# Patient Record
Sex: FEMALE | Race: WHITE | Employment: OTHER | URBAN - METROPOLITAN AREA
[De-identification: names, ages, dates, MRNs, and addresses within clinical notes are randomized per-mention and may not be internally consistent; named-entity substitution may affect disease eponyms.]

---

## 2018-07-19 ENCOUNTER — HOSPITAL ENCOUNTER (INPATIENT)
Age: 83
LOS: 1 days | Discharge: HOME | DRG: 149 | End: 2018-07-22
Attending: EMERGENCY MEDICINE | Admitting: INTERNAL MEDICINE
Payer: MEDICARE

## 2018-07-19 ENCOUNTER — APPOINTMENT (OUTPATIENT)
Dept: CT IMAGING | Age: 83
DRG: 149 | End: 2018-07-19
Attending: EMERGENCY MEDICINE
Payer: MEDICARE

## 2018-07-19 ENCOUNTER — APPOINTMENT (OUTPATIENT)
Dept: MRI IMAGING | Age: 83
DRG: 149 | End: 2018-07-19
Attending: INTERNAL MEDICINE
Payer: MEDICARE

## 2018-07-19 DIAGNOSIS — R42 DIZZINESS: ICD-10-CM

## 2018-07-19 DIAGNOSIS — R27.0 ATAXIA: ICD-10-CM

## 2018-07-19 DIAGNOSIS — R42 LIGHTHEADEDNESS: Primary | ICD-10-CM

## 2018-07-19 PROBLEM — R53.1 WEAKNESS: Status: ACTIVE | Noted: 2018-07-19

## 2018-07-19 LAB
ALBUMIN SERPL-MCNC: 3.7 G/DL (ref 3.5–5)
ALBUMIN/GLOB SERPL: 1.1 {RATIO} (ref 1.1–2.2)
ALP SERPL-CCNC: 68 U/L (ref 45–117)
ALT SERPL-CCNC: 23 U/L (ref 12–78)
ANION GAP SERPL CALC-SCNC: 7 MMOL/L (ref 5–15)
APPEARANCE UR: CLEAR
AST SERPL-CCNC: 21 U/L (ref 15–37)
ATRIAL RATE: 61 BPM
BACTERIA URNS QL MICRO: NEGATIVE /HPF
BASOPHILS # BLD: 0 K/UL (ref 0–0.1)
BASOPHILS NFR BLD: 0 % (ref 0–1)
BILIRUB SERPL-MCNC: 0.3 MG/DL (ref 0.2–1)
BILIRUB UR QL: NEGATIVE
BUN SERPL-MCNC: 12 MG/DL (ref 6–20)
BUN/CREAT SERPL: 13 (ref 12–20)
CALCIUM SERPL-MCNC: 8.6 MG/DL (ref 8.5–10.1)
CALCULATED P AXIS, ECG09: 60 DEGREES
CALCULATED R AXIS, ECG10: -39 DEGREES
CALCULATED T AXIS, ECG11: 24 DEGREES
CHLORIDE SERPL-SCNC: 96 MMOL/L (ref 97–108)
CO2 SERPL-SCNC: 27 MMOL/L (ref 21–32)
COLOR UR: NORMAL
CREAT SERPL-MCNC: 0.96 MG/DL (ref 0.55–1.02)
DIAGNOSIS, 93000: NORMAL
DIFFERENTIAL METHOD BLD: NORMAL
EOSINOPHIL # BLD: 0.1 K/UL (ref 0–0.4)
EOSINOPHIL NFR BLD: 1 % (ref 0–7)
EPITH CASTS URNS QL MICRO: NORMAL /LPF
ERYTHROCYTE [DISTWIDTH] IN BLOOD BY AUTOMATED COUNT: 12.4 % (ref 11.5–14.5)
GLOBULIN SER CALC-MCNC: 3.5 G/DL (ref 2–4)
GLUCOSE BLD STRIP.AUTO-MCNC: 91 MG/DL (ref 65–100)
GLUCOSE SERPL-MCNC: 154 MG/DL (ref 65–100)
GLUCOSE UR STRIP.AUTO-MCNC: NEGATIVE MG/DL
HCT VFR BLD AUTO: 38.6 % (ref 35–47)
HGB BLD-MCNC: 12.8 G/DL (ref 11.5–16)
HGB UR QL STRIP: NEGATIVE
HYALINE CASTS URNS QL MICRO: NORMAL /LPF (ref 0–5)
IMM GRANULOCYTES # BLD: 0 K/UL (ref 0–0.04)
IMM GRANULOCYTES NFR BLD AUTO: 0 % (ref 0–0.5)
KETONES UR QL STRIP.AUTO: NEGATIVE MG/DL
LEUKOCYTE ESTERASE UR QL STRIP.AUTO: NEGATIVE
LYMPHOCYTES # BLD: 2.2 K/UL (ref 0.8–3.5)
LYMPHOCYTES NFR BLD: 37 % (ref 12–49)
MAGNESIUM SERPL-MCNC: 1.9 MG/DL (ref 1.6–2.4)
MCH RBC QN AUTO: 29.6 PG (ref 26–34)
MCHC RBC AUTO-ENTMCNC: 33.2 G/DL (ref 30–36.5)
MCV RBC AUTO: 89.1 FL (ref 80–99)
MONOCYTES # BLD: 0.5 K/UL (ref 0–1)
MONOCYTES NFR BLD: 8 % (ref 5–13)
NEUTS SEG # BLD: 3.1 K/UL (ref 1.8–8)
NEUTS SEG NFR BLD: 53 % (ref 32–75)
NITRITE UR QL STRIP.AUTO: NEGATIVE
NRBC # BLD: 0 K/UL (ref 0–0.01)
NRBC BLD-RTO: 0 PER 100 WBC
P-R INTERVAL, ECG05: 208 MS
PH UR STRIP: 6.5 [PH] (ref 5–8)
PLATELET # BLD AUTO: 304 K/UL (ref 150–400)
PMV BLD AUTO: 9.6 FL (ref 8.9–12.9)
POTASSIUM SERPL-SCNC: 3.9 MMOL/L (ref 3.5–5.1)
PROT SERPL-MCNC: 7.2 G/DL (ref 6.4–8.2)
PROT UR STRIP-MCNC: NEGATIVE MG/DL
Q-T INTERVAL, ECG07: 422 MS
QRS DURATION, ECG06: 84 MS
QTC CALCULATION (BEZET), ECG08: 424 MS
RBC # BLD AUTO: 4.33 M/UL (ref 3.8–5.2)
RBC #/AREA URNS HPF: NORMAL /HPF (ref 0–5)
SERVICE CMNT-IMP: NORMAL
SODIUM SERPL-SCNC: 130 MMOL/L (ref 136–145)
SP GR UR REFRACTOMETRY: 1.01 (ref 1–1.03)
UR CULT HOLD, URHOLD: NORMAL
UROBILINOGEN UR QL STRIP.AUTO: 0.2 EU/DL (ref 0.2–1)
VENTRICULAR RATE, ECG03: 61 BPM
WBC # BLD AUTO: 5.9 K/UL (ref 3.6–11)
WBC URNS QL MICRO: NORMAL /HPF (ref 0–4)

## 2018-07-19 PROCEDURE — 74011250636 HC RX REV CODE- 250/636: Performed by: INTERNAL MEDICINE

## 2018-07-19 PROCEDURE — 93005 ELECTROCARDIOGRAM TRACING: CPT

## 2018-07-19 PROCEDURE — 74011250637 HC RX REV CODE- 250/637: Performed by: INTERNAL MEDICINE

## 2018-07-19 PROCEDURE — 82962 GLUCOSE BLOOD TEST: CPT

## 2018-07-19 PROCEDURE — 70450 CT HEAD/BRAIN W/O DYE: CPT

## 2018-07-19 PROCEDURE — 93880 EXTRACRANIAL BILAT STUDY: CPT

## 2018-07-19 PROCEDURE — 70551 MRI BRAIN STEM W/O DYE: CPT

## 2018-07-19 PROCEDURE — 83735 ASSAY OF MAGNESIUM: CPT | Performed by: EMERGENCY MEDICINE

## 2018-07-19 PROCEDURE — 70544 MR ANGIOGRAPHY HEAD W/O DYE: CPT

## 2018-07-19 PROCEDURE — 99285 EMERGENCY DEPT VISIT HI MDM: CPT

## 2018-07-19 PROCEDURE — 36415 COLL VENOUS BLD VENIPUNCTURE: CPT | Performed by: EMERGENCY MEDICINE

## 2018-07-19 PROCEDURE — 80053 COMPREHEN METABOLIC PANEL: CPT | Performed by: EMERGENCY MEDICINE

## 2018-07-19 PROCEDURE — 81001 URINALYSIS AUTO W/SCOPE: CPT | Performed by: EMERGENCY MEDICINE

## 2018-07-19 PROCEDURE — 85025 COMPLETE CBC W/AUTO DIFF WBC: CPT | Performed by: EMERGENCY MEDICINE

## 2018-07-19 PROCEDURE — 99218 HC RM OBSERVATION: CPT

## 2018-07-19 PROCEDURE — 74011250637 HC RX REV CODE- 250/637: Performed by: EMERGENCY MEDICINE

## 2018-07-19 RX ORDER — BUTALBITAL, ACETAMINOPHEN AND CAFFEINE 300; 40; 50 MG/1; MG/1; MG/1
1 CAPSULE ORAL
COMMUNITY
End: 2019-10-31 | Stop reason: CLARIF

## 2018-07-19 RX ORDER — SODIUM CHLORIDE 0.9 % (FLUSH) 0.9 %
5-10 SYRINGE (ML) INJECTION AS NEEDED
Status: DISCONTINUED | OUTPATIENT
Start: 2018-07-19 | End: 2018-07-22 | Stop reason: HOSPADM

## 2018-07-19 RX ORDER — ATORVASTATIN CALCIUM 20 MG/1
40 TABLET, FILM COATED ORAL
Status: DISCONTINUED | OUTPATIENT
Start: 2018-07-19 | End: 2018-07-20

## 2018-07-19 RX ORDER — SODIUM CHLORIDE 0.9 % (FLUSH) 0.9 %
5-10 SYRINGE (ML) INJECTION EVERY 8 HOURS
Status: DISCONTINUED | OUTPATIENT
Start: 2018-07-19 | End: 2018-07-22 | Stop reason: HOSPADM

## 2018-07-19 RX ORDER — CALCIUM CARBONATE 200(500)MG
1 TABLET,CHEWABLE ORAL
COMMUNITY
End: 2020-11-03

## 2018-07-19 RX ORDER — LANOLIN ALCOHOL/MO/W.PET/CERES
3 CREAM (GRAM) TOPICAL
Status: DISCONTINUED | OUTPATIENT
Start: 2018-07-19 | End: 2018-07-22 | Stop reason: HOSPADM

## 2018-07-19 RX ORDER — DIAZEPAM 5 MG/1
5 TABLET ORAL
Status: DISCONTINUED | OUTPATIENT
Start: 2018-07-19 | End: 2018-07-22 | Stop reason: HOSPADM

## 2018-07-19 RX ORDER — DICYCLOMINE HYDROCHLORIDE 10 MG/1
10 CAPSULE ORAL
Status: DISCONTINUED | OUTPATIENT
Start: 2018-07-19 | End: 2018-07-22 | Stop reason: HOSPADM

## 2018-07-19 RX ORDER — CLOPIDOGREL BISULFATE 75 MG/1
75 TABLET ORAL
Status: DISCONTINUED | OUTPATIENT
Start: 2018-07-19 | End: 2018-07-22 | Stop reason: HOSPADM

## 2018-07-19 RX ORDER — ENOXAPARIN SODIUM 100 MG/ML
40 INJECTION SUBCUTANEOUS
Status: DISCONTINUED | OUTPATIENT
Start: 2018-07-19 | End: 2018-07-22 | Stop reason: HOSPADM

## 2018-07-19 RX ORDER — FLUTICASONE PROPIONATE 50 MCG
1 SPRAY, SUSPENSION (ML) NASAL DAILY
COMMUNITY
End: 2021-04-06

## 2018-07-19 RX ORDER — FLUTICASONE PROPIONATE 50 MCG
2 SPRAY, SUSPENSION (ML) NASAL DAILY
Status: DISCONTINUED | OUTPATIENT
Start: 2018-07-20 | End: 2018-07-22 | Stop reason: HOSPADM

## 2018-07-19 RX ORDER — DICYCLOMINE HYDROCHLORIDE 10 MG/1
10 CAPSULE ORAL
COMMUNITY
End: 2020-11-03 | Stop reason: SDUPTHER

## 2018-07-19 RX ORDER — ONDANSETRON 4 MG/1
4 TABLET, ORALLY DISINTEGRATING ORAL
Status: DISCONTINUED | OUTPATIENT
Start: 2018-07-19 | End: 2018-07-22 | Stop reason: HOSPADM

## 2018-07-19 RX ORDER — ACETAMINOPHEN 325 MG/1
650 TABLET ORAL
Status: DISCONTINUED | OUTPATIENT
Start: 2018-07-19 | End: 2018-07-22 | Stop reason: HOSPADM

## 2018-07-19 RX ORDER — ASPIRIN 325 MG
325 TABLET ORAL ONCE
Status: COMPLETED | OUTPATIENT
Start: 2018-07-19 | End: 2018-07-19

## 2018-07-19 RX ORDER — NALOXONE HYDROCHLORIDE 0.4 MG/ML
0.4 INJECTION, SOLUTION INTRAMUSCULAR; INTRAVENOUS; SUBCUTANEOUS AS NEEDED
Status: DISCONTINUED | OUTPATIENT
Start: 2018-07-19 | End: 2018-07-22 | Stop reason: HOSPADM

## 2018-07-19 RX ORDER — ACETAMINOPHEN 650 MG/1
650 SUPPOSITORY RECTAL
Status: DISCONTINUED | OUTPATIENT
Start: 2018-07-19 | End: 2018-07-22 | Stop reason: HOSPADM

## 2018-07-19 RX ORDER — BUTALBITAL, ACETAMINOPHEN AND CAFFEINE 50; 325; 40 MG/1; MG/1; MG/1
1 TABLET ORAL
Status: DISCONTINUED | OUTPATIENT
Start: 2018-07-19 | End: 2018-07-22 | Stop reason: HOSPADM

## 2018-07-19 RX ORDER — ATENOLOL 25 MG/1
25 TABLET ORAL
Status: DISCONTINUED | OUTPATIENT
Start: 2018-07-19 | End: 2018-07-22 | Stop reason: HOSPADM

## 2018-07-19 RX ADMIN — CLOPIDOGREL BISULFATE 75 MG: 75 TABLET ORAL at 20:31

## 2018-07-19 RX ADMIN — ATENOLOL 25 MG: 25 TABLET ORAL at 20:30

## 2018-07-19 RX ADMIN — ENOXAPARIN SODIUM 40 MG: 40 INJECTION SUBCUTANEOUS at 20:31

## 2018-07-19 RX ADMIN — ASPIRIN 325 MG: 325 TABLET ORAL at 16:33

## 2018-07-19 RX ADMIN — DIAZEPAM 5 MG: 5 TABLET ORAL at 20:31

## 2018-07-19 RX ADMIN — ATORVASTATIN CALCIUM 40 MG: 20 TABLET, FILM COATED ORAL at 22:43

## 2018-07-19 RX ADMIN — ACETAMINOPHEN 650 MG: 325 TABLET ORAL at 22:43

## 2018-07-19 RX ADMIN — ONDANSETRON 4 MG: 4 TABLET, ORALLY DISINTEGRATING ORAL at 22:44

## 2018-07-19 RX ADMIN — Medication 10 ML: at 22:00

## 2018-07-19 RX ADMIN — MELATONIN TAB 3 MG 3 MG: 3 TAB at 22:43

## 2018-07-19 NOTE — ACP (ADVANCE CARE PLANNING)
Advance Care Planning (ACP) Provider Note - Comprehensive      Date of ACP Conversation: 07/19/18    Persons included in Conversation:  patient and family  Length of ACP Conversation in minutes:  15 minutes     Authorized Decision Maker (if patient is incapable of making informed decisions): This person is: patient's son, who is not available. Also present during this discussion was pt's cousin, who is an oncology nurse at [address] [place] [address] for ALL Patients with Decision Making Capacity:   Importance of advance care planning, including choosing a healthcare agent to communicate patient's healthcare decisions if patient lost the ability to make decisions, such as after a sudden illness  Exploration of values, goals, and preferences if recovery is not expected, even with continued medical treatment in the event of: cardiopulmonary arrest, imminent death  \"In these circumstances, what matters most to you? \"  \"What, if any, treatments would you want to avoid? \" Patient is not 979% certain that she would want heroic measures for resuscitation in the event of cardiopulmonary arrest, including chest compressions, defibrillation, intubation/mechanical ventilation. She will discuss it further with her cousin    Review of Existing Advance Directive:  N/A. Pt does have one but not available for review     For Serious or Chronic Illness:  Understanding of medical condition    Understanding of CPR, goals and expected outcomes, benefits and burdens discussed. Information on CPR success rates provided (e.g. for hospitalized adults, the average survival rate to discharge after cardiac arrest is about 17%. Many factors lower a patients chance of survival, including advanced age, performance status, malignancy, and presence of multiple comorbidities); Individual asked to communicate understanding of information in his/her own words.   Explored fears and concerns regarding CPR or possible outcomes     Interventions Provided:  Patient is not 023% certain that she would want heroic measures for resuscitation in the event of cardiopulmonary arrest, including chest compressions, defibrillation, intubation/mechanical ventilation. She will discuss it further with her cousin. As such, code status is FULL CODE for now. She declines palliative care consult.

## 2018-07-19 NOTE — IP AVS SNAPSHOT
[address] 
[phone] Patient: Amanda Mejia MRN: [id number] ECV:[id number]/96/[id number] A check arabella indicates which time of day the medication should be taken. My Medications CONTINUE taking these medications Instructions Each Dose to Equal  
 Morning Noon Evening Bedtime  
 atenolol 25 mg tablet Commonly known as:  TENORMIN Your last dose was:  7/21/18 at 5:42 PM  
   
 Take 25 mg by mouth daily (after dinner). 25 mg  
    
   
   
   
  
 calcium carbonate 200 mg calcium (500 mg) Chew Commonly known as:  TUMS Take 1 Tab by mouth four (4) times daily as needed. 1 Tab  
    
   
   
   
  
 diazePAM 5 mg tablet Commonly known as:  VALIUM Your last dose was:  7/21/18 at 8:50 PM  
   
 Take 5 mg by mouth nightly as needed. 5 mg  
    
   
   
   
  
 dicyclomine 10 mg capsule Commonly known as:  BENTYL Your last dose was:  7/22/18 at 8:50 PM  
   
 Take 10 mg by mouth three (3) times daily as needed (abdominal cramping). 10 mg  
    
   
   
   
  
 FIORICET -40 mg per capsule Generic drug:  butalbital-acetaminophen-caff Take 1 Cap by mouth every six (6) hours as needed for Pain. 1 Cap FLONASE ALLERGY RELIEF 50 mcg/actuation nasal spray Generic drug:  fluticasone Your last dose was:  7/22/18at 9:00 AM  
   
 2 Sprays by Both Nostrils route daily. 2 Spray  
    
   
   
   
  
 ondansetron hcl 4 mg tablet Commonly known as:  Melvi Huston Take 1 Tab by mouth every eight (8) hours as needed for Nausea. 4 mg PLAVIX 75 mg Tab Generic drug:  clopidogrel Your last dose was:  7/21/18 at 5:42 PM  
   
 Take 75 mg by mouth daily (after dinner).   
 75 mg

## 2018-07-19 NOTE — ED NOTES
Patient up to bedside commode with assist, no difficulty noted. Hospitalist at bedside evaluating patient at this time.

## 2018-07-19 NOTE — ROUTINE PROCESS
TRANSFER - OUT REPORT:    Verbal report given to Anders Gray RN (name) on Nithin Cruz  being transferred to 5th floor(unit) for routine progression of care       Report consisted of patients Situation, Background, Assessment and   Recommendations(SBAR). Information from the following report(s) ED Summary and MAR was reviewed with the receiving nurse. Lines:   Peripheral IV 07/19/18 Left Forearm (Active)   Site Assessment Clean, dry, & intact 7/19/2018  1:28 PM   Phlebitis Assessment 0 7/19/2018  1:28 PM   Infiltration Assessment 0 7/19/2018  1:28 PM   Dressing Status Clean, dry, & intact 7/19/2018  1:28 PM   Dressing Type Transparent 7/19/2018  1:28 PM   Hub Color/Line Status Pink 7/19/2018  1:28 PM        Opportunity for questions and clarification was provided.       Patient transported with:   [employer]

## 2018-07-19 NOTE — H&P
[facility] [address] [facility], [facility] [address] 
[phone] Hospitalist Admission Note NAME:  Roselia Campbell :   1935 MRN:  [id number] PCP:  Jim Resendez MD  
 
Date/Time:  2018 6:01 PM 
 
  
  
Subjective: CHIEF COMPLAINT: dizziness HISTORY OF PRESENT ILLNESS:    
Ms. Brenda Luciano is a 81 y.o. female w/ hx of TIA, pre-diabetes, breast CA, HTN who presents with dizziness. Ongoing x 1 week, intermittent, moderate, worsening. Last night her 13year-old granddaughter (whom she serves as legal guardian and primary care provider) noticed slurring of speech. Pt did not think she had slurred speech. Denied facial droop, numbness, visual impairment, weakness. Does report chronic headaches. ED workup showed hyponatremia. Head CT negative. Past Medical History:  
Diagnosis Date  Anxiety  Breast cancer ([facility] [address]   
 s/p lumpectomy  Diabetes mellitus ([facility] [address] diet-controlled  Hx-TIA (transient ischemic attack)  Hypertension  Hyponatremia  Melanoma ([facility] [address]   
 s/p resection, right cheek Past Surgical History:  
Procedure Laterality Date  HX BACK SURGERY    
 HX BREAST LUMPECTOMY    
 right  HX CHOLECYSTECTOMY  HX HYSTERECTOMY  HX MALIGNANT SKIN LESION EXCISION    HX MENISCECTOMY  HX OTHER SURGICAL    
 pineda's neuroma bilaterally Social History Substance Use Topics  Smoking status: Never Smoker  Smokeless tobacco: Not on file  Alcohol use No  
  
 
Family History Problem Relation Age of Onset  Heart Failure Mother  Other Father   
  aortic aneurysm  Diabetes Son  Immunodeficiency Son   
  post kidney and pancreas transplant Allergies Allergen Reactions  Sulfa (Sulfonamide Antibiotics) Nausea and Vomiting  Codeine Other (comments) Stomach cramping  Levaquin [Levofloxacin] Nausea and Vomiting   Pt can not take PO due to vomiting. Prior to Admission medications Medication Sig Start Date End Date Taking? Authorizing Provider  
dicyclomine (BENTYL) 10 mg capsule Take 10 mg by mouth three (3) times daily as needed (abdominal cramping). Yes Historical Provider  
butalbital-acetaminophen-caff (FIORICET) -40 mg per capsule Take 1 Cap by mouth every six (6) hours as needed for Pain. Yes Historical Provider  
fluticasone (FLONASE ALLERGY RELIEF) 50 mcg/actuation nasal spray 2 Sprays by Both Nostrils route daily. Yes Historical Provider  
calcium carbonate (TUMS) 200 mg calcium (500 mg) chew Take 1 Tab by mouth four (4) times daily as needed. Yes Historical Provider  
ondansetron hcl (ZOFRAN) 4 mg tablet Take 1 Tab by mouth every eight (8) hours as needed for Nausea. 3/25/13  Yes Shahriar Medina PA-C  
atenolol (TENORMIN) 25 mg tablet Take 25 mg by mouth daily (after dinner). Yes Historical Provider  
clopidogrel (PLAVIX) 75 mg tablet Take 75 mg by mouth daily (after dinner). Yes Historical Provider  
diazepam (VALIUM) 5 mg tablet Take 5 mg by mouth nightly as needed. Yes Historical Provider Review of Systems: 
(bold if positive, if negative) Gen:  Eyes:  ENT:  CVS:  dizzinessPulm:  GI:   
:   
MS:  Skin:  Psych:  Endo:   
Hem:  Renal:   
Neuro:  headache Objective: VITALS:   
Vital signs reviewed; most recent are: 
 
Visit Vitals  /57  Pulse 70  Temp 98.2 °F (36.8 °C)  Resp 20  
 Ht 5' 1\" (1.549 m)  Wt 52.2 kg (115 lb)  SpO2 97%  BMI 21.73 kg/m2 SpO2 Readings from Last 6 Encounters:  
07/19/18 97% 03/25/13 93% 03/12/13 95% 03/08/13 97% No intake or output data in the 24 hours ending 07/19/18 1801 Exam:  
 
Physical Exam: 
 
Gen:  Well-developed, well-nourished, in no acute distress HEENT:  No scleral icterus, PERRL, hearing intact to voice, moist mucous membranes Neck:  Supple, without masses. Thyroid non-tender Resp:  No accessory muscle use. CTAB without wheezing, rales, rhonchi 
Card: RRR. Normal S1 and S2 without murmurs, rubs, or gallops. No peripheral lower extremity edema. No JVD. Peripheral pulses in tact. Abd:  Normoactive bowel sounds. Soft, non-tender, non-distended. No rebound, no guarding. No appreciable hepatosplenomegaly Lymph:  No cervical adenopathy Musc:  No cyanosis or clubbing Skin:  No rashes or ulcers; turgor intact. Cap refill ~2 secs Neuro:  Cranial nerves 3-12 in tact. No focal motor weakness or numbness, follows commands appropriately Psych:  Good insight, normal affect. Alert, oriented x 3. Answers questions appropriately Labs: 
 
Recent Labs  
   07/19/18 
 1329 WBC  5.9 HGB  12.8 HCT  38.6 PLT  304 Recent Labs  
   07/19/18 
 1329 NA  130*  
K  3.9 CL  96* CO2  27 GLU  154* BUN  12  
CREA  0.96  
CA  8.6 MG  1.9 ALB  3.7 SGOT  21 ALT  23 No components found for: [place] No results for input(s): PH, PCO2, PO2, HCO3, FIO2 in the last 72 hours. No results for input(s): INR in the last 72 hours. No lab exists for component: INREXT Lab Results Component Value Date/Time Specimen Description: NARES 03/07/2013 04:00 PM  
 Specimen Description: SPUTUM 03/06/2013 04:15 PM  
 Specimen Description: BLOOD 03/05/2013 02:45 PM  
 
Lab Results Component Value Date/Time Culture result:  03/07/2013 04:00 PM  
  MRSA NOT PRESENT Screening of patient nares for MRSA is for surveillance purposes and, if positive, to facilitate isolation considerations in high risk settings. It is not intended for automatic decolonization interventions per se as regimens are not sufficiently effective to warrant routine use. Culture result: LIGHT NORMAL RESPIRATORY GABE 03/06/2013 04:15 PM  
 Culture result: NO GROWTH 5 DAYS 03/05/2013 02:45 PM  
 
All other current labs reviewed in the computer. Imaging/Studies: 
 
Head CT IMPRESSION: No evidence of acute process. EKG: NSR, NSST changes Assessment / Plan:   
  
  Dizziness: does have history of TIAs, but no focal findings on exam. Questionable slurring of speech overnight noted by her 13year old granddaughter at home. Patient herself does endorse lots of stressors occurring at home as her granddaughter (for whom she serves as primary guardian) needs a lot of medical/psychiatric attention. Will admit under observation and start TIA/CVA workup, including MRI/MRA brain, carotid dopplers, TTE. Check orthostatics, noting one BP reading in the 28L systolic. PT/OT evaluation. Continue Plavix. Start statin, check FLP. Self-reports \"pre-diabetes\". Random BG elevated. Check FBG and A1c. Neurology consult Hyponatremia: chronic. Stable. May need to fluid restrict. Follow Na Essential hypertension, benign: checking orthostatics as above. TTE. Can continue atenolol keeping in mind permissive HTN. Watch HR on BB. Anxiety / insomnia: continue diazepam. Add melatonin Code Status: FULL. See separate ACP note Surrogate decision maker: son ED notes and lab results reviewed. Total time spent with patient: 55 Minutes Time spent in the care of this patient included reviewing records, discussing with nursing, obtaining history and examining the patient, and discussing treatment plans, with >50% time spent counseling/coordinating care Risk of deterioration: High Care Plan discussed with: ED provider, Patient, Family, Nursing Staff and >50% of time spent in counseling and coordination of care Discussed:  Code Status, Care Plan and D/C Planning Prophylaxis:  Lovenox Disposition:  Home w/Family      
___________________________________________________ Attending Physician: Janith Klinefelter, MD

## 2018-07-19 NOTE — PROGRESS NOTES
[facility]: MED RECONCILIATION    Medications added:     · Dicyclomine PRN for abdominal cramping - patient reports that she has been using this much more often recently  · Fioricet PRN  · Flonase daily   · Tums PRN for stomach acid - reports using a lot lately    Medications removed:    · Mucinex  · Lansoprazole - reports that she thought it was taken off the market, but says she would like to have a prescription for a maintenance acid reducer  · Azithromycin - old Rx    Medications adjusted:    · none    Information obtained from: patient, Erasmo Cifuentes (cousin) present with permission, medication bottles provided for review, Rx query    Significant PMH/Disease States:   Past Medical History:   Diagnosis Date    Anxiety     Breast cancer ([facility] [address] 1993    s/p lumpectomy    Diabetes mellitus ([facility] [address]     diet-controlled    Hx-TIA (transient ischemic attack)     Hypertension     Hyponatremia     Melanoma ([facility] [address] 2008    s/p resection, right cheek     Chief Complaint for this Admission:   Chief Complaint   Patient presents with    Dizziness     Allergies: Sulfa (sulfonamide antibiotics); Codeine; and Levaquin [levofloxacin]    Prior to Admission Medications:     Medication Documentation Review Audit       Reviewed by NATA TaylorD (Pharmacist) on 07/19/18 at 1730         Medication Sig Documenting Provider Last Dose Status Taking?      atenolol (TENORMIN) 25 mg tablet Take 25 mg by mouth daily (after dinner). Historical Provider 7/18/2018 dinner Active Yes    butalbital-acetaminophen-caff (FIORICET) -40 mg per capsule Take 1 Cap by mouth every six (6) hours as needed for Pain. Historical Provider 7/19/2018 Unknown time Active Yes    calcium carbonate (TUMS) 200 mg calcium (500 mg) chew Take 1 Tab by mouth four (4) times daily as needed. Historical Provider 7/19/2018 Unknown time Active Yes    clopidogrel (PLAVIX) 75 mg tablet Take 75 mg by mouth daily (after dinner).  Historical Provider 7/18/2018 dinner Active Yes diazepam (VALIUM) 5 mg tablet Take 5 mg by mouth nightly as needed. Historical Provider 7/19/2018 0200 Active Yes    dicyclomine (BENTYL) 10 mg capsule Take 10 mg by mouth three (3) times daily as needed (abdominal cramping). Historical Provider 7/19/2018 Unknown time Active Yes    fluticasone (FLONASE ALLERGY RELIEF) 50 mcg/actuation nasal spray 2 Sprays by Both Nostrils route daily. Historical Provider 7/19/2018 am Active Yes    ondansetron hcl (ZOFRAN) 4 mg tablet Take 1 Tab by mouth every eight (8) hours as needed for Nausea. Jr Dallas PA-C 7/19/2018 Unknown time Active Yes                  Thank you for the consult,  Daniel Peña

## 2018-07-19 NOTE — PROGRESS NOTES
[address] call from report from DALE Davis. Awaiting patient arrival.    65  TRANSFER - IN REPORT:    Verbal report received from Susan RN(name) on Tracy Marshall  being received from ED(unit) for routine progression of care      Report consisted of patients Situation, Background, Assessment and   Recommendations(SBAR). Information from the following report(s) SBAR, Kardex, MAR, Recent Results and Cardiac Rhythm NSR was reviewed with the receiving nurse. Opportunity for questions and clarification was provided. Assessment completed upon patients arrival to unit and care assumed. Patient arrived in room. Patient requesting food. Cousin at bedside. 1835  MRI checklist completed. MRI tech Weston Diaz notified. Patient is requesting valium prior to test. Oncoming nurse and MRI notified. \      1900  Bedside and Verbal shift change report given to Sravanthi Cowan (oncoming nurse) by Leann Triana RN (offgoing nurse). Report included the following information SBAR, Kardex, MAR, Recent Results and Cardiac Rhythm NSR.

## 2018-07-19 NOTE — PROGRESS NOTES
7/19/2018  5:26 PM  Case Management Note    Met with patient and family member to discuss discharge planning. Confirmed demographics. Patients 13year old granddaughter lives with here. They live in a 2 story home with 5 steps to inter and 12 to upstairs. Patient does not have any DME, she used HH some years back and is unable to recall the name. Patient follows [facility] for medical management. No NN to notify. She gets her RX filled @ [facility] [address] through [facility]. Patient states she is being admitted for further testing, not confirmed at time of this note. Patient does have consults for PT/OT/ speech so may need something on discharge. Will continue to follow    Reason for Admission:  dizziness                   RRAT Score:          5           Plan for utilizing home health:      Unable to determine at this time                    Likelihood of Readmission:  Low/green                         Transition of Care Plan:     Unable to determine at this time    Care Management Interventions  PCP Verified by CM: Yes  Mode of Transport at Discharge: Self  Transition of Care Consult (CM Consult): Discharge Planning  Physical Therapy Consult: Yes  Occupational Therapy Consult: Yes  Speech Therapy Consult: Yes  Current Support Network:  Other (17 year old sohail lives with her)  Confirm Follow Up Transport: Family  Plan discussed with Pt/Family/Caregiver: Yes  Discharge Location  Discharge Placement: Unable to determine at this time  Usman Deluca, Josie N Nirav Gonzalez

## 2018-07-19 NOTE — ED TRIAGE NOTES
Patient sent by NP from PCP's office, has had progressive issues with weakness, dizziness, and lightheadedness. Has been going on for a couple of weeks, getting worse. Seems as though she is fine during the day but by the evening she is worn out. Adds that she is raising her granddaughter for the past 12 years. She is  and is doing this alone. Went to cardiology and had a EKG, which was unchanged since 2015. Cards wants a outpatient stress test.  States NP wanted CT scan of head. Brings lab results with as well.

## 2018-07-19 NOTE — ED PROVIDER NOTES
HPI Comments: 81 y.o. female with past medical history significant for DM, HTN, and TIA who presents from home with chief complaint of dizziness. Pt states she suddenly became lightheaded, generally weak, and nauseous after awakening from sleep at 11:30 last night. Pt's granddaughter observed slurred speech at that time. She states she recently has been becoming lightheaded after standing and walking. Pt states she visited her cardiologist yesterday and had lab work drawn this past Monday. Pt is currently taking Diazepam PRN and Zofran for nausea. Pt notes she was on Plavix in the past for a prior TIA. She also c/o SOB w/ exertion \"for a while\". In addition, she reports loss of appetite and weight loss which she attributes to stress after the passing of her  1 year ago. Pt notes she has hx of hyponatremia; she took 1 salt tablet prior to seeing cardiology yesterday. Pt's cousin reports Pt has occasional confusion and she fell ~1 month ago d/t \"losing her balance\". Pt is scheduled for a stress test on 8/15/18. Pt denies new medications. She denies chest pain, room-spinning dizziness, cough, fever, pain/swelling in legs, and black/bloody stools. There are no other acute medical concerns at this time. PCP: Beti Lopez MD    Note written by Cayla Prajapati, as dictated by Mendez Kirby MD 2:00 PM    The history is provided by the patient and a relative.         Past Medical History:   Diagnosis Date    Anxiety     Breast cancer ([facility] [address] 1993    s/p lumpectomy    Diabetes mellitus ([address]     diet-controlled    Hx-TIA (transient ischemic attack)     Hypertension     Hyponatremia     Melanoma ([facility] [address] 2008    s/p resection, right cheek       Past Surgical History:   Procedure Laterality Date    HX BACK SURGERY      HX BREAST LUMPECTOMY  1993    right    HX CHOLECYSTECTOMY      HX HYSTERECTOMY      HX MALIGNANT SKIN LESION EXCISION  2008    HX MENISCECTOMY      HX OTHER SURGICAL edwin's neuroma bilaterally         Family History:   Problem Relation Age of Onset    Heart Failure Mother     Other Father      aortic aneurysm    Diabetes Son     Immunodeficiency Son      post kidney and pancreas transplant       Social History     Social History    Marital status:      Spouse name: N/A    Number of children: N/A    Years of education: N/A     Occupational History    retired from retail      Social History Main Topics    Smoking status: Never Smoker    Smokeless tobacco: Not on file    Alcohol use No    Drug use: Not on file    Sexual activity: Not on file     Other Topics Concern    Not on file     Social History Narrative    Caretaker for granddaughter         ALLERGIES: Sulfa (sulfonamide antibiotics); Codeine; and Levaquin [levofloxacin]    Review of Systems   Constitutional: Positive for appetite change and unexpected weight change. Negative for fever. Respiratory: Positive for shortness of breath. Negative for cough. Cardiovascular: Negative for chest pain and leg swelling. Gastrointestinal: Positive for nausea. Negative for blood in stool. Neurological: Positive for weakness and light-headedness. Negative for dizziness. Psychiatric/Behavioral: Positive for confusion. All other systems reviewed and are negative.       Vitals:    07/19/18 1256 07/19/18 1319 07/19/18 1320   BP: 173/78 164/79    Pulse: 73 69 72   Resp: 16  15   Temp: 98.2 °F (36.8 °C)     SpO2: 98%  99%   Weight: 52.2 kg (115 lb)     Height: 5' 1\" (1.549 m)              Physical Exam   Physical Examination: General appearance - alert, well appearing, and in no distress, oriented to person, place, and time and normal appearing weight  Eyes - pupils equal and reactive, extraocular eye movements intact  Neck - supple, no significant adenopathy  Chest - clear to auscultation, no wheezes, rales or rhonchi, symmetric air entry  Heart - normal rate, regular rhythm, normal S1, S2, no murmurs, rubs, clicks or gallops  Abdomen - soft, nontender, nondistended, no masses or organomegaly  Back exam - full range of motion, no tenderness, palpable spasm or pain on motion  Neurological - alert, oriented, normal speech, no focal findings or movement disorder noted, normal f-n-f, no nystagmus, no pronator drift  Musculoskeletal - no joint tenderness, deformity or swelling  Extremities - peripheral pulses normal, no pedal edema, no clubbing or cyanosis  Skin - normal coloration and turgor, no rashes, no suspicious skin lesions noted  MDM  Number of Diagnoses or Management Options     Amount and/or Complexity of Data Reviewed  Clinical lab tests: ordered and reviewed  Tests in the radiology section of CPT®: ordered and reviewed  Decide to obtain previous medical records or to obtain history from someone other than the patient: yes  Obtain history from someone other than the patient: yes (family)  Review and summarize past medical records: yes  Independent visualization of images, tracings, or specimens: yes    Patient Progress  Patient progress: improved        ED Course       Procedures  ED EKG interpretation:  Rhythm: normal sinus rhythm; and regular . Rate (approx.): 61; Axis: left axis deviation; P wave: prolonged; QRS interval: normal ; ST/T wave: non-specific changes;   EKG documented by Gustavo Toledo MD      PROGRESS NOTE:  4:14 PM  Updated Pt on test results and plan for admission to r/o CVA/TIA. CONSULT NOTE:  4:16 PM Gustavo Toledo MD spoke with Dr. Sha Rubin, Consult for Hospitalist.  Discussed available diagnostic tests and clinical findings. Dr. Sha Rubin will see and admit Pt.

## 2018-07-19 NOTE — ED NOTES
Bedside shift change report given to Lara RN (oncoming nurse) by Monica Christianson RN (offgoing nurse). Report included the following information SBAR, ED Summary, MAR and Recent Results.

## 2018-07-19 NOTE — IP AVS SNAPSHOT
[address] [facility] 
[phone] Patient: Kandice Yo MRN: [id number] PEU:9/[id number]/[id number] About your hospitalization You were admitted on:  July 19, 2018 You last received care in the:  [facility] 5M1 MED SURG 1 You were discharged on:  July 22, 2018 Why you were hospitalized Your primary diagnosis was:  Not on File Your diagnoses also included:  Anxiety, History Of Tias, Essential Hypertension, Benign, Hyponatremia, Dizziness, Weakness Follow-up Information Follow up With Details Comments Contact Info MD Pj Walker [address] [address] [phone] Discharge Orders None A check arabella indicates which time of day the medication should be taken. My Medications CONTINUE taking these medications Instructions Each Dose to Equal  
 Morning Noon Evening Bedtime  
 atenolol 25 mg tablet Commonly known as:  TENORMIN Your last dose was:  7/21/18 at 5:42 PM  
   
 Take 25 mg by mouth daily (after dinner). 25 mg  
    
   
   
   
  
 calcium carbonate 200 mg calcium (500 mg) Chew Commonly known as:  TUMS Take 1 Tab by mouth four (4) times daily as needed. 1 Tab  
    
   
   
   
  
 diazePAM 5 mg tablet Commonly known as:  VALIUM Your last dose was:  7/21/18 at 8:50 PM  
   
 Take 5 mg by mouth nightly as needed. 5 mg  
    
   
   
   
  
 dicyclomine 10 mg capsule Commonly known as:  BENTYL Your last dose was:  7/22/18 at 8:50 PM  
   
 Take 10 mg by mouth three (3) times daily as needed (abdominal cramping). 10 mg  
    
   
   
   
  
 FIORICET -40 mg per capsule Generic drug:  butalbital-acetaminophen-caff Take 1 Cap by mouth every six (6) hours as needed for Pain. 1 Cap FLONASE ALLERGY RELIEF 50 mcg/actuation nasal spray Generic drug:  fluticasone Your last dose was:  7/22/18at 9:00 AM  
   
 2 Sprays by Both Nostrils route daily. 2 Spray  
    
   
   
   
  
 ondansetron hcl 4 mg tablet Commonly known as:  Alex Even Take 1 Tab by mouth every eight (8) hours as needed for Nausea. 4 mg PLAVIX 75 mg Tab Generic drug:  clopidogrel Your last dose was:  7/21/18 at 5:42 PM  
   
 Take 75 mg by mouth daily (after dinner). 75 mg Discharge Instructions ACUTE DIAGNOSES: 
Dizziness Dizziness CHRONIC MEDICAL DIAGNOSES: 
Problem List as of 7/22/2018  Date Reviewed: 7/19/2018 Codes Class Noted - Resolved Dizziness ICD-10-CM: B61 ICD-9-CM: 780.4  7/19/2018 - Present Weakness ICD-10-CM: R53.1 ICD-9-CM: 780.79  7/19/2018 - Present Pneumonia, organism unspecified(486) ICD-10-CM: J18.9 ICD-9-CM: 019  3/5/2013 - Present Hyponatremia ICD-10-CM: E87.1 ICD-9-CM: 276.1  3/5/2013 - Present Essential hypertension, benign (Chronic) ICD-10-CM: I10 
ICD-9-CM: 401.1  3/5/2013 - Present Anxiety (Chronic) ICD-10-CM: F41.9 ICD-9-CM: 300.00  3/5/2013 - Present History of TIAs (Chronic) ICD-10-CM: C34.20 
ICD-9-CM: V12.54  3/5/2013 - Present RESOLVED: Sinus tachycardia ICD-10-CM: R00.0 ICD-9-CM: 427.89  3/5/2013 - 3/8/2013 RESOLVED: Cough ICD-10-CM: R05 ICD-9-CM: 786.2  3/5/2013 - 3/8/2013 RESOLVED: Nausea & vomiting ICD-10-CM: R11.2 ICD-9-CM: 787.01  3/5/2013 - 3/8/2013 DISCHARGE MEDICATIONS:  
  
 
 
· It is important that you take the medication exactly as they are prescribed. · Keep your medication in the bottles provided by the pharmacist and keep a list of the medication names, dosages, and times to be taken in your wallet. · Do not take other medications without consulting your doctor. DIET:  Regular Diet ACTIVITY: Activity as tolerated ADDITIONAL INFORMATION: If you experience any of the following symptoms then please call your primary care physician or return to the emergency room if you cannot get hold of your doctor: Fever, chills, nausea, vomiting, diarrhea, change in mentation, falling, bleeding, shortness of breath. FOLLOW UP CARE: 
Dr. Jim Resenedz MD  you are to call and set up an appointment to see them in 2 weeks. Follow-up with nephrology, Dr Finn Araiza. Check labs in 3 days. Information obtained by : 
I understand that if any problems occur once I am at home I am to contact my physician. I understand and acknowledge receipt of the instructions indicated above. Physician's or R.N.'s Signature                                                                  Date/Time Patient or Representative Signature                                                          Date/Time LÃ[employer]nzanos Announcement We are excited to announce that we are making your provider's discharge notes available to you in LÃ¡nzanos. You will see these notes when they are completed and signed by the physician that discharged you from your recent hospital stay. If you have any questions or concerns about any information you see in LÃ[employer]nzanos, please call the Health Information Department where you were seen or reach out to your Primary Care Provider for more information about your plan of care. Introducing [facility]! Dear Anaya Fatima: Thank you for requesting a LÃ[employer]nz[employer] account. Our records indicate that you already have an active LÃ[employer]nzanos account. You can access your account anytime at [email or web address] Encentuate/Royal Yatri Holidayst Did you know that you can access your hospital and ER discharge instructions at any time in Savaree? You can also review all of your test results from your hospital stay or ER visit. Additional Information If you have questions, please visit the Frequently Asked Questions section of the [employer] website at [email or web address] Have [email or web address] [email or web address] [email or web address]/. Remember, Sav[place]e is NOT to be used for urgent needs. For medical emergencies, dial 911. Now available from your iPhone and Android! Introducing Pasquale Kaiser As a [employer] patient, I wanted to make you aware of our electronic visit tool called Pasquale Kaiser. MagTag/PubNub allows you to connect within minutes with a medical provider 24 hours a day, seven days a week via a mobile device or tablet or logging into a secure website from your computer. You can access Pasquale Kaiser from anywhere in the [place]. A virtual visit might be right for you when you have a simple condition and feel like you just dont want to get out of bed, or cant get away from work for an appointment, when your regular [facility] provider is not available (evenings, weekends or holidays), or when youre out of town and need minor care. Electronic visits cost only $49 and if the MagTag/PubNub provider determines a prescription is needed to treat your condition, one can be electronically transmitted to a nearby pharmacy*. Please take a moment to enroll today if you have not already done so. The enrollment process is free and takes just a few minutes. To enroll, please download the MagTag/PubNub ben to your tablet or phone, or visit [email or web address] to enroll on your computer. And, as an [address] [place] [address] patient with a [employer] account, the results of your visits will be scanned into your electronic medical record and your primary care provider will be able to view the scanned results. We urge you to continue to see your regular [employer] provider for your ongoing medical care. And while your primary care provider may not be the one available when you seek a [employer] virtual visit, the peace of mind you get from getting a real diagnosis real time can be priceless. For more information on [employer], view our Frequently Asked Questions (FAQs) at [email or web address]. Sincerely, 
 
Carmela Barlow MD 
Chief Medical Officer Yvonne Vo *:  certain medications cannot be prescribed via [employer] Providers Seen During Your Hospitalization Provider Specialty Primary office phone Neeta Silva MD Emergency Medicine [phone] Julita Shelton MD Internal Medicine [phone] Ileana Smyth MD Hospitalist [phone] Your Primary Care Physician (PCP) Primary Care Physician Office Phone Office Fax [address] [facility] [phone] You are allergic to the following Allergen Reactions Sulfa (Sulfonamide Antibiotics) Nausea and Vomiting Codeine Other (comments) Stomach cramping Levaquin (Levofloxacin) Nausea and Vomiting Pt can not take PO due to vomiting. Recent Documentation Height Weight Breastfeeding? BMI OB Status Smoking Status 1.549 m 52.2 kg No 21.73 kg/m2 Hysterectomy Never Smoker Emergency Contacts Name Discharge Info Relation Home Work Mobile Josefina Lamas DISCHARGE CAREGIVER [3] Daughter [21] [phone] Patient Belongings The following personal items are in your possession at time of discharge: 
     Visual Aid: Glasses, With patient          Jewelry: None       Other Valuables: Eyeglasses, [address] [facility], [address], Cell Phone Please provide this summary of care documentation to your next provider.  
  
  
 
  
Signatures-by signing, you are acknowledging that this After Visit Summary has been reviewed with you and you have received a copy. Patient Signature:  ____________________________________________________________ Date:  ____________________________________________________________  
  
Steve Mais Provider Signature:  ____________________________________________________________ Date:  ____________________________________________________________

## 2018-07-20 ENCOUNTER — APPOINTMENT (OUTPATIENT)
Dept: GENERAL RADIOLOGY | Age: 83
DRG: 149 | End: 2018-07-20
Attending: INTERNAL MEDICINE
Payer: MEDICARE

## 2018-07-20 LAB
ALBUMIN SERPL-MCNC: 3.2 G/DL (ref 3.5–5)
ALBUMIN/GLOB SERPL: 1 {RATIO} (ref 1.1–2.2)
ALP SERPL-CCNC: 60 U/L (ref 45–117)
ALT SERPL-CCNC: 21 U/L (ref 12–78)
ANION GAP SERPL CALC-SCNC: 6 MMOL/L (ref 5–15)
AST SERPL-CCNC: 20 U/L (ref 15–37)
BASOPHILS # BLD: 0 K/UL (ref 0–0.1)
BASOPHILS NFR BLD: 1 % (ref 0–1)
BILIRUB SERPL-MCNC: 0.3 MG/DL (ref 0.2–1)
BUN SERPL-MCNC: 13 MG/DL (ref 6–20)
BUN/CREAT SERPL: 14 (ref 12–20)
CALCIUM SERPL-MCNC: 8.6 MG/DL (ref 8.5–10.1)
CHLORIDE SERPL-SCNC: 97 MMOL/L (ref 97–108)
CHOLEST SERPL-MCNC: 169 MG/DL
CO2 SERPL-SCNC: 27 MMOL/L (ref 21–32)
CREAT SERPL-MCNC: 0.9 MG/DL (ref 0.55–1.02)
DIFFERENTIAL METHOD BLD: ABNORMAL
EOSINOPHIL # BLD: 0.1 K/UL (ref 0–0.4)
EOSINOPHIL NFR BLD: 2 % (ref 0–7)
ERYTHROCYTE [DISTWIDTH] IN BLOOD BY AUTOMATED COUNT: 12.2 % (ref 11.5–14.5)
EST. AVERAGE GLUCOSE BLD GHB EST-MCNC: 117 MG/DL
GLOBULIN SER CALC-MCNC: 3.1 G/DL (ref 2–4)
GLUCOSE SERPL-MCNC: 96 MG/DL (ref 65–100)
HBA1C MFR BLD: 5.7 % (ref 4.2–6.3)
HCT VFR BLD AUTO: 35.9 % (ref 35–47)
HDLC SERPL-MCNC: 65 MG/DL
HDLC SERPL: 2.6 {RATIO} (ref 0–5)
HGB BLD-MCNC: 12.2 G/DL (ref 11.5–16)
IMM GRANULOCYTES # BLD: 0 K/UL (ref 0–0.04)
IMM GRANULOCYTES NFR BLD AUTO: 0 % (ref 0–0.5)
LDLC SERPL CALC-MCNC: 84.2 MG/DL (ref 0–100)
LIPID PROFILE,FLP: NORMAL
LYMPHOCYTES # BLD: 3.6 K/UL (ref 0.8–3.5)
LYMPHOCYTES NFR BLD: 52 % (ref 12–49)
MAGNESIUM SERPL-MCNC: 2 MG/DL (ref 1.6–2.4)
MCH RBC QN AUTO: 29.8 PG (ref 26–34)
MCHC RBC AUTO-ENTMCNC: 34 G/DL (ref 30–36.5)
MCV RBC AUTO: 87.6 FL (ref 80–99)
MONOCYTES # BLD: 0.6 K/UL (ref 0–1)
MONOCYTES NFR BLD: 9 % (ref 5–13)
NEUTS SEG # BLD: 2.6 K/UL (ref 1.8–8)
NEUTS SEG NFR BLD: 37 % (ref 32–75)
NRBC # BLD: 0 K/UL (ref 0–0.01)
NRBC BLD-RTO: 0 PER 100 WBC
PHOSPHATE SERPL-MCNC: 4.1 MG/DL (ref 2.6–4.7)
PLATELET # BLD AUTO: 253 K/UL (ref 150–400)
PMV BLD AUTO: 9 FL (ref 8.9–12.9)
POTASSIUM SERPL-SCNC: 4.1 MMOL/L (ref 3.5–5.1)
PROT SERPL-MCNC: 6.3 G/DL (ref 6.4–8.2)
RBC # BLD AUTO: 4.1 M/UL (ref 3.8–5.2)
SODIUM SERPL-SCNC: 130 MMOL/L (ref 136–145)
TRIGL SERPL-MCNC: 99 MG/DL (ref ?–150)
VLDLC SERPL CALC-MCNC: 19.8 MG/DL
WBC # BLD AUTO: 7.1 K/UL (ref 3.6–11)

## 2018-07-20 PROCEDURE — 74011250636 HC RX REV CODE- 250/636: Performed by: INTERNAL MEDICINE

## 2018-07-20 PROCEDURE — 83036 HEMOGLOBIN GLYCOSYLATED A1C: CPT | Performed by: INTERNAL MEDICINE

## 2018-07-20 PROCEDURE — 80053 COMPREHEN METABOLIC PANEL: CPT | Performed by: INTERNAL MEDICINE

## 2018-07-20 PROCEDURE — 74011250637 HC RX REV CODE- 250/637: Performed by: PHYSICIAN ASSISTANT

## 2018-07-20 PROCEDURE — 85025 COMPLETE CBC W/AUTO DIFF WBC: CPT | Performed by: INTERNAL MEDICINE

## 2018-07-20 PROCEDURE — 74011250637 HC RX REV CODE- 250/637: Performed by: INTERNAL MEDICINE

## 2018-07-20 PROCEDURE — 84100 ASSAY OF PHOSPHORUS: CPT | Performed by: INTERNAL MEDICINE

## 2018-07-20 PROCEDURE — 97165 OT EVAL LOW COMPLEX 30 MIN: CPT

## 2018-07-20 PROCEDURE — 97535 SELF CARE MNGMENT TRAINING: CPT

## 2018-07-20 PROCEDURE — G8987 SELF CARE CURRENT STATUS: HCPCS

## 2018-07-20 PROCEDURE — 36415 COLL VENOUS BLD VENIPUNCTURE: CPT | Performed by: INTERNAL MEDICINE

## 2018-07-20 PROCEDURE — 99218 HC RM OBSERVATION: CPT

## 2018-07-20 PROCEDURE — 83735 ASSAY OF MAGNESIUM: CPT | Performed by: INTERNAL MEDICINE

## 2018-07-20 PROCEDURE — G8988 SELF CARE GOAL STATUS: HCPCS

## 2018-07-20 PROCEDURE — 93306 TTE W/DOPPLER COMPLETE: CPT

## 2018-07-20 PROCEDURE — 71046 X-RAY EXAM CHEST 2 VIEWS: CPT

## 2018-07-20 PROCEDURE — 80061 LIPID PANEL: CPT | Performed by: INTERNAL MEDICINE

## 2018-07-20 PROCEDURE — 97116 GAIT TRAINING THERAPY: CPT

## 2018-07-20 PROCEDURE — 97161 PT EVAL LOW COMPLEX 20 MIN: CPT

## 2018-07-20 RX ORDER — POLYETHYLENE GLYCOL 3350 17 G/17G
17 POWDER, FOR SOLUTION ORAL DAILY
Status: DISCONTINUED | OUTPATIENT
Start: 2018-07-20 | End: 2018-07-22 | Stop reason: HOSPADM

## 2018-07-20 RX ORDER — TRAMADOL HYDROCHLORIDE 50 MG/1
50 TABLET ORAL
Status: DISCONTINUED | OUTPATIENT
Start: 2018-07-20 | End: 2018-07-22 | Stop reason: HOSPADM

## 2018-07-20 RX ADMIN — POLYETHYLENE GLYCOL 3350 17 G: 17 POWDER, FOR SOLUTION ORAL at 12:50

## 2018-07-20 RX ADMIN — ATENOLOL 25 MG: 25 TABLET ORAL at 17:21

## 2018-07-20 RX ADMIN — Medication 10 ML: at 13:13

## 2018-07-20 RX ADMIN — MELATONIN TAB 3 MG 3 MG: 3 TAB at 21:02

## 2018-07-20 RX ADMIN — ONDANSETRON 4 MG: 4 TABLET, ORALLY DISINTEGRATING ORAL at 06:54

## 2018-07-20 RX ADMIN — CLOPIDOGREL BISULFATE 75 MG: 75 TABLET ORAL at 17:21

## 2018-07-20 RX ADMIN — TRAMADOL HYDROCHLORIDE 50 MG: 50 TABLET, FILM COATED ORAL at 09:54

## 2018-07-20 RX ADMIN — Medication 10 ML: at 21:41

## 2018-07-20 RX ADMIN — ENOXAPARIN SODIUM 40 MG: 40 INJECTION SUBCUTANEOUS at 21:03

## 2018-07-20 RX ADMIN — Medication 10 ML: at 06:00

## 2018-07-20 RX ADMIN — FLUTICASONE PROPIONATE 2 SPRAY: 50 SPRAY, METERED NASAL at 09:54

## 2018-07-20 RX ADMIN — ACETAMINOPHEN 650 MG: 325 TABLET ORAL at 20:59

## 2018-07-20 RX ADMIN — ACETAMINOPHEN 650 MG: 325 TABLET ORAL at 04:25

## 2018-07-20 RX ADMIN — DICYCLOMINE HYDROCHLORIDE 10 MG: 10 CAPSULE ORAL at 04:35

## 2018-07-20 NOTE — PROGRESS NOTES
Cody Osorio [address] [place] [address]  [address] [facility], [address] [place] [address]  [phone]      Medical Progress Note      NAME: Marlin Hoff   :  1935  MRM:  [id number]    Date/Time: 2018  8:06 AM       Assessment and Plan:   1. 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 MRI/MRA brain, carotid dopplers are unremarkable. Check TTE. Check orthostatics. PT/OT evaluation. Continue Plavix. Neurology consult     2. Hyponatremia: chronic. Stable. May need to fluid restrict. Check urin and serum osmolality, urin sodium and TSH. Consult nephrology      3.  Essential hypertension, benign: on atenolol.      4. Anxiety / insomnia: a lot of stress at home. continue diazepam.     5.  Generalized abdominal pain/ Hx IBS. Has severe abdominal pain. Follows with Dr Joel Cota. Symptomatic treatment and consult GI           Subjective:     Chief Complaint:  Follow up of pt who was admitted with dizziness. C/o severe abdominal pain     ROS:  (bold if positive, if negative)    Abd Pain  Tolerating PT  Tolerating Diet        Objective:     Last 24hrs VS reviewed since prior progress note.  Most recent are:    Visit Vitals    /67 (BP 1 Location: Left arm, BP Patient Position: At rest)    Pulse 66    Temp 97.8 °F (36.6 °C)    Resp 17    Ht 5' 1\" (1.549 m)    Wt 52.2 kg (115 lb)    SpO2 95%    BMI 21.73 kg/m2     SpO2 Readings from Last 6 Encounters:   18 95%   13 93%   13 95%   13 97%        No intake or output data in the 24 hours ending 18 0806     Physical Exam:    Gen:  Well-developed, well-nourished, in no acute distress  HEENT:  Pink conjunctivae, PERRL, hearing intact to voice, moist mucous membranes  Neck:  Supple, without masses, thyroid non-tender  Resp:  No accessory muscle use, clear breath sounds without wheezes rales or rhonchi  Card:  No murmurs, normal S1, S2 without thrills, bruits or peripheral edema  Abd:  Soft, non-tender, non-distended, normoactive bowel sounds are present, no palpable organomegaly and no detectable hernias  Lymph:  No cervical or inguinal adenopathy  Musc:  No cyanosis or clubbing  Skin:  No rashes or ulcers, skin turgor is good  Neuro:  Cranial nerves are grossly intact, no focal motor weakness, follows commands appropriately  Psych:  Good insight, oriented to person, place and time, alert  __________________________________________________________________  Medications Reviewed: (see below)  Medications:     Current Facility-Administered Medications   Medication Dose Route Frequency    sodium chloride (NS) flush 5-10 mL  5-10 mL IntraVENous Q8H    sodium chloride (NS) flush 5-10 mL  5-10 mL IntraVENous PRN    naloxone (NARCAN) injection 0.4 mg  0.4 mg IntraVENous PRN    sodium chloride (NS) flush 5-10 mL  5-10 mL IntraVENous Q8H    sodium chloride (NS) flush 5-10 mL  5-10 mL IntraVENous PRN    acetaminophen (TYLENOL) tablet 650 mg  650 mg Oral Q4H PRN    Or    acetaminophen (TYLENOL) solution 650 mg  650 mg Per NG tube Q4H PRN    Or    acetaminophen (TYLENOL) suppository 650 mg  650 mg Rectal Q4H PRN    enoxaparin (LOVENOX) injection 40 mg  40 mg SubCUTAneous QHS    atenolol (TENORMIN) tablet 25 mg  25 mg Oral PCD    butalbital-acetaminophen-caffeine (FIORICET, ESGIC) -40 mg per tablet 1 Tab  1 Tab Oral Q6H PRN    clopidogrel (PLAVIX) tablet 75 mg  75 mg Oral PCD    diazePAM (VALIUM) tablet 5 mg  5 mg Oral QHS PRN    dicyclomine (BENTYL) capsule 10 mg  10 mg Oral TID PRN    fluticasone (FLONASE) 50 mcg/actuation nasal spray 2 Spray  2 Spray Both Nostrils DAILY    ondansetron (ZOFRAN ODT) tablet 4 mg  4 mg Oral Q8H PRN    melatonin tablet 3 mg  3 mg Oral QHS    atorvastatin (LIPITOR) tablet 40 mg  40 mg Oral QHS        Lab Data Reviewed: (see below)  Lab Review:     Recent Labs      07/20/18   0202  07/19/18   1329   WBC  7.1  5.9   HGB  12.2  12.8   HCT  35.9  38.6   PLT  253  304     Recent Labs      07/20/18 0202 07/19/18   1329   NA  130*  130*   K  4.1  3.9   CL  97  96*   CO2  27  27   GLU  96  154*   BUN  13  12   CREA  0.90  0.96   CA  8.6  8.6   MG  2.0  1.9   PHOS  4.1   --    ALB  3.2*  3.7   TBILI  0.3  0.3   SGOT  20  21   ALT  21  23     Lab Results   Component Value Date/Time    Glucose (POC) 91 07/19/2018 03:22 PM    Glucose (POC) 104 03/12/2013 12:38 PM    Glucose (POC) 106 (H) 03/08/2013 07:37 AM    Glucose (POC) 123 (H) 03/07/2013 10:40 PM    Glucose (POC) 113 (H) 03/07/2013 05:00 PM    Glucose (POC) 97 03/07/2013 11:20 AM     No results for input(s): PH, PCO2, PO2, HCO3, FIO2 in the last 72 hours. No results for input(s): INR in the last 72 hours. No lab exists for component: INREXT  All Micro Results     Procedure Component Value Units Date/Time    URINE CULTURE HOLD SAMPLE [[id number]] Collected:  07/19/18 1330    Order Status:  Completed Specimen:  Urine from Serum Updated:  07/19/18 1334     Urine culture hold         URINE ON HOLD IN MICROBIOLOGY DEPT FOR 3 DAYS. IF UNPRESERVED URINE IS SUBMITTED, IT CANNOT BE USED FOR ADDITIONAL TESTING AFTER 24 HRS, RECOLLECTION WILL BE REQUIRED. I have reviewed notes of prior 24hr. Other pertinent lab:       Total time spent with patient: [place] [address] discussed with: Patient, Nursing Staff and >50% of time spent in counseling and coordination of care    Discussed:  Care Plan    Prophylaxis:  Lovenox    Disposition:  Home w/Family           ___________________________________________________    Attending Physician: Traci Pennington MD

## 2018-07-20 NOTE — ROUTINE PROCESS
Bedside shift change report given to Aleida Tolbert (oncoming nurse) by Annie Fabian (offgoing nurse). Report included the following information SBAR, Kardex, Intake/Output, MAR, Accordion, Recent Results and Med Rec Status.

## 2018-07-20 NOTE — CONSULTS
Gastroenterology Consultation Note      Admit Date: 7/19/2018  Consult Date: 7/20/2018   I greatly appreciate your asking me to see Columbus Favia, thank you very much for the opportunity to participate in her care. Narrative Assessment and Plan   · Lower abdominal cramps  · Chronic constipation  · Hx of pancreatic cyst 2008 - stable on CT scan abdomen/pelvis 2017    I believe her constipation is contributing to her intermittent lower abdominal cramping as symptoms improves with having a bowel movement. She's uses a lot of medication as needed but not on a daily regimen. Recommend trial of daily Miralax to see if this controls her symptoms  Okay to continue Bentyl as needed    Attending evaluation (Dr. Doretha Vizcaino) to follow    Subjective:     Chief Complaint: abdominal cramps    History of Present Illness: Rito Hussein is a 81 y.o. female who presented with complaints of dizziness and concern for stroke. Workup was negative. We have been asked to evaluate patient for abdominal pain. Upon my discussion with patient she states \"this is not anything unusual\". She has been dealing with GI symptoms for years. This morning she had severe lower abdominal cramps and a burning sensation. Improved with having a bowel movement. She tries to have a bowel movement daily but sometimes requires Miralax, enema or suppository. Sometimes the stool is hard and difficult to pass. No visible blood in stool. Last evaluation in office in 2017 by Dr. Dannie Perry - she was scheduled to have EGD/Colon but constipation was so severe that it can canceled until it resolved so that prep would be adequate. She never has procedures done. No additional complaints.     PCP:  Mae Bills MD    Past Medical History:   Diagnosis Date    Anxiety     [facility] cancer [facility]) 1993    s/p lumpectomy    Diabetes mellitus ([facility] [address]     diet-controlled    Hx-TIA (transient ischemic attack)     Hypertension     Hyponatremia     Melanoma ([facility] [address] 2008    s/p resection, right cheek        Past Surgical History:   Procedure Laterality Date    HX BACK SURGERY      HX BREAST LUMPECTOMY  1993    right    HX CHOLECYSTECTOMY      HX HYSTERECTOMY      HX MALIGNANT SKIN LESION EXCISION      HX MENISCECTOMY      HX OTHER SURGICAL      pineda's neuroma bilaterally       Social History   Substance Use Topics    Smoking status: Never Smoker    Smokeless tobacco: Not on file    Alcohol use No        Family History   Problem Relation Age of Onset    Heart Failure Mother     Other Father      aortic aneurysm    Diabetes Son     Immunodeficiency Son      post kidney and pancreas transplant        Allergies   Allergen Reactions    Sulfa (Sulfonamide Antibiotics) Nausea and Vomiting    Codeine Other (comments)     Stomach cramping    Levaquin [Levofloxacin] Nausea and Vomiting     Pt can not take PO due to vomiting. Home Medications:  Prior to Admission Medications   Prescriptions Last Dose Informant Patient Reported? Taking?   atenolol (TENORMIN) 25 mg tablet 2018 at dinner Self Yes Yes   Sig: Take 25 mg by mouth daily (after dinner). butalbital-acetaminophen-caff (FIORICET) -40 mg per capsule 2018 at Unknown time Self Yes Yes   Sig: Take 1 Cap by mouth every six (6) hours as needed for Pain. calcium carbonate (TUMS) 200 mg calcium (500 mg) chew 2018 at Unknown time Self Yes Yes   Sig: Take 1 Tab by mouth four (4) times daily as needed. clopidogrel (PLAVIX) 75 mg tablet 2018 at dinner Self Yes Yes   Sig: Take 75 mg by mouth daily (after dinner). diazepam (VALIUM) 5 mg tablet 2018 at 0200 Self Yes Yes   Sig: Take 5 mg by mouth nightly as needed. dicyclomine (BENTYL) 10 mg capsule 2018 at Unknown time Self Yes Yes   Sig: Take 10 mg by mouth three (3) times daily as needed (abdominal cramping).    fluticasone (FLONASE ALLERGY RELIEF) 50 mcg/actuation nasal spray 2018 at am Self Yes Yes   Si Sprays by Both Nostrils route daily. ondansetron hcl (ZOFRAN) 4 mg tablet 7/19/2018 at Unknown time Self No Yes   Sig: Take 1 Tab by mouth every eight (8) hours as needed for Nausea.       Facility-Administered Medications: None       Hospital Medications:  Current Facility-Administered Medications   Medication Dose Route Frequency    traMADol (ULTRAM) tablet 50 mg  50 mg Oral Q6H PRN    sodium chloride (NS) flush 5-10 mL  5-10 mL IntraVENous Q8H    sodium chloride (NS) flush 5-10 mL  5-10 mL IntraVENous PRN    naloxone (NARCAN) injection 0.4 mg  0.4 mg IntraVENous PRN    sodium chloride (NS) flush 5-10 mL  5-10 mL IntraVENous Q8H    sodium chloride (NS) flush 5-10 mL  5-10 mL IntraVENous PRN    acetaminophen (TYLENOL) tablet 650 mg  650 mg Oral Q4H PRN    Or    acetaminophen (TYLENOL) solution 650 mg  650 mg Per NG tube Q4H PRN    Or    acetaminophen (TYLENOL) suppository 650 mg  650 mg Rectal Q4H PRN    enoxaparin (LOVENOX) injection 40 mg  40 mg SubCUTAneous QHS    atenolol (TENORMIN) tablet 25 mg  25 mg Oral PCD    butalbital-acetaminophen-caffeine (FIORICET, ESGIC) -40 mg per tablet 1 Tab  1 Tab Oral Q6H PRN    clopidogrel (PLAVIX) tablet 75 mg  75 mg Oral PCD    diazePAM (VALIUM) tablet 5 mg  5 mg Oral QHS PRN    dicyclomine (BENTYL) capsule 10 mg  10 mg Oral TID PRN    fluticasone (FLONASE) 50 mcg/actuation nasal spray 2 Spray  2 Spray Both Nostrils DAILY    ondansetron (ZOFRAN ODT) tablet 4 mg  4 mg Oral Q8H PRN    melatonin tablet 3 mg  3 mg Oral QHS       Review of Systems: Admission ROS by Alicia Dunham MD from 7/19/2018 were reviewed with the patient and changes (other than per HPI) include: none      Objective:     Physical Exam:  Visit Vitals    /84 (BP 1 Location: Left arm, BP Patient Position: Post activity;Standing)    Pulse 92    Temp 97.8 °F (36.6 °C)    Resp 17    Ht 5' 1\" (1.549 m)    Wt 52.2 kg (115 lb)    SpO2 95%    BMI 21.73 kg/m2     SpO2 Readings from Last 6 Encounters:   07/20/18 95%   03/25/13 93%   03/12/13 95%   03/08/13 97%        No intake or output data in the 24 hours ending 07/20/18 1146   General: no distress, comfortable  Skin:  No rash or palpable dermatologic mass lesions  HEENT: Pupils equal, sclera anicteric, oropharynx with no gross lesions  Cardiovascular: regular rate and rhythm  Respiratory:  No abnormal audible breath sounds, normal respiratory effort, no throacic deformity  GI:  Bowel sounds present, soft, nondistended, minimal lower abdominal discomfort  Musculoskeletal:  No skeletal deformity nor acute arthritis noted. Neurological:  Motor and sensory function intact in upper extremeties  Psychiatric:  Normal affect, memory intact, appears to have insight into current illness    Laboratory:    Recent Results (from the past 24 hour(s))   CBC WITH AUTOMATED DIFF    Collection Time: 07/19/18  1:29 PM   Result Value Ref Range    WBC 5.9 3.6 - 11.0 K/uL    RBC 4.33 3.80 - 5.20 M/uL    HGB 12.8 11.5 - 16.0 g/dL    HCT 38.6 35.0 - 47.0 %    MCV 89.1 80.0 - 99.0 FL    MCH 29.6 26.0 - 34.0 PG    MCHC 33.2 30.0 - 36.5 g/dL    RDW 12.4 11.5 - 14.5 %    PLATELET 793 297 - 697 K/uL    MPV 9.6 8.9 - 12.9 FL    NRBC 0.0 0  WBC    ABSOLUTE NRBC 0.00 0.00 - 0.01 K/uL    NEUTROPHILS 53 32 - 75 %    LYMPHOCYTES 37 12 - 49 %    MONOCYTES 8 5 - 13 %    EOSINOPHILS 1 0 - 7 %    BASOPHILS 0 0 - 1 %    IMMATURE GRANULOCYTES 0 0.0 - 0.5 %    ABS. NEUTROPHILS 3.1 1.8 - 8.0 K/UL    ABS. LYMPHOCYTES 2.2 0.8 - 3.5 K/UL    ABS. MONOCYTES 0.5 0.0 - 1.0 K/UL    ABS. EOSINOPHILS 0.1 0.0 - 0.4 K/UL    ABS. BASOPHILS 0.0 0.0 - 0.1 K/UL    ABS. IMM.  GRANS. 0.0 0.00 - 0.04 K/UL    DF AUTOMATED     MAGNESIUM    Collection Time: 07/19/18  1:29 PM   Result Value Ref Range    Magnesium 1.9 1.6 - 2.4 mg/dL   METABOLIC PANEL, COMPREHENSIVE    Collection Time: 07/19/18  1:29 PM   Result Value Ref Range    Sodium 130 (L) 136 - 145 mmol/L    Potassium 3.9 3.5 - 5.1 mmol/L    Chloride 96 (L) 97 - 108 mmol/L    CO2 27 21 - 32 mmol/L    Anion gap 7 5 - 15 mmol/L    Glucose 154 (H) 65 - 100 mg/dL    BUN 12 6 - 20 MG/DL    Creatinine 0.96 0.55 - 1.02 MG/DL    BUN/Creatinine ratio 13 12 - 20      GFR est AA >60 >60 ml/min/1.73m2    GFR est non-AA 56 (L) >60 ml/min/1.73m2    Calcium 8.6 8.5 - 10.1 MG/DL    Bilirubin, total 0.3 0.2 - 1.0 MG/DL    ALT (SGPT) 23 12 - 78 U/L    AST (SGOT) 21 15 - 37 U/L    Alk. phosphatase 68 45 - 117 U/L    Protein, total 7.2 6.4 - 8.2 g/dL    Albumin 3.7 3.5 - 5.0 g/dL    Globulin 3.5 2.0 - 4.0 g/dL    A-G Ratio 1.1 1.1 - 2.2     URINALYSIS W/MICROSCOPIC    Collection Time: 07/19/18  1:30 PM   Result Value Ref Range    Color YELLOW/STRAW      Appearance CLEAR CLEAR      Specific gravity 1.012 1.003 - 1.030      pH (UA) 6.5 5.0 - 8.0      Protein NEGATIVE  NEG mg/dL    Glucose NEGATIVE  NEG mg/dL    Ketone NEGATIVE  NEG mg/dL    Bilirubin NEGATIVE  NEG      Blood NEGATIVE  NEG      Urobilinogen 0.2 0.2 - 1.0 EU/dL    Nitrites NEGATIVE  NEG      Leukocyte Esterase NEGATIVE  NEG      WBC 0-4 0 - 4 /hpf    RBC 0-5 0 - 5 /hpf    Epithelial cells FEW FEW /lpf    Bacteria NEGATIVE  NEG /hpf    Hyaline cast 0-2 0 - 5 /lpf   URINE CULTURE HOLD SAMPLE    Collection Time: 07/19/18  1:30 PM   Result Value Ref Range    Urine culture hold        URINE ON HOLD IN MICROBIOLOGY DEPT FOR 3 DAYS. IF UNPRESERVED URINE IS SUBMITTED, IT CANNOT BE USED FOR ADDITIONAL TESTING AFTER 24 HRS, RECOLLECTION WILL BE REQUIRED.    EKG, 12 LEAD, INITIAL    Collection Time: 07/19/18  1:39 PM   Result Value Ref Range    Ventricular Rate 61 BPM    Atrial Rate 61 BPM    P-R Interval 208 ms    QRS Duration 84 ms    Q-T Interval 422 ms    QTC Calculation (Bezet) 424 ms    Calculated P Axis 60 degrees    Calculated R Axis -39 degrees    Calculated T Axis 24 degrees    Diagnosis       Normal sinus rhythm  Left axis deviation  Minimal voltage criteria for LVH, may be normal variant  Nonspecific ST abnormality  Abnormal ECG  When compared with ECG of 05-MAR-2013 15:28,  Vent. rate has decreased BY  40 BPM  Nonspecific T wave abnormality no longer evident in Anterior leads  Confirmed by Lenore Chávez MD. ([id number]) on 7/19/2018 10:41:38 PM     GLUCOSE, POC    Collection Time: 07/19/18  3:22 PM   Result Value Ref Range    Glucose (POC) 91 65 - 100 mg/dL    Performed by [facility]    METABOLIC PANEL, COMPREHENSIVE    Collection Time: 07/20/18  2:02 AM   Result Value Ref Range    Sodium 130 (L) 136 - 145 mmol/L    Potassium 4.1 3.5 - 5.1 mmol/L    Chloride 97 97 - 108 mmol/L    CO2 27 21 - 32 mmol/L    Anion gap 6 5 - 15 mmol/L    Glucose 96 65 - 100 mg/dL    BUN 13 6 - 20 MG/DL    Creatinine 0.90 0.55 - 1.02 MG/DL    BUN/Creatinine ratio 14 12 - 20      GFR est AA >60 >60 ml/min/1.73m2    GFR est non-AA 60 (L) >60 ml/min/1.73m2    Calcium 8.6 8.5 - 10.1 MG/DL    Bilirubin, total 0.3 0.2 - 1.0 MG/DL    ALT (SGPT) 21 12 - 78 U/L    AST (SGOT) 20 15 - 37 U/L    Alk. phosphatase 60 45 - 117 U/L    Protein, total 6.3 (L) 6.4 - 8.2 g/dL    Albumin 3.2 (L) 3.5 - 5.0 g/dL    Globulin 3.1 2.0 - 4.0 g/dL    A-G Ratio 1.0 (L) 1.1 - 2.2     CBC WITH AUTOMATED DIFF    Collection Time: 07/20/18  2:02 AM   Result Value Ref Range    WBC 7.1 3.6 - 11.0 K/uL    RBC 4.10 3.80 - 5.20 M/uL    HGB 12.2 11.5 - 16.0 g/dL    HCT 35.9 35.0 - 47.0 %    MCV 87.6 80.0 - 99.0 FL    MCH 29.8 26.0 - 34.0 PG    MCHC 34.0 30.0 - 36.5 g/dL    RDW 12.2 11.5 - 14.5 %    PLATELET 413 494 - 592 K/uL    MPV 9.0 8.9 - 12.9 FL    NRBC 0.0 0  WBC    ABSOLUTE NRBC 0.00 0.00 - 0.01 K/uL    NEUTROPHILS 37 32 - 75 %    LYMPHOCYTES 52 (H) 12 - 49 %    MONOCYTES 9 5 - 13 %    EOSINOPHILS 2 0 - 7 %    BASOPHILS 1 0 - 1 %    IMMATURE GRANULOCYTES 0 0.0 - 0.5 %    ABS. NEUTROPHILS 2.6 1.8 - 8.0 K/UL    ABS. LYMPHOCYTES 3.6 (H) 0.8 - 3.5 K/UL    ABS. MONOCYTES 0.6 0.0 - 1.0 K/UL    ABS. EOSINOPHILS 0.1 0.0 - 0.4 K/UL    ABS.  BASOPHILS 0.0 0.0 - 0.1 K/UL ABS. IMM. GRANS. 0.0 0.00 - 0.04 K/UL    DF AUTOMATED     MAGNESIUM    Collection Time: 07/20/18  2:02 AM   Result Value Ref Range    Magnesium 2.0 1.6 - 2.4 mg/dL   PHOSPHORUS    Collection Time: 07/20/18  2:02 AM   Result Value Ref Range    Phosphorus 4.1 2.6 - 4.7 MG/DL   LIPID PANEL    Collection Time: 07/20/18  2:02 AM   Result Value Ref Range    LIPID PROFILE          Cholesterol, total 169 <200 MG/DL    Triglyceride 99 <150 MG/DL    HDL Cholesterol 65 MG/DL    LDL, calculated 84.2 0 - 100 MG/DL    VLDL, calculated 19.8 MG/DL    CHOL/HDL Ratio 2.6 0 - 5.0           Assessment/Plan:     Active Problems:    Hyponatremia (3/5/2013)      Essential hypertension, benign (3/5/2013)      Anxiety (3/5/2013)      History of TIAs (3/5/2013)      Dizziness (7/19/2018)      Weakness (7/19/2018)         See above narrative for full detail. Festus Yan PA-C  07/20/18  11:47 AM    I have interviewed and examined patient with addendum to note above and formulation care plan to reflect my evaluation    Has had bowel movements with PEG 3350 and has resolved intestinal symptoms. As long as daily use PEG 3350 not thought a contributing factor for electrolyte abn, recommend daily use; she appears understand.     From Gi point of view can discharge when convenient  Thanks again      Anaya Castillo M.D.

## 2018-07-20 NOTE — PROGRESS NOTES
Received SLP eval per protocol. She passed the STAND. She had dysarthria per family on admission, but none now. SLP eval deferred at this time.

## 2018-07-20 NOTE — PROCEDURES
[facility]  *** FINAL REPORT ***    Name: Xi Mansfield  MRN: [id number]    Inpatient  : 1935  HIS Order #: [id number]  [id number] [facility] Visit #: [id number]  Date: 2018    TYPE OF TEST: Cerebrovascular Duplex    REASON FOR TEST  Transient ischemic attacks    Right Carotid:-             Proximal               Mid                 Distal  cm/s  Systolic  Diastolic  Systolic  Diastolic  Systolic  Diastolic  CCA:     70.0       9.5                            50.2      11.9  Bulb:  ECA:     99.1       6.0  ICA:     42.0      12.9       62.5      19.7       54.7      15.5  ICA/CCA:  0.8       1.1    ICA Stenosis: <50%    Right Vertebral:-  Finding: Antegrade  Sys:       47.6  Ninoska:       12.7    Right Subclavian:    Left Carotid:-            Proximal                Mid                 Distal  cm/s  Systolic  Diastolic  Systolic  Diastolic  Systolic  Diastolic  CCA:     42.7      13.8                            57.2      14.5  Bulb:  ECA:     55.5       4.9  ICA:     46.0      13.2       63.3      21.5       60.6      17.9  ICA/CCA:  0.8       0.9    ICA Stenosis: <50%    Left Vertebral:-  Finding: Antegrade  Sys:       40.9  Ninoska:        5.0    Left Subclavian:    INTERPRETATION/FINDINGS  PROCEDURE:  Evaluation of the extracranial cerebrovascular arteries  with ultrasound (B-mode imaging, pulsed Doppler, color Doppler). Includes the common carotid, internal carotid, external carotid, and  vertebral arteries. FINDINGS: Heterogeneous plaque noted in the bilateral carotid bulb and   proximal left ICA with no hemodynamic changes. IMPRESSION: Findings are consistent with 0-49% stenosis of the right  internal carotid and 0-49% stenosis of the left internal carotid. Vertebrals are patent with antegrade flow. ADDITIONAL COMMENTS    I have personally reviewed the data relevant to the interpretation of  this  study. TECHNOLOGIST: Bonnie Ramirez  Signed: 2018 10:32:19 PM    PHYSICIAN: Zina Gallego.  Freddy Newman MD  Signed: 7/20/2018 8:39:28 AM

## 2018-07-20 NOTE — PROGRESS NOTES
Bedside and Verbal shift change report given to Alfredo Walker RN (oncoming nurse) by Julita Mccord RN (offgoing nurse). Report included the following information SBAR, Kardex, Intake/Output, MAR, Accordion and Recent Results.

## 2018-07-20 NOTE — PROGRESS NOTES
Physical Therapy:    Attempted to see pt for therapy evaluation today at 1:31 PM; pt is currently not in room and appears to be off the floor. Will continue to follow pt and complete eval when able.

## 2018-07-20 NOTE — PROGRESS NOTES
7/20/2018   CARE MANAGEMENT NOTE:  Pt is admitted under OBS status. CM provided pt with written and oral explanation of Medicare Outpt OBS and State OBS letters. Signed copies were placed into pt's chart.   [place]

## 2018-07-20 NOTE — PROGRESS NOTES
Problem: Self Care Deficits Care Plan (Adult)  Goal: *Acute Goals and Plan of Care (Insert Text)  Occupational Therapy Goals  Initiated 7/20/2018  1. Patient will perform lower body dressing with modified independence within 7 day(s). 2.  Patient will perform upper body dressing with modified independence within 7 day(s). 3.  Patient will perform grooming with modified independence within 7 day(s). 4.  Patient will perform toilet transfers with modified independence within 7 day(s). 5.  Patient will perform all aspects of toileting with modified independence within 7 day(s). 6.  Patient will participate in upper extremity therapeutic exercise/activities with independence for 10 minutes within 7 day(s). Occupational Therapy EVALUATION  Patient: Matt Steen (65 y.o. female)  Date: 7/20/2018  Primary Diagnosis: Dizziness        Precautions: fall        ASSESSMENT :  Based on the objective data described below, the patient presents with hospital admission secondary to dizziness and report of slurred speech. Patient reports dizziness at home x 1 week. She reports feeling below baseline for same time, and upon granddaughter attempting to wake patient from sleeping, patient with slurred speech briefly. Patient reports dizziness improved in supine but has been very careful moving in home and no falls reported. Patient with guardianship of 15yr old granddaughter, but has additional family nearby. Patient BP in supine at 157/70. Patient able to move to EOB with supervision and reports mild dizziness upon sitting. Elevated BP with sitting, but upon recheck , 144/79. CGA for sit to stand and BP at 136/71. Patient reports dizziness but improves with brief time standing. Able to ambulate to bathroom,which she has done twice before today, with assistance. Patient able to manage ADL tasks with CGA and BP post activity standing at 160/84. Patient returned to supine after report of feeling very sleepy.   At this time recommend [facility] upon discharge, however if patient improves to baseline mobility prior to discharge no follow up skilled services needed, but she may require additional family assist for transportation. Patient with good insight into deficits and good safety awareness. Vitals:    07/20/18 1048 07/20/18 1050 07/20/18 1056 07/20/18 1102   BP: (!) 134/100 144/79 136/71 160/84   BP 1 Location: Left arm Left arm Left arm Left arm   BP Patient Position: Sitting Sitting Standing Post activity;Standing   Pulse: 78 71 92 92   Resp:       Temp:       SpO2:       Weight:       Height:         Patient will benefit from skilled intervention to address the above impairments. Patients rehabilitation potential is considered to be Good  Factors which may influence rehabilitation potential include:   [x]             None noted  []             Mental ability/status  []             Medical condition  []             Home/family situation and support systems  []             Safety awareness  []             Pain tolerance/management  []             Other:      PLAN :  Recommendations and Planned Interventions:  [x]               Self Care Training                  [x]        Therapeutic Activities  [x]               Functional Mobility Training    []        Cognitive Retraining  [x]               Therapeutic Exercises           [x]        Endurance Activities  [x]               Balance Training                   []        Neuromuscular Re-Education  []               Visual/Perceptual Training     [x]   Home Safety Training  [x]               Patient Education                 [x]        Family Training/Education  []               Other (comment):    Frequency/Duration: Patient will be followed by occupational therapy 5 times a week to address goals.   Discharge Recommendations: Home Health vs none pending improvement  Further Equipment Recommendations for Discharge: none noted      SUBJECTIVE:   Patient stated I have been dizzy for a week.     OBJECTIVE DATA SUMMARY:   HISTORY:   Past Medical History:   Diagnosis Date    Anxiety     Breast cancer ([facility] [address] 1993    s/p lumpectomy    Diabetes mellitus ([facility] [address]     diet-controlled    Hx-TIA (transient ischemic attack)     Hypertension     Hyponatremia     Melanoma ([facility] [address] 2008    s/p resection, right cheek     Past Surgical History:   Procedure Laterality Date    HX BACK SURGERY      HX BREAST LUMPECTOMY  1993    right    HX CHOLECYSTECTOMY      HX HYSTERECTOMY      HX MALIGNANT SKIN LESION EXCISION  2008    HX MENISCECTOMY      HX OTHER SURGICAL      pineda's neuroma bilaterally       Prior Level of Function/Environment/Context: Patient independent with ADLs  Occupations in which the patient is/was successful, what are the barriers preventing that success:   Performance Patterns (routines, roles, habits, and rituals):   Personal Interests and/or values:   Expanded or extensive additional review of patient history:     Home Situation  Home Environment: Private residence  # Steps to Enter: 5  Rails to Enter: Yes  Hand Rails : Bilateral (wide set)  One/Two Story Residence: Two story  # of Interior Steps: 13  Living Alone: No  Support Systems:  (granddaugther)  Patient Expects to be Discharged to[de-identified] Private residence  Current DME Used/Available at Home: Cane, straight  Tub or Shower Type: Shower    Hand dominance: Right    EXAMINATION OF PERFORMANCE DEFICITS:  Cognitive/Behavioral Status:  Neurologic State: Alert  Orientation Level: Oriented X4  Cognition: Appropriate decision making; Follows commands  Perception: Appears intact  Perseveration: No perseveration noted  Safety/Judgement: Awareness of environment    Skin: intact as seen    Edema: none noted     Hearing:   Auditory  Auditory Impairment: None    Vision/Perceptual:              Range of Motion:  AROM: Within functional limits                         Strength:  Strength: Generally decreased, functional Coordination:  Coordination: Within functional limits  Fine Motor Skills-Upper: Left Intact; Right Intact    Gross Motor Skills-Upper: Left Intact; Right Intact    Tone & Sensation:  Tone: Normal  Sensation: Intact                      Balance:  Sitting: Intact  Standing: Impaired  Standing - Static: Constant support  Standing - Dynamic : Fair    Functional Mobility and Transfers for ADLs:  Bed Mobility:  Rolling: Supervision  Supine to Sit: Supervision  Sit to Supine: Supervision  Scooting: Supervision    Transfers:  Sit to Stand: Contact guard assistance  Stand to Sit: Contact guard assistance  Toilet Transfer : Minimum assistance    ADL Assessment:  Feeding: Independent    Oral Facial Hygiene/Grooming: Contact guard assistance (standing at sink)    Bathing: Contact guard assistance (balance in standing)    Upper Body Dressing: Supervision    Lower Body Dressing: Contact guard assistance    Toileting: Contact guard assistance                ADL Intervention and task modifications:                                     Cognitive Retraining  Safety/Judgement: Awareness of environment    Therapeutic Exercise:     Functional Measure:  Barthel Index:    Bathin  Bladder: 10  Bowels: 10  Groomin  Dressin  Feeding: 10  Mobility: 10  Stairs: 5  Toilet Use: 5  Transfer (Bed to Chair and Back): 10  Total: 70       Barthel and G-code impairment scale:  Percentage of impairment CH  0% CI  1-19% CJ  20-39% CK  40-59% CL  60-79% CM  80-99% CN  100%   Barthel Score 0-100 100 99-80 79-60 59-40 20-39 1-19   0   Barthel Score 0-20 20 17-19 13-16 9-12 5-8 1-4 0      The Barthel ADL Index: Guidelines  1. The index should be used as a record of what a patient does, not as a record of what a patient could do. 2. The main aim is to establish degree of independence from any help, physical or verbal, however minor and for whatever reason. 3. The need for supervision renders the patient not independent.   4. A patient's performance should be established using the best available evidence. Asking the patient, friends/relatives and nurses are the usual sources, but direct observation and common sense are also important. However direct testing is not needed. 5. Usually the patient's performance over the preceding 24-48 hours is important, but occasionally longer periods will be relevant. 6. Middle categories imply that the patient supplies over 50 per cent of the effort. 7. Use of aids to be independent is allowed. Amanda Kidd., Barthel, D.W. ([id number]). Functional evaluation: the Barthel Index. [address] [facility] (14)2. Mahsa LinoutMINGO dobson, Ellen Mars., [place]., Juvencio Alstonstein, [facility] (1999). Measuring the change indisability after inpatient rehabilitation; comparison of the responsiveness of the Barthel Index and Functional [place] Measure. Journal of Neurology, Neurosurgery, and Psychiatry, 66(4), 670-082. Lola Flores, N.J.A, SINDHU Finney, & Donte Casarez MIrinaA. (2004.) Assessment of post-stroke quality of life in cost-effectiveness studies: The usefulness of the Barthel Index and the EuroQoL-5D. Quality of Life Research, 13, 576-33         G codes: In compliance with CMSs Claims Based Outcome Reporting, the following G-code set was chosen for this patient based on their primary functional limitation being treated: The outcome measure chosen to determine the severity of the functional limitation was the Barthel Index  with a score of 70/100 which was correlated with the impairment scale. ?  Self Care:     - CURRENT STATUS: CJ - 20%-39% impaired, limited or restricted    - GOAL STATUS: CI - 1%-19% impaired, limited or restricted    - D/C STATUS:  ---------------To be determined---------------     Occupational Therapy Evaluation Charge Determination   History Examination Decision-Making   LOW Complexity : Brief history review  LOW Complexity : 1-3 performance deficits relating to physical, cognitive , or psychosocial skils that result in activity limitations and / or participation restrictions  LOW Complexity : No comorbidities that affect functional and no verbal or physical assistance needed to complete eval tasks       Based on the above components, the patient evaluation is determined to be of the following complexity level: LOW        Fugl-Geronimo Assessment of Motor Recovery after Stroke:     Reflex Activity  Flexors/Biceps/Fingers: Can be elicited  Extensors/Triceps: Can be elicited  Reflex Subtotal: 4    Volitional Movement Within Synergies  Shoulder Retraction: Full  Shoulder Elevation: Full  Shoulder Abduction (90 degrees): Full  Shoulder External Rotation: Full  Elbow Flexion: Full  Forearm Supination: Full  Shoulder Adduction/Internal Rotation: Full  Elbow Extension: Full  Forearm Pronation: Full  Subtotal: 18    Volitional Movement Mixing Synergies  Hand to Lumbar Spine: Full  Shoulder Flexion (0-90 degrees): Full  Pronation-Supination: Full  Subtotal: 6    Volitional Movement With Little or No Synergy  Shoulder Abduction (0-90 degrees): Full  Shoulder Flexion ( degrees): Full  Pronation/Supination: Full  Subtotal : 6    Normal Reflex Activity  Biceps, Triceps, Finger Flexors:  Full  Subtotal : 2    Upper Extremity Total   Upper Extremity Total: 36    Wrist  Stability at 15 Degree Dorsiflexion: Full  Repeated Dorsiflexion/ Volar Flexion: Full  Stability at 15 Degree Dorsiflexion: Full  Repeated Dorsiflexion/ Volar Flexion: Full  Circumduction: Full  Wrist Total: 10    Hand  Mass Flexion: Full  Mass Extension: Full  Grasp A: Full  Grasp B: Full  Grasp C: Full  Grasp D: Full  Grasp E: Full  Hand Total: 14    Coordination/Speed  Tremor: None  Dysmetria: None  Time: <1s  Coordination/Speed Total : 6    Total A-D  Total A-D (Motor Function): 66/66       Percentage of impairment CH  0% CI  1-19% CJ  20-39% CK  40-59% CL  60-79% CM  80-99% CN  100%   Fugl-Geronimo score: 0-66 66 53-65 39-52 26-38 13-25 1-12   0      This is a reliable/valid measure of arm function after a neurological event. It has established value to characterize functional status and for measuring spontaneous and therapy-induced recovery; tests proximal and distal motor functions. Fugl-Geronimo Assessment  UE scores recorded between five and 30 days post neurologic event can be used to predict UE recovery at six months post neurologic event. Severe = 0-21 points   Moderately Severe = 22-33 points   Moderate = 34-47 points   Mild = 48-66 points  CARRIE Mosley, HORACIO Damon, & DONATO Narayan (1992). Measurement of motor recovery after stroke: Outcome assessment and sample size requirements. Stroke, 23, pp. 8214-4171.   --------------------------------------------------------------------------------------------------------------------------------------------------------------------  MCID:  Stroke:   Alea Gold et al, 2001; n = 171; mean age 79 (5) years; assessed within 16 (12) days of stroke, Acute Stroke)  FMA Motor Scores from Admission to Discharge   10 point increase in FMA Upper Extremity = 1.5 change in discharge FIM   10 point increase in FMA Lower Extremity = 1.9 change in discharge FIM  MDC:   Stroke:   Lorraine Pacheco et al, 2008, n = 14, mean age = 59.9 (14.6) years, assessed on average 14 (6.5) months post stroke, Chronic Stroke)   FMA = 5.2 points for the Upper Extremity portion of the assessment       Pain:                    Activity Tolerance:   VSS  Please refer to the flowsheet for vital signs taken during this treatment. After treatment:   [] Patient left in no apparent distress sitting up in chair  [x] Patient left in no apparent distress in bed  [x] Call bell left within reach  [x] Nursing notified  [x] Caregiver present  [x] Bed alarm activated    COMMUNICATION/EDUCATION:   The patients plan of care was discussed with: Physical Therapist and Registered Nurse.   [x] Home safety education was provided and the patient/caregiver indicated understanding. [x] Patient/family have participated as able in goal setting and plan of care. [x] Patient/family agree to work toward stated goals and plan of care. [] Patient understands intent and goals of therapy, but is neutral about his/her participation. [] Patient is unable to participate in goal setting and plan of care. This patients plan of care is appropriate for delegation to DENIZ.     Thank you for this referral.  Katlin Monroe, OTR/L  Time Calculation: 37 mins

## 2018-07-20 NOTE — CONSULTS
[address] [place]  MR#: [id number]  : 1935  ACCOUNT #: [de-identified]   DATE OF SERVICE: 2018    REQUESTING PHYSICIAN:  Dr. Kyra Gomez. REASON FOR CONSULTATION:  Dizziness. HISTORY OF PRESENT ILLNESS:  The patient is an 80-year-old female who presented to the emergency room yesterday with complaints of 1 week of dizziness and intermittent slurred speech. In the emergency room, the patient had a CT of the head that was negative, but there was concern for possible TIA and she was admitted for further workup. When I came to see the patient, she reported that she has been having dizziness since last Saturday. Last Saturday she woke up in the middle of the night and felt dizzy and started to take sodium tablets. She then saw her PCP on Monday and was found to have a sodium level of 130 and was told to continue on her sodium tablets. She subsequently saw a cardiologist who recommended that she decrease her water intake, but not continue with the sodium tablets. The patient reports that she continued with dizziness this entire time. She reported it is much worse when she is standing. It feels better when she is lying down. She does not notice any other head turn or position. She denies that it is a room spinning sensation and feels more like a sensation of lightheaded, going to faint. She reports that she does take atenolol for blood pressure and has not been monitoring her blood pressure at home. She reports that she does try to regularly drink water, especially water with electrolytes, to help keep her sodium level. Despite this, on admission she did have a sodium level of 130. Patient does have a prior history of cancer as well. She does have stroke risk factors of prior history of TIA, prediabetes and hypertension.   The patient reports that in terms of her slurred speech episode, she had woken up from a very deep sleep and was calling her granddaughter to help her and her speech was slightly slurred as she was waking up. She reports that it was not anything like her previous TIA that she had had and that once she was fully awake, her speech was normal.  Prior to admission, she was taking Plavix and she has been taking this for many years. When I saw her today, she reported that she is still feeling dizzy. No matter what position we had her in, she continued to express the dizziness. The patient also reports that she has headaches on a regular basis for which she takes Fioricet and this typically helps the headaches. She denies having a headache this morning, but is having some nausea. PAST MEDICAL HISTORY:  Anxiety, history of breast cancer, prediabetes, history of hypertension, history of TIA, hyponatremia, and history of melanoma. SOCIAL HISTORY:  The patient is , does not smoke or drink. FAMILY HISTORY:  Heart failure and diabetes. ALLERGIES:  SULFA, CODEINE, AND LEVAQUIN. MEDICATIONS:  Tramadol Narcan, Tylenol, acetaminophen, Fioricet, Plavix, Valium, Bentyl, Flonase, Zofran. REVIEW OF SYSTEMS  CONSTITUTIONAL:  Denies recent weight gain, fevers, chills, or sweats. ENT:  Denies hearing loss or ringing in ears. EYES:  Denies vision changes. CARDIOVASCULAR:  Denies chest pain. RESPIRATORY:  Denies shortness of breath, cough, or wheezing. GASTROINTESTINAL:  Denies nausea, vomiting, constipation, diarrhea. MUSCULOSKELETAL:  Denies joint pains. INTEGUMENT:  Denies rash. NEUROLOGIC:  Per HPI. PSYCHIATRIC:  Denies anxiety or depression, but does admit to having significant stress at home. CLINICAL DATA REVIEW:  CT of the head negative. An MRI of the brain negative. I personally reviewed these images in the [facility] system and this is my impression. An MRA of the head negative. Carotids less than 50% stenosis. TTE pending. Telemetry is normal sinus rhythm.     LABORATORY DATA:  UA negative for infection. Initial CBC within normal limits. Initial metabolic panel with a sodium of 130, magnesium of 2.0. Lipid profile with an LDL of 84.2. EKG is normal sinus rhythm. PHYSICAL EXAMINATION:  VITAL SIGNS:  Blood pressure 155/67. Pulse 66. Temperature 97.86, respirations 17, oxygen saturation 95%. GENERAL:  The patient is alert and cooperative, no distress. HEAD:  Normocephalic without obvious abnormality. EYES:  Conjunctivae clear. Pupils equally round and reactive to light. Extraocular movements intact. No nystagmus. Visual fields full. No papilledema. LUNGS:  Unlabored breathing. HEART:  Regular rate and rhythm. ABDOMEN:  Soft, nondistended. EXTREMITIES:  Normal, atraumatic. No cyanosis or edema. Pulses 2+ and symmetric. SKIN:  Color, texture, turgor normal.  NEUROLOGIC:  General attention normal.  Language, naming, repetition, fluency are normal.  Memory is intact to recent and remote memory. CRANIAL NERVES II-XII:  Visual fields full. Pupils equally round, reactive to light. Extraocular movements intact. Face is symmetric. Tongue and palate are midline. MOTOR:  Normal bulk and tone. No tremor. Strength is 5/5 in all 4 extremities. SENSORY:  Intact to light touch, pinprick, vibration and temperature. COORDINATION:  Finger-to-nose intact bilaterally. GAIT:  Slightly slow but no ataxia. REFLEXES:  1+ throughout. IMPRESSION:  This is an 81-year-old female who presented with 1 week of dizziness and a transient episode of slurred speech. I suspect the patient's slurred speech was not a transient ischemic attack and rather was from her coming out of a deep sleep. I do not think a full stroke workup is indicated, though she has had most of this testing already and an MRI of the brain was negative for stroke. Additionally, I suspect the patient's dizziness is either secondary to her hyponatremia or orthostatic hypotension.   Discussed with the patient, we may need to monitor for this and hold her atenolol for further treatment while we are normalizing her sodium. She has no signs of benign positional paroxysmal vertigo on exam and I suspect she is not actually suffering from vertigo, but rather dizziness secondary to electrolyte disturbance/hypovolemia. RECOMMENDATIONS:  1. MRI of the brain, MRA of the head and carotids, all negative as above. 2.  TTE pending. 3.  Continue telemetry. 4.  Good blood pressure control. 5.  Stroke labs were completed as above. Patient's LDL is above goal.  However, I do not suspect that she had a TIA or stroke so I am not going to start her on any statin medication. 6.  Continue Plavix for stroke prevention. 7.  ST, OT, PT evaluation ordered and the patient will likely need some assistance prior to discharge. 8.  Correction of hyponatremia per primary. 9.  Agree with checking orthostatics and potentially holding atenolol, but will defer to cardiology for this. 9. VTE prophylaxis with Lovenox. Thank you very much for this consultation. At this point, no further neurological workup is recommended. We will sign off. Please call if further questions. If patient does not improve clinically, she is welcome to follow up in the neurology clinic in 2-3 weeks.       MD JOSE Polanco / NATALEE  D: 07/20/2018 10:38     T: 07/20/2018 11:08  JOB #: [id number]

## 2018-07-20 NOTE — PROGRESS NOTES
Problem: Mobility Impaired (Adult and Pediatric)  Goal: *Therapy Goal (Edit Goal, Insert Text)  Physical Therapy Goals  Initiated 7/20/2018  1. Patient will move from supine to sit and sit to supine  in bed with modified independence within 2 day(s). 2.  Patient will transfer from bed to chair and chair to bed with modified independence using the least restrictive device within 2 day(s). 3.  Patient will perform sit to stand with modified independence within 2 day(s). 4.  Patient will ambulate with modified independence for 150 feet with the least restrictive device within 2 day(s). 5.  Patient will ascend/descend 14 stairs with one handrail(s) with modified independence within 2 day(s). physical Therapy EVALUATION  Patient: Randall Sotelo (24 y.o. female)  Date: 7/20/2018  Primary Diagnosis: Dizziness        Precautions: Fall       ASSESSMENT :  Based on the objective data described below, the patient was admitted with dizziness on admission and presents with increased risk of falls without using an appropriate assistive device for ambulation. Pt currently lives with grand-daughter in a two story home with 5-steps to enter home with bilateral hand rails. Pt's bed and bath are on 2nd level with 12-14 steps up with right hand rail. Pt has had a h/o dizziness during functional mobility activities in the past and will benefit with therapy for training on the proper use of and safety precautions when using a rolling walker for household and community ambulation, and to increase overal functional mobility safety. After training on proper use of RW, pt was able to ambulate with SBA x 125ft without any difficulty. Pt will benefit with therapy for continued gait training using RW and stair training to maximize functional independence. Patient will benefit from skilled intervention to address the above impairments.   Patients rehabilitation potential is considered to be Good  Factors which may influence rehabilitation potential include:   []         None noted  []         Mental ability/status  [x]         Medical condition  []         Home/family situation and support systems  [x]         Safety awareness  []         Pain tolerance/management  []         Other:      PLAN :  Recommendations and Planned Interventions:  [x]           Bed Mobility Training             []    Neuromuscular Re-Education  [x]           Transfer Training                   []    Orthotic/Prosthetic Training  [x]           Gait Training                         []    Modalities  [x]           Therapeutic Exercises           []    Edema Management/Control  [x]           Therapeutic Activities            [x]    Patient and Family Training/Education  []           Other (comment):    Frequency/Duration: Patient will be followed by physical therapy  daily to address goals. Discharge Recommendations: None  Further Equipment Recommendations for Discharge: rolling walker     SUBJECTIVE:   Patient stated All they need to do is give me more sodium and my headache and dizziness will go away.     OBJECTIVE DATA SUMMARY:   HISTORY:    Past Medical History:   Diagnosis Date    Anxiety     Breast cancer ([facility] [address] 1993    s/p lumpectomy    Diabetes mellitus ([facility] [address]     diet-controlled    Hx-TIA (transient ischemic attack)     Hypertension     Hyponatremia     Melanoma ([facility] [address] 2008    s/p resection, right cheek     Past Surgical History:   Procedure Laterality Date    HX BACK SURGERY      HX BREAST LUMPECTOMY  1993    right    HX CHOLECYSTECTOMY      HX HYSTERECTOMY      HX MALIGNANT SKIN LESION EXCISION  2008    HX MENISCECTOMY      HX OTHER SURGICAL      pineda's neuroma bilaterally     Prior Level of Function/Home Situation: independent   Personal factors and/or comorbidities impacting plan of care: medical condition (dizziness)    Home Situation  Home Environment: Private residence  # Steps to Enter: 5  Rails to Enter: Yes  [employer] : Bilateral  One/Two Story Residence: Two story  # of Interior Steps: 15  Interior Rails: Right  Lift Chair Available: No  Living Alone: No  Support Systems: Family member(s), Friends \ neighbors  Patient Expects to be Discharged to[de-identified] Private residence  Current DME Used/Available at Home: Abi Duet, straight  Tub or Shower Type: Shower    EXAMINATION/PRESENTATION/DECISION MAKING:   Critical Behavior:  Neurologic State: Alert  Orientation Level: Oriented X4  Cognition: Appropriate decision making, Appropriate for age attention/concentration, Appropriate safety awareness  Safety/Judgement: Awareness of environment  Hearing: Auditory  Auditory Impairment: None  Skin:  Age appropriate  Edema: no edema noted  Range Of Motion:  AROM: Within functional limits                       Strength:    Strength: Generally decreased, functional                    Tone & Sensation:   Tone: Normal              Sensation: Intact               Coordination:  Coordination: Generally decreased, functional  Vision:      Functional Mobility:  Bed Mobility:  Rolling: Supervision  Supine to Sit: Supervision  Sit to Supine: Supervision  Scooting: Supervision  Transfers:  Sit to Stand: Stand-by assistance  Stand to Sit: Stand-by assistance  Stand Pivot Transfers: Stand-by assistance     Bed to Chair: Stand-by assistance              Balance:   Sitting: Intact  Standing: Intact; With support  Standing - Static: Fair  Standing - Dynamic : Fair  Ambulation/Gait Training:  Distance (ft): 125 Feet (ft)  Assistive Device: Walker, rolling  Ambulation - Level of Assistance: Stand-by assistance     Gait Description (WDL): Exceptions to WDL  Gait Abnormalities: Decreased step clearance; Path deviations        Base of Support: Narrowed     Speed/Ana Laura: Slow  Step Length: Left shortened;Right shortened                     Stairs:      Not done during this Rx session        Therapeutic Exercises:    Not done during this Rx session    Functional Measure:  Barthel Index:    Bathin  Bladder: 10  Bowels: 10  Groomin  Dressin  Feeding: 10  Mobility: 10  Stairs: 5  Toilet Use: 10  Transfer (Bed to Chair and Back): 10  Total: 75       Barthel and G-code impairment scale:  Percentage of impairment CH  0% CI  1-19% CJ  20-39% CK  40-59% CL  60-79% CM  80-99% CN  100%   Barthel Score 0-100 100 99-80 79-60 59-40 20-39 1-19   0   Barthel Score 0-20 20 17-19 13-16 9-12 5-8 1-4 0      The Barthel ADL Index: Guidelines  1. The index should be used as a record of what a patient does, not as a record of what a patient could do. 2. The main aim is to establish degree of independence from any help, physical or verbal, however minor and for whatever reason. 3. The need for supervision renders the patient not independent. 4. A patient's performance should be established using the best available evidence. Asking the patient, friends/relatives and nurses are the usual sources, but direct observation and common sense are also important. However direct testing is not needed. 5. Usually the patient's performance over the preceding 24-48 hours is important, but occasionally longer periods will be relevant. 6. Middle categories imply that the patient supplies over 50 per cent of the effort. 7. Use of aids to be independent is allowed. Maureen Mahoney., Barthel, DIrinaW. ([id number]). Functional evaluation: the Barthel Index. [facility] (14)2. MINGO Flores, Bhupinder Palacio., Jorge Alberto Landeros., Amarilis Prabhakar, [address] [place] [address] (). Measuring the change indisability after inpatient rehabilitation; comparison of the responsiveness of the Barthel Index and Functional [place] Measure. Journal of Neurology, Neurosurgery, and Psychiatry, 66(4), 930-865. Amber [place], N.[place].A, SINDHU Finney, & Ghazala Royal MIrinaA. (2004.) Assessment of post-stroke quality of life in cost-effectiveness studies: The usefulness of the Barthel Index and the EuroQoL-5D.  Quality of Life Research, 13, 768-73 J codes:  In compliance with CMSs Claims Based Outcome Reporting, the following G-code set was chosen for this patient based on their primary functional limitation being treated: The outcome measure chosen to determine the severity of the functional limitation was the Barthel Index with a score of 75/100 which was correlated with the impairment scale. ? Mobility - Walking and Moving Around:     - CURRENT STATUS: CJ - 20%-39% impaired, limited or restricted    - GOAL STATUS: CI - 1%-19% impaired, limited or restricted    - D/C STATUS:  ---------------To be determined---------------      Physical Therapy Evaluation Charge Determination   History Examination Presentation Decision-Making   LOW Complexity : Zero comorbidities / personal factors that will impact the outcome / POC LOW Complexity : 1-2 Standardized tests and measures addressing body structure, function, activity limitation and / or participation in recreation  LOW Complexity : Stable, uncomplicated  LOW Complexity : FOTO score of       Based on the above components, the patient evaluation is determined to be of the following complexity level: LOW     Pain:                    Activity Tolerance:   Pt participated with therapy and slowly accepted using RW for increased safety. Please refer to the flowsheet for vital signs taken during this treatment. After treatment:   [x]         Patient left in no apparent distress sitting up in chair  []         Patient left in no apparent distress in bed  [x]         Call bell left within reach  [x]         Nursing notified  []         Caregiver present  []         Bed alarm activated    COMMUNICATION/EDUCATION:   The patients plan of care was discussed with: Registered Nurse. [x]         Fall prevention education was provided and the patient/caregiver indicated understanding. [x]         Patient/family have participated as able in goal setting and plan of care.   [x]         Patient/family agree to work toward stated goals and plan of care. []         Patient understands intent and goals of therapy, but is neutral about his/her participation. []         Patient is unable to participate in goal setting and plan of care.     Thank you for this referral.  Therese Toledo, PT   Time Calculation: 24 mins

## 2018-07-20 NOTE — CONSULTS
[address] [facility]  MR#: [id number]  : 1935  ACCOUNT #: [de-identified]   DATE OF SERVICE: 2018    ATTENDING PHYSICIAN:  Dr. Anette Chin. REASON FOR CONSULTATION:  Hyponatremia. Thank you for allowing me to see this patient. This is an 81-year-old white female with past medical history of adult-onset diabetes mellitus, diet controlled, underlying hypertension, not on a thiazide and a previous TIA. She developed weakness and dizziness. She thought this was related to dehydration and she took some salt tablets. However, she continued to feel weak, nauseated and had some abdominal discomfort. Per the chart, she was noted to have slurred speech and they thought she may have had a TIA. She apparently had a previous TIA. She was brought to the emergency room. She was found to have a serum sodium of 130; however, potassium and magnesium were normal.  There is some vague history of hyponatremia in the past, although review of the [facility] record shows serum sodiums have been above 130. PAST MEDICAL HISTORY:  Remarkable for breast cancer diagnosed and treated in . There is a chest x-ray showing a pulmonary nodule in . She has been followed by GI for pancreatic issues and apparently has some type of pancreatic cyst per the patient. However, there is no history of pancreatitis. FAMILY HISTORY:  Remarkable for a son who had a kidney transplant kidney-pancreas transplant and has diabetes. No kidney disease in the patient. SOCIAL HISTORY:  She is a . She raises her grandchild. She does not smoke. No alcohol use. REVIEW OF SYSTEMS:  Positive for a 10-16 pound weight loss over the last year. This is unexplained. She has had no cough,. She does have shortness of breath, particularly with exertion. No persistent nausea or vomiting, although she did have some nausea prior to admission. No chest pain.   No blood in the stool. No hemoptysis or hematemesis. No difficulty in urinating. No hematuria. No foamy urine. No blood in the stool. Recent weakness and lightheadedness which prompted admission through the emergency room. MEDICATIONS PRIOR TO ADMISSION:  Bentyl, Fioricet, Flonase, Zofran, Tenormin, Plavix and Valium. ALLERGIES:  SULFA, [address]. CURRENT MEDICATIONS:  Atenolol 25 mg daily, Plavix 75 mg a day, Lovenox 40 mg at bedtime subQ, Flonase, melatonin, MiraLax. PHYSICAL EXAMINATION:  GENERAL:  She is a well-developed 81-year-old white female in no acute distress. VITAL SIGNS:  Blood pressure is 160/84, with a pulse of 92. Yesterday, she did have a blood pressure recorded at 92/78, but generally the blood pressures have been high. She is afebrile. HEENT:  Normocephalic, atraumatic. Conjunctivae and sclerae clear and some mild temporal wasting. Tongue is midline and moist.  NECK:  Supple. No overt JVD. LUNGS:  Clear. CARDIOVASCULAR:  Regular rhythm. No S3 gallop. There is no rub. ABDOMEN:  Soft with normoactive bowel sounds. There is no sacral edema. There is no organomegaly. EXTREMITIES:  Showed no edema. NEUROLOGIC:  Nonfocal.  She is alert, oriented and conversant. LABORATORY DATA:  Show a hematocrit of 35.9%, with hemoglobin 12.2 and a white count of 7.1 without a left shift. There is a mild lymphocytosis. No eosinophilia. Sodium 130, potassium 4.1, chloride 97, CO2 of 27, BUN and creatinine are 13 and 0.9, magnesium 2, albumin 3.2, globulin 3.1. Total cholesterol 169. TSH in 03/2013 was 1.89. CT scan on admission showed no acute changes. A CT scan of the chest done in 03/2013 did show a right lower lobe pleural parenchymal 6.5 mm nodular focus. Carotid Dopplers have been ordered and showed no significant stenosis. Vertebral arteries were open. IMPRESSION:    1. Chronic mild hyponatremia without associated hypokalemia or hypomagnesemia.   May represent a decreased solute intake relative to hypotonic fluids. May also represent mild SIADH, depending on where her urine sodiums are, given her history of lung nodule and previous breast cancer, although the breast cancer probably is not an issue at this point. 2.  Hypertension. 3.  Adult-onset diabetes mellitus, diet controlled. 4.  Unexplained weight loss with a mild hypoalbuminemia. PLAN:  1.  I spoke with Dr. Rachel Andrade. He has ordered the usual studies in the workup of hyponatremia. We will await the urine electrolytes. I think he also ordered a TSH. 2.  A chest x-ray would be important given the history of a nodule and unexplained weight loss. 3.  Check urine protein/creatinine ratio. 4.  Urine osmolality and a measured serum osmolality. For the time being, we will go with mild fluid restriction and increase solute intake. If her urinary sodium is low, then IV saline may be appropriate. However, with the serum sodium of 130 which seems to be chronic, probably will respond best to conservative measures. Thank you for allowing me to see this patient.       MD SKYLER Elliott / IRENE  D: 07/20/2018 12:23     T: 07/20/2018 13:00  JOB #: [id number]

## 2018-07-21 LAB
ALBUMIN SERPL-MCNC: 3.3 G/DL (ref 3.5–5)
ANION GAP SERPL CALC-SCNC: 5 MMOL/L (ref 5–15)
BUN SERPL-MCNC: 16 MG/DL (ref 6–20)
BUN/CREAT SERPL: 20 (ref 12–20)
CALCIUM SERPL-MCNC: 8.9 MG/DL (ref 8.5–10.1)
CHLORIDE SERPL-SCNC: 96 MMOL/L (ref 97–108)
CO2 SERPL-SCNC: 26 MMOL/L (ref 21–32)
CREAT SERPL-MCNC: 0.81 MG/DL (ref 0.55–1.02)
GLUCOSE BLD STRIP.AUTO-MCNC: 99 MG/DL (ref 65–100)
GLUCOSE SERPL-MCNC: 106 MG/DL (ref 65–100)
OSMOLALITY SERPL: 278 MOSM/KG H2O
OSMOLALITY UR: 320 MOSM/KG H2O
PHOSPHATE SERPL-MCNC: 4.1 MG/DL (ref 2.6–4.7)
POTASSIUM SERPL-SCNC: 4.4 MMOL/L (ref 3.5–5.1)
POTASSIUM UR-SCNC: 29 MMOL/L
PROT UR-MCNC: <6 MG/DL (ref 0–11.9)
SERVICE CMNT-IMP: NORMAL
SODIUM SERPL-SCNC: 127 MMOL/L (ref 136–145)
SODIUM UR-SCNC: 46 MMOL/L
T4 FREE SERPL-MCNC: 1.1 NG/DL (ref 0.8–1.5)
TSH SERPL DL<=0.05 MIU/L-ACNC: 4.14 UIU/ML (ref 0.36–3.74)
URATE SERPL-MCNC: 4.2 MG/DL (ref 2.6–6)

## 2018-07-21 PROCEDURE — 74011250637 HC RX REV CODE- 250/637: Performed by: INTERNAL MEDICINE

## 2018-07-21 PROCEDURE — 83935 ASSAY OF URINE OSMOLALITY: CPT | Performed by: INTERNAL MEDICINE

## 2018-07-21 PROCEDURE — 84133 ASSAY OF URINE POTASSIUM: CPT | Performed by: INTERNAL MEDICINE

## 2018-07-21 PROCEDURE — 84156 ASSAY OF PROTEIN URINE: CPT | Performed by: INTERNAL MEDICINE

## 2018-07-21 PROCEDURE — 65270000029 HC RM PRIVATE

## 2018-07-21 PROCEDURE — 80069 RENAL FUNCTION PANEL: CPT | Performed by: INTERNAL MEDICINE

## 2018-07-21 PROCEDURE — 99218 HC RM OBSERVATION: CPT

## 2018-07-21 PROCEDURE — 83930 ASSAY OF BLOOD OSMOLALITY: CPT | Performed by: INTERNAL MEDICINE

## 2018-07-21 PROCEDURE — 84300 ASSAY OF URINE SODIUM: CPT | Performed by: INTERNAL MEDICINE

## 2018-07-21 PROCEDURE — 84443 ASSAY THYROID STIM HORMONE: CPT | Performed by: INTERNAL MEDICINE

## 2018-07-21 PROCEDURE — 74011250636 HC RX REV CODE- 250/636: Performed by: INTERNAL MEDICINE

## 2018-07-21 PROCEDURE — 74011250637 HC RX REV CODE- 250/637: Performed by: PHYSICIAN ASSISTANT

## 2018-07-21 PROCEDURE — 84550 ASSAY OF BLOOD/URIC ACID: CPT | Performed by: INTERNAL MEDICINE

## 2018-07-21 PROCEDURE — 36415 COLL VENOUS BLD VENIPUNCTURE: CPT | Performed by: INTERNAL MEDICINE

## 2018-07-21 PROCEDURE — 84439 ASSAY OF FREE THYROXINE: CPT | Performed by: INTERNAL MEDICINE

## 2018-07-21 PROCEDURE — 82962 GLUCOSE BLOOD TEST: CPT

## 2018-07-21 RX ORDER — SODIUM CHLORIDE 9 MG/ML
75 INJECTION, SOLUTION INTRAVENOUS CONTINUOUS
Status: DISPENSED | OUTPATIENT
Start: 2018-07-21 | End: 2018-07-22

## 2018-07-21 RX ADMIN — TRAMADOL HYDROCHLORIDE 50 MG: 50 TABLET, FILM COATED ORAL at 17:42

## 2018-07-21 RX ADMIN — DIAZEPAM 5 MG: 5 TABLET ORAL at 20:50

## 2018-07-21 RX ADMIN — ONDANSETRON 4 MG: 4 TABLET, ORALLY DISINTEGRATING ORAL at 17:42

## 2018-07-21 RX ADMIN — ENOXAPARIN SODIUM 40 MG: 40 INJECTION SUBCUTANEOUS at 20:50

## 2018-07-21 RX ADMIN — ATENOLOL 25 MG: 25 TABLET ORAL at 17:42

## 2018-07-21 RX ADMIN — POLYETHYLENE GLYCOL 3350 17 G: 17 POWDER, FOR SOLUTION ORAL at 08:17

## 2018-07-21 RX ADMIN — DIAZEPAM 5 MG: 5 TABLET ORAL at 00:09

## 2018-07-21 RX ADMIN — Medication 10 ML: at 14:06

## 2018-07-21 RX ADMIN — MELATONIN TAB 3 MG 3 MG: 3 TAB at 20:50

## 2018-07-21 RX ADMIN — DICYCLOMINE HYDROCHLORIDE 10 MG: 10 CAPSULE ORAL at 08:24

## 2018-07-21 RX ADMIN — CLOPIDOGREL BISULFATE 75 MG: 75 TABLET ORAL at 17:42

## 2018-07-21 RX ADMIN — SODIUM CHLORIDE 75 ML/HR: 900 INJECTION, SOLUTION INTRAVENOUS at 14:03

## 2018-07-21 RX ADMIN — Medication 10 ML: at 14:00

## 2018-07-21 RX ADMIN — FLUTICASONE PROPIONATE 2 SPRAY: 50 SPRAY, METERED NASAL at 09:00

## 2018-07-21 RX ADMIN — ACETAMINOPHEN 650 MG: 325 TABLET ORAL at 08:17

## 2018-07-21 RX ADMIN — Medication 10 ML: at 06:13

## 2018-07-21 RX ADMIN — Medication 10 ML: at 06:03

## 2018-07-21 RX ADMIN — ONDANSETRON 4 MG: 4 TABLET, ORALLY DISINTEGRATING ORAL at 00:09

## 2018-07-21 NOTE — PROGRESS NOTES
Patient's daughter stated that patient was feeling dizzy. Went into room and took BP. 159/88. Patient stated that was high for her.  Notified patient's nurse

## 2018-07-21 NOTE — PROGRESS NOTES
Bedside and Verbal shift change report given to [facility] rn student and i rn  (oncoming nurse) by Ramy Cool  (offgoing nurse). Report included the following information SBAR and Kardex.

## 2018-07-21 NOTE — PROGRESS NOTES
Bedside and Verbal shift change report given to Arti Albarado RN (oncoming nurse) by Alexandr Rutledge, Student RN (offgoing nurse). Report included the following information SBAR, Kardex, Intake/Output, MAR, Accordion and Recent Results.

## 2018-07-21 NOTE — PROGRESS NOTES
[facility]     Renal Daily Progress Note:     Admission Date: 2018     Subjective:  Feels ok, no nausea or vomiting. Na dropped to 127 with slightly elevated urine na and urine osm but no urine creatinine. CXR neg. TSH up but not overtly hypothyroid. No edema    Review of Systems  Pertinent items are noted in HPI.     Objective:     Visit Vitals    /68 (BP 1 Location: Left arm, BP Patient Position: At rest)    Pulse 72    Temp 98.3 °F (36.8 °C)    Resp 17    Ht 5' 1\" (1.549 m)    Wt 52.2 kg (115 lb)    SpO2 98%    BMI 21.73 kg/m2     Temp (24hrs), Av.9 °F (36.6 °C), Min:97.5 °F (36.4 °C), Max:98.3 °F (36.8 °C)      No intake or output data in the 24 hours ending 18 1138  Current Facility-Administered Medications   Medication Dose Route Frequency    traMADol (ULTRAM) tablet 50 mg  50 mg Oral Q6H PRN    polyethylene glycol (MIRALAX) packet 17 g  17 g Oral DAILY    sodium chloride (NS) flush 5-10 mL  5-10 mL IntraVENous Q8H    sodium chloride (NS) flush 5-10 mL  5-10 mL IntraVENous PRN    naloxone (NARCAN) injection 0.4 mg  0.4 mg IntraVENous PRN    sodium chloride (NS) flush 5-10 mL  5-10 mL IntraVENous Q8H    sodium chloride (NS) flush 5-10 mL  5-10 mL IntraVENous PRN    acetaminophen (TYLENOL) tablet 650 mg  650 mg Oral Q4H PRN    Or    acetaminophen (TYLENOL) solution 650 mg  650 mg Per NG tube Q4H PRN    Or    acetaminophen (TYLENOL) suppository 650 mg  650 mg Rectal Q4H PRN    enoxaparin (LOVENOX) injection 40 mg  40 mg SubCUTAneous QHS    atenolol (TENORMIN) tablet 25 mg  25 mg Oral PCD    butalbital-acetaminophen-caffeine (FIORICET, ESGIC) -40 mg per tablet 1 Tab  1 Tab Oral Q6H PRN    clopidogrel (PLAVIX) tablet 75 mg  75 mg Oral PCD    diazePAM (VALIUM) tablet 5 mg  5 mg Oral QHS PRN    dicyclomine (BENTYL) capsule 10 mg  10 mg Oral TID PRN    fluticasone (FLONASE) 50 mcg/actuation nasal spray 2 Spray  2 Spray Both Nostrils DAILY    ondansetron (ZOFRAN ODT) tablet 4 mg  4 mg Oral Q8H PRN    melatonin tablet 3 mg  3 mg Oral QHS       Physical Exam:General appearance: alert, cooperative, no distress, appears stated age  Neck: supple, symmetrical, trachea midline, no adenopathy, thyroid: not enlarged, symmetric, no tenderness/mass/nodules and no JVD  Lungs: clear to auscultation bilaterally  Heart: regular rate and rhythm, no S3 or S4, no rub  Abdomen: soft, non-tender. Bowel sounds normal. No masses,  no organomegaly  Extremities: no edema  Neurologic: Grossly normal    Data Review:     LABS:  Recent Labs      07/21/18   0456  07/20/18   0202  07/19/18   1329   NA  127*  130*  130*   K  4.4  4.1  3.9   CL  96*  97  96*   CO2  26  27  27   BUN  16  13  12   CREA  0.81  0.90  0.96   CA  8.9  8.6  8.6   ALB  3.3*  3.2*  3.7   PHOS  4.1  4.1   --    MG   --   2.0  1.9     Recent Labs      07/20/18   0202  07/19/18   1329   WBC  7.1  5.9   HGB  12.2  12.8   HCT  35.9  38.6   PLT  253  304     Recent Labs      07/21/18   0823   REGINA  46   KU  29   CXR 7-20-18    FINDINGS: Cardiac monitoring wires overlie the thorax. The cardiomediastinal and  hilar contours are within normal limits. The pulmonary vasculature is within  normal limits.      The lungs and pleural spaces are clear. The visualized bones and upper abdomen  are age-appropriate.     IMPRESSION  IMPRESSION:     Normal chest views. No change since 2013 given difference in technique. Assessment:   Renal Specific Problems  Hyponatremia with mild hypochloremia, no proteinuria, CXR clean. ?  Excess ADH or volume depletion or decreased solute intake        Plan:     Obtain/ Order: labs/cultures/radiology/procedures:  renal panel in AM        Therapeutic:    IV saline, if serum Na drops then likely SIADH like picture, then would try iTng Browning MD    [phone]

## 2018-07-21 NOTE — PROGRESS NOTES
Cody Osorio [facility] [place] [address]  [address] [place] [address], [address] [place] [address]  [phone]      Medical Progress Note      NAME: America Robert   :  1935  MRM:  [id number]    Date/Time: 2018  8:06 AM       Assessment and Plan:   1. 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 MRI/MRA brain, carotid dopplers, TTE are unremarkable. No orthostatics. Evaluated by PT/OT. Continue Plavix. Neurology consult appreciated     2. Hyponatremia: chronic. Fluid restrict. Check urine and serum osmolality, urine sodium( not done yet). TSH is slightly elevated. Check free T4. Nephrology evaluation appreciated      3.  Essential hypertension, benign: on atenolol.      4. Anxiety / insomnia: a lot of stress at home. continue diazepam.     5.  Generalized abdominal pain/ Hx IBS. Has severe abdominal pain. Follows with Dr Tressa Pollock. Symptomatic treatment. GI consult appreciated. Subjective:     Chief Complaint:  Follow up of pt who was admitted with dizziness. C/o not getting enough sleep     ROS:  (bold if positive, if negative)    Abd Pain  Tolerating PT  Tolerating Diet        Objective:     Last 24hrs VS reviewed since prior progress note.  Most recent are:    Visit Vitals    /68 (BP 1 Location: Left arm, BP Patient Position: At rest)    Pulse 72    Temp 98.3 °F (36.8 °C)    Resp 17    Ht 5' 1\" (1.549 m)    Wt 52.2 kg (115 lb)    SpO2 98%    BMI 21.73 kg/m2     SpO2 Readings from Last 6 Encounters:   18 98%   13 93%   13 95%   13 97%        No intake or output data in the 24 hours ending 18 0901     Physical Exam:    Gen:  Well-developed, well-nourished, in no acute distress  HEENT:  Pink conjunctivae, PERRL, hearing intact to voice, moist mucous membranes  Neck:  Supple, without masses, thyroid non-tender  Resp:  No accessory muscle use, clear breath sounds without wheezes rales or rhonchi  Card:  No murmurs, normal S1, S2 without thrills, bruits or peripheral edema  Abd:  Soft, non-tender, non-distended, normoactive bowel sounds are present, no palpable organomegaly and no detectable hernias  Lymph:  No cervical or inguinal adenopathy  Musc:  No cyanosis or clubbing  Skin:  No rashes or ulcers, skin turgor is good  Neuro:  Cranial nerves are grossly intact, no focal motor weakness, follows commands appropriately  Psych:  Good insight, oriented to person, place and time, alert  __________________________________________________________________  Medications Reviewed: (see below)  Medications:     Current Facility-Administered Medications   Medication Dose Route Frequency    traMADol (ULTRAM) tablet 50 mg  50 mg Oral Q6H PRN    polyethylene glycol (MIRALAX) packet 17 g  17 g Oral DAILY    sodium chloride (NS) flush 5-10 mL  5-10 mL IntraVENous Q8H    sodium chloride (NS) flush 5-10 mL  5-10 mL IntraVENous PRN    naloxone (NARCAN) injection 0.4 mg  0.4 mg IntraVENous PRN    sodium chloride (NS) flush 5-10 mL  5-10 mL IntraVENous Q8H    sodium chloride (NS) flush 5-10 mL  5-10 mL IntraVENous PRN    acetaminophen (TYLENOL) tablet 650 mg  650 mg Oral Q4H PRN    Or    acetaminophen (TYLENOL) solution 650 mg  650 mg Per NG tube Q4H PRN    Or    acetaminophen (TYLENOL) suppository 650 mg  650 mg Rectal Q4H PRN    enoxaparin (LOVENOX) injection 40 mg  40 mg SubCUTAneous QHS    atenolol (TENORMIN) tablet 25 mg  25 mg Oral PCD    butalbital-acetaminophen-caffeine (FIORICET, ESGIC) -40 mg per tablet 1 Tab  1 Tab Oral Q6H PRN    clopidogrel (PLAVIX) tablet 75 mg  75 mg Oral PCD    diazePAM (VALIUM) tablet 5 mg  5 mg Oral QHS PRN    dicyclomine (BENTYL) capsule 10 mg  10 mg Oral TID PRN    fluticasone (FLONASE) 50 mcg/actuation nasal spray 2 Spray  2 Spray Both Nostrils DAILY    ondansetron (ZOFRAN ODT) tablet 4 mg  4 mg Oral Q8H PRN    melatonin tablet 3 mg  3 mg Oral QHS        Lab Data Reviewed: (see below)  Lab Review:     Recent Labs      07/20/18   3059 07/19/18   1329   WBC  7.1  5.9   HGB  12.2  12.8   HCT  35.9  38.6   PLT  253  304     Recent Labs      07/21/18   0456  07/20/18   0202  07/19/18   1329   NA  127*  130*  130*   K  4.4  4.1  3.9   CL  96*  97  96*   CO2  26  27  27   GLU  106*  96  154*   BUN  16  13  12   CREA  0.81  0.90  0.96   CA  8.9  8.6  8.6   MG   --   2.0  1.9   PHOS  4.1  4.1   --    ALB  3.3*  3.2*  3.7   TBILI   --   0.3  0.3   SGOT   --   20  21   ALT   --   21  23     Lab Results   Component Value Date/Time    Glucose (POC) 91 07/19/2018 03:22 PM    Glucose (POC) 104 03/12/2013 12:38 PM    Glucose (POC) 106 (H) 03/08/2013 07:37 AM    Glucose (POC) 123 (H) 03/07/2013 10:40 PM    Glucose (POC) 113 (H) 03/07/2013 05:00 PM    Glucose (POC) 97 03/07/2013 11:20 AM     No results for input(s): PH, PCO2, PO2, HCO3, FIO2 in the last 72 hours. No results for input(s): INR in the last 72 hours. No lab exists for component: Rinda File  All Micro Results     Procedure Component Value Units Date/Time    URINE CULTURE HOLD SAMPLE [[id number]] Collected:  07/19/18 1330    Order Status:  Completed Specimen:  Urine from Serum Updated:  07/19/18 1334     Urine culture hold         URINE ON HOLD IN MICROBIOLOGY DEPT FOR 3 DAYS. IF UNPRESERVED URINE IS SUBMITTED, IT CANNOT BE USED FOR ADDITIONAL TESTING AFTER 24 HRS, RECOLLECTION WILL BE REQUIRED. I have reviewed notes of prior 24hr. Other pertinent lab:       Total time spent with patient: Rosieku 59 discussed with: Patient, Nursing Staff and >50% of time spent in counseling and coordination of care    Discussed:  Care Plan    Prophylaxis:  Lovenox    Disposition:  Home w/Family           ___________________________________________________    Attending Physician: Yareli Negron MD

## 2018-07-22 VITALS
SYSTOLIC BLOOD PRESSURE: 145 MMHG | WEIGHT: 115 LBS | RESPIRATION RATE: 17 BRPM | TEMPERATURE: 97.1 F | HEART RATE: 73 BPM | DIASTOLIC BLOOD PRESSURE: 80 MMHG | BODY MASS INDEX: 21.71 KG/M2 | HEIGHT: 61 IN | OXYGEN SATURATION: 97 %

## 2018-07-22 LAB
ALBUMIN SERPL-MCNC: 3.3 G/DL (ref 3.5–5)
ANION GAP SERPL CALC-SCNC: 6 MMOL/L (ref 5–15)
BUN SERPL-MCNC: 9 MG/DL (ref 6–20)
BUN/CREAT SERPL: 12 (ref 12–20)
CALCIUM SERPL-MCNC: 8.6 MG/DL (ref 8.5–10.1)
CHLORIDE SERPL-SCNC: 97 MMOL/L (ref 97–108)
CO2 SERPL-SCNC: 25 MMOL/L (ref 21–32)
CREAT SERPL-MCNC: 0.75 MG/DL (ref 0.55–1.02)
CREAT UR-MCNC: 18.23 MG/DL
GLUCOSE SERPL-MCNC: 95 MG/DL (ref 65–100)
MAGNESIUM SERPL-MCNC: 1.9 MG/DL (ref 1.6–2.4)
PHOSPHATE SERPL-MCNC: 3.6 MG/DL (ref 2.6–4.7)
POTASSIUM SERPL-SCNC: 4.4 MMOL/L (ref 3.5–5.1)
SODIUM SERPL-SCNC: 128 MMOL/L (ref 136–145)
SODIUM UR-SCNC: 58 MMOL/L

## 2018-07-22 PROCEDURE — 74011250637 HC RX REV CODE- 250/637: Performed by: PHYSICIAN ASSISTANT

## 2018-07-22 PROCEDURE — 74011250636 HC RX REV CODE- 250/636: Performed by: INTERNAL MEDICINE

## 2018-07-22 PROCEDURE — 74011250637 HC RX REV CODE- 250/637: Performed by: INTERNAL MEDICINE

## 2018-07-22 PROCEDURE — 83735 ASSAY OF MAGNESIUM: CPT | Performed by: INTERNAL MEDICINE

## 2018-07-22 PROCEDURE — 82570 ASSAY OF URINE CREATININE: CPT | Performed by: INTERNAL MEDICINE

## 2018-07-22 PROCEDURE — 84300 ASSAY OF URINE SODIUM: CPT | Performed by: INTERNAL MEDICINE

## 2018-07-22 PROCEDURE — 80069 RENAL FUNCTION PANEL: CPT | Performed by: INTERNAL MEDICINE

## 2018-07-22 PROCEDURE — 36415 COLL VENOUS BLD VENIPUNCTURE: CPT | Performed by: INTERNAL MEDICINE

## 2018-07-22 RX ADMIN — POLYETHYLENE GLYCOL 3350 17 G: 17 POWDER, FOR SOLUTION ORAL at 08:06

## 2018-07-22 RX ADMIN — FLUTICASONE PROPIONATE 2 SPRAY: 50 SPRAY, METERED NASAL at 09:00

## 2018-07-22 RX ADMIN — SODIUM CHLORIDE 75 ML/HR: 900 INJECTION, SOLUTION INTRAVENOUS at 03:27

## 2018-07-22 RX ADMIN — DICYCLOMINE HYDROCHLORIDE 10 MG: 10 CAPSULE ORAL at 03:46

## 2018-07-22 RX ADMIN — TRAMADOL HYDROCHLORIDE 50 MG: 50 TABLET, FILM COATED ORAL at 06:23

## 2018-07-22 RX ADMIN — ONDANSETRON 4 MG: 4 TABLET, ORALLY DISINTEGRATING ORAL at 08:06

## 2018-07-22 RX ADMIN — ACETAMINOPHEN 650 MG: 325 TABLET ORAL at 03:46

## 2018-07-22 NOTE — PROGRESS NOTES
Cody Osorio [facility] [place] [address]  [address] [facility], [address] [place] [address]  [phone]      Medical Progress Note      NAME: Emelina Santos   :  1935  MRM:  [id number]    Date/Time: 2018  8:06 AM       Assessment and Plan:   1. Dizziness:  History of TIAs. MRI/MRA brain, carotid dopplers, TTE are unremarkable. No orthostatics. Evaluated by PT/OT. Continue Plavix. Neurology consult appreciated     2. Hyponatremia: chronic. TSH is slightly elevated. Check free T4. Nephrology evaluation appreciated. Trial of NS. Slight improvement of sodium.      3.  Essential hypertension, benign: on atenolol.      4. Anxiety / insomnia: a lot of stress at home. continue diazepam.     5.  Generalized abdominal pain/ Hx IBS. Has severe abdominal pain. Follows with Dr Pablo Batista. Symptomatic treatment. GI consult appreciated. Subjective:     Chief Complaint:  Follow up of pt who was admitted with dizziness. c/o nausea     ROS:  (bold if positive, if negative)    Abd Pain  Tolerating PT  Tolerating Diet        Objective:     Last 24hrs VS reviewed since prior progress note.  Most recent are:    Visit Vitals    /62 (BP 1 Location: Left arm, BP Patient Position: At rest)    Pulse 62    Temp 97.8 °F (36.6 °C)    Resp 15    Ht 5' 1\" (1.549 m)    Wt 52.2 kg (115 lb)    SpO2 95%    Breastfeeding No    BMI 21.73 kg/m2     SpO2 Readings from Last 6 Encounters:   18 95%   13 93%   13 95%   13 97%            Intake/Output Summary (Last 24 hours) at 18 0824  Last data filed at 18 0529   Gross per 24 hour   Intake                0 ml   Output              400 ml   Net             -400 ml        Physical Exam:    Gen:  Well-developed, well-nourished, in no acute distress  HEENT:  Pink conjunctivae, PERRL, hearing intact to voice, moist mucous membranes  Neck:  Supple, without masses, thyroid non-tender  Resp:  No accessory muscle use, clear breath sounds without wheezes rales or rhonchi  Card:  No murmurs, normal S1, S2 without thrills, bruits or peripheral edema  Abd:  Soft, non-tender, non-distended, normoactive bowel sounds are present, no palpable organomegaly and no detectable hernias  Lymph:  No cervical or inguinal adenopathy  Musc:  No cyanosis or clubbing  Skin:  No rashes or ulcers, skin turgor is good  Neuro:  Cranial nerves are grossly intact, no focal motor weakness, follows commands appropriately  Psych:  Good insight, oriented to person, place and time, alert  __________________________________________________________________  Medications Reviewed: (see below)  Medications:     Current Facility-Administered Medications   Medication Dose Route Frequency    0.9% sodium chloride infusion  75 mL/hr IntraVENous CONTINUOUS    traMADol (ULTRAM) tablet 50 mg  50 mg Oral Q6H PRN    polyethylene glycol (MIRALAX) packet 17 g  17 g Oral DAILY    sodium chloride (NS) flush 5-10 mL  5-10 mL IntraVENous Q8H    sodium chloride (NS) flush 5-10 mL  5-10 mL IntraVENous PRN    naloxone (NARCAN) injection 0.4 mg  0.4 mg IntraVENous PRN    sodium chloride (NS) flush 5-10 mL  5-10 mL IntraVENous Q8H    sodium chloride (NS) flush 5-10 mL  5-10 mL IntraVENous PRN    acetaminophen (TYLENOL) tablet 650 mg  650 mg Oral Q4H PRN    Or    acetaminophen (TYLENOL) solution 650 mg  650 mg Per NG tube Q4H PRN    Or    acetaminophen (TYLENOL) suppository 650 mg  650 mg Rectal Q4H PRN    enoxaparin (LOVENOX) injection 40 mg  40 mg SubCUTAneous QHS    atenolol (TENORMIN) tablet 25 mg  25 mg Oral PCD    butalbital-acetaminophen-caffeine (FIORICET, ESGIC) -40 mg per tablet 1 Tab  1 Tab Oral Q6H PRN    clopidogrel (PLAVIX) tablet 75 mg  75 mg Oral PCD    diazePAM (VALIUM) tablet 5 mg  5 mg Oral QHS PRN    dicyclomine (BENTYL) capsule 10 mg  10 mg Oral TID PRN    fluticasone (FLONASE) 50 mcg/actuation nasal spray 2 Spray  2 Spray Both Nostrils DAILY    ondansetron (ZOFRAN ODT) tablet 4 mg  4 mg Oral Q8H PRN    melatonin tablet 3 mg  3 mg Oral QHS        Lab Data Reviewed: (see below)  Lab Review:     Recent Labs      07/20/18   0202  07/19/18   1329   WBC  7.1  5.9   HGB  12.2  12.8   HCT  35.9  38.6   PLT  253  304     Recent Labs      07/22/18   0332  07/21/18   0456  07/20/18   0202  07/19/18   1329   NA  128*  127*  130*  130*   K  4.4  4.4  4.1  3.9   CL  97  96*  97  96*   CO2  25  26  27  27   GLU  95  106*  96  154*   BUN  9  16  13  12   CREA  0.75  0.81  0.90  0.96   CA  8.6  8.9  8.6  8.6   MG  1.9   --   2.0  1.9   PHOS  3.6  4.1  4.1   --    ALB  3.3*  3.3*  3.2*  3.7   TBILI   --    --   0.3  0.3   SGOT   --    --   20  21   ALT   --    --   21  23     Lab Results   Component Value Date/Time    Glucose (POC) 99 07/21/2018 08:59 PM    Glucose (POC) 91 07/19/2018 03:22 PM    Glucose (POC) 104 03/12/2013 12:38 PM    Glucose (POC) 106 (H) 03/08/2013 07:37 AM    Glucose (POC) 123 (H) 03/07/2013 10:40 PM    Glucose (POC) 113 (H) 03/07/2013 05:00 PM     No results for input(s): PH, PCO2, PO2, HCO3, FIO2 in the last 72 hours. No results for input(s): INR in the last 72 hours. No lab exists for component: Fontenot Jac  All Micro Results     Procedure Component Value Units Date/Time    URINE CULTURE HOLD SAMPLE [[id number]] Collected:  07/19/18 1330    Order Status:  Completed Specimen:  Urine from Serum Updated:  07/19/18 1334     Urine culture hold         URINE ON HOLD IN MICROBIOLOGY DEPT FOR 3 DAYS. IF UNPRESERVED URINE IS SUBMITTED, IT CANNOT BE USED FOR ADDITIONAL TESTING AFTER 24 HRS, RECOLLECTION WILL BE REQUIRED. I have reviewed notes of prior 24hr. Other pertinent lab:       Total time spent with patient: Everett 59 discussed with: Patient, Nursing Staff and >50% of time spent in counseling and coordination of care    Discussed:  Care Plan    Prophylaxis:  Lovenox    Disposition:  Home w/Family           ___________________________________________________    Attending Physician: Jihan Alcaraz MD

## 2018-07-22 NOTE — PROGRESS NOTES
Bedside and Verbal shift change report given to Cely Macedo RN (oncoming nurse) by Vesna Singletary, Student Nurse (offgoing nurse). Report included the following information SBAR, Kardex, Procedure Summary, Intake/Output and Recent Results.

## 2018-07-22 NOTE — DISCHARGE SUMMARY
Hospitalist Discharge Summary     Patient ID:    Ko Mccord  [id number]  28 y.o.  1935    Admit date: 7/19/2018    Discharge date and time: 7/22/2018    Admission Diagnoses: Dizziness  Dizziness    Chronic Diagnoses:    Problem List as of 7/22/2018  Date Reviewed: 7/19/2018          Codes Class Noted - Resolved    Dizziness ICD-10-CM: R42  ICD-9-CM: 780.4  7/19/2018 - Present        Weakness ICD-10-CM: R53.1  ICD-9-CM: 780.79  7/19/2018 - Present        Pneumonia, organism unspecified(486) ICD-10-CM: J18.9  ICD-9-CM: 486  3/5/2013 - Present        Hyponatremia ICD-10-CM: E87.1  ICD-9-CM: 276.1  3/5/2013 - Present        Essential hypertension, benign (Chronic) ICD-10-CM: I10  ICD-9-CM: 401.1  3/5/2013 - Present        Anxiety (Chronic) ICD-10-CM: F41.9  ICD-9-CM: 300.00  3/5/2013 - Present        History of TIAs (Chronic) ICD-10-CM: Z86.73  ICD-9-CM: V12.54  3/5/2013 - Present        RESOLVED: Sinus tachycardia ICD-10-CM: R00.0  ICD-9-CM: 427.89  3/5/2013 - 3/8/2013        RESOLVED: Cough ICD-10-CM: R05  ICD-9-CM: 786.2  3/5/2013 - 3/8/2013        RESOLVED: Nausea & vomiting ICD-10-CM: R11.2  ICD-9-CM: 787.01  3/5/2013 - 3/8/2013              Discharge Medications:   Current Discharge Medication List      CONTINUE these medications which have NOT CHANGED    Details   dicyclomine (BENTYL) 10 mg capsule Take 10 mg by mouth three (3) times daily as needed (abdominal cramping). butalbital-acetaminophen-caff (FIORICET) -40 mg per capsule Take 1 Cap by mouth every six (6) hours as needed for Pain. fluticasone (FLONASE ALLERGY RELIEF) 50 mcg/actuation nasal spray 2 Sprays by Both Nostrils route daily. calcium carbonate (TUMS) 200 mg calcium (500 mg) chew Take 1 Tab by mouth four (4) times daily as needed. ondansetron hcl (ZOFRAN) 4 mg tablet Take 1 Tab by mouth every eight (8) hours as needed for Nausea.   Qty: 12 Tab, Refills: 0      atenolol (TENORMIN) 25 mg tablet Take 25 mg by mouth daily (after dinner). clopidogrel (PLAVIX) 75 mg tablet Take 75 mg by mouth daily (after dinner). diazepam (VALIUM) 5 mg tablet Take 5 mg by mouth nightly as needed. Follow up Care:    1. Rudy Horta MD in 1-2 weeks  2. Nephrology     Diet:  Regular Diet    Disposition:  Home. Advanced Directive:    Discharge Exam:  See today's note. CONSULTATIONS: GI and Nephrology, neurology     Significant Diagnostic Studies:   Recent Labs      07/20/18   0202  07/19/18   1329   WBC  7.1  5.9   HGB  12.2  12.8   HCT  35.9  38.6   PLT  253  304     Recent Labs      07/22/18   0332  07/21/18   0456  07/20/18 0202 07/19/18   1329   NA  128*  127*  130*  130*   K  4.4  4.4  4.1  3.9   CL  97  96*  97  96*   CO2  25  26  27  27   BUN  9  16  13  12   CREA  0.75  0.81  0.90  0.96   GLU  95  106*  96  154*   CA  8.6  8.9  8.6  8.6   MG  1.9   --   2.0  1.9   PHOS  3.6  4.1  4.1   --    URICA   --   4.2   --    --      Recent Labs      07/22/18   0332  07/21/18 0456  07/20/18 0202 07/19/18   1329   SGOT   --    --   20  21   ALT   --    --   21  23   AP   --    --   60  68   TBILI   --    --   0.3  0.3   TP   --    --   6.3*  7.2   ALB  3.3*  3.3*  3.2*  3.7   GLOB   --    --   3.1  3.5     No results for input(s): INR, PTP, APTT in the last 72 hours. No lab exists for component: INREXT   No results for input(s): FE, TIBC, PSAT, FERR in the last 72 hours. No results for input(s): PH, PCO2, PO2 in the last 72 hours. No results for input(s): CPK, CKMB in the last 72 hours. No lab exists for component: TROPONINI  Lab Results   Component Value Date/Time    Glucose (POC) 99 07/21/2018 08:59 PM    Glucose (POC) 91 07/19/2018 03:22 PM    Glucose (POC) 104 03/12/2013 12:38 PM    Glucose (POC) 106 (H) 03/08/2013 07:37 AM    Glucose (POC) 123 (H) 03/07/2013 10:40 PM    Glucose (POC) 113 (H) 03/07/2013 05:00 PM             HOSPITAL COURSE & TREATMENT RENDERED:   1.   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 MRI/MRA brain, carotid dopplers, TTE are unremarkable. No orthostatics. Evaluated by PT/OT. Continue Plavix. Neurology consult appreciated      2. Hyponatremia: chronic. Fluid restrict. TSH is slightly elevated. Free T4 is normal. Nephrology evaluation appreciated. FU with Dr Anup Michaud  3.  Essential hypertension, benign: on atenolol.       4.  Anxiety / insomnia: a lot of stress at home. continue diazepam.      5.  Generalized abdominal pain/ Hx IBS. Has severe abdominal pain. Follows with Dr Veronique Lewis. Symptomatic treatment.  GI consult appreciated.       Discharged in improved condition     Signed:  Gerardo Kelly MD  7/22/2018  10:46 AM

## 2018-07-22 NOTE — DISCHARGE INSTRUCTIONS
ACUTE DIAGNOSES:  Dizziness  Dizziness    CHRONIC MEDICAL DIAGNOSES:  Problem List as of 7/22/2018  Date Reviewed: 7/19/2018          Codes Class Noted - Resolved    Dizziness ICD-10-CM: R42  ICD-9-CM: 780.4  7/19/2018 - Present        Weakness ICD-10-CM: R53.1  ICD-9-CM: 780.79  7/19/2018 - Present        Pneumonia, organism unspecified(486) ICD-10-CM: J18.9  ICD-9-CM: 210  3/5/2013 - Present        Hyponatremia ICD-10-CM: E87.1  ICD-9-CM: 276.1  3/5/2013 - Present        Essential hypertension, benign (Chronic) ICD-10-CM: I10  ICD-9-CM: 401.1  3/5/2013 - Present        Anxiety (Chronic) ICD-10-CM: F41.9  ICD-9-CM: 300.00  3/5/2013 - Present        History of TIAs (Chronic) ICD-10-CM: Z86.73  ICD-9-CM: V12.54  3/5/2013 - Present        RESOLVED: Sinus tachycardia ICD-10-CM: R00.0  ICD-9-CM: 427.89  3/5/2013 - 3/8/2013        RESOLVED: Cough ICD-10-CM: R05  ICD-9-CM: 786.2  3/5/2013 - 3/8/2013        RESOLVED: Nausea & vomiting ICD-10-CM: R11.2  ICD-9-CM: 787.01  3/5/2013 - 3/8/2013              DISCHARGE MEDICATIONS:          · It is important that you take the medication exactly as they are prescribed. · Keep your medication in the bottles provided by the pharmacist and keep a list of the medication names, dosages, and times to be taken in your wallet. · Do not take other medications without consulting your doctor. DIET:  Regular Diet    ACTIVITY: Activity as tolerated    ADDITIONAL INFORMATION: If you experience any of the following symptoms then please call your primary care physician or return to the emergency room if you cannot get hold of your doctor: Fever, chills, nausea, vomiting, diarrhea, change in mentation, falling, bleeding, shortness of breath. FOLLOW UP CARE:  Dr. Frances Alaniz MD  you are to call and set up an appointment to see them in 2 weeks. Follow-up with nephrology, Dr Harjeet Marcelo. Check labs in 3 days.        Information obtained by :  I understand that if any problems occur once I am at home I am to contact my physician. I understand and acknowledge receipt of the instructions indicated above.                                                                                                                                            Physician's or R.N.'s Signature                                                                  Date/Time                                                                                                                                              Patient or Representative Signature                                                          Date/Time

## 2018-07-22 NOTE — PROGRESS NOTES
[facility] KIDNEY     Renal Daily Progress Note:     Admission Date: 2018     Subjective:  Feels ok, no nausea or vomiting. Na up to 128. Urine Na up with low urine creatinine. CXR neg. TSH up but not overtly hypothyroid. No edema    Review of Systems  Pertinent items are noted in HPI.     Objective:     Visit Vitals    /80 (BP 1 Location: Left arm, BP Patient Position: At rest)    Pulse 73    Temp 97.1 °F (36.2 °C)    Resp 17    Ht 5' 1\" (1.549 m)    Wt 52.2 kg (115 lb)    SpO2 97%    Breastfeeding No    BMI 21.73 kg/m2     Temp (24hrs), Av.6 °F (36.4 °C), Min:97.1 °F (36.2 °C), Max:98.1 °F (36.7 °C)        Intake/Output Summary (Last 24 hours) at 18 1045  Last data filed at 18 0843   Gross per 24 hour   Intake              320 ml   Output              400 ml   Net              -80 ml     Current Facility-Administered Medications   Medication Dose Route Frequency    0.9% sodium chloride infusion  75 mL/hr IntraVENous CONTINUOUS    traMADol (ULTRAM) tablet 50 mg  50 mg Oral Q6H PRN    polyethylene glycol (MIRALAX) packet 17 g  17 g Oral DAILY    sodium chloride (NS) flush 5-10 mL  5-10 mL IntraVENous Q8H    sodium chloride (NS) flush 5-10 mL  5-10 mL IntraVENous PRN    naloxone (NARCAN) injection 0.4 mg  0.4 mg IntraVENous PRN    sodium chloride (NS) flush 5-10 mL  5-10 mL IntraVENous Q8H    sodium chloride (NS) flush 5-10 mL  5-10 mL IntraVENous PRN    acetaminophen (TYLENOL) tablet 650 mg  650 mg Oral Q4H PRN    Or    acetaminophen (TYLENOL) solution 650 mg  650 mg Per NG tube Q4H PRN    Or    acetaminophen (TYLENOL) suppository 650 mg  650 mg Rectal Q4H PRN    enoxaparin (LOVENOX) injection 40 mg  40 mg SubCUTAneous QHS    atenolol (TENORMIN) tablet 25 mg  25 mg Oral PCD    butalbital-acetaminophen-caffeine (FIORICET, ESGIC) -40 mg per tablet 1 Tab  1 Tab Oral Q6H PRN    clopidogrel (PLAVIX) tablet 75 mg  75 mg Oral PCD    diazePAM (VALIUM) tablet 5 mg  5 mg Oral QHS PRN    dicyclomine (BENTYL) capsule 10 mg  10 mg Oral TID PRN    fluticasone (FLONASE) 50 mcg/actuation nasal spray 2 Spray  2 Spray Both Nostrils DAILY    ondansetron (ZOFRAN ODT) tablet 4 mg  4 mg Oral Q8H PRN    melatonin tablet 3 mg  3 mg Oral QHS       Physical Exam:General appearance: alert, cooperative, no distress, appears stated age  Neck: supple, symmetrical, trachea midline, no adenopathy, thyroid: not enlarged, symmetric, no tenderness/mass/nodules and no JVD  Lungs: clear to auscultation bilaterally  Heart: regular rate and rhythm, no S3 or S4, no rub  Abdomen: soft, non-tender. Bowel sounds normal. No masses,  no organomegaly  Extremities: no edema  Neurologic: Grossly normal    Data Review:     LABS:  Recent Labs      07/22/18   0332  07/21/18   0456  07/20/18   0202  07/19/18   1329   NA  128*  127*  130*  130*   K  4.4  4.4  4.1  3.9   CL  97  96*  97  96*   CO2  25  26  27  27   BUN  9  16  13  12   CREA  0.75  0.81  0.90  0.96   CA  8.6  8.9  8.6  8.6   ALB  3.3*  3.3*  3.2*  3.7   PHOS  3.6  4.1  4.1   --    MG  1.9   --   2.0  1.9     Recent Labs      07/20/18   0202  07/19/18   1329   WBC  7.1  5.9   HGB  12.2  12.8   HCT  35.9  38.6   PLT  253  304     Recent Labs      07/22/18   0522  07/21/18   0823   REGINA  58  46   [facility]   --   29   [facility]AU  18.23   --    CXR 7-20-18    FINDINGS: Cardiac monitoring wires overlie the thorax. The cardiomediastinal and  hilar contours are within normal limits. The pulmonary vasculature is within  normal limits.      The lungs and pleural spaces are clear. The visualized bones and upper abdomen  are age-appropriate.     IMPRESSION  IMPRESSION:     Normal chest views. No change since 2013 given difference in technique. Assessment:   Renal Specific Problems  Hyponatremia with mild hypochloremia, no proteinuria, CXR clean. ? Excess ADH or volume depletion or decreased solute intake  I suspect primary problem is decreased solute intake.  She responded positively to IV saline. Would expect worsening serum sodium if purely SIADH. Plan:     Obtain/ Order: labs/cultures/radiology/procedures:  renal panel in AM        Therapeutic:      D/w patient to add salt to food  Increase protein intake  Limit water to 1-1.5 liters/day  Recheck labs in 3 days (Wednesday) via PCP or my ofice. My office will send out a lab request regardless.       Sophia Cardenas MD    [phone]

## 2018-07-22 NOTE — PROGRESS NOTES
I have reviewed discharge instructions with the patient and daughter. The patient and daughter verbalized understanding. Discharge medications reviewed with patient and daughter and appropriate educational materials and side effects teaching were provided.

## 2019-03-06 ENCOUNTER — APPOINTMENT (OUTPATIENT)
Dept: GENERAL RADIOLOGY | Age: 84
End: 2019-03-06
Attending: EMERGENCY MEDICINE
Payer: MEDICARE

## 2019-03-06 ENCOUNTER — HOSPITAL ENCOUNTER (EMERGENCY)
Age: 84
Discharge: HOME | End: 2019-03-06
Attending: EMERGENCY MEDICINE
Payer: MEDICARE

## 2019-03-06 VITALS
HEIGHT: 61 IN | DIASTOLIC BLOOD PRESSURE: 79 MMHG | HEART RATE: 70 BPM | TEMPERATURE: 98.7 F | OXYGEN SATURATION: 96 % | SYSTOLIC BLOOD PRESSURE: 154 MMHG | WEIGHT: 110.44 LBS | BODY MASS INDEX: 20.85 KG/M2 | RESPIRATION RATE: 19 BRPM

## 2019-03-06 DIAGNOSIS — R53.1 WEAKNESS: Primary | ICD-10-CM

## 2019-03-06 DIAGNOSIS — R19.7 DIARRHEA, UNSPECIFIED TYPE: ICD-10-CM

## 2019-03-06 LAB
ALBUMIN SERPL-MCNC: 3.8 G/DL (ref 3.5–5)
ALBUMIN/GLOB SERPL: 1.1 {RATIO} (ref 1.1–2.2)
ALP SERPL-CCNC: 63 U/L (ref 45–117)
ALT SERPL-CCNC: 22 U/L (ref 12–78)
ANION GAP SERPL CALC-SCNC: 9 MMOL/L (ref 5–15)
APPEARANCE UR: CLEAR
AST SERPL-CCNC: 20 U/L (ref 15–37)
ATRIAL RATE: 57 BPM
BACTERIA URNS QL MICRO: NEGATIVE /HPF
BASOPHILS # BLD: 0.1 K/UL (ref 0–0.1)
BASOPHILS NFR BLD: 1 % (ref 0–1)
BILIRUB SERPL-MCNC: 0.5 MG/DL (ref 0.2–1)
BILIRUB UR QL: NEGATIVE
BUN SERPL-MCNC: 11 MG/DL (ref 6–20)
BUN/CREAT SERPL: 13 (ref 12–20)
CALCIUM SERPL-MCNC: 9 MG/DL (ref 8.5–10.1)
CALCULATED P AXIS, ECG09: 64 DEGREES
CALCULATED R AXIS, ECG10: -38 DEGREES
CALCULATED T AXIS, ECG11: -12 DEGREES
CHLORIDE SERPL-SCNC: 100 MMOL/L (ref 97–108)
CO2 SERPL-SCNC: 23 MMOL/L (ref 21–32)
COLOR UR: NORMAL
COMMENT, HOLDF: NORMAL
CREAT SERPL-MCNC: 0.83 MG/DL (ref 0.55–1.02)
DIAGNOSIS, 93000: NORMAL
DIFFERENTIAL METHOD BLD: NORMAL
EOSINOPHIL # BLD: 0.1 K/UL (ref 0–0.4)
EOSINOPHIL NFR BLD: 1 % (ref 0–7)
EPITH CASTS URNS QL MICRO: NORMAL /LPF
ERYTHROCYTE [DISTWIDTH] IN BLOOD BY AUTOMATED COUNT: 12.4 % (ref 11.5–14.5)
GLOBULIN SER CALC-MCNC: 3.5 G/DL (ref 2–4)
GLUCOSE SERPL-MCNC: 101 MG/DL (ref 65–100)
GLUCOSE UR STRIP.AUTO-MCNC: NEGATIVE MG/DL
HCT VFR BLD AUTO: 41.6 % (ref 35–47)
HGB BLD-MCNC: 13.9 G/DL (ref 11.5–16)
HGB UR QL STRIP: NEGATIVE
HYALINE CASTS URNS QL MICRO: NORMAL /LPF (ref 0–5)
IMM GRANULOCYTES # BLD AUTO: 0 K/UL (ref 0–0.04)
IMM GRANULOCYTES NFR BLD AUTO: 0 % (ref 0–0.5)
KETONES UR QL STRIP.AUTO: NEGATIVE MG/DL
LACTATE BLD-SCNC: 1.34 MMOL/L (ref 0.4–2)
LEUKOCYTE ESTERASE UR QL STRIP.AUTO: NEGATIVE
LYMPHOCYTES # BLD: 2.2 K/UL (ref 0.8–3.5)
LYMPHOCYTES NFR BLD: 33 % (ref 12–49)
MCH RBC QN AUTO: 29.2 PG (ref 26–34)
MCHC RBC AUTO-ENTMCNC: 33.4 G/DL (ref 30–36.5)
MCV RBC AUTO: 87.4 FL (ref 80–99)
MONOCYTES # BLD: 0.5 K/UL (ref 0–1)
MONOCYTES NFR BLD: 8 % (ref 5–13)
NEUTS SEG # BLD: 3.7 K/UL (ref 1.8–8)
NEUTS SEG NFR BLD: 57 % (ref 32–75)
NITRITE UR QL STRIP.AUTO: NEGATIVE
NRBC # BLD: 0 K/UL (ref 0–0.01)
NRBC BLD-RTO: 0 PER 100 WBC
P-R INTERVAL, ECG05: 200 MS
PH UR STRIP: 8 [PH] (ref 5–8)
PLATELET # BLD AUTO: 344 K/UL (ref 150–400)
PMV BLD AUTO: 10.1 FL (ref 8.9–12.9)
POTASSIUM SERPL-SCNC: 4 MMOL/L (ref 3.5–5.1)
PROT SERPL-MCNC: 7.3 G/DL (ref 6.4–8.2)
PROT UR STRIP-MCNC: NEGATIVE MG/DL
Q-T INTERVAL, ECG07: 426 MS
QRS DURATION, ECG06: 78 MS
QTC CALCULATION (BEZET), ECG08: 414 MS
RBC # BLD AUTO: 4.76 M/UL (ref 3.8–5.2)
RBC #/AREA URNS HPF: NORMAL /HPF (ref 0–5)
SAMPLES BEING HELD,HOLD: NORMAL
SODIUM SERPL-SCNC: 132 MMOL/L (ref 136–145)
SP GR UR REFRACTOMETRY: 1 (ref 1–1.03)
TROPONIN I SERPL-MCNC: <0.05 NG/ML
UR CULT HOLD, URHOLD: NORMAL
UROBILINOGEN UR QL STRIP.AUTO: 0.2 EU/DL (ref 0.2–1)
VENTRICULAR RATE, ECG03: 57 BPM
WBC # BLD AUTO: 6.6 K/UL (ref 3.6–11)
WBC URNS QL MICRO: NORMAL /HPF (ref 0–4)

## 2019-03-06 PROCEDURE — 84484 ASSAY OF TROPONIN QUANT: CPT

## 2019-03-06 PROCEDURE — 93005 ELECTROCARDIOGRAM TRACING: CPT

## 2019-03-06 PROCEDURE — 74011250636 HC RX REV CODE- 250/636: Performed by: EMERGENCY MEDICINE

## 2019-03-06 PROCEDURE — 99285 EMERGENCY DEPT VISIT HI MDM: CPT

## 2019-03-06 PROCEDURE — 96361 HYDRATE IV INFUSION ADD-ON: CPT

## 2019-03-06 PROCEDURE — 36415 COLL VENOUS BLD VENIPUNCTURE: CPT

## 2019-03-06 PROCEDURE — 85025 COMPLETE CBC W/AUTO DIFF WBC: CPT

## 2019-03-06 PROCEDURE — 80053 COMPREHEN METABOLIC PANEL: CPT

## 2019-03-06 PROCEDURE — 83605 ASSAY OF LACTIC ACID: CPT

## 2019-03-06 PROCEDURE — 74011250637 HC RX REV CODE- 250/637: Performed by: EMERGENCY MEDICINE

## 2019-03-06 PROCEDURE — 71045 X-RAY EXAM CHEST 1 VIEW: CPT

## 2019-03-06 PROCEDURE — 96360 HYDRATION IV INFUSION INIT: CPT

## 2019-03-06 PROCEDURE — 81001 URINALYSIS AUTO W/SCOPE: CPT

## 2019-03-06 RX ORDER — LORAZEPAM 1 MG/1
1 TABLET ORAL
Status: COMPLETED | OUTPATIENT
Start: 2019-03-06 | End: 2019-03-06

## 2019-03-06 RX ADMIN — SODIUM CHLORIDE 1000 ML: 900 INJECTION, SOLUTION INTRAVENOUS at 10:02

## 2019-03-06 RX ADMIN — LORAZEPAM 1 MG: 1 TABLET ORAL at 11:15

## 2019-03-06 NOTE — DISCHARGE INSTRUCTIONS
Patient Education        Diarrhea: Care Instructions  Your Care Instructions    Diarrhea is loose, watery stools (bowel movements). The exact cause is often hard to find. Sometimes diarrhea is your body's way of getting rid of what caused an upset stomach. Viruses, food poisoning, and many medicines can cause diarrhea. Some people get diarrhea in response to emotional stress, anxiety, or certain foods. Almost everyone has diarrhea now and then. It usually isn't serious, and your stools will return to normal soon. The important thing to do is replace the fluids you have lost, so you can prevent dehydration. The doctor has checked you carefully, but problems can develop later. If you notice any problems or new symptoms, get medical treatment right away. Follow-up care is a key part of your treatment and safety. Be sure to make and go to all appointments, and call your doctor if you are having problems. It's also a good idea to know your test results and keep a list of the medicines you take. How can you care for yourself at home? · Watch for signs of dehydration, which means your body has lost too much water. Dehydration is a serious condition and should be treated right away. Signs of dehydration are:  ? Increasing thirst and dry eyes and mouth. ? Feeling faint or lightheaded. ? Darker urine, and a smaller amount of urine than normal.  · To prevent dehydration, drink plenty of fluids, enough so that your urine is light yellow or clear like water. Choose water and other caffeine-free clear liquids until you feel better. If you have kidney, heart, or liver disease and have to limit fluids, talk with your doctor before you increase the amount of fluids you drink. · Begin eating small amounts of mild foods the next day, if you feel like it. ? Try yogurt that has live cultures of Lactobacillus. (Check the label.)  ?  Avoid spicy foods, fruits, alcohol, and caffeine until 48 hours after all symptoms are gone.  ? Avoid chewing gum that contains sorbitol. ? Avoid dairy products (except for yogurt with Lactobacillus) while you have diarrhea and for 3 days after symptoms are gone. · The doctor may recommend that you take over-the-counter medicine, such as loperamide (Imodium), if you still have diarrhea after 6 hours. Read and follow all instructions on the label. Do not use this medicine if you have bloody diarrhea, a high fever, or other signs of serious illness. Call your doctor if you think you are having a problem with your medicine. When should you call for help? Call 911 anytime you think you may need emergency care. For example, call if:    · You passed out (lost consciousness).     · Your stools are maroon or very bloody.    Call your doctor now or seek immediate medical care if:    · You are dizzy or lightheaded, or you feel like you may faint.     · Your stools are black and look like tar, or they have streaks of blood.     · You have new or worse belly pain.     · You have symptoms of dehydration, such as:  ? Dry eyes and a dry mouth. ? Passing only a little dark urine. ? Feeling thirstier than usual.     · You have a new or higher fever.    Watch closely for changes in your health, and be sure to contact your doctor if:    · Your diarrhea is getting worse.     · You see pus in the diarrhea.     · You are not getting better after 2 days (48 hours). Where can you learn more? Go to http://gemini-jay.info/. Enter L640 in the search box to learn more about \"Diarrhea: Care Instructions. \"  Current as of: September 23, 2018  Content Version: 11.9  © 6215-2887 [employer]. Care instructions adapted under license by [employer] (which disclaims liability or warranty for this information).  If you have questions about a medical condition or this instruction, always ask your healthcare professional. [place] [address] any warranty or liability for your use of this information. Patient Education        Fatigue: Care Instructions  Your Care Instructions    Fatigue is a feeling of tiredness, exhaustion, or lack of energy. You may feel fatigue because of too much or not enough activity. It can also come from stress, lack of sleep, boredom, and poor diet. Many medical problems, such as viral infections, can cause fatigue. Emotional problems, especially depression, are often the cause of fatigue. Fatigue is most often a symptom of another problem. Treatment for fatigue depends on the cause. For example, if you have fatigue because you have a certain health problem, treating this problem also treats your fatigue. If depression or anxiety is the cause, treatment may help. Follow-up care is a key part of your treatment and safety. Be sure to make and go to all appointments, and call your doctor if you are having problems. It's also a good idea to know your test results and keep a list of the medicines you take. How can you care for yourself at home? · Get regular exercise. But don't overdo it. Go back and forth between rest and exercise. · Get plenty of rest.  · Eat a healthy diet. Do not skip meals, especially breakfast.  · Reduce your use of caffeine, tobacco, and alcohol. Caffeine is most often found in coffee, tea, cola drinks, and chocolate. · Limit medicines that can cause fatigue. This includes tranquilizers and cold and allergy medicines. When should you call for help? Watch closely for changes in your health, and be sure to contact your doctor if:    · You have new symptoms such as fever or a rash.     · Your fatigue gets worse.     · You have been feeling down, depressed, or hopeless. Or you may have lost interest in things that you usually enjoy.     · You are not getting better as expected. Where can you learn more? Go to http://gemini-jay.info/.   Enter A122 in the search box to learn more about \"Fatigue: Care Instructions. \"  Current as of: September 23, 2018  Content Version: 11.9  © 6162-5638 [employer], [facility]. Care instructions adapted under license by [employer] (which disclaims liability or warranty for this information). If you have questions about a medical condition or this instruction, always ask your healthcare professional. [place] [address] any warranty or liability for your use of this information.

## 2019-03-06 NOTE — ED PROVIDER NOTES
81 y.o. female with past medical history significant for Hypertension, TIA, Anxiety, Breast CA s/p lumpectomy, Melanoma s/p resection, Hyponatremia, and Diabetes who presents via EMS with chief complaint of generalized weakness. Patient states onset six days ago of generalized weakness that has progressively worsened since onset. Patient presents to [facility] ED today with persisting generalized weakness, as well as having alternating episodes of diarrhea and constipation. Patient states she has had intermittent diarrhea since Jan. 2019 with 5 \"watery\" episodes yesterday, but states she has been taking Pepto Bismal and \"anti-diarrhea\" medication that have caused her to be constipated. Patient reports accompanying nausea, intermittent abdominal pain described as \"cramping\", SOB with exertion, and feeling like she is \"going to pass out. \" Patient denies recent antibiotic use. Pt denies fever, chills, cough, congestion, chest pain, vomiting, blood in stool, difficulty with urination or dysuria. Admits increased stress and anxiety. There are no other acute medical concerns at this time. PCP: Laura Moise MD    Note written by Cayla Trevino, as dictated by Dawson Wetzel MD 9:36 AM        The history is provided by the patient and the EMS personnel. No  was used.         Past Medical History:   Diagnosis Date    Anxiety     Breast cancer ([facility] [address] 1993    s/p lumpectomy    Diabetes mellitus ([facility] [address]     diet-controlled    Hx-TIA (transient ischemic attack)     Hypertension     Hyponatremia     Melanoma ([facility] [address] 2008    s/p resection, right cheek       Past Surgical History:   Procedure Laterality Date    HX BACK SURGERY      HX BREAST LUMPECTOMY  1993    right    HX CHOLECYSTECTOMY      HX HYSTERECTOMY      HX MALIGNANT SKIN LESION EXCISION  2008    HX MENISCECTOMY      HX OTHER SURGICAL      pineda's neuroma bilaterally         Family History:   Problem Relation Age of Onset  Heart Failure Mother     Other Father         aortic aneurysm    Diabetes Son     Immunodeficiency Son         post kidney and pancreas transplant       Social History     Socioeconomic History    Marital status:      Spouse name: Not on file    Number of children: Not on file    Years of education: Not on file    Highest education level: Not on file   Social Needs    Financial resource strain: Not on file    Food insecurity - worry: Not on file    Food insecurity - inability: Not on file   [employer] needs - medical: Not on file   [employer] needs - non-medical: Not on file   Occupational History    Occupation: retired from retail   Tobacco Use    Smoking status: Never Smoker   Substance and Sexual Activity    Alcohol use: No    Drug use: Not on file    Sexual activity: Not on file   Other Topics Concern    Not on file   Social History Narrative    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 HENT: Negative for congestion. Respiratory: Positive for shortness of breath. Negative for cough. Cardiovascular: Negative for chest pain. Gastrointestinal: Positive for abdominal pain, constipation and diarrhea. Negative for blood in stool, nausea and vomiting. Genitourinary: Negative for difficulty urinating and dysuria. Neurological: Positive for weakness. All other systems reviewed and are negative. Vitals:    03/06/19 0931   BP: 178/73   Pulse: 65   Resp: 15   Temp: 98.7 °F (37.1 °C)   SpO2: 100%   Weight: 50.1 kg (110 lb 7 oz)   Height: 5' 1\" (1.549 m)            Physical Exam   Constitutional: She is oriented to person, place, and time. She appears well-developed. No distress. Mild to moderately ill appearing, elderly, frail   HENT:   Head: Normocephalic and atraumatic. Mouth/Throat: Mucous membranes are dry.    Eyes: Conjunctivae are normal. No scleral icterus. Neck: Neck supple. No tracheal deviation present. Cardiovascular: Normal rate, regular rhythm, normal heart sounds and intact distal pulses. Exam reveals no gallop and no friction rub. No murmur heard. Pulmonary/Chest: Effort normal and breath sounds normal. She has no wheezes. She has no rales. Abdominal: Soft. She exhibits no distension. There is no tenderness. There is no rebound and no guarding. Musculoskeletal: She exhibits no edema. Neurological: She is alert and oriented to person, place, and time. Tremulous   Skin: Skin is warm and dry. No rash noted. Psychiatric: She has a normal mood and affect. Nursing note and vitals reviewed. Note written by Cayla Peña, as dictated by Isai Lara MD 9:36 AM       MDM       Procedures  ED EKG interpretation:  Rhythm: sinus bradycardia; and regular . Rate (approx.): 57 bpm; Axis: left axis deviation in inferior Q waves; ST/T wave: normal.  Note written by Cayla Peña, as dictated by Isai Lara MD 11:01 AM    Progress Note:  Results, treatment, and follow up plan have been discussed with patient. Questions were answered. Karly Roberts MD  11:35 AM    A/P: weakness with intermittent diarrhea for 2 months - unclear etiology; suspect anxiety is playing a role; reassuring appearance and exam; VSS; CBC, CMP, tUA, EKG, CXR ok; home with PCP/GI f/u.   Karly Roberts MD  11:37 AM

## 2019-03-06 NOTE — ED NOTES
Given complete discharge instructions by Dr. Sebastian Burden and discharged home to family via wheelchair.

## 2019-03-06 NOTE — ED NOTES
Patient up to bedside commode, 200 mls of clear yellow urine returned, specimen to lab. Pastoral care consulted.
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
## 2019-03-21 ENCOUNTER — HOSPITAL ENCOUNTER (EMERGENCY)
Age: 84
Discharge: HOME | End: 2019-03-21
Attending: EMERGENCY MEDICINE
Payer: MEDICARE

## 2019-03-21 ENCOUNTER — APPOINTMENT (OUTPATIENT)
Dept: GENERAL RADIOLOGY | Age: 84
End: 2019-03-21
Attending: EMERGENCY MEDICINE
Payer: MEDICARE

## 2019-03-21 VITALS
WEIGHT: 110 LBS | DIASTOLIC BLOOD PRESSURE: 94 MMHG | HEART RATE: 61 BPM | RESPIRATION RATE: 15 BRPM | HEIGHT: 61 IN | SYSTOLIC BLOOD PRESSURE: 144 MMHG | BODY MASS INDEX: 20.77 KG/M2 | TEMPERATURE: 97.7 F | OXYGEN SATURATION: 99 %

## 2019-03-21 DIAGNOSIS — K58.9 IRRITABLE BOWEL SYNDROME WITHOUT DIARRHEA: ICD-10-CM

## 2019-03-21 DIAGNOSIS — R53.1 WEAKNESS: ICD-10-CM

## 2019-03-21 DIAGNOSIS — E87.1 HYPONATREMIA: Primary | ICD-10-CM

## 2019-03-21 LAB
ALBUMIN SERPL-MCNC: 3.7 G/DL (ref 3.5–5)
ALBUMIN/GLOB SERPL: 1.1 {RATIO} (ref 1.1–2.2)
ALP SERPL-CCNC: 68 U/L (ref 45–117)
ALT SERPL-CCNC: 21 U/L (ref 12–78)
ANION GAP SERPL CALC-SCNC: 7 MMOL/L (ref 5–15)
APPEARANCE UR: CLEAR
AST SERPL-CCNC: 19 U/L (ref 15–37)
ATRIAL RATE: 56 BPM
BACTERIA URNS QL MICRO: NEGATIVE /HPF
BASOPHILS # BLD: 0 K/UL (ref 0–0.1)
BASOPHILS NFR BLD: 0 % (ref 0–1)
BILIRUB SERPL-MCNC: 0.5 MG/DL (ref 0.2–1)
BILIRUB UR QL: NEGATIVE
BNP SERPL-MCNC: 99 PG/ML
BUN SERPL-MCNC: 9 MG/DL (ref 6–20)
BUN/CREAT SERPL: 11 (ref 12–20)
CALCIUM SERPL-MCNC: 9 MG/DL (ref 8.5–10.1)
CALCULATED P AXIS, ECG09: 79 DEGREES
CALCULATED R AXIS, ECG10: -34 DEGREES
CALCULATED T AXIS, ECG11: 22 DEGREES
CHLORIDE SERPL-SCNC: 98 MMOL/L (ref 97–108)
CO2 SERPL-SCNC: 24 MMOL/L (ref 21–32)
COLOR UR: ABNORMAL
COMMENT, HOLDF: NORMAL
CREAT SERPL-MCNC: 0.81 MG/DL (ref 0.55–1.02)
CREAT UR-MCNC: 30.1 MG/DL
D DIMER PPP FEU-MCNC: 0.28 MG/L FEU (ref 0–0.65)
DIAGNOSIS, 93000: NORMAL
DIFFERENTIAL METHOD BLD: NORMAL
EOSINOPHIL # BLD: 0 K/UL (ref 0–0.4)
EOSINOPHIL NFR BLD: 0 % (ref 0–7)
EPITH CASTS URNS QL MICRO: ABNORMAL /LPF
ERYTHROCYTE [DISTWIDTH] IN BLOOD BY AUTOMATED COUNT: 12.1 % (ref 11.5–14.5)
GLOBULIN SER CALC-MCNC: 3.4 G/DL (ref 2–4)
GLUCOSE SERPL-MCNC: 96 MG/DL (ref 65–100)
GLUCOSE UR STRIP.AUTO-MCNC: NEGATIVE MG/DL
HCT VFR BLD AUTO: 40.2 % (ref 35–47)
HGB BLD-MCNC: 13.4 G/DL (ref 11.5–16)
HGB UR QL STRIP: NEGATIVE
HYALINE CASTS URNS QL MICRO: ABNORMAL /LPF (ref 0–5)
IMM GRANULOCYTES # BLD AUTO: 0 K/UL (ref 0–0.04)
IMM GRANULOCYTES NFR BLD AUTO: 0 % (ref 0–0.5)
KETONES UR QL STRIP.AUTO: ABNORMAL MG/DL
LEUKOCYTE ESTERASE UR QL STRIP.AUTO: ABNORMAL
LYMPHOCYTES # BLD: 2 K/UL (ref 0.8–3.5)
LYMPHOCYTES NFR BLD: 29 % (ref 12–49)
MCH RBC QN AUTO: 29.1 PG (ref 26–34)
MCHC RBC AUTO-ENTMCNC: 33.3 G/DL (ref 30–36.5)
MCV RBC AUTO: 87.2 FL (ref 80–99)
MONOCYTES # BLD: 0.5 K/UL (ref 0–1)
MONOCYTES NFR BLD: 7 % (ref 5–13)
NEUTS SEG # BLD: 4.3 K/UL (ref 1.8–8)
NEUTS SEG NFR BLD: 64 % (ref 32–75)
NITRITE UR QL STRIP.AUTO: NEGATIVE
NRBC # BLD: 0 K/UL (ref 0–0.01)
NRBC BLD-RTO: 0 PER 100 WBC
OSMOLALITY SERPL: 275 MOSM/KG H2O
OSMOLALITY UR: 260 MOSM/KG H2O
P-R INTERVAL, ECG05: 198 MS
PH UR STRIP: 8 [PH] (ref 5–8)
PLATELET # BLD AUTO: 318 K/UL (ref 150–400)
PMV BLD AUTO: 9.4 FL (ref 8.9–12.9)
POTASSIUM SERPL-SCNC: 4.1 MMOL/L (ref 3.5–5.1)
PROT SERPL-MCNC: 7.1 G/DL (ref 6.4–8.2)
PROT UR STRIP-MCNC: NEGATIVE MG/DL
Q-T INTERVAL, ECG07: 436 MS
QRS DURATION, ECG06: 90 MS
QTC CALCULATION (BEZET), ECG08: 420 MS
RBC # BLD AUTO: 4.61 M/UL (ref 3.8–5.2)
RBC #/AREA URNS HPF: ABNORMAL /HPF (ref 0–5)
SAMPLES BEING HELD,HOLD: NORMAL
SODIUM SERPL-SCNC: 129 MMOL/L (ref 136–145)
SODIUM UR-SCNC: 55 MMOL/L
SP GR UR REFRACTOMETRY: 1 (ref 1–1.03)
TROPONIN I SERPL-MCNC: <0.05 NG/ML
UR CULT HOLD, URHOLD: NORMAL
UROBILINOGEN UR QL STRIP.AUTO: 0.2 EU/DL (ref 0.2–1)
VENTRICULAR RATE, ECG03: 56 BPM
WBC # BLD AUTO: 6.9 K/UL (ref 3.6–11)
WBC URNS QL MICRO: ABNORMAL /HPF (ref 0–4)

## 2019-03-21 PROCEDURE — 93005 ELECTROCARDIOGRAM TRACING: CPT

## 2019-03-21 PROCEDURE — 99285 EMERGENCY DEPT VISIT HI MDM: CPT

## 2019-03-21 PROCEDURE — 85025 COMPLETE CBC W/AUTO DIFF WBC: CPT

## 2019-03-21 PROCEDURE — 81001 URINALYSIS AUTO W/SCOPE: CPT

## 2019-03-21 PROCEDURE — 83880 ASSAY OF NATRIURETIC PEPTIDE: CPT

## 2019-03-21 PROCEDURE — 82570 ASSAY OF URINE CREATININE: CPT

## 2019-03-21 PROCEDURE — 85379 FIBRIN DEGRADATION QUANT: CPT

## 2019-03-21 PROCEDURE — 84300 ASSAY OF URINE SODIUM: CPT

## 2019-03-21 PROCEDURE — 83935 ASSAY OF URINE OSMOLALITY: CPT

## 2019-03-21 PROCEDURE — 83930 ASSAY OF BLOOD OSMOLALITY: CPT

## 2019-03-21 PROCEDURE — 36415 COLL VENOUS BLD VENIPUNCTURE: CPT

## 2019-03-21 PROCEDURE — 74011250636 HC RX REV CODE- 250/636: Performed by: EMERGENCY MEDICINE

## 2019-03-21 PROCEDURE — 80053 COMPREHEN METABOLIC PANEL: CPT

## 2019-03-21 PROCEDURE — 71046 X-RAY EXAM CHEST 2 VIEWS: CPT

## 2019-03-21 PROCEDURE — 84484 ASSAY OF TROPONIN QUANT: CPT

## 2019-03-21 PROCEDURE — 96360 HYDRATION IV INFUSION INIT: CPT

## 2019-03-21 RX ADMIN — SODIUM CHLORIDE 500 ML: 900 INJECTION, SOLUTION INTRAVENOUS at 11:28

## 2019-03-21 NOTE — ED NOTES
Ambulated to restroom with assistance, very unsteady gait noted with C/O mild dizziness. W/C used for further transport.

## 2019-03-21 NOTE — ED NOTES
Introduced self to patient as primary nurse and assumed care. Plan of care and timeline explained. Case management currently at bedside.

## 2019-03-21 NOTE — ED NOTES
The patient was able to ambulate 20 feet, with assistance, very slowly and moderately unsteady. Complained of lightheadedness while walking.

## 2019-03-21 NOTE — DISCHARGE INSTRUCTIONS
Patient Education               We hope that we have addressed all of your medical concerns. The examination and treatment you received in the Emergency Department were for an emergent problem and were not intended as complete care. It is important that you follow up with your healthcare provider(s) for ongoing care. If your symptoms worsen or do not improve as expected, and you are unable to reach your usual health care provider(s), you should return to the Emergency Department. Today's healthcare is undergoing tremendous change, and patient satisfaction surveys are one of the many tools to assess the quality of medical care. You may receive a survey from the [employer] regarding your experience in the Emergency Department. I hope that your experience has been completely positive, particularly the medical care that I provided. As such, please participate in the survey; anything less than excellent does not meet my expectations or intentions. [facility] and 8 [facility] participate in nationally recognized quality of care measures. If your blood pressure is greater than 120/80, as reported below, we urge that you seek medical care to address the potential of high blood pressure, commonly known as hypertension. Hypertension can be hereditary or can be caused by certain medical conditions, pain, stress, or \"white coat syndrome. \"       Please make an appointment with your health care provider(s) for follow up of your Emergency Department visit.        VITALS:   Patient Vitals for the past 8 hrs:   Temp Pulse Resp BP SpO2   03/21/19 1244 -- 63 20 (!) 144/94 98 %   03/21/19 1200 -- 60 22 129/66 100 %   03/21/19 1130 -- 60 19 139/78 99 %   03/21/19 1110 -- 77 -- 128/79 --   03/21/19 1107 -- 60 17 126/76 --   03/21/19 1102 -- (!) 55 20 122/70 --   03/21/19 1022 97.7 °F (36.5 °C) 60 22 149/75 100 %          Thank you for allowing us to provide you with medical care today. We realize that you have many choices for your emergency care needs. Please choose us in the future for any continued health care needs. Jael Cutler MD    [facility], [facility].   Office: [phone]            Recent Results (from the past 24 hour(s))   EKG, 12 LEAD, INITIAL    Collection Time: 03/21/19 10:45 AM   Result Value Ref Range    Ventricular Rate 56 BPM    Atrial Rate 56 BPM    P-R Interval 198 ms    QRS Duration 90 ms    Q-T Interval 436 ms    QTC Calculation (Bezet) 420 ms    Calculated P Axis 79 degrees    Calculated R Axis -34 degrees    Calculated T Axis 22 degrees    Diagnosis       Sinus bradycardia with sinus arrhythmia  Left axis deviation  Nonspecific ST abnormality  Abnormal ECG  When compared with ECG of 06-MAR-2019 10:17,  Criteria for Inferior infarct are no longer present  ST no longer depressed in Inferior leads     CBC WITH AUTOMATED DIFF    Collection Time: 03/21/19 10:56 AM   Result Value Ref Range    WBC 6.9 3.6 - 11.0 K/uL    RBC 4.61 3.80 - 5.20 M/uL    HGB 13.4 11.5 - 16.0 g/dL    HCT 40.2 35.0 - 47.0 %    MCV 87.2 80.0 - 99.0 FL    MCH 29.1 26.0 - 34.0 PG    MCHC 33.3 30.0 - 36.5 g/dL    RDW 12.1 11.5 - 14.5 %    PLATELET 005 519 - 037 K/uL    MPV 9.4 8.9 - 12.9 FL    NRBC 0.0 0  WBC    ABSOLUTE NRBC 0.00 0.00 - 0.01 K/uL    NEUTROPHILS 64 32 - 75 %    LYMPHOCYTES 29 12 - 49 %    MONOCYTES 7 5 - 13 %    EOSINOPHILS 0 0 - 7 %    BASOPHILS 0 0 - 1 %    IMMATURE GRANULOCYTES 0 0.0 - 0.5 %    ABS. NEUTROPHILS 4.3 1.8 - 8.0 K/UL    ABS. LYMPHOCYTES 2.0 0.8 - 3.5 K/UL    ABS. MONOCYTES 0.5 0.0 - 1.0 K/UL    ABS. EOSINOPHILS 0.0 0.0 - 0.4 K/UL    ABS. BASOPHILS 0.0 0.0 - 0.1 K/UL    ABS. IMM.  GRANS. 0.0 0.00 - 0.04 K/UL    DF AUTOMATED     METABOLIC PANEL, COMPREHENSIVE    Collection Time: 03/21/19 10:56 AM   Result Value Ref Range    Sodium 129 (L) 136 - 145 mmol/L    Potassium 4.1 3.5 - 5.1 mmol/L    Chloride 98 97 - 108 mmol/L    CO2 24 21 - 32 mmol/L    Anion gap 7 5 - 15 mmol/L    Glucose 96 65 - 100 mg/dL    BUN 9 6 - 20 MG/DL    Creatinine 0.81 0.55 - 1.02 MG/DL    BUN/Creatinine ratio 11 (L) 12 - 20      GFR est AA >60 >60 ml/min/1.73m2    GFR est non-AA >60 >60 ml/min/1.73m2    Calcium 9.0 8.5 - 10.1 MG/DL    Bilirubin, total 0.5 0.2 - 1.0 MG/DL    ALT (SGPT) 21 12 - 78 U/L    AST (SGOT) 19 15 - 37 U/L    Alk. phosphatase 68 45 - 117 U/L    Protein, total 7.1 6.4 - 8.2 g/dL    Albumin 3.7 3.5 - 5.0 g/dL    Globulin 3.4 2.0 - 4.0 g/dL    A-G Ratio 1.1 1.1 - 2.2     NT-PRO BNP    Collection Time: 03/21/19 10:56 AM   Result Value Ref Range    NT pro-BNP 99 <450 PG/ML   TROPONIN I    Collection Time: 03/21/19 10:56 AM   Result Value Ref Range    Troponin-I, Qt. <0.05 <0.05 ng/mL   D DIMER    Collection Time: 03/21/19 10:56 AM   Result Value Ref Range    D-dimer 0.28 0.00 - 0.65 mg/L FEU   SAMPLES BEING HELD    Collection Time: 03/21/19 10:56 AM   Result Value Ref Range    SAMPLES BEING HELD 1SST     COMMENT        Add-on orders for these samples will be processed based on acceptable specimen integrity and analyte stability, which may vary by analyte. URINALYSIS W/MICROSCOPIC    Collection Time: 03/21/19 11:23 AM   Result Value Ref Range    Color YELLOW/STRAW      Appearance CLEAR CLEAR      Specific gravity 1.005 1.003 - 1.030      pH (UA) 8.0 5.0 - 8.0      Protein NEGATIVE  NEG mg/dL    Glucose NEGATIVE  NEG mg/dL    Ketone TRACE (A) NEG mg/dL    Bilirubin NEGATIVE  NEG      Blood NEGATIVE  NEG      Urobilinogen 0.2 0.2 - 1.0 EU/dL    Nitrites NEGATIVE  NEG      Leukocyte Esterase SMALL (A) NEG      WBC 5-10 0 - 4 /hpf    RBC 0-5 0 - 5 /hpf    Epithelial cells FEW FEW /lpf    Bacteria NEGATIVE  NEG /hpf    Hyaline cast 0-2 0 - 5 /lpf   URINE CULTURE HOLD SAMPLE    Collection Time: 03/21/19 11:23 AM   Result Value Ref Range    Urine culture hold        URINE ON HOLD IN MICROBIOLOGY DEPT FOR 3 DAYS.  IF UNPRESERVED URINE IS SUBMITTED, IT CANNOT BE USED FOR ADDITIONAL TESTING AFTER 24 HRS, RECOLLECTION WILL BE REQUIRED. CREATININE, UR, RANDOM    Collection Time: 03/21/19 11:23 AM   Result Value Ref Range    Creatinine, urine 30.10 mg/dL       Xr Chest Pa Lat    Result Date: 3/21/2019  EXAM:  XR CHEST PA LAT INDICATION: sob COMPARISON: 3/6/2019. And 6/20/2018 TECHNIQUE: Frontal and lateral views of the chest FINDINGS: No lobar consolidation, pleural effusion, or pneumothorax. Normal cardiomediastinal silhouette. No acute or aggressive osseous lesion. IMPRESSION: 1. No evidence of an acute cardiopulmonary process. Fatigue: Care Instructions  Your Care Instructions    Fatigue is a feeling of tiredness, exhaustion, or lack of energy. You may feel fatigue because of too much or not enough activity. It can also come from stress, lack of sleep, boredom, and poor diet. Many medical problems, such as viral infections, can cause fatigue. Emotional problems, especially depression, are often the cause of fatigue. Fatigue is most often a symptom of another problem. Treatment for fatigue depends on the cause. For example, if you have fatigue because you have a certain health problem, treating this problem also treats your fatigue. If depression or anxiety is the cause, treatment may help. Follow-up care is a key part of your treatment and safety. Be sure to make and go to all appointments, and call your doctor if you are having problems. It's also a good idea to know your test results and keep a list of the medicines you take. How can you care for yourself at home? · Get regular exercise. But don't overdo it. Go back and forth between rest and exercise. · Get plenty of rest.  · Eat a healthy diet. Do not skip meals, especially breakfast.  · Reduce your use of caffeine, tobacco, and alcohol. Caffeine is most often found in coffee, tea, cola drinks, and chocolate. · Limit medicines that can cause fatigue.  This includes tranquilizers and cold and allergy medicines. When should you call for help? Watch closely for changes in your health, and be sure to contact your doctor if:    · You have new symptoms such as fever or a rash.     · Your fatigue gets worse.     · You have been feeling down, depressed, or hopeless. Or you may have lost interest in things that you usually enjoy.     · You are not getting better as expected. Where can you learn more? Go to http://gemini-jay.info/. Enter D411 in the search box to learn more about \"Fatigue: Care Instructions. \"  Current as of: September 23, 2018  Content Version: 11.9  © 8664-8809 [employer]. Care instructions adapted under license by [employer] (which disclaims liability or warranty for this information). If you have questions about a medical condition or this instruction, always ask your healthcare professional. [place] [address] any warranty or liability for your use of this information. Patient Education        Hyponatremia: Care Instructions  Your Care Instructions    Hyponatremia (say \"or-dv-[id number]-TREE-jimmy-uh\") means that you don't have enough sodium in your blood. It can cause nausea, vomiting, and headaches. Or you may not feel hungry. In serious cases, it can cause seizures, a coma, or even death. Hyponatremia is not a disease. It is a problem caused by something else, such as medicines or exercising for a long time in hot weather. You can get hyponatremia if you lose a lot of fluids and then you drink a lot of water or other liquids that don't have much sodium. You can also get it if you have kidney, liver, heart, or other health problems. Treatment is focused on getting your sodium levels back to normal.  Follow-up care is a key part of your treatment and safety. Be sure to make and go to all appointments, and call your doctor if you are having problems.  It's also a good idea to know your test results and keep a list of the medicines you take. How can you care for yourself at home? · If your doctor recommends it, drink fluids that have sodium. Sports drinks are a good choice. Or you can eat salty foods. · If your doctor recommends it, limit the amount of water you drink. And limit fluids that are mostly water. These include tea, coffee, and juice. · Take your medicines exactly as prescribed. Call your doctor if you have any problems with your medicine. · Get your sodium levels tested when your doctor tells you to. When should you call for help? Call 911 anytime you think you may need emergency care. For example, call if:    · You have a seizure.     · You passed out (lost consciousness).    Call your doctor now or seek immediate medical care if:    · You are confused or it is hard to focus.     · You have little or no appetite.     · You feel sick to your stomach or you vomit.     · You have a headache.     · You have mood changes.     · You feel more tired than usual.    Watch closely for changes in your health, and be sure to contact your doctor if:    · You do not get better as expected. Where can you learn more? Go to http://gemini-jay.info/. Enter W246 in the search box to learn more about \"Hyponatremia: Care Instructions. \"  Current as of: June 25, 2018  Content Version: 11.9  © 9409-5961 [employer]. Care instructions adapted under license by [employer] (which disclaims liability or warranty for this information). If you have questions about a medical condition or this instruction, always ask your healthcare professional. [place] [address] any warranty or liability for your use of this information. Patient Education        Diet for Irritable Bowel Syndrome: Care Instructions  Your Care Instructions    Irritable bowel syndrome, or IBS, is a problem with the intestines.  IBS can cause belly pain, bloating, gas, constipation, and diarrhea. Most people can control their symptoms by changing their diet and easing stress. No specific foods cause everyone with IBS to have symptoms. Doctors don't offer a specific diet to manage symptoms. But many people find that they feel better when they stop eating certain foods. A high-fiber diet may help if you have constipation. Follow-up care is a key part of your treatment and safety. Be sure to make and go to all appointments, and call your doctor if you are having problems. It's also a good idea to know your test results and keep a list of the medicines you take. How can you care for yourself at home? To reduce constipation  · Include fruits, vegetables, beans, and whole grains in your diet each day. These foods are high in fiber. Slowly increase the amount of fiber you eat. This helps you avoid a lot of gas. · Drink plenty of fluids, enough so that your urine is light yellow or clear like water. If you have kidney, heart, or liver disease and have to limit fluids, talk with your doctor before you increase the amount of fluids you drink. · Get some exercise every day. Build up slowly to 30 to 60 minutes a day on 5 or more days of the week. · Take a fiber supplement, such as Citrucel or Metamucil, every day if needed. Read and follow all instructions on the label. · Schedule time each day for a bowel movement. Having a daily routine may help. Take your time and do not strain when having a bowel movement. · Check with your doctor before you increase the amount of fiber in your diet. For some people who have IBS, eating more fiber may make some symptoms worse. This includes bloating. To reduce diarrhea  You may try giving up foods or drinks one at a time to see whether symptoms improve.  Limit or avoid the following:  · Alcohol  · Caffeine, which is found in coffee, tea, cola drinks, and chocolate  · Nicotine, from smoking or chewing tobacco  · Gas-producing foods, such as beans, broccoli, cabbage, and apples  · Dairy products that contain lactose (milk sugar), such as ice cream, milk, cheese, and sour cream  · Foods and drinks high in sugar, especially fruit juice, soda, candy, and other packaged sweets (such as cookies)  · Foods high in fat, including elliott, sausage, butter, oils, and anything deep-fried  · Sorbitol and xylitol, artificial sweeteners found in some sugarless candies and chewing gum  Keep track of foods  · Some people with IBS use a daily food diary to keep track of what they eat and whether they have any symptoms after eating certain foods. The diary also can be a good way to record what is going on in your life. · Stress plays a role in IBS. So if you are aware that certain stresses bring on symptoms, you can try to reduce those stresses. Keep mealtimes pleasant  · Try to maintain a pleasant environment when you eat. This may reduce stress that can make symptoms likely to occur. · Give yourself plenty of time to eat, rather than eating on the go. Chew your food slowly. Try not to swallow air, which can cause bloating. Where can you learn more? Go to http://gemini-jay.info/. Enter B351 in the search box to learn more about \"Diet for Irritable Bowel Syndrome: Care Instructions. \"  Current as of: March 28, 2018  Content Version: 11.9  © 0088-4130 [employer]. Care instructions adapted under license by [employer] (which disclaims liability or warranty for this information). If you have questions about a medical condition or this instruction, always ask your healthcare professional. [place] [address] any warranty or liability for your use of this information.

## 2019-03-21 NOTE — ED PROVIDER NOTES
81 y.o. female with past medical history significant for HTN, DM, TIA, breast cancer, melanoma, hyponatremia, and anxiety who presents to the ED via EMS with chief complaint of dizziness. Pt reports dizziness onset in January accompanied by nausea, weight loss (about 10-15 lbs in the past year), decreased appetite, generalized weakness, intermittent abdominal cramps, and SOB that is exacerbated by exertion and talking. Pt states her dizziness is exacerbated by movement. Pt states her sx have been worse since the beginning of March. Pt states she was seen at [facility] ED on 3/6 for similar sx and was discharged home. Pt states she has hx of chronic hyponatremia and takes sodium tablets intermittently, says she last took one yesterday with some relief. Pt states she also takes Plavix, atenolol, bentyl PRN, zofran PRN (last took this morning at 0630), and diazepam PRN. Pt states she has hx of vertigo in the past and had physical therapy at that time to improve her balance. Pt states she has seen ENT in the past. Pt states she was seen by GI 3 days ago and was scheduled for a colonoscopy on 3/24. Pt states she had a small BM this morning after getting a suppository. Pt notes she had a GLF in January but denies hitting her head. Pt denies hematochezia, melena, fever, or chest pain. There are no other acute medical complaints voiced at this time. Chart review: Pt was admitted in July 2018 for chronic hyponatremia. Pt was put on fluid restriction and was supposed to f/u with Dr. Cherie Smyth (nephrology). Pt had a full workup for dizziness including an MRI, MRA, and carotid dopplers which were normal. Neurology thought pt's dizziness was due to hypovolemia and hyponatremia. Social Hx: Never smoker. Denies EtOH use. Lives with granddaughter. PCP: Other, MD Davis 
 
Note written by Cayla Tejada, as dictated by Ben Baptiste MD 10:41 AM  
 
The history is provided by the patient and medical records. Past Medical History:  
Diagnosis Date  Anxiety  Breast cancer ([facility] 1993  
 s/p lumpectomy  [facility] diet-controlled  Hx-TIA (transient ischemic attack)  Hypertension  Hyponatremia  Melanoma ([facility] 2008  
 s/p resection, right cheek Past Surgical History:  
Procedure Laterality Date  HX BACK SURGERY    
 HX BREAST LUMPECTOMY  1993  
 right  HX CHOLECYSTECTOMY  HX HYSTERECTOMY  HX MALIGNANT SKIN LESION EXCISION  2008  HX MENISCECTOMY  HX OTHER SURGICAL    
 pineda's neuroma bilaterally Family History:  
Problem Relation Age of Onset  Heart Failure Mother  Other Father   
     aortic aneurysm  Diabetes Son  Immunodeficiency Son   
     post kidney and pancreas transplant Social History Socioeconomic History  Marital status:  Spouse name: Not on file  Number of children: Not on file  Years of education: Not on file  Highest education level: Not on file Occupational History  Occupation: retired from retail Social Needs  Financial resource strain: Not on file  Food insecurity:  
  Worry: Not on file Inability: Not on file  Transportation needs:  
  Medical: Not on file Non-medical: Not on file Tobacco Use  Smoking status: Never Smoker Substance and Sexual Activity  Alcohol use: No  
 Drug use: Not on file  Sexual activity: Not on file Lifestyle  Physical activity:  
  Days per week: Not on file Minutes per session: Not on file  Stress: Not on file Relationships  Social connections:  
  Talks on phone: Not on file Gets together: Not on file Attends Che[employer] service: Not on file Active member of club or organization: Not on file Attends meetings of clubs or organizations: Not on file Relationship status: Not on file  Intimate partner violence:  
  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 Negative for fever. Respiratory: Positive for shortness of breath. Cardiovascular: Negative for chest pain. Gastrointestinal: Positive for abdominal pain (intermittent) and nausea. Negative for blood in stool. Neurological: Positive for dizziness and weakness (generalized). All other systems reviewed and are negative. Vitals:  
 03/21/19 1022 BP: 149/75 Pulse: 60 Resp: 22 Temp: 97.7 °F (36.5 °C) SpO2: 100% Weight: 49.9 kg (110 lb) Height: 5' 1\" (1.549 m) Physical Exam  
Constitutional: She is oriented to person, place, and time. She appears well-developed and well-nourished. HENT:  
Head: Normocephalic and atraumatic. Right Ear: External ear normal.  
Left Ear: External ear normal.  
Eyes: Pupils are equal, round, and reactive to light. Conjunctivae and EOM are normal.  
Neck: Normal range of motion. Cardiovascular: Normal rate, regular rhythm, normal heart sounds and intact distal pulses. No murmur heard. Pulmonary/Chest: Effort normal and breath sounds normal. No stridor. No respiratory distress. She has no wheezes. She has no rales. Abdominal: Soft. Bowel sounds are normal. She exhibits no distension and no mass. There is no tenderness. There is no guarding. Musculoskeletal: Normal range of motion. Neurological: She is alert and oriented to person, place, and time. No cranial nerve deficit. Coordination normal.  
Finger to nose intact Skin: Skin is warm and dry. Nursing note and vitals reviewed.  
  
 
MDM 
 Number of Diagnoses or Management Options Hyponatremia:  
Irritable bowel syndrome without diarrhea:  
Weakness:  
Diagnosis management comments: dizzienss / weakness- check sodium, hb, orthostatics. Per pt does not seem positional doubt vertigo. Already had stroke black, check for infectious causes as well. Pt admitted in July 2018 so sx have been occurring longer than january Transient sob- , pt does appear anxious, denies cp, check troponin, ddimer, can follow up outpt Amount and/or Complexity of Data Reviewed Clinical lab tests: ordered and reviewed Tests in the radiology section of CPT®: ordered and reviewed Review and summarize past medical records: yes Discuss the patient with other providers: yes (pcp and nephrology) Independent visualization of images, tracings, or specimens: yes (ekg) Patient Progress Patient progress: stable Procedures ED EKG interpretation: 
Rhythm: sinus bradycardia; and regular . Rate (approx.): 56; Axis: normal; P wave: normal; QRS interval: normal ; ST/T wave: non-specific changes EKG documented by Connor Hebert MD, scribe, as interpreted by Ben Baptiste MD, ED MD. 
 
CONSULT NOTE: 
11:53 Jacob Hoff MD spoke with Dr. Daniel Sahu, Consult for Nephrology. Discussed available diagnostic tests and clinical findings. Dr. Daniel Sahu says pt chronically lives with a low sodium and hasn't been seen in the office since 2016 so she didn't f/u after her hospitalization in July. Recommends increasing the salt content of her diet so there is less free fluid and increasing her intake of meat. Does not think that her hyponatremia is the cause of her dizziness. PROGRESS NOTE: 
12:52 PM  
Spoke with pt's PCP who says this has been going on for 10 years. Says pt lives with her granddaughter but her granddaughter is in high school and doesn't drive so she has a neighbor that drives her when she needs it. Says she will order an outpatient CT of her abdomen or have gi order it and she is aware of pt's scheduled colonoscopy. She will follow up pt 
 
1:24 PM 
Pt ambulated and per rn unsteady initially but then ambulated fine. Case mgmt getting home health and pt eval though pt resistant. I suspect some of her sx are emotional and she may have some depression but ssri would worsen hyponatremia 2:48 PM 
Patient's results have been reviewed with them. Patient and/or family have verbally conveyed their understanding and agreement of the patient's signs, symptoms, diagnosis, treatment and prognosis and additionally agree to follow up as recommended or return to the Emergency Room should their condition change prior to follow-up. Discharge instructions have also been provided to the patient with some educational information regarding their diagnosis as well a list of reasons why they would want to return to the ER prior to their follow-up appointment should their condition change.

## 2019-03-21 NOTE — ED TRIAGE NOTES
The patient states she was seen here on March 6th for abdominal pain and weakness. She returns today with continues weakness and abdominal cramping. States she feels very weak and is short of breath while resting. Denies N/V or diarrhea.

## 2019-03-21 NOTE — ED NOTES
Pt taken out via wheel chair to await ride.  to wheel pt to car upon car's arrival. Jose Maria Alvarez in hand. Patient discharged by MD. Papers given and questions answered

## 2019-04-19 ENCOUNTER — APPOINTMENT (OUTPATIENT)
Dept: CT IMAGING | Age: 84
End: 2019-04-19
Attending: EMERGENCY MEDICINE
Payer: MEDICARE

## 2019-04-19 ENCOUNTER — HOSPITAL ENCOUNTER (EMERGENCY)
Age: 84
Discharge: HOME | End: 2019-04-19
Attending: EMERGENCY MEDICINE
Payer: MEDICARE

## 2019-04-19 ENCOUNTER — APPOINTMENT (OUTPATIENT)
Dept: GENERAL RADIOLOGY | Age: 84
End: 2019-04-19
Attending: EMERGENCY MEDICINE
Payer: MEDICARE

## 2019-04-19 VITALS
BODY MASS INDEX: 20.77 KG/M2 | HEIGHT: 61 IN | TEMPERATURE: 98.4 F | OXYGEN SATURATION: 100 % | HEART RATE: 67 BPM | DIASTOLIC BLOOD PRESSURE: 64 MMHG | SYSTOLIC BLOOD PRESSURE: 137 MMHG | RESPIRATION RATE: 20 BRPM | WEIGHT: 110 LBS

## 2019-04-19 DIAGNOSIS — E87.1 CHRONIC HYPONATREMIA: ICD-10-CM

## 2019-04-19 DIAGNOSIS — R53.1 WEAKNESS: Primary | ICD-10-CM

## 2019-04-19 LAB
ALBUMIN SERPL-MCNC: 3.6 G/DL (ref 3.5–5)
ALBUMIN/GLOB SERPL: 1.1 {RATIO} (ref 1.1–2.2)
ALP SERPL-CCNC: 75 U/L (ref 45–117)
ALT SERPL-CCNC: 20 U/L (ref 12–78)
ANION GAP SERPL CALC-SCNC: 7 MMOL/L (ref 5–15)
APPEARANCE UR: CLEAR
AST SERPL-CCNC: 16 U/L (ref 15–37)
BACTERIA URNS QL MICRO: NEGATIVE /HPF
BASOPHILS # BLD: 0.1 K/UL (ref 0–0.1)
BASOPHILS NFR BLD: 1 % (ref 0–1)
BILIRUB SERPL-MCNC: 0.3 MG/DL (ref 0.2–1)
BILIRUB UR QL: NEGATIVE
BUN SERPL-MCNC: 13 MG/DL (ref 6–20)
BUN/CREAT SERPL: 17 (ref 12–20)
CALCIUM SERPL-MCNC: 9 MG/DL (ref 8.5–10.1)
CHLORIDE SERPL-SCNC: 99 MMOL/L (ref 97–108)
CO2 SERPL-SCNC: 25 MMOL/L (ref 21–32)
COLOR UR: NORMAL
COMMENT, HOLDF: NORMAL
CREAT SERPL-MCNC: 0.76 MG/DL (ref 0.55–1.02)
DIFFERENTIAL METHOD BLD: NORMAL
EOSINOPHIL # BLD: 0.2 K/UL (ref 0–0.4)
EOSINOPHIL NFR BLD: 2 % (ref 0–7)
EPITH CASTS URNS QL MICRO: NORMAL /LPF
ERYTHROCYTE [DISTWIDTH] IN BLOOD BY AUTOMATED COUNT: 12.3 % (ref 11.5–14.5)
GLOBULIN SER CALC-MCNC: 3.4 G/DL (ref 2–4)
GLUCOSE SERPL-MCNC: 96 MG/DL (ref 65–100)
GLUCOSE UR STRIP.AUTO-MCNC: NEGATIVE MG/DL
HCT VFR BLD AUTO: 40.2 % (ref 35–47)
HGB BLD-MCNC: 13.8 G/DL (ref 11.5–16)
HGB UR QL STRIP: NEGATIVE
HYALINE CASTS URNS QL MICRO: NORMAL /LPF (ref 0–5)
IMM GRANULOCYTES # BLD AUTO: 0 K/UL (ref 0–0.04)
IMM GRANULOCYTES NFR BLD AUTO: 0 % (ref 0–0.5)
KETONES UR QL STRIP.AUTO: NEGATIVE MG/DL
LEUKOCYTE ESTERASE UR QL STRIP.AUTO: NEGATIVE
LYMPHOCYTES # BLD: 3 K/UL (ref 0.8–3.5)
LYMPHOCYTES NFR BLD: 38 % (ref 12–49)
MCH RBC QN AUTO: 30.2 PG (ref 26–34)
MCHC RBC AUTO-ENTMCNC: 34.3 G/DL (ref 30–36.5)
MCV RBC AUTO: 88 FL (ref 80–99)
MONOCYTES # BLD: 0.8 K/UL (ref 0–1)
MONOCYTES NFR BLD: 11 % (ref 5–13)
NEUTS SEG # BLD: 3.8 K/UL (ref 1.8–8)
NEUTS SEG NFR BLD: 48 % (ref 32–75)
NITRITE UR QL STRIP.AUTO: NEGATIVE
NRBC # BLD: 0 K/UL (ref 0–0.01)
NRBC BLD-RTO: 0 PER 100 WBC
PH UR STRIP: 7 [PH] (ref 5–8)
PLATELET # BLD AUTO: 320 K/UL (ref 150–400)
PMV BLD AUTO: 9.3 FL (ref 8.9–12.9)
POTASSIUM SERPL-SCNC: 3.8 MMOL/L (ref 3.5–5.1)
PROT SERPL-MCNC: 7 G/DL (ref 6.4–8.2)
PROT UR STRIP-MCNC: NEGATIVE MG/DL
RBC # BLD AUTO: 4.57 M/UL (ref 3.8–5.2)
RBC #/AREA URNS HPF: NORMAL /HPF (ref 0–5)
SAMPLES BEING HELD,HOLD: NORMAL
SODIUM SERPL-SCNC: 131 MMOL/L (ref 136–145)
SP GR UR REFRACTOMETRY: <1.005 (ref 1–1.03)
TROPONIN I SERPL-MCNC: <0.05 NG/ML
UR CULT HOLD, URHOLD: NORMAL
UROBILINOGEN UR QL STRIP.AUTO: 0.2 EU/DL (ref 0.2–1)
WBC # BLD AUTO: 7.8 K/UL (ref 3.6–11)
WBC URNS QL MICRO: NORMAL /HPF (ref 0–4)

## 2019-04-19 PROCEDURE — 36415 COLL VENOUS BLD VENIPUNCTURE: CPT

## 2019-04-19 PROCEDURE — 84484 ASSAY OF TROPONIN QUANT: CPT

## 2019-04-19 PROCEDURE — 96361 HYDRATE IV INFUSION ADD-ON: CPT

## 2019-04-19 PROCEDURE — 99285 EMERGENCY DEPT VISIT HI MDM: CPT

## 2019-04-19 PROCEDURE — 71045 X-RAY EXAM CHEST 1 VIEW: CPT

## 2019-04-19 PROCEDURE — 85025 COMPLETE CBC W/AUTO DIFF WBC: CPT

## 2019-04-19 PROCEDURE — 81001 URINALYSIS AUTO W/SCOPE: CPT

## 2019-04-19 PROCEDURE — 74011250636 HC RX REV CODE- 250/636: Performed by: EMERGENCY MEDICINE

## 2019-04-19 PROCEDURE — 93005 ELECTROCARDIOGRAM TRACING: CPT

## 2019-04-19 PROCEDURE — 70450 CT HEAD/BRAIN W/O DYE: CPT

## 2019-04-19 PROCEDURE — 96374 THER/PROPH/DIAG INJ IV PUSH: CPT

## 2019-04-19 PROCEDURE — 80053 COMPREHEN METABOLIC PANEL: CPT

## 2019-04-19 RX ORDER — ONDANSETRON 2 MG/ML
4 INJECTION INTRAMUSCULAR; INTRAVENOUS
Status: COMPLETED | OUTPATIENT
Start: 2019-04-19 | End: 2019-04-19

## 2019-04-19 RX ADMIN — ONDANSETRON 4 MG: 2 INJECTION INTRAMUSCULAR; INTRAVENOUS at 17:36

## 2019-04-19 RX ADMIN — SODIUM CHLORIDE 500 ML: 900 INJECTION, SOLUTION INTRAVENOUS at 17:36

## 2019-04-19 NOTE — ED PROVIDER NOTES
81 y.o. female with past medical history significant for HTN, TIA, anxiety, breast cancer, melanoma, DM, and hyponatremia who presents from EMS with chief complaint of lightheadedness. Pt reports lightheadedness, accompanied by generalized weakness since February, which worsened today. Pt states she feels like she is going to \"pass out\". Pt notes her lightheadedness is aggravated by standing. Pt also c/o nausea. Per EMS, Pt's BG was 106. EMS reports Pt's BP was \"151/80's\". Pt states she was evaluated in the ED on 03/21/19 for hyponatremia. Pt denies pain, fever, chills, vomiting, cough, or leg swelling. There are no other acute medical concerns at this time. Note written by Cayla Reece, as dictated by Dee Dee Hinton MD 4:18 PM 
 
 
The history is provided by the patient and the EMS personnel. No  was used. Past Medical History:  
Diagnosis Date  Anxiety  Breast cancer ([facility] [address] 1993  
 s/p lumpectomy  Diabetes mellitus ([address] diet-controlled  Hx-TIA (transient ischemic attack)  Hypertension  Hyponatremia  Melanoma ([facility] [address] 2008  
 s/p resection, right cheek Past Surgical History:  
Procedure Laterality Date  HX BACK SURGERY    
 HX BREAST LUMPECTOMY  1993  
 right  HX CHOLECYSTECTOMY  HX HYSTERECTOMY  HX MALIGNANT SKIN LESION EXCISION  2008  HX MENISCECTOMY  HX OTHER SURGICAL    
 pineda's neuroma bilaterally Family History:  
Problem Relation Age of Onset  Heart Failure Mother  Other Father   
     aortic aneurysm  Diabetes Son  Immunodeficiency Son   
     post kidney and pancreas transplant Social History Socioeconomic History  Marital status:  Spouse name: Not on file  Number of children: Not on file  Years of education: Not on file  Highest education level: Not on file Occupational History  Occupation: retired from retail Social Needs  Financial resource strain: Not on file  Food insecurity:  
  Worry: Not on file Inability: Not on file  Transportation needs:  
  Medical: Not on file Non-medical: Not on file Tobacco Use  Smoking status: Never Smoker Substance and Sexual Activity  Alcohol use: No  
 Drug use: Not on file  Sexual activity: Not on file Lifestyle  Physical activity:  
  Days per week: Not on file Minutes per session: Not on file  Stress: Not on file Relationships  Social connections:  
  Talks on phone: Not on file Gets together: Not on file Attends Yazdanism service: Not on file Active member of club or organization: Not on file Attends meetings of clubs or organizations: Not on file Relationship status: Not on file  Intimate partner violence:  
  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 HENT: Negative for ear pain and sore throat. Eyes: Negative for pain. Respiratory: Negative for cough, chest tightness and shortness of breath. Cardiovascular: Negative for chest pain and leg swelling. Gastrointestinal: Positive for nausea. Negative for abdominal pain and vomiting. Genitourinary: Negative for dysuria and flank pain. Musculoskeletal: Negative for back pain. Skin: Negative for rash. Neurological: Positive for weakness (Generalized) and light-headedness. Negative for headaches. All other systems reviewed and are negative. There were no vitals filed for this visit. Physical Exam  
Constitutional: No distress. Elderly and frail.   
HENT:  
 Head: Normocephalic and atraumatic. Mouth/Throat: Oropharynx is clear and moist.  
Eyes: Pupils are equal, round, and reactive to light. Conjunctivae are normal. No scleral icterus. Neck: Neck supple. No tracheal deviation present. Cardiovascular: Normal rate, regular rhythm and intact distal pulses. Pulmonary/Chest: Effort normal. No respiratory distress. She has no wheezes. She has no rales. Abdominal: Soft. She exhibits no distension. There is no tenderness. Genitourinary:  
Genitourinary Comments: deferred Musculoskeletal: She exhibits no edema or deformity. Neurological: She is alert. No cranial nerve deficit. Coordination normal.  
Unsteady on feet. No cranial nerve deficit. Normal finger to nose test. No pronator drift. Equivocal romberg. Skin: Skin is warm and dry. Psychiatric: She has a normal mood and affect. Nursing note and vitals reviewed. Note written by Cayla Reece, as dictated by Dee Dee Hinton MD 4:18 PM 
 
MDM Number of Diagnoses or Management Options Chronic hyponatremia:  
Weakness:  
Diagnosis management comments: 81-year-old  female presents with lightheadedness and weakness that has been ongoing since February. She states that she's had similar episodes with low sodium. Differential diagnosis includes electrolyte abnormality, a KI, orthostatic hypotension, less likely ACS or intercranial hemorrhage. EKG shows no signs of ischemia. CT head shows no acute disease. Electrolytes remarkable for baseline hyponatremia. Urinalysis shows no signs of infection. Orthostatics performed showing no drop in her blood pressure or increase in her heart rate. Patient discharged in stable condition advised to follow closely with her primary care doctor. Given return precautions. Ambulated well prior to discharge. Procedures ED EKG interpretation: 
Rhythm: Sinus rhythm with 1st degree AV block; and regular .  Rate (approx.): 69 BPM; Axis: left axis deviation; Minimal voltage criteria for LVH; ST/T wave: normal;  
Note written by Cayla Correia, as dictated by Terrie Becker MD 6:14 PM 
 
 
 
 
 Vickie Matt.  Amanda Dean MD

## 2019-04-19 NOTE — ED NOTES
Patient discharged from ED by provider. Discharge instructions reviewed with patient by provider and all questions answered. Patient wheeled from ED in NAD.

## 2019-04-19 NOTE — DISCHARGE INSTRUCTIONS
Patient Education        Weakness: Care Instructions  Your Care Instructions    Weakness is a lack of physical or muscle strength. You may feel that you need to make extra effort to move your arms, legs, or other muscles. Generalized weakness means that you feel weak in most areas of your body. Another type of weakness may affect just one muscle or group of muscles. You may feel weak and tired after you have done too much activity, such as taking an extra-long hike. This is not a serious problem. It often goes away on its own. Feeling weak can also be caused by medical conditions like thyroid problems, depression, or a virus. Sometimes the cause can be serious. Your doctor may want to do more tests to try to find the cause of the weakness. The doctor has checked you carefully, but problems can develop later. If you notice any problems or new symptoms, get medical treatment right away. Follow-up care is a key part of your treatment and safety. Be sure to make and go to all appointments, and call your doctor if you are having problems. It's also a good idea to know your test results and keep a list of the medicines you take. How can you care for yourself at home? · Rest when you feel tired. · Be safe with medicines. If your doctor prescribed medicine, take it exactly as prescribed. Call your doctor if you think you are having a problem with your medicine. You will get more details on the specific medicines your doctor prescribes. · Do not skip meals. Eating a balanced diet may increase your energy level. · Get some physical activity every day, but do not get too tired. When should you call for help? Call your doctor now or seek immediate medical care if:    · You have new or worse weakness.     · You are dizzy or lightheaded, or you feel like you may faint.    Watch closely for changes in your health, and be sure to contact your doctor if:    · You do not get better as expected.    Where can you learn more?  Go to http://gemini-jay.info/. Enter [id number] in the search box to learn more about \"Weakness: Care Instructions. \"  Current as of: September 23, 2018  Content Version: 11.9  © 2959-8147 [employer]. Care instructions adapted under license by [employer] (which disclaims liability or warranty for this information). If you have questions about a medical condition or this instruction, always ask your healthcare professional. [place] [address] any warranty or liability for your use of this information.

## 2019-04-19 NOTE — ED TRIAGE NOTES
Patient presents to ED complaining of weakness and lightheadedness. Per EMS patient has not been feeling well since end of February and has been seen a few times since then. Weakness has gotten worse today. Denies pain and vomiting. Bs 106 and stable vital signs en route per EMS

## 2019-04-22 LAB
ATRIAL RATE: 69 BPM
CALCULATED P AXIS, ECG09: 40 DEGREES
CALCULATED R AXIS, ECG10: -36 DEGREES
CALCULATED T AXIS, ECG11: 16 DEGREES
DIAGNOSIS, 93000: NORMAL
P-R INTERVAL, ECG05: 214 MS
Q-T INTERVAL, ECG07: 414 MS
QRS DURATION, ECG06: 86 MS
QTC CALCULATION (BEZET), ECG08: 443 MS
VENTRICULAR RATE, ECG03: 69 BPM

## 2019-08-29 ENCOUNTER — HOSPITAL ENCOUNTER (EMERGENCY)
Age: 84
Discharge: HOME | End: 2019-08-29
Attending: EMERGENCY MEDICINE
Payer: MEDICARE

## 2019-08-29 VITALS
WEIGHT: 108 LBS | HEIGHT: 61 IN | BODY MASS INDEX: 20.39 KG/M2 | RESPIRATION RATE: 18 BRPM | OXYGEN SATURATION: 97 % | TEMPERATURE: 97.5 F | HEART RATE: 76 BPM | DIASTOLIC BLOOD PRESSURE: 90 MMHG | SYSTOLIC BLOOD PRESSURE: 133 MMHG

## 2019-08-29 DIAGNOSIS — R42 ORTHOSTATIC DIZZINESS: Primary | ICD-10-CM

## 2019-08-29 LAB
ALBUMIN SERPL-MCNC: 3.7 G/DL (ref 3.5–5)
ALBUMIN/GLOB SERPL: 1 {RATIO} (ref 1.1–2.2)
ALP SERPL-CCNC: 72 U/L (ref 45–117)
ALT SERPL-CCNC: 17 U/L (ref 12–78)
ANION GAP SERPL CALC-SCNC: 7 MMOL/L (ref 5–15)
APPEARANCE UR: CLEAR
AST SERPL-CCNC: 16 U/L (ref 15–37)
ATRIAL RATE: 59 BPM
BACTERIA URNS QL MICRO: NEGATIVE /HPF
BASOPHILS # BLD: 0.1 K/UL (ref 0–0.1)
BASOPHILS NFR BLD: 1 % (ref 0–1)
BILIRUB SERPL-MCNC: 0.5 MG/DL (ref 0.2–1)
BILIRUB UR QL: NEGATIVE
BUN SERPL-MCNC: 9 MG/DL (ref 6–20)
BUN/CREAT SERPL: 11 (ref 12–20)
CALCIUM SERPL-MCNC: 8.9 MG/DL (ref 8.5–10.1)
CALCULATED P AXIS, ECG09: 14 DEGREES
CALCULATED R AXIS, ECG10: -39 DEGREES
CALCULATED T AXIS, ECG11: 27 DEGREES
CHLORIDE SERPL-SCNC: 101 MMOL/L (ref 97–108)
CO2 SERPL-SCNC: 26 MMOL/L (ref 21–32)
COLOR UR: NORMAL
COMMENT, HOLDF: NORMAL
CREAT SERPL-MCNC: 0.84 MG/DL (ref 0.55–1.02)
DIAGNOSIS, 93000: NORMAL
DIFFERENTIAL METHOD BLD: ABNORMAL
EOSINOPHIL # BLD: 0.1 K/UL (ref 0–0.4)
EOSINOPHIL NFR BLD: 1 % (ref 0–7)
EPITH CASTS URNS QL MICRO: NORMAL /LPF
ERYTHROCYTE [DISTWIDTH] IN BLOOD BY AUTOMATED COUNT: 12.4 % (ref 11.5–14.5)
GLOBULIN SER CALC-MCNC: 3.6 G/DL (ref 2–4)
GLUCOSE SERPL-MCNC: 110 MG/DL (ref 65–100)
GLUCOSE UR STRIP.AUTO-MCNC: NEGATIVE MG/DL
HCT VFR BLD AUTO: 41.4 % (ref 35–47)
HGB BLD-MCNC: 13.6 G/DL (ref 11.5–16)
HGB UR QL STRIP: NEGATIVE
IMM GRANULOCYTES # BLD AUTO: 0 K/UL (ref 0–0.04)
IMM GRANULOCYTES NFR BLD AUTO: 1 % (ref 0–0.5)
KETONES UR QL STRIP.AUTO: NEGATIVE MG/DL
LEUKOCYTE ESTERASE UR QL STRIP.AUTO: NEGATIVE
LYMPHOCYTES # BLD: 2 K/UL (ref 0.8–3.5)
LYMPHOCYTES NFR BLD: 31 % (ref 12–49)
MAGNESIUM SERPL-MCNC: 2.1 MG/DL (ref 1.6–2.4)
MCH RBC QN AUTO: 29.4 PG (ref 26–34)
MCHC RBC AUTO-ENTMCNC: 32.9 G/DL (ref 30–36.5)
MCV RBC AUTO: 89.6 FL (ref 80–99)
MONOCYTES # BLD: 0.5 K/UL (ref 0–1)
MONOCYTES NFR BLD: 8 % (ref 5–13)
NEUTS SEG # BLD: 3.9 K/UL (ref 1.8–8)
NEUTS SEG NFR BLD: 58 % (ref 32–75)
NITRITE UR QL STRIP.AUTO: NEGATIVE
NRBC # BLD: 0 K/UL (ref 0–0.01)
NRBC BLD-RTO: 0 PER 100 WBC
P-R INTERVAL, ECG05: 212 MS
PH UR STRIP: 7 [PH] (ref 5–8)
PLATELET # BLD AUTO: 308 K/UL (ref 150–400)
PMV BLD AUTO: 9.5 FL (ref 8.9–12.9)
POTASSIUM SERPL-SCNC: 3.8 MMOL/L (ref 3.5–5.1)
PROT SERPL-MCNC: 7.3 G/DL (ref 6.4–8.2)
PROT UR STRIP-MCNC: NEGATIVE MG/DL
Q-T INTERVAL, ECG07: 420 MS
QRS DURATION, ECG06: 86 MS
QTC CALCULATION (BEZET), ECG08: 415 MS
RBC # BLD AUTO: 4.62 M/UL (ref 3.8–5.2)
RBC #/AREA URNS HPF: NORMAL /HPF (ref 0–5)
SAMPLES BEING HELD,HOLD: NORMAL
SODIUM SERPL-SCNC: 134 MMOL/L (ref 136–145)
SP GR UR REFRACTOMETRY: 1 (ref 1–1.03)
TROPONIN I SERPL-MCNC: <0.05 NG/ML
UR CULT HOLD, URHOLD: NORMAL
UROBILINOGEN UR QL STRIP.AUTO: 0.2 EU/DL (ref 0.2–1)
VENTRICULAR RATE, ECG03: 59 BPM
WBC # BLD AUTO: 6.6 K/UL (ref 3.6–11)
WBC URNS QL MICRO: NORMAL /HPF (ref 0–4)

## 2019-08-29 PROCEDURE — 81001 URINALYSIS AUTO W/SCOPE: CPT

## 2019-08-29 PROCEDURE — 99285 EMERGENCY DEPT VISIT HI MDM: CPT

## 2019-08-29 PROCEDURE — 84484 ASSAY OF TROPONIN QUANT: CPT

## 2019-08-29 PROCEDURE — 85025 COMPLETE CBC W/AUTO DIFF WBC: CPT

## 2019-08-29 PROCEDURE — 36415 COLL VENOUS BLD VENIPUNCTURE: CPT

## 2019-08-29 PROCEDURE — 93005 ELECTROCARDIOGRAM TRACING: CPT

## 2019-08-29 PROCEDURE — 74011250636 HC RX REV CODE- 250/636: Performed by: EMERGENCY MEDICINE

## 2019-08-29 PROCEDURE — 83735 ASSAY OF MAGNESIUM: CPT

## 2019-08-29 PROCEDURE — 80053 COMPREHEN METABOLIC PANEL: CPT

## 2019-08-29 RX ADMIN — SODIUM CHLORIDE 1000 ML: 900 INJECTION, SOLUTION INTRAVENOUS at 10:08

## 2019-08-29 NOTE — ED TRIAGE NOTES
Pt arrives via EMS for general weakness and dizziness x weeks. Pt reports having a sodium electrolyte balance problem. Pt recently started on meclizine.

## 2019-08-29 NOTE — ED PROVIDER NOTES
81 y.o. female with past medical history significant for HTN, TIA, anxiety, breast cancer, melanoma, SM, and hyponatremia who presents from home via EMS with chief complaint of fatigue. Pt complains of fatigue with associated dizziness, lightheadedness, decreased appetite, and shortness of breath with exertion that started about 1.5 weeks ago. Pt states she had 3 episodes of diarrhea about 2 weeks ago that she thinks caused onset of symptoms. Pt relates diarrhea to \"eating bad fish\". Pt states dizziness is worse with standing. Pt states she is on meclizine with some relief. Pt notes current symptoms are similar to previous episodes of hyponatremia. Pt notes a 20 pound weight loss since 03/2019 due to decreased appetite. Pt notes normal PO fluid intake. Pt denies chest pain, nausea, vomiting, or room spinning dizziness. There are no other acute medical concerns at this time. Social hx: Never Smoker. Denies EtOH Use. PCP: Other, MD Davis    Note written by Daniela Moore.  Eliz Liner, as dictated by Olive Roman., MD 9:50 AM               Past Medical History:   Diagnosis Date    Anxiety     Breast cancer [facility]) 1993    s/p lumpectomy    Diabetes mellitus ([facility] [address]     diet-controlled    Hx-TIA (transient ischemic attack)     Hypertension     Hyponatremia     Melanoma ([facility] [address] 2008    s/p resection, right cheek       Past Surgical History:   Procedure Laterality Date    HX BACK SURGERY      HX BREAST LUMPECTOMY  1993    right    HX CHOLECYSTECTOMY      HX HYSTERECTOMY      HX MALIGNANT SKIN LESION EXCISION  2008    HX MENISCECTOMY      HX OTHER SURGICAL      pineda's neuroma bilaterally         Family History:   Problem Relation Age of Onset    Heart Failure Mother     Other Father         aortic aneurysm    Diabetes Son     Immunodeficiency Son         post kidney and pancreas transplant       Social History     Socioeconomic History    Marital status:      Spouse name: Not on file    Number of children: Not on file    Years of education: Not on file    Highest education level: Not on file   Occupational History    Occupation: retired from retail   Social Needs    Financial resource strain: Not on file    Food insecurity:     Worry: Not on file     Inability: Not on file   Ginx needs:     Medical: Not on file     Non-medical: Not on file   Tobacco Use    Smoking status: Never Smoker   Substance and Sexual Activity    Alcohol use: No    Drug use: Not on file    Sexual activity: Not on file   Lifestyle    Physical activity:     Days per week: Not on file     Minutes per session: Not on file    Stress: Not on file   Relationships    Social connections:     Talks on phone: Not on file     Gets together: Not on file     Attends Yarsani service: Not on file     Active member of club or organization: Not on file     Attends meetings of clubs or organizations: Not on file     Relationship status: Not on file    Intimate partner violence:     Fear of current or ex partner: Not on file     Emotionally abused: Not on file     Physically abused: Not on file     Forced sexual activity: Not on file   Other Topics Concern    Not on file   Social History Narrative    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 Negative for chills and fever. HENT: Negative for ear pain and sore throat. Eyes: Negative for pain. Respiratory: Positive for shortness of breath. Negative for chest tightness. Cardiovascular: Negative for chest pain. Gastrointestinal: Positive for diarrhea (Resolved. ). Negative for abdominal pain, nausea and vomiting. Genitourinary: Negative for flank pain. Musculoskeletal: Negative for back pain. Skin: Negative for rash. Neurological: Positive for dizziness and light-headedness. Negative for headaches. All other systems reviewed and are negative. Vitals:    08/29/19 0941   BP: (!) 177/94   Pulse: 64   Resp: 18   Temp: 97.5 °F (36.4 °C)   SpO2: 99%   Weight: 49 kg (108 lb)   Height: 5' 1\" (1.549 m)            Physical Exam   Constitutional: She is oriented to person, place, and time. No distress. Elderly. Frail. HENT:   Head: Normocephalic and atraumatic. Mouth/Throat: Oropharynx is clear and moist.   Eyes: Pupils are equal, round, and reactive to light. Conjunctivae are normal. No scleral icterus. Neck: Neck supple. No tracheal deviation present. Cardiovascular: Normal rate, regular rhythm, normal heart sounds and intact distal pulses. Exam reveals no gallop and no friction rub. Pulmonary/Chest: Effort normal. No respiratory distress. She has no wheezes. She has no rales. Abdominal: Soft. She exhibits no distension. There is no tenderness. Genitourinary:   Genitourinary Comments: deferred   Musculoskeletal: She exhibits no edema or deformity. Neurological: She is alert and oriented to person, place, and time. She has normal strength. No cranial nerve deficit or sensory deficit. Equal  strength bilaterally. Normal motor in lower extremities. Normal sensation throughout. Normal finger to nose. Skin: Skin is warm and dry. Psychiatric: She has a normal mood and affect. Nursing note and vitals reviewed. Note written by Saranya King. Shyann Singh, as dictated by Adali Kraft., MD 9:50 AM      [facility]  ED Course as of Aug 29 1201   Thu Aug 29, 2019   [age]-year-old female with a history of hyponatremia, anxiety, chronic dizziness presents with weakness and dizziness with differential diagnosis of electrolyte abnormality, UTI, dehydration, orthostasis less likely ACS. EKG shows sinus bradycardia with first-degree AV block at rate of 59, normal intervals, left axis deviation, no acute ischemia or infarction. [TT]   1130 Patient reassessed.   Not having any lightheadedness or dizziness unless she stands. IV fluids were not running. We are fixing the IV line now. Will reassess after her IV fluid bolus. [TT]      ED Course User Index  [TT] Amna Camacho MD       Procedures          12:02 PM  Patient re-evaluated. All questions answered. Patient appropriate for discharge. Given return precautions and follow up instructions. LABORATORY TESTS:  Labs Reviewed   CBC WITH AUTOMATED DIFF - Abnormal; Notable for the following components:       Result Value    IMMATURE GRANULOCYTES 1 (*)     All other components within normal limits   METABOLIC PANEL, COMPREHENSIVE - Abnormal; Notable for the following components:    Sodium 134 (*)     Glucose 110 (*)     BUN/Creatinine ratio 11 (*)     A-G Ratio 1.0 (*)     All other components within normal limits   URINE CULTURE HOLD SAMPLE   SAMPLES BEING HELD   MAGNESIUM   TROPONIN I   URINALYSIS W/MICROSCOPIC       IMAGING RESULTS:  No orders to display       MEDICATIONS GIVEN:  Medications   sodium chloride 0.9 % bolus infusion 1,000 mL (1,000 mL IntraVENous New Bag 8/29/19 1008)       IMPRESSION:  1. Orthostatic dizziness        PLAN:  1. Current Discharge Medication List        2. Follow-up Information     Follow up With Specialties Details Why Contact Info    [facility] EMERGENCY DEPT Emergency Medicine Schedule an appointment as soon as possible for a visit  [address] [facility]  [phone]    Savannah Hawkins MD Neurology Schedule an appointment as soon as possible for a visit  [address] [facility] [place] [address] [facility]  [phone]      Your PCP  Schedule an appointment as soon as possible for a visit          3. Return to ED for new or worsening symptoms       Ese Reyes.  Deann Dyson MD

## 2019-08-29 NOTE — DISCHARGE INSTRUCTIONS

## 2019-10-30 ENCOUNTER — HOSPITAL ENCOUNTER (INPATIENT)
Age: 84
LOS: 2 days | Discharge: HOME | DRG: 391 | End: 2019-11-02
Attending: EMERGENCY MEDICINE | Admitting: FAMILY MEDICINE
Payer: MEDICARE

## 2019-10-30 DIAGNOSIS — K52.9 ACUTE COLITIS: ICD-10-CM

## 2019-10-30 DIAGNOSIS — K62.5 RECTAL BLEEDING: Primary | ICD-10-CM

## 2019-10-30 PROCEDURE — 80053 COMPREHEN METABOLIC PANEL: CPT

## 2019-10-30 PROCEDURE — 82272 OCCULT BLD FECES 1-3 TESTS: CPT

## 2019-10-30 PROCEDURE — 83690 ASSAY OF LIPASE: CPT

## 2019-10-30 PROCEDURE — 85025 COMPLETE CBC W/AUTO DIFF WBC: CPT

## 2019-10-30 PROCEDURE — C9113 INJ PANTOPRAZOLE SODIUM, VIA: HCPCS | Performed by: EMERGENCY MEDICINE

## 2019-10-30 PROCEDURE — 85730 THROMBOPLASTIN TIME PARTIAL: CPT

## 2019-10-30 PROCEDURE — 96375 TX/PRO/DX INJ NEW DRUG ADDON: CPT

## 2019-10-30 PROCEDURE — 99285 EMERGENCY DEPT VISIT HI MDM: CPT

## 2019-10-30 PROCEDURE — 82150 ASSAY OF AMYLASE: CPT

## 2019-10-30 PROCEDURE — 96374 THER/PROPH/DIAG INJ IV PUSH: CPT

## 2019-10-30 PROCEDURE — 94762 N-INVAS EAR/PLS OXIMTRY CONT: CPT

## 2019-10-30 PROCEDURE — 85610 PROTHROMBIN TIME: CPT

## 2019-10-30 PROCEDURE — 74011250636 HC RX REV CODE- 250/636: Performed by: EMERGENCY MEDICINE

## 2019-10-30 PROCEDURE — 96372 THER/PROPH/DIAG INJ SC/IM: CPT

## 2019-10-30 RX ORDER — DICYCLOMINE HYDROCHLORIDE 10 MG/ML
20 INJECTION INTRAMUSCULAR
Status: COMPLETED | OUTPATIENT
Start: 2019-10-30 | End: 2019-10-30

## 2019-10-30 RX ORDER — ONDANSETRON 2 MG/ML
8 INJECTION INTRAMUSCULAR; INTRAVENOUS
Status: COMPLETED | OUTPATIENT
Start: 2019-10-30 | End: 2019-10-30

## 2019-10-30 RX ADMIN — SODIUM CHLORIDE 1000 ML: 900 INJECTION, SOLUTION INTRAVENOUS at 23:43

## 2019-10-30 RX ADMIN — ONDANSETRON 8 MG: 2 INJECTION INTRAMUSCULAR; INTRAVENOUS at 23:43

## 2019-10-30 RX ADMIN — DICYCLOMINE HYDROCHLORIDE 20 MG: 10 INJECTION INTRAMUSCULAR at 23:43

## 2019-10-30 RX ADMIN — PANTOPRAZOLE SODIUM 40 MG: 40 INJECTION, POWDER, FOR SOLUTION INTRAVENOUS at 23:43

## 2019-10-31 ENCOUNTER — APPOINTMENT (OUTPATIENT)
Dept: CT IMAGING | Age: 84
DRG: 391 | End: 2019-10-31
Attending: EMERGENCY MEDICINE
Payer: MEDICARE

## 2019-10-31 PROBLEM — K92.2 GI BLEED: Status: ACTIVE | Noted: 2019-10-31

## 2019-10-31 PROBLEM — E11.9 DIABETES MELLITUS (HCC): Status: ACTIVE | Noted: 2019-10-31

## 2019-10-31 PROBLEM — K86.2 PANCREATIC CYST: Status: ACTIVE | Noted: 2019-10-31

## 2019-10-31 PROBLEM — K52.9 ACUTE COLITIS: Status: ACTIVE | Noted: 2019-10-31

## 2019-10-31 LAB
ALBUMIN SERPL-MCNC: 3.4 G/DL (ref 3.5–5)
ALBUMIN/GLOB SERPL: 1 {RATIO} (ref 1.1–2.2)
ALP SERPL-CCNC: 63 U/L (ref 45–117)
ALT SERPL-CCNC: 17 U/L (ref 12–78)
AMYLASE SERPL-CCNC: 58 U/L (ref 25–115)
ANION GAP SERPL CALC-SCNC: 6 MMOL/L (ref 5–15)
APPEARANCE UR: CLEAR
APTT PPP: 24.4 SEC (ref 22.1–32)
AST SERPL-CCNC: 18 U/L (ref 15–37)
ATRIAL RATE: 56 BPM
BACTERIA URNS QL MICRO: NEGATIVE /HPF
BASOPHILS # BLD: 0.1 K/UL (ref 0–0.1)
BASOPHILS NFR BLD: 1 % (ref 0–1)
BILIRUB SERPL-MCNC: 0.4 MG/DL (ref 0.2–1)
BILIRUB UR QL: NEGATIVE
BUN SERPL-MCNC: 11 MG/DL (ref 6–20)
BUN/CREAT SERPL: 14 (ref 12–20)
CALCIUM SERPL-MCNC: 8.7 MG/DL (ref 8.5–10.1)
CALCULATED P AXIS, ECG09: 57 DEGREES
CALCULATED R AXIS, ECG10: -34 DEGREES
CALCULATED T AXIS, ECG11: 9 DEGREES
CHLORIDE SERPL-SCNC: 100 MMOL/L (ref 97–108)
CO2 SERPL-SCNC: 25 MMOL/L (ref 21–32)
COLOR UR: ABNORMAL
COMMENT, HOLDF: NORMAL
CREAT SERPL-MCNC: 0.78 MG/DL (ref 0.55–1.02)
DIAGNOSIS, 93000: NORMAL
DIFFERENTIAL METHOD BLD: NORMAL
EOSINOPHIL # BLD: 0.1 K/UL (ref 0–0.4)
EOSINOPHIL NFR BLD: 1 % (ref 0–7)
EPITH CASTS URNS QL MICRO: ABNORMAL /LPF
ERYTHROCYTE [DISTWIDTH] IN BLOOD BY AUTOMATED COUNT: 12.3 % (ref 11.5–14.5)
GLOBULIN SER CALC-MCNC: 3.3 G/DL (ref 2–4)
GLUCOSE BLD STRIP.AUTO-MCNC: 115 MG/DL (ref 65–100)
GLUCOSE SERPL-MCNC: 114 MG/DL (ref 65–100)
GLUCOSE UR STRIP.AUTO-MCNC: NEGATIVE MG/DL
HCT VFR BLD AUTO: 35.6 % (ref 35–47)
HCT VFR BLD AUTO: 37.4 % (ref 35–47)
HEMOCCULT STL QL: POSITIVE
HGB BLD-MCNC: 11.8 G/DL (ref 11.5–16)
HGB BLD-MCNC: 12.4 G/DL (ref 11.5–16)
HGB UR QL STRIP: NEGATIVE
HYALINE CASTS URNS QL MICRO: ABNORMAL /LPF (ref 0–5)
IMM GRANULOCYTES # BLD AUTO: 0 K/UL (ref 0–0.04)
IMM GRANULOCYTES NFR BLD AUTO: 0 % (ref 0–0.5)
INR PPP: 1 (ref 0.9–1.1)
KETONES UR QL STRIP.AUTO: NEGATIVE MG/DL
LACTATE SERPL-SCNC: 1.3 MMOL/L (ref 0.4–2)
LEUKOCYTE ESTERASE UR QL STRIP.AUTO: ABNORMAL
LIPASE SERPL-CCNC: 156 U/L (ref 73–393)
LYMPHOCYTES # BLD: 3.3 K/UL (ref 0.8–3.5)
LYMPHOCYTES NFR BLD: 31 % (ref 12–49)
MCH RBC QN AUTO: 29.5 PG (ref 26–34)
MCHC RBC AUTO-ENTMCNC: 33.1 G/DL (ref 30–36.5)
MCV RBC AUTO: 89 FL (ref 80–99)
MONOCYTES # BLD: 0.8 K/UL (ref 0–1)
MONOCYTES NFR BLD: 7 % (ref 5–13)
NEUTS SEG # BLD: 6.5 K/UL (ref 1.8–8)
NEUTS SEG NFR BLD: 60 % (ref 32–75)
NITRITE UR QL STRIP.AUTO: NEGATIVE
NRBC # BLD: 0 K/UL (ref 0–0.01)
NRBC BLD-RTO: 0 PER 100 WBC
P-R INTERVAL, ECG05: 220 MS
PH UR STRIP: 7 [PH] (ref 5–8)
PLATELET # BLD AUTO: 290 K/UL (ref 150–400)
PMV BLD AUTO: 9.8 FL (ref 8.9–12.9)
POTASSIUM SERPL-SCNC: 3.6 MMOL/L (ref 3.5–5.1)
PROT SERPL-MCNC: 6.7 G/DL (ref 6.4–8.2)
PROT UR STRIP-MCNC: NEGATIVE MG/DL
PROTHROMBIN TIME: 10.6 SEC (ref 9–11.1)
Q-T INTERVAL, ECG07: 436 MS
QRS DURATION, ECG06: 88 MS
QTC CALCULATION (BEZET), ECG08: 420 MS
RBC # BLD AUTO: 4 M/UL (ref 3.8–5.2)
RBC #/AREA URNS HPF: ABNORMAL /HPF (ref 0–5)
SAMPLES BEING HELD,HOLD: NORMAL
SERVICE CMNT-IMP: ABNORMAL
SODIUM SERPL-SCNC: 131 MMOL/L (ref 136–145)
SP GR UR REFRACTOMETRY: <1.005 (ref 1–1.03)
THERAPEUTIC RANGE,PTTT: NORMAL SECS (ref 58–77)
UA: UC IF INDICATED,UAUC: ABNORMAL
UROBILINOGEN UR QL STRIP.AUTO: 0.2 EU/DL (ref 0.2–1)
VENTRICULAR RATE, ECG03: 56 BPM
WBC # BLD AUTO: 10.7 K/UL (ref 3.6–11)
WBC URNS QL MICRO: ABNORMAL /HPF (ref 0–4)

## 2019-10-31 PROCEDURE — 81001 URINALYSIS AUTO W/SCOPE: CPT

## 2019-10-31 PROCEDURE — 83605 ASSAY OF LACTIC ACID: CPT

## 2019-10-31 PROCEDURE — 87086 URINE CULTURE/COLONY COUNT: CPT

## 2019-10-31 PROCEDURE — 65660000000 HC RM CCU STEPDOWN

## 2019-10-31 PROCEDURE — 82962 GLUCOSE BLOOD TEST: CPT

## 2019-10-31 PROCEDURE — 74174 CTA ABD&PLVS W/CONTRAST: CPT

## 2019-10-31 PROCEDURE — 74011250637 HC RX REV CODE- 250/637: Performed by: INTERNAL MEDICINE

## 2019-10-31 PROCEDURE — 87040 BLOOD CULTURE FOR BACTERIA: CPT

## 2019-10-31 PROCEDURE — 96375 TX/PRO/DX INJ NEW DRUG ADDON: CPT

## 2019-10-31 PROCEDURE — 36415 COLL VENOUS BLD VENIPUNCTURE: CPT

## 2019-10-31 PROCEDURE — 74011000258 HC RX REV CODE- 258: Performed by: INTERNAL MEDICINE

## 2019-10-31 PROCEDURE — 93005 ELECTROCARDIOGRAM TRACING: CPT

## 2019-10-31 PROCEDURE — 74011250636 HC RX REV CODE- 250/636: Performed by: INTERNAL MEDICINE

## 2019-10-31 PROCEDURE — 96361 HYDRATE IV INFUSION ADD-ON: CPT

## 2019-10-31 PROCEDURE — 74011636320 HC RX REV CODE- 636/320: Performed by: RADIOLOGY

## 2019-10-31 PROCEDURE — 74011250636 HC RX REV CODE- 250/636: Performed by: FAMILY MEDICINE

## 2019-10-31 PROCEDURE — 85018 HEMOGLOBIN: CPT

## 2019-10-31 PROCEDURE — 74011250636 HC RX REV CODE- 250/636: Performed by: EMERGENCY MEDICINE

## 2019-10-31 RX ORDER — LANOLIN ALCOHOL/MO/W.PET/CERES
500 CREAM (GRAM) TOPICAL AS NEEDED
COMMUNITY

## 2019-10-31 RX ORDER — DIAZEPAM 5 MG/1
5 TABLET ORAL
Status: DISCONTINUED | OUTPATIENT
Start: 2019-10-31 | End: 2019-11-02 | Stop reason: HOSPADM

## 2019-10-31 RX ORDER — SODIUM CHLORIDE 9 MG/ML
75 INJECTION, SOLUTION INTRAVENOUS CONTINUOUS
Status: DISCONTINUED | OUTPATIENT
Start: 2019-10-31 | End: 2019-11-02 | Stop reason: HOSPADM

## 2019-10-31 RX ORDER — TRAMADOL HYDROCHLORIDE 50 MG/1
25 TABLET ORAL
Status: DISCONTINUED | OUTPATIENT
Start: 2019-10-31 | End: 2019-11-02 | Stop reason: HOSPADM

## 2019-10-31 RX ORDER — DIAZEPAM 5 MG/1
5 TABLET ORAL
COMMUNITY
End: 2020-11-03 | Stop reason: SDUPTHER

## 2019-10-31 RX ORDER — FENTANYL CITRATE 50 UG/ML
12.5 INJECTION, SOLUTION INTRAMUSCULAR; INTRAVENOUS
Status: DISCONTINUED | OUTPATIENT
Start: 2019-10-31 | End: 2019-11-02 | Stop reason: HOSPADM

## 2019-10-31 RX ORDER — ONDANSETRON 4 MG/1
4 TABLET, ORALLY DISINTEGRATING ORAL
COMMUNITY
End: 2020-11-03 | Stop reason: SDUPTHER

## 2019-10-31 RX ORDER — SUCRALFATE 1 G/10ML
1 SUSPENSION ORAL
COMMUNITY
End: 2020-11-03

## 2019-10-31 RX ORDER — CARBOXYMETHYLCELLULOSE SODIUM 10 MG/ML
1 GEL OPHTHALMIC AS NEEDED
COMMUNITY

## 2019-10-31 RX ORDER — METOCLOPRAMIDE HYDROCHLORIDE 5 MG/ML
10 INJECTION INTRAMUSCULAR; INTRAVENOUS
Status: COMPLETED | OUTPATIENT
Start: 2019-10-31 | End: 2019-10-31

## 2019-10-31 RX ORDER — TRAMADOL HYDROCHLORIDE 50 MG/1
25 TABLET ORAL ONCE
Status: COMPLETED | OUTPATIENT
Start: 2019-10-31 | End: 2019-10-31

## 2019-10-31 RX ORDER — ACETAMINOPHEN 325 MG/1
325-650 TABLET ORAL
COMMUNITY

## 2019-10-31 RX ORDER — SODIUM CHLORIDE 0.9 % (FLUSH) 0.9 %
5-40 SYRINGE (ML) INJECTION AS NEEDED
Status: DISCONTINUED | OUTPATIENT
Start: 2019-10-31 | End: 2019-11-02 | Stop reason: HOSPADM

## 2019-10-31 RX ORDER — NALOXONE HYDROCHLORIDE 0.4 MG/ML
0.4 INJECTION, SOLUTION INTRAMUSCULAR; INTRAVENOUS; SUBCUTANEOUS
Status: DISCONTINUED | OUTPATIENT
Start: 2019-10-31 | End: 2019-11-02 | Stop reason: HOSPADM

## 2019-10-31 RX ORDER — FENTANYL CITRATE 50 UG/ML
50 INJECTION, SOLUTION INTRAMUSCULAR; INTRAVENOUS
Status: COMPLETED | OUTPATIENT
Start: 2019-10-31 | End: 2019-10-31

## 2019-10-31 RX ORDER — CARBOXYMETHYLCELLULOSE SODIUM 10 MG/ML
2 GEL OPHTHALMIC
Status: DISCONTINUED | OUTPATIENT
Start: 2019-10-31 | End: 2019-11-02 | Stop reason: HOSPADM

## 2019-10-31 RX ORDER — SODIUM CHLORIDE 0.9 % (FLUSH) 0.9 %
5-40 SYRINGE (ML) INJECTION EVERY 8 HOURS
Status: DISCONTINUED | OUTPATIENT
Start: 2019-10-31 | End: 2019-11-02 | Stop reason: HOSPADM

## 2019-10-31 RX ORDER — LANOLIN ALCOHOL/MO/W.PET/CERES
500 CREAM (GRAM) TOPICAL DAILY
Status: DISCONTINUED | OUTPATIENT
Start: 2019-11-01 | End: 2019-11-02 | Stop reason: HOSPADM

## 2019-10-31 RX ORDER — MECLIZINE HCL 12.5 MG 12.5 MG/1
12.5-25 TABLET ORAL
COMMUNITY
End: 2020-11-03 | Stop reason: SDUPTHER

## 2019-10-31 RX ORDER — ONDANSETRON 2 MG/ML
4 INJECTION INTRAMUSCULAR; INTRAVENOUS
Status: DISCONTINUED | OUTPATIENT
Start: 2019-10-31 | End: 2019-11-02 | Stop reason: HOSPADM

## 2019-10-31 RX ORDER — DICLOFENAC SODIUM 10 MG/G
2 GEL TOPICAL
COMMUNITY
End: 2021-06-01 | Stop reason: SDUPTHER

## 2019-10-31 RX ADMIN — FENTANYL CITRATE 50 MCG: 50 INJECTION, SOLUTION INTRAMUSCULAR; INTRAVENOUS at 02:39

## 2019-10-31 RX ADMIN — Medication 10 ML: at 21:48

## 2019-10-31 RX ADMIN — IOPAMIDOL 100 ML: 755 INJECTION, SOLUTION INTRAVENOUS at 01:23

## 2019-10-31 RX ADMIN — PIPERACILLIN AND TAZOBACTAM 3.38 G: 3; .375 INJECTION, POWDER, LYOPHILIZED, FOR SOLUTION INTRAVENOUS at 10:38

## 2019-10-31 RX ADMIN — DIAZEPAM 5 MG: 5 TABLET ORAL at 21:38

## 2019-10-31 RX ADMIN — TRAMADOL HYDROCHLORIDE 25 MG: 50 TABLET, FILM COATED ORAL at 15:35

## 2019-10-31 RX ADMIN — PIPERACILLIN AND TAZOBACTAM 3.38 G: 3; .375 INJECTION, POWDER, LYOPHILIZED, FOR SOLUTION INTRAVENOUS at 16:43

## 2019-10-31 RX ADMIN — TRAMADOL HYDROCHLORIDE 25 MG: 50 TABLET ORAL at 20:25

## 2019-10-31 RX ADMIN — Medication 10 ML: at 06:37

## 2019-10-31 RX ADMIN — SODIUM CHLORIDE 75 ML/HR: 900 INJECTION, SOLUTION INTRAVENOUS at 06:57

## 2019-10-31 RX ADMIN — SODIUM CHLORIDE 1000 ML: 900 INJECTION, SOLUTION INTRAVENOUS at 03:52

## 2019-10-31 RX ADMIN — METOCLOPRAMIDE 10 MG: 5 INJECTION, SOLUTION INTRAMUSCULAR; INTRAVENOUS at 02:39

## 2019-10-31 NOTE — PROGRESS NOTES
TRANSFER - OUT REPORT:    Verbal report given to DALE Parra(name) on Oval Keon  being transferred to [facility](unit) for routine progression of care       Report consisted of patients Situation, Background, Assessment and   Recommendations(SBAR). Information from the following report(s) SBAR, Kardex and MAR was reviewed with the receiving nurse. Lines:   Peripheral IV 10/31/19 Left Forearm (Active)   Site Assessment Clean, dry, & intact 10/31/2019  7:38 AM   Phlebitis Assessment 0 10/31/2019  7:38 AM   Infiltration Assessment 0 10/31/2019  7:38 AM   Dressing Status Clean, dry, & intact 10/31/2019  7:38 AM   Dressing Type Transparent 10/31/2019  7:38 AM   Hub Color/Line Status Pink;Capped 10/31/2019  7:38 AM   Action Taken Open ports on tubing capped 10/31/2019  5:17 AM       Peripheral IV 10/30/19 Left Antecubital (Active)   Site Assessment Clean, dry, & intact 10/31/2019  7:38 AM   Phlebitis Assessment 0 10/31/2019  7:38 AM   Infiltration Assessment 0 10/31/2019  7:38 AM   Dressing Status Clean, dry, & intact 10/31/2019  7:38 AM   Dressing Type Transparent 10/31/2019  7:38 AM   Hub Color/Line Status Pink;Capped 10/31/2019  7:38 AM   Action Taken Open ports on tubing capped 10/31/2019  5:17 AM        Opportunity for questions and clarification was provided.

## 2019-10-31 NOTE — CONSULTS
[address] [place] [address]. [address] [place] [address] NP  [phone]                    GASTROENTEROLOGY CONSULTATION NOTE              NAME:  Nate Faust   :   1935   MRN:   [id number]       Referring Physician:   Dr. Clifton Navarro Date:   10/31/2019 11:56 AM    Chief Complaint:    Rectal Bleeding     History of Present Illness:    Patient is a 81 y.o. who we Kelly Parks been asked to see in consultation for the above complaint. The patient presents to the ER with complaints of rectal bleeding which started yesterday. She was out with her granddaughter and had a [place] from [facility]. Soon after this the patient developed severe lower abominal pain and started passing BRBPR. The cramping was so severe it \"put her on the floor\". She initially thought the pain was from \"a vessel\". The cramping became progressively worse, bentyl was of no relief. She denies sick contacts and fevers. Her work up here was notable for a 2 g drop in her hemoglobin and colitis on CT scan. PMH:  Past Medical History:   Diagnosis Date    Anxiety     Breast cancer ([address]     s/p lumpectomy    Diabetes mellitus ([address]     diet-controlled    Hx-TIA (transient ischemic attack)     Hypertension     Hyponatremia     [facility]an[facility]     s/p resection, right cheek       PSH:  Past Surgical History:   Procedure Laterality Date    HX BACK SURGERY      HX BREAST LUMPECTOMY      right    HX CHOLECYSTECTOMY      HX HYSTERECTOMY      HX MALIGNANT SKIN LESION EXCISION      HX MENISCECTOMY      HX OTHER SURGICAL      pineda's neuroma bilaterally       Allergies: Allergies   Allergen Reactions    Sulfa (Sulfonamide Antibiotics) Nausea and Vomiting    Codeine Other (comments)     Stomach cramping    Levaquin [Levofloxacin] Nausea and Vomiting     Pt can not take PO due to vomiting.     Linzess [Linaclotide] Diarrhea       Home Medications:  Prior to Admission Medications   Prescriptions Last Dose Informant Patient Reported? Taking?   acetaminophen (TYLENOL) 325 mg tablet 10/30/2019 at Unknown time Self Yes Yes   Sig: Take 325-650 mg by mouth every six (6) hours as needed (headache). atenolol (TENORMIN) 25 mg tablet 10/30/2019 at Unknown time Self Yes Yes   Sig: Take 25 mg by mouth daily (after dinner). calcium carbonate (TUMS) 200 mg calcium (500 mg) chew 10/24/2019 at Unknown time Self Yes Yes   Sig: Take 1 Tab by mouth four (4) times daily as needed. carboxymethylcellulose sodium (REFRESH LIQUIGEL) 1 % dlgl ophthalmic solution  Self Yes Yes   Sig: Administer 1 Drop to both eyes as needed (dry eyes). clopidogrel (PLAVIX) 75 mg tablet 10/30/2019 at 0800 Self Yes Yes   Sig: Take 75 mg by mouth daily (after dinner). cyanocobalamin (VITAMIN B12) 500 mcg tablet 10/30/2019 at Unknown time Self Yes Yes   Sig: Take 500 mcg by mouth daily. diazePAM (VALIUM) 5 mg tablet 10/29/2019 at PM Self Yes Yes   Sig: Take 5 mg by mouth two (2) times daily as needed for Anxiety or Sleep. diclofenac (VOLTAREN) 1 % gel 10/24/2019 at Unknown time Self Yes Yes   Sig: Apply 2 g to affected area four (4) times daily as needed (knee pain, back pain). dicyclomine (BENTYL) 10 mg capsule 10/30/2019 at 1900 Self Yes Yes   Sig: Take 10 mg by mouth three (3) times daily as needed (abdominal cramping). fluticasone (FLONASE ALLERGY RELIEF) 50 mcg/actuation nasal spray 10/30/2019 at Unknown time Self Yes Yes   [place] by Both Nostrils route daily. meclizine (ANTIVERT) 12.5 mg tablet  Self Yes Yes   Sig: Take 12.5-25 mg by mouth two (2) times daily as needed for Dizziness. ondansetron (ZOFRAN ODT) 4 mg disintegrating tablet 10/30/2019 at Unknown time Self Yes Yes   Sig: Take 4 mg by mouth every eight (8) hours as needed for Nausea. sucralfate (CARAFATE) 100 mg/mL suspension 10/30/2019 at Unknown time Self Yes Yes   Sig: Take 1 g by mouth four (4) times daily as needed (abdominal pain).       Facility-Administered Medications: None       Hospital Medications:  Current Facility-Administered Medications   Medication Dose Route Frequency    iopamidol (ISOVUE-370) 76 % injection        sodium chloride (NS) flush 5-40 mL  5-40 mL IntraVENous Q8H    sodium chloride (NS) flush 5-40 mL  5-40 mL IntraVENous PRN    0.9% sodium chloride infusion  75 mL/hr IntraVENous CONTINUOUS    fentaNYL citrate (PF) injection 12.5 mcg  12.5 mcg IntraVENous Q4H PRN    ondansetron (ZOFRAN) injection 4 mg  4 mg IntraVENous Q6H PRN    piperacillin-tazobactam (ZOSYN) 3.375 g in 0.9% sodium chloride (MBP/ADV) 100 mL  3.375 g IntraVENous Q8H    diazePAM (VALIUM) tablet 5 mg  5 mg Oral BID PRN    carboxymethylcellulose sodium (CELLUVISC) 1 % ophthalmic solution 2 Drop  2 Drop Both Eyes PRN    [START ON 11/1/2019] cyanocobalamin (VITAMIN B12) tablet 500 mcg  500 mcg Oral DAILY     Current Outpatient Medications   Medication Sig    carboxymethylcellulose sodium (REFRESH LIQUIGEL) 1 % dlgl ophthalmic solution Administer 1 Drop to both eyes as needed (dry eyes).  cyanocobalamin (VITAMIN B12) 500 mcg tablet Take 500 mcg by mouth daily.  acetaminophen (TYLENOL) 325 mg tablet Take 325-650 mg by mouth every six (6) hours as needed (headache).  diazePAM (VALIUM) 5 mg tablet Take 5 mg by mouth two (2) times daily as needed for Anxiety or Sleep.  ondansetron (ZOFRAN ODT) 4 mg disintegrating tablet Take 4 mg by mouth every eight (8) hours as needed for Nausea.  meclizine (ANTIVERT) 12.5 mg tablet Take 12.5-25 mg by mouth two (2) times daily as needed for Dizziness.  sucralfate (CARAFATE) 100 mg/mL suspension Take 1 g by mouth four (4) times daily as needed (abdominal pain).  diclofenac (VOLTAREN) 1 % gel Apply 2 g to affected area four (4) times daily as needed (knee pain, back pain).  dicyclomine (BENTYL) 10 mg capsule Take 10 mg by mouth three (3) times daily as needed (abdominal cramping).     fluticasone (FLONASE ALLERGY RELIEF) 50 mcg/actuation nasal spray 1 [place] by Both Nostrils route daily.  calcium carbonate (TUMS) 200 mg calcium (500 mg) chew Take 1 Tab by mouth four (4) times daily as needed.  atenolol (TENORMIN) 25 mg tablet Take 25 mg by mouth daily (after dinner).  clopidogrel (PLAVIX) 75 mg tablet Take 75 mg by mouth daily (after dinner). Social History:  Social History     Tobacco Use    Smoking status: Never Smoker   Substance Use Topics    Alcohol use: No       Family History:  Family History   Problem Relation Age of Onset    Heart Failure Mother     Other Father         aortic aneurysm    Diabetes Son     Immunodeficiency Son         post kidney and pancreas transplant       Review of Systems:  Constitutional: negative fever, negative chills, negative weight loss  Eyes:   negative visual changes  ENT:   negative sore throat, tongue or lip swelling  Respiratory:  negative cough, negative dyspnea  Cards:  negative for chest pain, palpitations, lower extremity edema  GI:   See HPI  :  negative for frequency, dysuria  Integument:  negative for rash and pruritus  Heme:  negative for easy bruising and gum/nose bleeding  Musculoskel: negative for myalgias,  back pain and muscle weakness  Neuro: negative for headaches, dizziness, vertigo  Psych:  negative for feelings of anxiety, depression     Objective:     Patient Vitals for the past 8 hrs:   BP Temp Pulse Resp SpO2   10/31/19 1057 151/69 98.7 °F (37.1 °C) 74 18 96 %   10/31/19 0738 142/66 98.8 °F (37.1 °C) 68 18 92 %   10/31/19 0517 -- 98.7 °F (37.1 °C) -- -- --   10/31/19 0454 150/60 -- 63 17 98 %     10/31 0701 - 10/31 1900  In: 3817.9 [I.V.:3817.9]  Out: -   10/29 1901 - 10/31 0700  In: 1000 [I.V.:1000]  Out: -     EXAM:     NEURO-alert, oriented x3, affect appropriate   HEENT-Head: Normocephalic, no lesions, without obvious abnormality. LUNGS-clear to auscultation bilaterally    COR-regular rate and rhythym     ABD- soft, non-tender.  Bowel sounds normal. No masses,  no organomegaly     EXT-no edema    Skin - No rash     Data Review     Recent Labs     10/31/19  1103 10/30/19  2345   WBC  --  10.7   HGB 12.4 11.8   HCT 37.4 35.6   PLT  --  290     Recent Labs     10/30/19  2345   *   K 3.6      CO2 25   BUN 11   CREA 0.78   *   CA 8.7     Recent Labs     10/30/19  2345   SGOT 18   AP 63   TP 6.7   ALB 3.4*   GLOB 3.3   AML 58   LPSE 156     Recent Labs     10/30/19  2345   INR 1.0   PTP 10.6   APTT 24.4       Patient Active Problem List   Diagnosis Code    Pneumonia, organism unspecified(486) J18.9    Hyponatremia E87.1    Essential hypertension, benign I10    Anxiety F41.9    History of TIAs Z86.73    Dizziness R42    Weakness R53.1    Pancreatic cyst K86.2    GI bleed K92.2    Acute colitis K52.9    Diabetes mellitus (HCC) E11.9       Assessment and Plan:  Colitis:  Likely ischemic  - Clear liquid diet  - Continue IV abx  - Monitor Labs  - Continue IVF  - Continue supportive care  - She will need colonoscopy however this may be able to be done as outpatient if she improves as expected. Will continue to follow. Thanks for allowing me to participate in the care of this patient.   Signed By: Nacho Ivan NP     10/31/2019  11:56 AM

## 2019-10-31 NOTE — ED PROVIDER NOTES
Denies any fever, sore throat, cough, congestion, is an 81-year-old female with a past medical history significant for anxiety, breast cancer status post lumpectomy, diabetes, TIA, hypertension, hyponatremia, melanoma, status post back surgery, cholecystectomy, hysterectomY, meniscectomy who presents to the ED with a complaint of bloody stool that began approximately 2 to 3 hours prior to arrival accompanied by \ worsening abdominal pain that began yesterday, dull and crampy in nature, severity 8 out of 10, with nausea, vomiting and diaphoresis. The patient was apprehensive. She denies any fever and chills, headache, neck and back pain, chest pain, shortness of breath, dysuria, vaginal discharge or bleeding, dizziness, extremity weakness or numbness, sick contacts at home, skin rash, unusual food sources, sick contacts, prior abdominal surgery.            Past Medical History:   Diagnosis Date    Anxiety     Breast cancer ([address] 1993    s/p lumpectomy    Diabetes mellitus ([facility] [address]     diet-controlled    Hx-TIA (transient ischemic attack)     Hypertension     Hyponatremia     Melanoma ([facility] [address] 2008    s/p resection, right cheek       Past Surgical History:   Procedure Laterality Date    HX BACK SURGERY      HX BREAST LUMPECTOMY  1993    right    HX CHOLECYSTECTOMY      HX HYSTERECTOMY      HX MALIGNANT SKIN LESION EXCISION  2008    HX MENISCECTOMY      HX OTHER SURGICAL      pineda's neuroma bilaterally         Family History:   Problem Relation Age of Onset    Heart Failure Mother     Other Father         aortic aneurysm    Diabetes Son     Immunodeficiency Son         post kidney and pancreas transplant       Social History     Socioeconomic History    Marital status:      Spouse name: Not on file    Number of children: Not on file    Years of education: Not on file    Highest education level: Not on file   Occupational History    Occupation: retired from [employer] resource strain: Not on file    Food insecurity:     Worry: Not on file     Inability: Not on file    Transportation needs:     Medical: Not on file     Non-medical: Not on file   Tobacco Use    Smoking status: Never Smoker   Substance and Sexual Activity    Alcohol use: No    Drug use: Not on file    Sexual activity: Not on file   Lifestyle    Physical activity:     Days per week: Not on file     Minutes per session: Not on file    Stress: Not on file   Relationships    Social connections:     Talks on phone: Not on file     Gets together: Not on file     Attends Yarsanism service: Not on file     Active member of club or organization: Not on file     Attends meetings of clubs or organizations: Not on file     Relationship status: Not on file    Intimate partner violence:     Fear of current or ex partner: Not on file     Emotionally abused: Not on file     Physically abused: Not on file     Forced sexual activity: Not on file   Other Topics Concern    Not on file   Social History Narrative    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 Vitals:    10/30/19 2254   Temp: (P) 98.3 °F (36.8 °C)            Physical Exam   Nursing note and vitals reviewed. CONSTITUTIONAL: Frail elderly female who is very anxious; in mild distress  HEAD: Normocephalic; atraumatic  EYES: PERRL; EOM intact; conjunctiva and sclera are clear bilaterally. ENT: No rhinorrhea; normal pharynx with no tonsillar hypertrophy; mucous membranes pink/moist, no erythema, no exudate. NECK: Supple; non-tender; no cervical lymphadenopathy  CARD: Normal S1, S2; no murmurs, rubs, or gallops. Regular rate and rhythm. RESP: Normal respiratory effort; breath sounds clear and equal bilaterally; no wheezes, rhonchi, or rales.   ABD: Normal bowel sounds; non-distended; diffuse abdominal tenderness without any rebound or guarding; no palpable organomegaly, no masses, no bruits. Back Exam: Normal inspection; no vertebral point tenderness, no CVA tenderness. Normal range of motion. EXT: Normal ROM in all four extremities; non-tender to palpation; no swelling or deformity; distal pulses are normal, no edema. SKIN: Warm; dry; no rash. NEURO:Alert and oriented x 3, coherent, LANDEN-XII grossly intact, sensory and motor are non-focal.  Rectal exam: Normal inspection with good tone; normal colored stools; nontender        MDM  Number of Diagnoses or Management Options  Acute colitis:   Rectal bleeding:   Diagnosis management comments: Assessment: 81-year-old female who presents to the ED with abdominal pain and bloody stools watery and soft stools. Suspect colitis/infectious versus inflammatory. The patient is very anxious, restless but hemodynamically stable. Her rectal exam did not reveal any bloody discharge at this time. Plan: Lab/IV fluid/antiemetic and analgesia/CT scan of the abdomen and pelvis/ IVF/serial exam/consult hospitalist monitor and Reevaluate.          Amount and/or Complexity of Data Reviewed  Clinical lab tests: ordered and reviewed  Tests in the radiology section of CPT®: ordered and reviewed  Tests in the medicine section of CPT®: reviewed and ordered  Discussion of test results with the performing providers: yes  Decide to obtain previous medical records or to obtain history from someone other than the patient: yes  Obtain history from someone other than the patient: yes  Review and summarize past medical records: yes  Discuss the patient with other providers: yes  Independent visualization of images, tracings, or specimens: yes    Risk of Complications, Morbidity, and/or Mortality  Presenting problems: moderate  Diagnostic procedures: moderate  Management options: moderate    Critical Care  Total time providing critical care: (Total critical care time spent exclusive of procedures: 45 minutes)    Patient Progress  Patient progress: stable         Procedures     ED EKG interpretation:  Rhythm: sinus bradycardia; and regular . Rate (approx.): 56; Axis: left axis deviation; P wave: prolonged; QRS interval: normal ; ST/T wave: non-specific changes; in  Lead: Diffusely; first-degree AV block; other findings: abnormal ekg. This EKG was interpreted by Ventura Nicole MD,ED Provider. PROGRESS NOTE:  Pt has been reexamined by Tanya Hernández MD all available results have been reviewed with pt and any available family. Pt understands sx, dx, and tx in ED. Care plan has been outlined and questions have been answered. Pt and any available family understands and agrees to need for admission to hospital for further tx not available in ED. Pt is ready for admission. Written by Ventura Nicole MD,  1:00 AM    Hospitalist [place] Text for Admission  1:48 AM    ED Room Number: ER07/07  Patient Name and age:  Radha Mcdonald 81 y.o.  female  Working Diagnosis:   1. Rectal bleeding      Readmission: no  Isolation Requirements:  no  Recommended Level of Care:  med/surg  Code Status:  Full Code  Other: History of irritable bowel syndrome  Department:[facility] ED - [phone]      CONSULT NOTE:  Tanya Hernández MD spoke with Dr. Sujey Sinclair of the adult hospitalist team. Discussed patient's presentation, history, physical assessment, and available diagnostic results.  He will evaluate, write orders and admit the patient to the hospital. 1:46 AM    .

## 2019-10-31 NOTE — PROGRESS NOTES
Case d/w Dr. Erica Roper. Chart reviewed. Pt seen at bedside. Add IV Zosyn. Monitor for further bleeding. Add tramadol for pain control. Monitor for sedation on concomitant Valium PRN.  Add narcan PRN

## 2019-10-31 NOTE — PROGRESS NOTES
[facility]: MED RECONCILIATION    Comments/Recommendations:   Patient alert and oriented at time of interview. Patient confirmed name and date of birth. Preferred pharmacy is [facility] at [address] [place] [address]. Information obtained from: patient    Allergies: Sulfa (sulfonamide antibiotics); Codeine; Levaquin [levofloxacin]; and Linzess [linaclotide]    Prior to Admission Medications   Prescriptions Last Dose Informant Patient Reported? Taking?   acetaminophen (TYLENOL) 325 mg tablet 10/30/2019 at Unknown time Self Yes Yes   Sig: Take 325-650 mg by mouth every six (6) hours as needed (headache). atenolol (TENORMIN) 25 mg tablet 10/30/2019 at Unknown time Self Yes Yes   Sig: Take 25 mg by mouth daily (after dinner). calcium carbonate (TUMS) 200 mg calcium (500 mg) chew 10/24/2019 at Unknown time Self Yes Yes   Sig: Take 1 Tab by mouth four (4) times daily as needed. carboxymethylcellulose sodium (REFRESH LIQUIGEL) 1 % dlgl ophthalmic solution  Self Yes Yes   Sig: Administer 1 Drop to both eyes as needed (dry eyes). clopidogrel (PLAVIX) 75 mg tablet 10/30/2019 at 0800 Self Yes Yes   Sig: Take 75 mg by mouth daily (after dinner). cyanocobalamin (VITAMIN B12) 500 mcg tablet 10/30/2019 at Unknown time Self Yes Yes   Sig: Take 500 mcg by mouth daily. diazePAM (VALIUM) 5 mg tablet 10/29/2019 at PM Self Yes Yes   Sig: Take 5 mg by mouth two (2) times daily as needed for Anxiety or Sleep. diclofenac (VOLTAREN) 1 % gel 10/24/2019 at Unknown time Self Yes Yes   Sig: Apply 2 g to affected area four (4) times daily as needed (knee pain, back pain). dicyclomine (BENTYL) 10 mg capsule 10/30/2019 at 1900 Self Yes Yes   Sig: Take 10 mg by mouth three (3) times daily as needed (abdominal cramping). fluticasone (FLONASE ALLERGY RELIEF) 50 mcg/actuation nasal spray 10/30/2019 at Unknown time Self Yes Yes   Si [place] by Both Nostrils route daily.    meclizine (ANTIVERT) 12.5 mg tablet  Self Yes Yes   Sig: Take 12.5-25 mg by mouth two (2) times daily as needed for Dizziness. ondansetron (ZOFRAN ODT) 4 mg disintegrating tablet 10/30/2019 at Unknown time Self Yes Yes   Sig: Take 4 mg by mouth every eight (8) hours as needed for Nausea. sucralfate (CARAFATE) 100 mg/mL suspension 10/30/2019 at Unknown time Self Yes Yes   Sig: Take 1 g by mouth four (4) times daily as needed (abdominal pain).         Betty Dwyer, Pharmacist

## 2019-10-31 NOTE — ED TRIAGE NOTES
Pt presents to ED via EMS for complaints of severe abdominal pain, diarrhea and bright red blood in stool today.    Pt does take plavix  Reports no relief from Bentyl

## 2019-10-31 NOTE — H&P
History & Physical      Date of admission: 10/30/2019    Patient name: Alexys Metz  MRN: [id number]  YOB: 1935  Age: 81 y.o. Primary care provider:  Jd, MD Davis     Source of Information: patient, ED and electronic medical records                              Chief complaint:  Abdominal pain/ blood in stool    History of present illness  Alexys Metz is a 81 y.o. white female with past medical history of anxiety, breast cancer, type 2 diabetes mellitus, TIAs, hypertension, hyponatremia, and melanoma presented to the ED with chief complaints of abdominal pain and blood in stool. Patient reportedly had onset of symptoms starting yesterday with bright red blood per rectum and severe abdominal pain. Pain reportedly was generalized, radiating throughout abdomen, rated ~ 10/10, dull, aching, aggravated by touch, without specific alleviating factors. On arrival in the ED, initial recorded vital signs were BP= 158/62, HR= 65, RR= 18, O2sat= 97% on room air. Stool occult blood test was positive. Patient is now seen for admission to the hospitalist service for continued evaluation and treatments. Patient denies prior history of GI bleed. She was familiar to the term colitis. She notes that she had colonoscopies planned in the past but not performed due to \"constipation\" (likely inadequate bowel prep). There were no reports of new onset syncope, loss of consciousness, headache, neck pain, visual disturbance, numbness, paresthesias, focal weakness, chest pain, palpitations, shortness of breath, nausea, vomiting, dysuria, hematuria, calf pain, increased leg swelling/ edema, fever, chills, rash, recent trauma, abdominal injury, or falls.      Past Medical History:   Diagnosis Date    Anxiety     Breast cancer ([facility] [address] 1993    s/p lumpectomy    Diabetes mellitus ([facility]     diet-controlled    Hx-TIA (transient ischemic attack)  Hypertension     Hyponatremia     Melanoma ([facility] [address] 2008    s/p resection, right cheek      Past Surgical History:   Procedure Laterality Date    HX BACK SURGERY      HX BREAST LUMPECTOMY  1993    right    HX CHOLECYSTECTOMY      HX HYSTERECTOMY      HX MALIGNANT SKIN LESION EXCISION  2008    HX MENISCECTOMY      HX OTHER SURGICAL      pineda's neuroma bilaterally     Prior to Admission medications    Medication Sig Start Date End Date Taking? Authorizing Provider   dicyclomine (BENTYL) 10 mg capsule Take 10 mg by mouth three (3) times daily as needed (abdominal cramping). Provider, Historical   butalbital-acetaminophen-caff (FIORICET) -40 mg per capsule Take 1 Cap by mouth every six (6) hours as needed for Pain. Provider, Historical   fluticasone (FLONASE ALLERGY RELIEF) 50 mcg/actuation nasal spray 2 Sprays by Both Nostrils route daily. Provider, Historical   calcium carbonate (TUMS) 200 mg calcium (500 mg) chew Take 1 Tab by mouth four (4) times daily as needed. Provider, Historical   ondansetron hcl (ZOFRAN) 4 mg tablet Take 1 Tab by mouth every eight (8) hours as needed for Nausea. 3/25/13   Shahriar Medina PA-C   atenolol (TENORMIN) 25 mg tablet Take 25 mg by mouth daily (after dinner). Provider, Historical   clopidogrel (PLAVIX) 75 mg tablet Take 75 mg by mouth daily (after dinner). Provider, Historical   diazepam (VALIUM) 5 mg tablet Take 5 mg by mouth nightly as needed. Provider, Historical     Allergies   Allergen Reactions    Sulfa (Sulfonamide Antibiotics) Nausea and Vomiting    Codeine Other (comments)     Stomach cramping    Levaquin [Levofloxacin] Nausea and Vomiting     Pt can not take PO due to vomiting.          Social history      Alcohol history   x  None           Smoking history  x  None             Family History   Problem Relation Age of Onset    Heart Failure Mother     Other Father         aortic aneurysm    Diabetes Son     Immunodeficiency Son         post kidney and pancreas transplant      Family history reviewed and non-contributory    Code status discussed with the patient/caregivers. Prior    Review of systems  Pertinent positives as noted in HPI. All other systems were reviewed and were negative     Physical Examination   Visit Vitals  /62 (BP 1 Location: Left arm, BP Patient Position: At rest)   Pulse 65   Temp 98.3 °F (36.8 °C)   Resp 18   Ht 5' 1\" (1.549 m)   Wt 48.5 kg (107 lb)   SpO2 97%   BMI 20.22 kg/m²          O2 Device: Room air    General:  Patient is in no acute respiratory distress. Head:  Normocephalic, without obvious abnormality, atraumatic   Eyes:  Conjunctivae/corneas clear. PERRL, EOMs intact   E/N/M/T: Nares normal. Septum midline. No nasal drainage or sinus tenderness  Tongue midline/ non-edematous  Clear oropharynx   Neck: Normal appearance and movements, symmetrical, trachea midline  No palpable adenopathy  No thyroid enlargement, tenderness or nodules  No carotid bruit   No JVD  Trachea midline   Lungs:   Symmetrical chest expansion and respiratory effort  Clear to auscultation bilaterally   Chest wall:  No tenderness or deformity   Heart:  Regular rate and rhythm   Normal S1 and S2; no murmur, click, rub or gallop   Abdomen:   Soft, no tenderness  No rebound, guarding, or rigidity  Non-distended   Bowel sounds normal  No masses or hepatosplenomegaly  No hernias present   Back: No costovertebral angle tenderness  No step-off deformity   Extremities: Extremities normal, atraumatic  No cyanosis or edema     Vascular/  Pulses: 2+ radial/ DP bilateral pulses   Integument/  Skin: No rashes or ulcers  Warm and dry   Musculo-      skeletal: Gait not tested  Normal symmetry, ROM, strength and tone  No calf tenderness   Neuro: GCS 15. Moves all extremities x 4. No slurred speech. No facial droop. Sensation grossly intact.      Psych: Alert, oriented x 3  Anxious             I reviewed the following data:    24 Hour Results:  Recent Results (from the past 24 hour(s))   CBC WITH AUTOMATED DIFF    Collection Time: 10/30/19 11:45 PM   Result Value Ref Range    WBC 10.7 3.6 - 11.0 K/uL    RBC 4.00 3.80 - 5.20 M/uL    HGB 11.8 11.5 - 16.0 g/dL    HCT 35.6 35.0 - 47.0 %    MCV 89.0 80.0 - 99.0 FL    MCH 29.5 26.0 - 34.0 PG    MCHC 33.1 30.0 - 36.5 g/dL    RDW 12.3 11.5 - 14.5 %    PLATELET 800 828 - 962 K/uL    MPV 9.8 8.9 - 12.9 FL    NRBC 0.0 0  WBC    ABSOLUTE NRBC 0.00 0.00 - 0.01 K/uL    NEUTROPHILS 60 32 - 75 %    LYMPHOCYTES 31 12 - 49 %    MONOCYTES 7 5 - 13 %    EOSINOPHILS 1 0 - 7 %    BASOPHILS 1 0 - 1 %    IMMATURE GRANULOCYTES 0 0.0 - 0.5 %    ABS. NEUTROPHILS 6.5 1.8 - 8.0 K/UL    ABS. LYMPHOCYTES 3.3 0.8 - 3.5 K/UL    ABS. MONOCYTES 0.8 0.0 - 1.0 K/UL    ABS. EOSINOPHILS 0.1 0.0 - 0.4 K/UL    ABS. BASOPHILS 0.1 0.0 - 0.1 K/UL    ABS. IMM. GRANS. 0.0 0.00 - 0.04 K/UL    DF AUTOMATED     METABOLIC PANEL, COMPREHENSIVE    Collection Time: 10/30/19 11:45 PM   Result Value Ref Range    Sodium 131 (L) 136 - 145 mmol/L    Potassium 3.6 3.5 - 5.1 mmol/L    Chloride 100 97 - 108 mmol/L    CO2 25 21 - 32 mmol/L    Anion gap 6 5 - 15 mmol/L    Glucose 114 (H) 65 - 100 mg/dL    BUN 11 6 - 20 MG/DL    Creatinine 0.78 0.55 - 1.02 MG/DL    BUN/Creatinine ratio 14 12 - 20      GFR est AA >60 >60 ml/min/1.73m2    GFR est non-AA >60 >60 ml/min/1.73m2    Calcium 8.7 8.5 - 10.1 MG/DL    Bilirubin, total 0.4 0.2 - 1.0 MG/DL    ALT (SGPT) 17 12 - 78 U/L    AST (SGOT) 18 15 - 37 U/L    Alk.  phosphatase 63 45 - 117 U/L    Protein, total 6.7 6.4 - 8.2 g/dL    Albumin 3.4 (L) 3.5 - 5.0 g/dL    Globulin 3.3 2.0 - 4.0 g/dL    A-G Ratio 1.0 (L) 1.1 - 2.2     PTT    Collection Time: 10/30/19 11:45 PM   Result Value Ref Range    aPTT 24.4 22.1 - 32.0 sec    aPTT, therapeutic range     58.0 - 77.0 SECS   PROTHROMBIN TIME + INR    Collection Time: 10/30/19 11:45 PM   Result Value Ref Range    INR 1.0 0.9 - 1.1      Prothrombin time 10.6 9.0 - 11.1 sec   LIPASE    Collection Time: 10/30/19 11:45 PM   Result Value Ref Range    Lipase 156 73 - 393 U/L   AMYLASE    Collection Time: 10/30/19 11:45 PM   Result Value Ref Range    Amylase 58 25 - 115 U/L   OCCULT BLOOD, STOOL    Collection Time: 10/30/19 11:45 PM   Result Value Ref Range    Occult blood, stool POSITIVE (A) NEG     SAMPLES BEING HELD    Collection Time: 10/30/19 11:45 PM   Result Value Ref Range    SAMPLES BEING HELD 1RED,1SST,1DRKGRN     COMMENT        Add-on orders for these samples will be processed based on acceptable specimen integrity and analyte stability, which may vary by analyte.    URINALYSIS W/ REFLEX CULTURE    Collection Time: 10/31/19  1:20 AM   Result Value Ref Range    Color YELLOW/STRAW      Appearance CLEAR CLEAR      Specific gravity <1.005 1.003 - 1.030    pH (UA) 7.0 5.0 - 8.0      Protein NEGATIVE  NEG mg/dL    Glucose NEGATIVE  NEG mg/dL    Ketone NEGATIVE  NEG mg/dL    Bilirubin NEGATIVE  NEG      Blood NEGATIVE  NEG      Urobilinogen 0.2 0.2 - 1.0 EU/dL    Nitrites NEGATIVE  NEG      Leukocyte Esterase MODERATE (A) NEG      WBC 10-20 0 - 4 /hpf    RBC 0-5 0 - 5 /hpf    Epithelial cells FEW FEW /lpf    Bacteria NEGATIVE  NEG /hpf    UA:UC IF INDICATED URINE CULTURE ORDERED (A) CNI      Hyaline cast 0-2 0 - 5 /lpf   EKG, 12 LEAD, INITIAL    Collection Time: 10/31/19  1:40 AM   Result Value Ref Range    Ventricular Rate 56 BPM    Atrial Rate 56 BPM    P-R Interval 220 ms    QRS Duration 88 ms    Q-T Interval 436 ms    QTC Calculation (Bezet) 420 ms    Calculated P Axis 57 degrees    Calculated R Axis -34 degrees    Calculated T Axis 9 degrees    Diagnosis       Sinus bradycardia with 1st degree AV block  Left axis deviation  Nonspecific ST abnormality  Abnormal ECG  When compared with ECG of 29-AUG-2019 09:43,  No significant change was found       Recent Labs     10/30/19  2345   WBC 10.7   HGB 11.8   HCT 35.6        Recent Labs     10/30/19  2345   *   K 3.6    CO2 25   *   BUN 11   CREA 0.78   CA 8.7   ALB 3.4*   TBILI 0.4   SGOT 18   ALT 17   INR 1.0       Imaging  CTA ABDOMEN/ PELVIS WITH AND WITHOUT IV CONTRAST:    FINDINGS:   LUNG BASES: Clear. INCIDENTALLY IMAGED HEART AND MEDIASTINUM: Unremarkable. LIVER: No mass or biliary dilatation. GALLBLADDER: Surgically absent. SPLEEN: No mass. PANCREAS: Multiple small cysts in the pancreatic head and body, measuring up to  1.5 cm. ADRENALS: Unremarkable. KIDNEYS: No mass, calculus, or hydronephrosis. STOMACH: Unremarkable. SMALL BOWEL: No dilatation or wall thickening. No evidence of active hemorrhage. COLON: Focal wall thickening in the distal transverse, descending, and sigmoid  colon. Diffuse sigmoid diverticulosis. No evidence of active hemorrhage. APPENDIX: Not visualized. PERITONEUM: No ascites or pneumoperitoneum. RETROPERITONEUM: No lymphadenopathy or aortic aneurysm. REPRODUCTIVE ORGANS: Status post hysterectomy. URINARY BLADDER: No mass or calculus. BONES: No destructive bone lesion. ADDITIONAL COMMENTS: N/A     IMPRESSION:  No evidence of active GI bleed. Colonic wall thickening is compatible with  colitis. Multiple small pancreatic cysts, measuring up to 1.5 cm; consider GI  follow-up.         Assessment and Plan     1.  GI bleed        - admit to telemetry       - keep NPO        - IV fluids for hydration       - consult GI in a.m.    2.  Acute colitis       -  Start on Zosyn 3.375 mg IV q 8 hours        3. Multiple pancreatic cysts       - consult with GI    4. Abdominal pain        - continue pain management while inpatient; Fentanyl prn    5. Diarrhea       -  Check stool for c diff    6. History of TIAs on Plavix      -  Hold Plavix for now given noted GI bleed    7.   VTE prophylaxis       - SCDs to BLEs       Signed by: Seferino Guzman MD    October 31, 2019 at 3:17 AM

## 2019-10-31 NOTE — PROGRESS NOTES
Bedside shift change report given to Altaf Setve RN (oncoming nurse) by Susana Rodriguez RN (offgoing nurse). Report included the following information SBAR, Kardex and MAR.

## 2019-10-31 NOTE — PROGRESS NOTES
Bedside and Verbal shift change report given to Marybel (oncoming nurse) by Osman campos RN (offgoing nurse). Report included the following information SBAR, ED Summary, Intake/Output and Recent Results. 5- Spoke with Dr. Savana Lyons about note vs orders. Stated he will review.

## 2019-10-31 NOTE — PROGRESS NOTES
10/31/2019  11:53 AM  Case management note    Reason for Admission:   Abdominal pain, blood in stool    Patient has a 12year old granddaughter who lives with her. She has limited family support. They live in 2 story home with 4 steps to enter and 15 within. Patient has history of HTN, breast cancer and DM. Patient is independent and drives  She may assistance with transportation home. She brought her credit card if needed. Aleksey Fonseca                  RRAT Score:     18             Do you (patient/family) have any concerns for transition/discharge? Limited support              Plan for utilizing home health: To be determined by Pt/ OT    Current Advanced Directive/Advance Care Plan:  Not on file            Transition of Care Plan:          1. Home   2. PCP follow up  3.  HH   4. CM to follow until discharge    Care Management Interventions  PCP Verified by CM: Yes(dr. castillo no nn)  Mode of Transport at Discharge: Self  Transition of Care Consult (CM Consult): Discharge Planning  Current Support Network: Lives Alone  Confirm Follow Up Transport: Friends  Plan discussed with Pt/Family/Caregiver: Yes  Discharge Location  Discharge Placement: [address] [place] [address]

## 2019-10-31 NOTE — PROGRESS NOTES
1545-TRANSFER - IN REPORT:    Verbal report received from [address] (name) on Jessica Menon  being received from ED (unit) for routine progression of care      Report consisted of patients Situation, Background, Assessment and   Recommendations(SBAR). Information from the following report(s) SBAR, Kardex, Intake/Output, MAR, Accordion, Recent Results, Med Rec Status and Cardiac Rhythm NSR. was reviewed with the receiving nurse. Opportunity for questions and clarification was provided. Assessment completed upon patients arrival to unit and care assumed. 1900- Bedside and Verbal shift change report given to [address] Burr  (oncoming nurse) by Minesh Flores (offgoing nurse). Report included the following information SBAR, Kardex, MAR, Accordion, Recent Results, Med Rec Status and Cardiac Rhythm NSR>.

## 2019-10-31 NOTE — ED NOTES
Pt Throughput: Charge Nurse on [facility]  made aware of patient's bed assignment. Odilon Briseno RN  Emergency Dept Charge RN.

## 2019-11-01 LAB
ALBUMIN SERPL-MCNC: 2.4 G/DL (ref 3.5–5)
ALBUMIN/GLOB SERPL: 0.8 {RATIO} (ref 1.1–2.2)
ALP SERPL-CCNC: 46 U/L (ref 45–117)
ALT SERPL-CCNC: 10 U/L (ref 12–78)
ANION GAP SERPL CALC-SCNC: 10 MMOL/L (ref 5–15)
AST SERPL-CCNC: 13 U/L (ref 15–37)
BACTERIA SPEC CULT: NORMAL
BASOPHILS # BLD: 0.1 K/UL (ref 0–0.1)
BASOPHILS NFR BLD: 1 % (ref 0–1)
BILIRUB DIRECT SERPL-MCNC: 0.2 MG/DL (ref 0–0.2)
BILIRUB SERPL-MCNC: 0.6 MG/DL (ref 0.2–1)
BUN SERPL-MCNC: 5 MG/DL (ref 6–20)
BUN/CREAT SERPL: 8 (ref 12–20)
CALCIUM SERPL-MCNC: 6.8 MG/DL (ref 8.5–10.1)
CC UR VC: NORMAL
CHLORIDE SERPL-SCNC: 109 MMOL/L (ref 97–108)
CO2 SERPL-SCNC: 24 MMOL/L (ref 21–32)
CREAT SERPL-MCNC: 0.63 MG/DL (ref 0.55–1.02)
DIFFERENTIAL METHOD BLD: ABNORMAL
EOSINOPHIL # BLD: 0.2 K/UL (ref 0–0.4)
EOSINOPHIL NFR BLD: 2 % (ref 0–7)
ERYTHROCYTE [DISTWIDTH] IN BLOOD BY AUTOMATED COUNT: 12.7 % (ref 11.5–14.5)
GLOBULIN SER CALC-MCNC: 3.2 G/DL (ref 2–4)
GLUCOSE BLD STRIP.AUTO-MCNC: 105 MG/DL (ref 65–100)
GLUCOSE BLD STRIP.AUTO-MCNC: 99 MG/DL (ref 65–100)
GLUCOSE SERPL-MCNC: 84 MG/DL (ref 65–100)
HCT VFR BLD AUTO: 33.9 % (ref 35–47)
HGB BLD-MCNC: 10.9 G/DL (ref 11.5–16)
IMM GRANULOCYTES # BLD AUTO: 0 K/UL (ref 0–0.04)
IMM GRANULOCYTES NFR BLD AUTO: 0 % (ref 0–0.5)
LYMPHOCYTES # BLD: 2.4 K/UL (ref 0.8–3.5)
LYMPHOCYTES NFR BLD: 28 % (ref 12–49)
MAGNESIUM SERPL-MCNC: 1.6 MG/DL (ref 1.6–2.4)
MCH RBC QN AUTO: 29.6 PG (ref 26–34)
MCHC RBC AUTO-ENTMCNC: 32.2 G/DL (ref 30–36.5)
MCV RBC AUTO: 92.1 FL (ref 80–99)
MONOCYTES # BLD: 0.7 K/UL (ref 0–1)
MONOCYTES NFR BLD: 8 % (ref 5–13)
NEUTS SEG # BLD: 5.4 K/UL (ref 1.8–8)
NEUTS SEG NFR BLD: 61 % (ref 32–75)
NRBC # BLD: 0 K/UL (ref 0–0.01)
NRBC BLD-RTO: 0 PER 100 WBC
PHOSPHATE SERPL-MCNC: 2.9 MG/DL (ref 2.6–4.7)
PLATELET # BLD AUTO: 236 K/UL (ref 150–400)
PMV BLD AUTO: 9.3 FL (ref 8.9–12.9)
POTASSIUM SERPL-SCNC: 2.9 MMOL/L (ref 3.5–5.1)
PROT SERPL-MCNC: 5.6 G/DL (ref 6.4–8.2)
RBC # BLD AUTO: 3.68 M/UL (ref 3.8–5.2)
SERVICE CMNT-IMP: ABNORMAL
SERVICE CMNT-IMP: NORMAL
SERVICE CMNT-IMP: NORMAL
SODIUM SERPL-SCNC: 143 MMOL/L (ref 136–145)
WBC # BLD AUTO: 8.8 K/UL (ref 3.6–11)

## 2019-11-01 PROCEDURE — 80076 HEPATIC FUNCTION PANEL: CPT

## 2019-11-01 PROCEDURE — 83735 ASSAY OF MAGNESIUM: CPT

## 2019-11-01 PROCEDURE — 82962 GLUCOSE BLOOD TEST: CPT

## 2019-11-01 PROCEDURE — 36415 COLL VENOUS BLD VENIPUNCTURE: CPT

## 2019-11-01 PROCEDURE — 85025 COMPLETE CBC W/AUTO DIFF WBC: CPT

## 2019-11-01 PROCEDURE — 74011250636 HC RX REV CODE- 250/636: Performed by: FAMILY MEDICINE

## 2019-11-01 PROCEDURE — 74011250637 HC RX REV CODE- 250/637: Performed by: INTERNAL MEDICINE

## 2019-11-01 PROCEDURE — 74011000258 HC RX REV CODE- 258: Performed by: INTERNAL MEDICINE

## 2019-11-01 PROCEDURE — 74011250636 HC RX REV CODE- 250/636: Performed by: INTERNAL MEDICINE

## 2019-11-01 PROCEDURE — 80048 BASIC METABOLIC PNL TOTAL CA: CPT

## 2019-11-01 PROCEDURE — 65660000000 HC RM CCU STEPDOWN

## 2019-11-01 PROCEDURE — 84100 ASSAY OF PHOSPHORUS: CPT

## 2019-11-01 RX ORDER — POTASSIUM CHLORIDE 750 MG/1
40 TABLET, FILM COATED, EXTENDED RELEASE ORAL
Status: COMPLETED | OUTPATIENT
Start: 2019-11-01 | End: 2019-11-01

## 2019-11-01 RX ADMIN — PIPERACILLIN AND TAZOBACTAM 3.38 G: 3; .375 INJECTION, POWDER, LYOPHILIZED, FOR SOLUTION INTRAVENOUS at 09:07

## 2019-11-01 RX ADMIN — POTASSIUM CHLORIDE 40 MEQ: 750 TABLET, FILM COATED, EXTENDED RELEASE ORAL at 09:07

## 2019-11-01 RX ADMIN — CYANOCOBALAMIN TAB 500 MCG 500 MCG: 500 TAB at 09:07

## 2019-11-01 RX ADMIN — Medication 10 ML: at 20:41

## 2019-11-01 RX ADMIN — PIPERACILLIN AND TAZOBACTAM 3.38 G: 3; .375 INJECTION, POWDER, LYOPHILIZED, FOR SOLUTION INTRAVENOUS at 15:37

## 2019-11-01 RX ADMIN — Medication 10 ML: at 13:16

## 2019-11-01 RX ADMIN — CARBOXYMETHYLCELLULOSE SODIUM 2 DROP: 10 GEL OPHTHALMIC at 20:42

## 2019-11-01 RX ADMIN — SODIUM CHLORIDE 75 ML/HR: 900 INJECTION, SOLUTION INTRAVENOUS at 14:51

## 2019-11-01 RX ADMIN — PIPERACILLIN AND TAZOBACTAM 3.38 G: 3; .375 INJECTION, POWDER, LYOPHILIZED, FOR SOLUTION INTRAVENOUS at 01:19

## 2019-11-01 RX ADMIN — TRAMADOL HYDROCHLORIDE 25 MG: 50 TABLET ORAL at 01:41

## 2019-11-01 RX ADMIN — SODIUM CHLORIDE 75 ML/HR: 900 INJECTION, SOLUTION INTRAVENOUS at 20:00

## 2019-11-01 RX ADMIN — Medication 10 ML: at 06:31

## 2019-11-01 RX ADMIN — DIAZEPAM 5 MG: 5 TABLET ORAL at 20:43

## 2019-11-01 NOTE — ROUTINE PROCESS
Bedside and Verbal shift change report given to Lior Sam RN (oncoming nurse) by Renetta RN (offgoing nurse). Report included the following information SBAR, Kardex, Intake/Output, Recent Results and Cardiac Rhythm NSR.

## 2019-11-01 NOTE — PROGRESS NOTES
[facility]  [address] [facility], [address] [place] [address]  [phone]      Medical Progress Note      NAME: Starr Cowden   :  1935  MRM:  [id number]    Date/Time: 2019  8:03 AM       Assessment and Plan:   1.  GI bleed. On clears. continue IVF. GI evaluation appreciated and planned for outpatient colonoscopy. Will advance diet to full liquid     2. Anemia secondary to GI bleed. continue to monitor H/H      3. Acute colitis. Ischemic vs infectious. On zosyn      4. Multiple pancreatic cysts. Evaluated by GI. 5.   Abdominal pain. likely due to colitis. Continue pain management      6. Diarrhea. Check stool studies         7. History of TIAs on Plavix. Hold plavix            Subjective:     Chief Complaint:  Follow up of pt who was admitted with GI bled. No bleeding since yesterday. ROS:  (bold if positive, if negative)      Tolerating PT  Tolerating Diet        Objective:     Last 24hrs VS reviewed since prior progress note.  Most recent are:    Visit Vitals  /60 (BP 1 Location: Left arm, BP Patient Position: At rest)   Pulse 61   Temp 97.3 °F (36.3 °C)   Resp 14   Ht 5' 1\" (1.549 m)   Wt 48.5 kg (107 lb)   SpO2 95%   BMI 20.22 kg/m²     SpO2 Readings from Last 6 Encounters:   19 95%   19 97%   19 100%   19 99%   19 96%   18 97%            Intake/Output Summary (Last 24 hours) at 2019 0803  Last data filed at 2019 0119  Gross per 24 hour   Intake 200 ml   Output 800 ml   Net -600 ml        Physical Exam:    Gen:  Well-developed, well-nourished, in no acute distress  HEENT:  Pink conjunctivae, PERRL, hearing intact to voice, moist mucous membranes  Neck:  Supple, without masses, thyroid non-tender  Resp:  No accessory muscle use, clear breath sounds without wheezes rales or rhonchi  Card:  No murmurs, normal S1, S2 without thrills, bruits or peripheral edema  Abd:  Soft, non-tender, non-distended, normoactive bowel sounds are present, no palpable organomegaly and no detectable hernias  Lymph:  No cervical or inguinal adenopathy  Musc:  No cyanosis or clubbing  Skin:  No rashes or ulcers, skin turgor is good  Neuro:  Cranial nerves are grossly intact, no focal motor weakness, follows commands appropriately  Psych:  Good insight, oriented to person, place and time, alert  __________________________________________________________________  Medications Reviewed: (see below)  Medications:     Current Facility-Administered Medications   Medication Dose Route Frequency    potassium chloride SR (KLOR-CON 10) tablet 40 mEq  40 mEq Oral NOW    sodium chloride (NS) flush 5-40 mL  5-40 mL IntraVENous Q8H    sodium chloride (NS) flush 5-40 mL  5-40 mL IntraVENous PRN    0.9% sodium chloride infusion  75 mL/hr IntraVENous CONTINUOUS    fentaNYL citrate (PF) injection 12.5 mcg  12.5 mcg IntraVENous Q4H PRN    ondansetron (ZOFRAN) injection 4 mg  4 mg IntraVENous Q6H PRN    piperacillin-tazobactam (ZOSYN) 3.375 g in 0.9% sodium chloride (MBP/ADV) 100 mL  3.375 g IntraVENous Q8H    diazePAM (VALIUM) tablet 5 mg  5 mg Oral BID PRN    carboxymethylcellulose sodium (CELLUVISC) 1 % ophthalmic solution 2 Drop  2 Drop Both Eyes DAILY PRN    cyanocobalamin (VITAMIN B12) tablet 500 mcg  500 mcg Oral DAILY    naloxone (NARCAN) injection 0.4 mg  0.4 mg IntraVENous EVERY 2 MINUTES AS NEEDED    traMADol (ULTRAM) tablet 25 mg  25 mg Oral Q6H PRN        Lab Data Reviewed: (see below)  Lab Review:     Recent Labs     11/01/19  0611 10/31/19  1103 10/30/19  2345   WBC 8.8  --  10.7   HGB 10.9* 12.4 11.8   HCT 33.9* 37.4 35.6     --  290     Recent Labs     11/01/19  0611 10/30/19  2345    131*   K 2.9* 3.6   * 100   CO2 24 25   GLU 84 114*   BUN 5* 11   CREA 0.63 0.78   CA 6.8* 8.7   MG 1.6  --    PHOS 2.9  --    ALB 2.4* 3.4*   TBILI 0.6 0.4   SGOT 13* 18   ALT 10* 17   INR  --  1.0     Lab Results   Component Value Date/Time    Glucose (POC) 105 (H) 11/01/2019 06:11 AM    Glucose (POC) 115 (H) 10/31/2019 08:25 AM    Glucose (POC) 99 07/21/2018 08:59 PM    Glucose (POC) 91 07/19/2018 03:22 PM    Glucose (POC) 104 03/12/2013 12:38 PM    Glucose (POC) 106 (H) 03/08/2013 07:37 AM     No results for input(s): PH, PCO2, PO2, HCO3, FIO2 in the last 72 hours. Recent Labs     10/30/19  0839   INR 1.0     All Micro Results     Procedure Component Value Units Date/Time    CULTURE, BLOOD, PAIRED [[id number]] Collected:  10/31/19 0328    Order Status:  Completed Specimen:  Blood Updated:  11/01/19 0530     Special Requests: NO SPECIAL REQUESTS        Culture result: NO GROWTH 1 DAY       CULTURE, URINE [[id number]] Collected:  10/31/19 0120    Order Status:  Completed Updated:  10/31/19 0917          I have reviewed notes of prior 24hr. Other pertinent lab:      Total time spent with patient: [address] discussed with: Patient, Nursing Staff and >50% of time spent in counseling and coordination of care    Discussed:  Care Plan    Prophylaxis:  SCD's    Disposition:  Home w/Family           ___________________________________________________    Attending Physician: Isauro Hooker MD

## 2019-11-01 NOTE — PROGRESS NOTES
Care Plan Summary:   Problem: Pain  Goal: *Control of Pain  Outcome: Progressing Towards Goal  Goal: *PALLIATIVE CARE:  Alleviation of Pain  Outcome: Progressing Towards Goal       Problem: Falls - Risk of  Goal: *Absence of Falls  Description  Document Zahraa Williamson Fall Risk and appropriate interventions in the flowsheet.   Outcome: Progressing Towards Goal  Note:   Fall Risk Interventions:  Mobility Interventions: Bed/chair exit alarm, OT consult for ADLs, Patient to call before getting OOB    Medication Interventions: Patient to call before getting OOB, Teach patient to arise slowly    Elimination Interventions: Bed/chair exit alarm, Call light in reach, Elevated toilet seat, Toilet paper/wipes in reach    History of Falls Interventions: Bed/chair exit alarm, Vital signs minimum Q4HRs X 24 hrs (comment for end date), Room close to nurse's station, Investigate reason for fall    Problem: Diarrhea (Adult and Pediatrics)  Goal: *Absence of diarrhea  Outcome: Progressing Towards Goal  Goal: *PALLIATIVE CARE:  Absence of diarrhea  Outcome: Resolved/Not Met     Problem: Upper and Lower GI Bleed: Day 1  Goal: Off Pathway (Use only if patient is Off Pathway)  Outcome: Progressing Towards Goal  Goal: Activity/Safety  Outcome: Progressing Towards Goal  Goal: Consults, if ordered  Outcome: Progressing Towards Goal  Goal: Diagnostic Test/Procedures  Outcome: Progressing Towards Goal  Goal: Nutrition/Diet  Outcome: Progressing Towards Goal  Goal: Discharge Planning  Outcome: Progressing Towards Goal  Goal: Medications  Outcome: Progressing Towards Goal  Goal: Respiratory  Outcome: Progressing Towards Goal  Goal: Treatments/Interventions/Procedures  Outcome: Progressing Towards Goal  Goal: Psychosocial  Outcome: Progressing Towards Goal  Goal: *Optimal pain control at patient's stated goal  Outcome: Progressing Towards Goal  Goal: *Hemodynamically stable  Outcome: Progressing Towards Goal  Goal: *Demonstrates progressive activity  Outcome: Progressing Towards Goal

## 2019-11-01 NOTE — PROGRESS NOTES
Clinical Shift Summary:     1900: Bedside and Verbal shift change report given to Torsten Anguiano RN (oncoming nurse) by Ailyn Ely RN (offgoing nurse). Report included the following information SBAR, Kardex, ED Summary, Intake/Output, MAR, Accordion, Recent Results and Cardiac Rhythm NSR.     2138: Patient requested Valium PO for sleep/anxiety. Patient also c/o 4 out of 10 pain located in the lower abdomen. Administered PRN Tramadol PO (see MAR). 2210: Reassessed patient. Pain improved. 0141: Patient c/o 4 out 10 pain,located in the lower abdomen. Administered PRN Tramadol PO (see MAR). 2009: Reassessed patient. Pain improved. Besides pain, Patient remained stable throughout the night and experienced no bloody stools. 0700: Bedside and Verbal shift change report given to Christian Kumar RN and Renetta RN (oncoming nurse) by Torsten Anguiano RN (offgoing nurse). Report included the following information SBAR, Kardex, ED Summary, Intake/Output, MAR, Accordion and Med Rec Status/NSR.

## 2019-11-01 NOTE — PROGRESS NOTES
[address] NP  [phone]           GI PROGRESS NOTE        NAME: Roma Gore   :  1935   MRN:  [id number]       Subjective:   No complaints this afternoon. No further bleeding, wants to eat regular food. Objective: In NAD    VITALS:   Last 24hrs VS reviewed since prior progress note. Most recent are:  Visit Vitals  /66 (BP 1 Location: Left arm, BP Patient Position: At rest)   Pulse 61   Temp 98.2 °F (36.8 °C)   Resp 18   Ht 5' 1\" (1.549 m)   Wt 55.2 kg (121 lb 11.2 oz)   SpO2 96%   BMI 23.00 kg/m²       Intake/Output Summary (Last 24 hours) at 2019 1431  Last data filed at 2019 1315  Gross per 24 hour   Intake 1913.75 ml   Output 2000 ml   Net -86.25 ml       PHYSICAL EXAM:  General: Alert, in no acute distress    HEENT: Anicteric sclerae. Lungs:            CTA Bilaterally. Heart:  Regular  rhythm,    Abdomen: Soft, Non distended, Non tender.  (+)Bowel sounds, no HSM  Extremities: No c/c/e  Neurologic:  CN 2-12 gi, Alert and oriented X 3. No acute neurological distress   Psych:   Good insight. Not anxious nor agitated.     Lab Data Reviewed:   Recent Labs     19  0611 10/31/19  1103 10/30/19  2345   WBC 8.8  --  10.7   HGB 10.9* 12.4 11.8   HCT 33.9* 37.4 35.6     --  290     Recent Labs     19  0611 10/30/19  2345    131*   K 2.9* 3.6   * 100   CO2 24 25   BUN 5* 11   CREA 0.63 0.78   GLU 84 114*   PHOS 2.9  --    CA 6.8* 8.7     Recent Labs     19  0611 10/30/19  2345   SGOT 13* 18   AP 46 63   TP 5.6* 6.7   ALB 2.4* 3.4*   GLOB 3.2 3.3   AML  --  58   LPSE  --  156       ________________________________________________________________________  Patient Active Problem List   Diagnosis Code    Pneumonia, organism unspecified(486) J18.9    Hyponatremia E87.1    Essential hypertension, benign I10    Anxiety F41.9    History of TIAs 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 Improving  - Advanced to GI lite diet  - Monitor labs  - Continue supportive care    Will see again on request.  Please have her follow up in the office as outpatient to arrange for a colonoscopy. Please have her finish a course of antibiotics. Please call the on call GI provider over the weekend with any questions or concerns.         Signed By: Petra Sanchez NP     11/1/2019  2:31 PM

## 2019-11-01 NOTE — PROGRESS NOTES
Nutrition Assessment:    RECOMMENDATIONS/INTERVENTION(S):   1. Advance diet as medically feasible to goal of GI lite. 2. Add Glucerna BID to promote adeuqate po intake and wt maintenance/gain. 3. Continue to monitor po intakes, wt changes, labs, GI.    ASSESSMENT:   11/1: Pt assessed for low BMI for age. Admitted with GIB, acute colitis, pancreatic cyst. PMH includes HTN, DM. BMI 20.2. Estimated energy needs calculated to reflect healthy wt gain. Pt reports appetite is \"getting there. \" Says it has been poor since March. Reports eating 3 meals + one snacks per day but with smaller portions than normal. Says she weighed 120# in March and #. This is an 11% wt loss x9 months, which is not considered significant for time frame but noted. Wt recorded in chart in March is 110#. Pt's diet advanced to full liquids this AM. Clear liquids breakfast tray in room, most of jello and italian ice eaten. Pt drinks Glucerna at home and would like to receive it while hospitalized as well. Will add BID to promote adequate po intake and wt maintenance/gain. K+2.9 low, currently being repleted. Labs- K 2.9, Ca 6.8, BG -112. Meds- Vit. B12, Zosyn, KCl. Diet Order: Full liquids  % Eaten:  No data found. Pertinent Medications: [x] Reviewed    Labs: [x] Reviewed    Anthropometrics: Height: 5' 1\" (154.9 cm) Weight: 48.5 kg (107 lb)    IBW (%IBW):   ( ) UBW (%UBW):   (  %)      BMI: Body mass index is 20.22 kg/m². This BMI is indicative of:   [x] Underweight for age   [] Normal    [] Overweight    []  Obesity    []  Extreme Obesity (BMI>40)  Estimated Nutrition Needs (Based on): 1391 Kcals/day(877 x 1.3 AF (+250)) , 49 g(0.8-1 g/kg) Protein  Carbohydrate: At Least 130 g/day  Fluids: 1391 mL/day (1 ml/kcal)    Last BM: 11/1   [x]Active     []Hyperactive  []Hypoactive       [] Absent   BS  Skin:    [x] Intact   [] Incision  [] Breakdown   [] DTI   [] Tears/Excoriation/Abrasion  []Edema [] Other:      Wt Readings from Last 30 Encounters:   10/30/19 48.5 kg (107 lb)   08/29/19 49 kg (108 lb)   04/19/19 49.9 kg (110 lb)   03/21/19 49.9 kg (110 lb)   03/06/19 50.1 kg (110 lb 7 oz)   07/19/18 52.2 kg (115 lb)   07/19/18 49.9 kg (110 lb)   03/25/13 52.3 kg (115 lb 6 oz)   03/12/13 56.2 kg (124 lb)   03/05/13 56.2 kg (124 lb)      NUTRITION DIAGNOSES:   Problem:  Unintended weight loss     Etiology: related to inadequate energy intake for wt maintenance     Signs/Symptoms: as evidenced by 13# wt loss x9 months      NUTRITION INTERVENTIONS:  Meals/Snacks: General/healthful diet   Supplements: Commercial supplement              GOAL:   PO intake >50% meals + ONS with 0.5#/week wt gain next 2-4 days    Cultural, Holiness, or Ethnic Dietary Needs: None     EDUCATION & DISCHARGE NEEDS:    [x] None Identified   [] Identified and Education Provided/Documented   [] Identified and Pt declined/was not appropriate      [] Interdisciplinary Care Plan Reviewed/Documented    [x] Discharge Needs:  GI lite diet   [] No Nutrition Related Discharge Needs    NUTRITION RISK:   Pt Is At Nutrition Risk  [x]     No Nutrition Risk Identified  []       PT SEEN FOR:    []  MD Consult: []Calorie Count      []Diabetic Diet Education        []Diet Education     []Electrolyte Management     []General Nutrition Management and Supplements     []Management of Tube Feeding     []TPN Recommendations    []  RN Referral:  []MST score >=2     []Enteral/Parenteral Nutrition PTA     []Pregnant: Gestational DM or Multigestation                 [] Pressure Ulcer    [x]  Low BMI      []  Length of Stay       [] Dysphagia Diet         [] Ventilator  []  Follow-up     Previous Recommendations:   [] Implemented          [] Not Implemented          [x] Not Applicable    Previous Goal:   [] Met              [] Progressing Towards Goal              [] Not Progressing Towards Goal   [x] Not Applicable            Ayesha De Los Santos, [address] [facility]  Pager [phone]  Phone [phone]

## 2019-11-01 NOTE — CDMP QUERY
Pt admitted with acute GI Bleed  and has colitis documented. If known, Please further specify type of Colitis in the medical record.  
 
      => Acute Ischemic Colitis  
=> Bacterial Colitis 
=> Ulcerative Colitis  
=> Other, please specify 
=> Clinically unable to determine The medical record reflects the following: 
  Risk Factors: 80 yo F admitted with a  GI Bleed Clinical Indicators: 10/31 Gastro consult states \" Most consistent with mild ischemic colitis\" \"feels better already\" Treatment:  IV 2 l NS Bolus, IV Zosyn , pain meds, Plan for outpatient colonoscopy Thank you for your time [facility] RN/BSN, CCDS Desk:   [phone] Other:  [phone]

## 2019-11-01 NOTE — PROGRESS NOTES
Care Management follow up    Patient admitted for Gi bleed, acute abdominal pain, acute colitis. RRAT score 15    Current status  States she is feeling better but tired. Continues on IV therapy. Patient states her 12year old granddaughter lives with her and is able to assist with some of her care needs. Her granddaughter is in school during the day. She states her daughter in law and friends can assist her if she knows ahead of time. She may need assistance with transportation home. She lives in a 2 level home with 4 steps to enter home and 15 to the upper level. Patient has used home health approximately 6 months ago but unable to recall agency used. Transition of Care Plan  1. Return home with family assistance vs home health follow up.  2. Will monitor patient's progress to identify post discharge needs. 3. May need assistance with transportation home upon discharge.   4. Patient sees Berenda Epley, NP as her PCP ([address])      Katlin Luz RN, MSN/Care manager

## 2019-11-01 NOTE — PROGRESS NOTES
Bedside shift change report given to Nhung (oncoming nurse) by Ras Viveros (offgoing nurse). Report included the following information SBAR, Kardex, Intake/Output and Recent Results.

## 2019-11-02 VITALS
TEMPERATURE: 97.7 F | RESPIRATION RATE: 18 BRPM | HEIGHT: 61 IN | WEIGHT: 121.7 LBS | BODY MASS INDEX: 22.98 KG/M2 | OXYGEN SATURATION: 98 % | HEART RATE: 64 BPM | DIASTOLIC BLOOD PRESSURE: 68 MMHG | SYSTOLIC BLOOD PRESSURE: 151 MMHG

## 2019-11-02 LAB
ANION GAP SERPL CALC-SCNC: 9 MMOL/L (ref 5–15)
BASOPHILS # BLD: 0 K/UL (ref 0–0.1)
BASOPHILS NFR BLD: 1 % (ref 0–1)
BUN SERPL-MCNC: 6 MG/DL (ref 6–20)
BUN/CREAT SERPL: 8 (ref 12–20)
CALCIUM SERPL-MCNC: 8.7 MG/DL (ref 8.5–10.1)
CHLORIDE SERPL-SCNC: 105 MMOL/L (ref 97–108)
CO2 SERPL-SCNC: 23 MMOL/L (ref 21–32)
CREAT SERPL-MCNC: 0.79 MG/DL (ref 0.55–1.02)
DIFFERENTIAL METHOD BLD: ABNORMAL
EOSINOPHIL # BLD: 0.3 K/UL (ref 0–0.4)
EOSINOPHIL NFR BLD: 4 % (ref 0–7)
ERYTHROCYTE [DISTWIDTH] IN BLOOD BY AUTOMATED COUNT: 12.6 % (ref 11.5–14.5)
GLUCOSE BLD STRIP.AUTO-MCNC: 128 MG/DL (ref 65–100)
GLUCOSE BLD STRIP.AUTO-MCNC: 92 MG/DL (ref 65–100)
GLUCOSE SERPL-MCNC: 93 MG/DL (ref 65–100)
HCT VFR BLD AUTO: 35.4 % (ref 35–47)
HGB BLD-MCNC: 11.4 G/DL (ref 11.5–16)
IMM GRANULOCYTES # BLD AUTO: 0 K/UL (ref 0–0.04)
IMM GRANULOCYTES NFR BLD AUTO: 0 % (ref 0–0.5)
LYMPHOCYTES # BLD: 2.6 K/UL (ref 0.8–3.5)
LYMPHOCYTES NFR BLD: 35 % (ref 12–49)
MCH RBC QN AUTO: 29.5 PG (ref 26–34)
MCHC RBC AUTO-ENTMCNC: 32.2 G/DL (ref 30–36.5)
MCV RBC AUTO: 91.7 FL (ref 80–99)
MONOCYTES # BLD: 0.6 K/UL (ref 0–1)
MONOCYTES NFR BLD: 8 % (ref 5–13)
NEUTS SEG # BLD: 3.9 K/UL (ref 1.8–8)
NEUTS SEG NFR BLD: 52 % (ref 32–75)
NRBC # BLD: 0 K/UL (ref 0–0.01)
NRBC BLD-RTO: 0 PER 100 WBC
PLATELET # BLD AUTO: 259 K/UL (ref 150–400)
PMV BLD AUTO: 9.8 FL (ref 8.9–12.9)
POTASSIUM SERPL-SCNC: 3.7 MMOL/L (ref 3.5–5.1)
RBC # BLD AUTO: 3.86 M/UL (ref 3.8–5.2)
SERVICE CMNT-IMP: ABNORMAL
SERVICE CMNT-IMP: NORMAL
SODIUM SERPL-SCNC: 137 MMOL/L (ref 136–145)
WBC # BLD AUTO: 7.5 K/UL (ref 3.6–11)

## 2019-11-02 PROCEDURE — 74011250636 HC RX REV CODE- 250/636: Performed by: INTERNAL MEDICINE

## 2019-11-02 PROCEDURE — 74011250637 HC RX REV CODE- 250/637: Performed by: INTERNAL MEDICINE

## 2019-11-02 PROCEDURE — 74011250636 HC RX REV CODE- 250/636: Performed by: FAMILY MEDICINE

## 2019-11-02 PROCEDURE — 85025 COMPLETE CBC W/AUTO DIFF WBC: CPT

## 2019-11-02 PROCEDURE — 80048 BASIC METABOLIC PNL TOTAL CA: CPT

## 2019-11-02 PROCEDURE — 82962 GLUCOSE BLOOD TEST: CPT

## 2019-11-02 PROCEDURE — 36415 COLL VENOUS BLD VENIPUNCTURE: CPT

## 2019-11-02 PROCEDURE — 74011000258 HC RX REV CODE- 258: Performed by: INTERNAL MEDICINE

## 2019-11-02 RX ORDER — LEVOFLOXACIN 250 MG/1
250 TABLET ORAL DAILY
Qty: 5 TAB | Refills: 0 | Status: SHIPPED | OUTPATIENT
Start: 2019-11-02 | End: 2020-01-21

## 2019-11-02 RX ORDER — METRONIDAZOLE 250 MG/1
250 TABLET ORAL 3 TIMES DAILY
Qty: 15 TAB | Refills: 0 | Status: SHIPPED | OUTPATIENT
Start: 2019-11-02 | End: 2020-01-21

## 2019-11-02 RX ADMIN — Medication 10 ML: at 05:50

## 2019-11-02 RX ADMIN — ONDANSETRON 4 MG: 2 INJECTION INTRAMUSCULAR; INTRAVENOUS at 08:37

## 2019-11-02 RX ADMIN — Medication 10 ML: at 09:27

## 2019-11-02 RX ADMIN — PIPERACILLIN AND TAZOBACTAM 3.38 G: 3; .375 INJECTION, POWDER, LYOPHILIZED, FOR SOLUTION INTRAVENOUS at 08:37

## 2019-11-02 RX ADMIN — CYANOCOBALAMIN TAB 500 MCG 500 MCG: 500 TAB at 08:37

## 2019-11-02 RX ADMIN — PIPERACILLIN AND TAZOBACTAM 3.38 G: 3; .375 INJECTION, POWDER, LYOPHILIZED, FOR SOLUTION INTRAVENOUS at 01:16

## 2019-11-02 RX ADMIN — SODIUM CHLORIDE 75 ML/HR: 900 INJECTION, SOLUTION INTRAVENOUS at 08:26

## 2019-11-02 NOTE — DISCHARGE SUMMARY
Hospitalist Discharge Summary     Patient ID:    Gail Alvarenga  [id number]  32 y.o.  1935    Admit date: 10/30/2019    Discharge date and time: 11/2/2019    Admission Diagnoses: GI bleed [K92.2]  Acute colitis [K52.9]  Pancreatic cyst [K86.2]    Chronic Diagnoses:    Problem List as of 11/2/2019 Date Reviewed: 10/31/2019          Codes Class Noted - Resolved    Pancreatic cyst ICD-10-CM: K86.2  ICD-9-CM: 577.2  10/31/2019 - Present        GI bleed ICD-10-CM: K92.2  ICD-9-CM: 578.9  10/31/2019 - Present        Acute colitis ICD-10-CM: K52.9  ICD-9-CM: 558.9  10/31/2019 - Present        Diabetes mellitus ([facility] ICD-10-CM: E11.9  ICD-9-CM: 250.00  10/31/2019 - Present    Overview Signed 10/31/2019  7:09 AM by Sebastien Fonseca MD     diet-controlled             Dizziness ICD-10-CM: R42  ICD-9-CM: 780.4  7/19/2018 - Present        Weakness ICD-10-CM: R53.1  ICD-9-CM: 780.79  7/19/2018 - Present        Pneumonia, organism unspecified(486) ICD-10-CM: J18.9  ICD-9-CM: 322  3/5/2013 - Present        Hyponatremia ICD-10-CM: E87.1  ICD-9-CM: 276.1  3/5/2013 - Present        Essential hypertension, benign (Chronic) ICD-10-CM: I10  ICD-9-CM: 401.1  3/5/2013 - Present        Anxiety (Chronic) ICD-10-CM: F41.9  ICD-9-CM: 300.00  3/5/2013 - Present        History of TIAs (Chronic) ICD-10-CM: Z86.73  ICD-9-CM: V12.54  3/5/2013 - Present        RESOLVED: Sinus tachycardia ICD-10-CM: R00.0  ICD-9-CM: 427.89  3/5/2013 - 3/8/2013        RESOLVED: Cough ICD-10-CM: R05  ICD-9-CM: 786.2  3/5/2013 - 3/8/2013        RESOLVED: Nausea & vomiting ICD-10-CM: R11.2  ICD-9-CM: 787.01  3/5/2013 - 3/8/2013              Discharge Medications:   Current Discharge Medication List      START taking these medications    Details   metroNIDAZOLE (FLAGYL) 250 mg tablet Take 1 Tab by mouth three (3) times daily. Qty: 15 Tab, Refills: 0      levoFLOXacin (LEVAQUIN) 250 mg tablet Take 1 Tab by mouth daily.   Qty: 5 Tab, Refills: 0         CONTINUE these medications which have NOT CHANGED    Details   carboxymethylcellulose sodium (REFRESH LIQUIGEL) 1 % dlgl ophthalmic solution Administer 1 Drop to both eyes as needed (dry eyes). cyanocobalamin (VITAMIN B12) 500 mcg tablet Take 500 mcg by mouth daily. acetaminophen (TYLENOL) 325 mg tablet Take 325-650 mg by mouth every six (6) hours as needed (headache). diazePAM (VALIUM) 5 mg tablet Take 5 mg by mouth two (2) times daily as needed for Anxiety or Sleep.      ondansetron (ZOFRAN ODT) 4 mg disintegrating tablet Take 4 mg by mouth every eight (8) hours as needed for Nausea. meclizine (ANTIVERT) 12.5 mg tablet Take 12.5-25 mg by mouth two (2) times daily as needed for Dizziness. sucralfate (CARAFATE) 100 mg/mL suspension Take 1 g by mouth four (4) times daily as needed (abdominal pain). diclofenac (VOLTAREN) 1 % gel Apply 2 g to affected area four (4) times daily as needed (knee pain, back pain). dicyclomine (BENTYL) 10 mg capsule Take 10 mg by mouth three (3) times daily as needed (abdominal cramping). fluticasone (FLONASE ALLERGY RELIEF) 50 mcg/actuation nasal spray 1 [place] by Both Nostrils route daily. calcium carbonate (TUMS) 200 mg calcium (500 mg) chew Take 1 Tab by mouth four (4) times daily as needed. atenolol (TENORMIN) 25 mg tablet Take 25 mg by mouth daily (after dinner). clopidogrel (PLAVIX) 75 mg tablet Take 75 mg by mouth daily (after dinner). Follow up Care:    1. Other, MD Davis in 1-2 weeks  2. GI    Diet:  Regular Diet    Disposition:  Home. Advanced Directive:    Discharge Exam:  See today's note.     CONSULTATIONS: GI    Significant Diagnostic Studies:   Recent Labs     11/02/19 0321 11/01/19  0611   WBC 7.5 8.8   HGB 11.4* 10.9*   HCT 35.4 33.9*    236     Recent Labs     11/02/19 0321 11/01/19  0611 10/30/19  2345    143 131*   K 3.7 2.9* 3.6    109* 100   CO2 23 24 25   BUN 6 5* 11   CREA 0.79 0.63 0.78   GLU 93 84 114*   CA 8.7 6.8* 8.7   MG  --  1.6  --    PHOS  --  2.9  --      Recent Labs     11/01/19  0611 10/30/19  2345   SGOT 13* 18   ALT 10* 17   AP 46 63   TBILI 0.6 0.4   TP 5.6* 6.7   ALB 2.4* 3.4*   GLOB 3.2 3.3   AML  --  58   LPSE  --  156     Recent Labs     10/30/19  2345   INR 1.0   PTP 10.6   APTT 24.4      No results for input(s): FE, TIBC, PSAT, FERR in the last 72 hours. No results for input(s): PH, PCO2, PO2 in the last 72 hours. No results for input(s): CPK, CKMB in the last 72 hours. No lab exists for component: TROPONINI  Lab Results   Component Value Date/Time    Glucose (POC) 92 11/02/2019 06:26 AM    Glucose (POC) 99 11/01/2019 09:45 PM    Glucose (POC) 105 (H) 11/01/2019 06:11 AM    Glucose (POC) 115 (H) 10/31/2019 08:25 AM    Glucose (POC) 99 07/21/2018 08:59 PM             HOSPITAL COURSE & TREATMENT RENDERED:   1.  GI bleed. Advanced diet and tolerating well. GI evaluation appreciated and planned for outpatient colonoscopy.      2. Anemia secondary to GI bleed. Stable. continue to monitor H/H      3.  Acute colitis. Ischemic vs infectious. Will continue flagyl and levaquin. will have outpatient colonoscopy. 4.  Multiple pancreatic cysts. Evaluated by GI.      5.   Abdominal pain. likely due to colitis. Resolved. [place]. Resolved.         7. History of TIAs on Plavix.  Continue plavix        discharged in improved condition    Spent 35 minutes         Signed:  Venu Krishnan MD  11/2/2019  7:53 AM

## 2019-11-02 NOTE — PROGRESS NOTES
Cody Osorio [facility]  [address] [facility], [place], [address] [place] [address]  [phone]      Medical Progress Note      NAME: Serina Norwood   :  1935  MRM:  [id number]    Date/Time: 2019  8:03 AM       Assessment and Plan:   1.  GI bleed. Advanced diet and tolerating well. GI evaluation appreciated and planned for outpatient colonoscopy. 2.  Anemia secondary to GI bleed. continue to monitor H/H      3. Acute colitis. Ischemic vs infectious. Will continue flagyl and levaquin. 4.  Multiple pancreatic cysts. Evaluated by GI. 5.   Abdominal pain. likely due to colitis. Continue pain management      6. Diarrhea. Check stool studies         7. History of TIAs on Plavix. Continue plavix            Subjective:     Chief Complaint:  Follow up of pt who was admitted with GI bled. No bleeding and tolerating diet well     ROS:  (bold if positive, if negative)      Tolerating PT  Tolerating Diet        Objective:     Last 24hrs VS reviewed since prior progress note.  Most recent are:    Visit Vitals  /67 (BP 1 Location: Left arm, BP Patient Position: At rest)   Pulse 63   Temp 97.6 °F (36.4 °C)   Resp 18   Ht 5' 1\" (1.549 m)   Wt 55.2 kg (121 lb 11.2 oz)   SpO2 95%   BMI 23.00 kg/m²     SpO2 Readings from Last 6 Encounters:   19 95%   19 97%   19 100%   19 99%   19 96%   18 97%            Intake/Output Summary (Last 24 hours) at 2019 0749  Last data filed at 2019 0552  Gross per 24 hour   Intake 2116.25 ml   Output 2600 ml   Net -483.75 ml        Physical Exam:    Gen:  Well-developed, well-nourished, in no acute distress  HEENT:  Pink conjunctivae, PERRL, hearing intact to voice, moist mucous membranes  Neck:  Supple, without masses, thyroid non-tender  Resp:  No accessory muscle use, clear breath sounds without wheezes rales or rhonchi  Card:  No murmurs, normal S1, S2 without thrills, bruits or peripheral edema  Abd:  Soft, non-tender, non-distended, normoactive bowel sounds are present, no palpable organomegaly and no detectable hernias  Lymph:  No cervical or inguinal adenopathy  Musc:  No cyanosis or clubbing  Skin:  No rashes or ulcers, skin turgor is good  Neuro:  Cranial nerves are grossly intact, no focal motor weakness, follows commands appropriately  Psych:  Good insight, oriented to person, place and time, alert  __________________________________________________________________  Medications Reviewed: (see below)  Medications:     Current Facility-Administered Medications   Medication Dose Route Frequency    sodium chloride (NS) flush 5-40 mL  5-40 mL IntraVENous Q8H    sodium chloride (NS) flush 5-40 mL  5-40 mL IntraVENous PRN    0.9% sodium chloride infusion  75 mL/hr IntraVENous CONTINUOUS    fentaNYL citrate (PF) injection 12.5 mcg  12.5 mcg IntraVENous Q4H PRN    ondansetron (ZOFRAN) injection 4 mg  4 mg IntraVENous Q6H PRN    piperacillin-tazobactam (ZOSYN) 3.375 g in 0.9% sodium chloride (MBP/ADV) 100 mL  3.375 g IntraVENous Q8H    diazePAM (VALIUM) tablet 5 mg  5 mg Oral BID PRN    carboxymethylcellulose sodium (CELLUVISC) 1 % ophthalmic solution 2 Drop  2 Drop Both Eyes DAILY PRN    cyanocobalamin (VITAMIN B12) tablet 500 mcg  500 mcg Oral DAILY    naloxone (NARCAN) injection 0.4 mg  0.4 mg IntraVENous EVERY 2 MINUTES AS NEEDED    traMADol (ULTRAM) tablet 25 mg  25 mg Oral Q6H PRN        Lab Data Reviewed: (see below)  Lab Review:     Recent Labs     11/02/19  0321 11/01/19  0611 10/31/19  1103 10/30/19  2345   WBC 7.5 8.8  --  10.7   HGB 11.4* 10.9* 12.4 11.8   HCT 35.4 33.9* 37.4 35.6    236  --  290     Recent Labs     11/02/19  0321 11/01/19  0611 10/30/19  2345    143 131*   K 3.7 2.9* 3.6    109* 100   CO2 23 24 25   GLU 93 84 114*   BUN 6 5* 11   CREA 0.79 0.63 0.78   CA 8.7 6.8* 8.7   MG  --  1.6  --    PHOS  --  2.9  --    ALB  --  2.4* 3.4*   TBILI  --  0.6 0.4   SGOT  --  13* 18   ALT --  10* 17   INR  --   --  1.0     Lab Results   Component Value Date/Time    Glucose (POC) 92 11/02/2019 06:26 AM    Glucose (POC) 99 11/01/2019 09:45 PM    Glucose (POC) 105 (H) 11/01/2019 06:11 AM    Glucose (POC) 115 (H) 10/31/2019 08:25 AM    Glucose (POC) 99 07/21/2018 08:59 PM     No results for input(s): PH, PCO2, PO2, HCO3, FIO2 in the last 72 hours. Recent Labs     10/30/19  2345   INR 1.0     All Micro Results     Procedure Component Value Units Date/Time    CULTURE, BLOOD, PAIRED [[id number]] Collected:  10/31/19 0328    Order Status:  Completed Specimen:  Blood Updated:  11/02/19 0649     Special Requests: NO SPECIAL REQUESTS        Culture result: NO GROWTH 2 DAYS       CULTURE, URINE [[id number]] Collected:  10/31/19 0120    Order Status:  Completed Specimen:  Urine Updated:  11/01/19 1433     Special Requests: --        NO SPECIAL REQUESTS  Reflexed from [id number]       Central Count --        <10,000  COLONIES/mL       Culture result: NO SIGNIFICANT GROWTH             I have reviewed notes of prior 24hr. Other pertinent lab:      Total time spent with patient: [address] discussed with: Patient, Nursing Staff and >50% of time spent in counseling and coordination of care    Discussed:  Care Plan    Prophylaxis:  SCD's    Disposition:  Home w/Family           ___________________________________________________    Attending Physician: Rain Elder MD

## 2019-11-02 NOTE — PROGRESS NOTES
2300 Shift Change:  Bedside and Verbal shift change report given to Quinton Gage RN (oncoming nurse) by RN (offgoing nurse). Report included the following information SBAR, Kardex, Procedure Summary and Cardiac Rhythm NSR . End of Shift Report:  Bedside and Verbal shift change report given to Nat Morrissey RN (oncoming nurse) by Quinton Gage RN (offgoing nurse). Report included the following information SBAR, Kardex, Procedure Summary and Cardiac Rhythm  .

## 2019-11-02 NOTE — DISCHARGE INSTRUCTIONS
ACUTE DIAGNOSES:  GI bleed [K92.2]  Acute colitis [K52.9]  Pancreatic cyst [K86.2]    CHRONIC MEDICAL DIAGNOSES:  Problem List as of 11/2/2019 Date Reviewed: 10/31/2019          Codes Class Noted - Resolved    Pancreatic cyst ICD-10-CM: K86.2  ICD-9-CM: 577.2  10/31/2019 - Present        GI bleed ICD-10-CM: K92.2  ICD-9-CM: 578.9  10/31/2019 - Present        Acute colitis ICD-10-CM: K52.9  ICD-9-CM: 558.9  10/31/2019 - Present        Diabetes mellitus ([facility] ICD-10-CM: E11.9  ICD-9-CM: 250.00  10/31/2019 - Present    Overview Signed 10/31/2019  7:09 AM by Hebert Stone MD     diet-controlled             Dizziness ICD-10-CM: R42  ICD-9-CM: 780.4  7/19/2018 - Present        Weakness ICD-10-CM: R53.1  ICD-9-CM: 780.79  7/19/2018 - Present        Pneumonia, organism unspecified(486) ICD-10-CM: J18.9  ICD-9-CM: 278  3/5/2013 - Present        Hyponatremia ICD-10-CM: E87.1  ICD-9-CM: 276.1  3/5/2013 - Present        Essential hypertension, benign (Chronic) ICD-10-CM: I10  ICD-9-CM: 401.1  3/5/2013 - Present        Anxiety (Chronic) ICD-10-CM: F41.9  ICD-9-CM: 300.00  3/5/2013 - Present        History of TIAs (Chronic) ICD-10-CM: Z86.73  ICD-9-CM: V12.54  3/5/2013 - Present        RESOLVED: Sinus tachycardia ICD-10-CM: R00.0  ICD-9-CM: 427.89  3/5/2013 - 3/8/2013        RESOLVED: Cough ICD-10-CM: R05  ICD-9-CM: 786.2  3/5/2013 - 3/8/2013        RESOLVED: Nausea & vomiting ICD-10-CM: R11.2  ICD-9-CM: 787.01  3/5/2013 - 3/8/2013              DISCHARGE MEDICATIONS:          · It is important that you take the medication exactly as they are prescribed. · Keep your medication in the bottles provided by the pharmacist and keep a list of the medication names, dosages, and times to be taken in your wallet. · Do not take other medications without consulting your doctor.        DIET:  Regular Diet    ACTIVITY: Activity as tolerated    ADDITIONAL INFORMATION: If you experience any of the following symptoms then please call your primary care physician or return to the emergency room if you cannot get hold of your doctor: Fever, chills, nausea, vomiting, diarrhea, change in mentation, falling, bleeding, shortness of breath. FOLLOW UP CARE:  Primary care physician. you are to call and set up an appointment to see them in 2 weeks. Follow-up with gastroenterologist, Dr Willian Jarrell in 1-2 weeks       Information obtained by :  I understand that if any problems occur once I am at home I am to contact my physician. I understand and acknowledge receipt of the instructions indicated above.                                                                                                                                            Physician's or R.N.'s Signature                                                                  Date/Time                                                                                                                                              Patient or Representative Signature                                                          Date/Time

## 2019-11-02 NOTE — PROGRESS NOTES
Shift Summary:  1930 Bedside and Verbal shift change report given to Jocelyn Weathers RN (oncoming nurse) by Augusto Saez RN (offgoing nurse). Report included the following information SBAR, Kardex, Intake/Output, MAR, Accordion and Cardiac Rhythm NSR.     2345: Bedside and Verbal shift change report given to Verónica Louise RN (oncoming nurse) by Jocelyn Weathers RN (offgoing nurse). Report included the following information SBAR, Kardex, Procedure Summary, Intake/Output, MAR, Accordion, Recent Results and Cardiac Rhythm NSR.

## 2019-11-02 NOTE — PROGRESS NOTES
0730-  Bedside and Verbal shift change report given to Darren Le RN (oncoming nurse) by Zeenat Redd RN (offgoing nurse). Report included the following information SBAR, Kardex and Recent Results. .. I have reviewed discharge instructions with the patient. The patient verbalized understanding.       Discharged the pt via [facility]

## 2019-11-04 ENCOUNTER — PATIENT OUTREACH (OUTPATIENT)
Dept: CARDIOLOGY CLINIC | Age: 84
End: 2019-11-04

## 2019-11-04 NOTE — PROGRESS NOTES
Hospital Discharge Follow-Up      Date/Time:  2019 6:02 PM    Patient was admitted to [facility] on 10/30/19 and discharged on 19 for Colitis. The physician discharge summary was available at the time of outreach. Patient was contacted within one business days of discharge. Top Challenges reviewed with the provider   Nephrology- will contact to see if appointment can be moved up sooner than 2020. Advance Care Planning:   Does patient have an Advance Directive:  not on file        Method of communication with provider :phone    Inpatient RRAT score:  13  Was this a readmission? no       Care Transition Nurse (CTN) contacted the patient by telephone to perform post hospital discharge assessment. Verified name and  with patient as identifiers. Provided introduction to self, and explanation of the CTN role. Patient received hospital discharge instructions. CTN reviewed discharge instructions and red flags with patient who verbalized understanding. Patient given an opportunity to ask questions and does not have any further questions or concerns at this time. The patient agrees to contact the PCP office for questions related to their healthcare. CTN provided contact information for future reference. Disease Specific:   N/A    Patients top risk factors for readmission:  medical condition, multi health system providers, support system, transportation    [address] orders at discharge: none    Durable Medical Equipment ordered at discharge: none    Medication(s):   New Medications at Discharge:  ·  flagyl 250 mg TID for 5 days. · levaquin 250 mg daily for 5 days. Changed Medications at Discharge: none  Discontinued Medications at Discharge: none    Medication reconciliation was performed with patient, who verbalizes understanding of administration of home medications. There were no barriers to obtaining medications identified at this time.     Referral to Pharm D needed: no     Current Outpatient Medications   Medication Sig    metroNIDAZOLE (FLAGYL) 250 mg tablet Take 1 Tab by mouth three (3) times daily.  levoFLOXacin (LEVAQUIN) 250 mg tablet Take 1 Tab by mouth daily.  carboxymethylcellulose sodium (REFRESH LIQUIGEL) 1 % dlgl ophthalmic solution Administer 1 Drop to both eyes as needed (dry eyes).  cyanocobalamin (VITAMIN B12) 500 mcg tablet Take 500 mcg by mouth daily.  acetaminophen (TYLENOL) 325 mg tablet Take 325-650 mg by mouth every six (6) hours as needed (headache).  diazePAM (VALIUM) 5 mg tablet Take 5 mg by mouth two (2) times daily as needed for Anxiety or Sleep.  ondansetron (ZOFRAN ODT) 4 mg disintegrating tablet Take 4 mg by mouth every eight (8) hours as needed for Nausea.  meclizine (ANTIVERT) 12.5 mg tablet Take 12.5-25 mg by mouth two (2) times daily as needed for Dizziness.  sucralfate (CARAFATE) 100 mg/mL suspension Take 1 g by mouth four (4) times daily as needed (abdominal pain).  diclofenac (VOLTAREN) 1 % gel Apply 2 g to affected area four (4) times daily as needed (knee pain, back pain).  dicyclomine (BENTYL) 10 mg capsule Take 10 mg by mouth three (3) times daily as needed (abdominal cramping).  fluticasone (FLONASE ALLERGY RELIEF) 50 mcg/actuation nasal spray 1 [place] by Both Nostrils route daily.  calcium carbonate (TUMS) 200 mg calcium (500 mg) chew Take 1 Tab by mouth four (4) times daily as needed.  atenolol (TENORMIN) 25 mg tablet Take 25 mg by mouth daily (after dinner).  clopidogrel (PLAVIX) 75 mg tablet Take 75 mg by mouth daily (after dinner). No current facility-administered medications for this visit. There are no discontinued medications. [facility] follow up appointment(s): No future appointments. Non-[facility] follow up appointment(s):   PCP- Weston Ventura, NP-   [facility]:  n/a       Goals        Post Hospitalization     Attends follow-up appointments as directed.       11/4/19- PCP- Bart Birch NP- she has called and will schedule when they return call. GI- Dr. Jess Dickerson- she is calling them tomorrow to schedule- she has seen this provider group in the past.   Nephrology- Dr. Tatiana Lauren- she has appt in Feb 2020- I will call to see if I can get one sooner. [facility]        Identification of barriers to adherence to a plan of care such as inability to afford medications, lack of insurance, lack of transportation, etc.      11/4/19- Ms. Nii Casillas is raising grand-daughter, Baltazar Chung- she is now 11 yo but she does not drive. Her DIL is available to help but does need notice to be available. Right now she is coordinating f/u appointments and DIL will be taking her. [facility]         Returns to baseline activity level. 11/4/19- spoke with Ms. Nii Casillas- she said she has had improved stools over the past 2 days- eating and tolerating small meals well- appetite fair but improving. She has good understanding of what transpired in the hospital.  She is reaching out to make f/u appointments.  [facility]

## 2019-11-05 LAB
BACTERIA SPEC CULT: NORMAL
SERVICE CMNT-IMP: NORMAL

## 2019-12-19 ENCOUNTER — PATIENT OUTREACH (OUTPATIENT)
Dept: CARDIOLOGY CLINIC | Age: 84
End: 2019-12-19

## 2019-12-19 NOTE — PROGRESS NOTES
Care Transitions Nurse Note:  Goals        Post Hospitalization     Attends follow-up appointments as directed. 12/19/19-   PCP- Nabila Perez NP- Ms. Manisha Cuba said she has seen but office states last appt with NP was on 10/24- does not have current scheduled appt. Neurology- changing to new - seeing Dr. Jj Lizarraga in January 21, 2020. GI- Dr. Real Oneal. Saw on 12/2- spoke with office- she will need to call to schedule her procedure(s)- relayed to Ms. Manisha Cuba- right now they are scheduling for end of January-beginning of February. Nephrology-  Dr. Shayan Cordero- called office- they were not able to move up- scheduled now for Feb 5th at 4 pm.  There are only two providers in this office and holidays and hospital coverage are limiting in office availability. [facility]     11/4/19-   PCP- Nabila Perez NP- she has called and will schedule when they return call. GI- Dr. Ralph Fragoso- she is calling them tomorrow to schedule- she has seen this provider group in the past. Saw on 12/2. Will be doing Upper and lower scopes- to be scheduled. Nephrology- Dr. Shayan Cordero- she has appt in Feb 2020- I will call to see if I can get one sooner. [facility]        Identification of barriers to adherence to a plan of care such as inability to afford medications, lack of insurance, lack of transportation, etc.      12/19/19- sending out via [address] mail- information about diet recommendations- food items and amounts for IBS-colitis. [facility]     11/4/19- Ms. Manisha Cuba is raising grand-daughter, Yas Chavez- she is now 13 yo but she does not drive. Her DIL is available to help but does need notice to be available. Right now she is coordinating f/u appointments and DIL will be taking her. [facility]         Returns to baseline activity level.       12/19/19- spoke with Ms. Manisha Cuba- she has had diarrhea today- has been careful to watch what she is eating- no discomfort- but finds it challenging to eat- she has lost a lot of weight in past few months. Sending out materials after review recommended diet. [facility]     11/4/19- spoke with Ms. Lida Gerardo- she said she has had improved stools over the past 2 days- eating and tolerating small meals well- appetite fair but improving. She has good understanding of what transpired in the hospital.  She is reaching out to make f/u appointments.  LL[facility]

## 2020-01-21 ENCOUNTER — OFFICE VISIT (OUTPATIENT)
Dept: NEUROLOGY | Age: 85
End: 2020-01-21

## 2020-01-21 VITALS
DIASTOLIC BLOOD PRESSURE: 90 MMHG | SYSTOLIC BLOOD PRESSURE: 132 MMHG | HEIGHT: 61 IN | OXYGEN SATURATION: 98 % | HEART RATE: 78 BPM | BODY MASS INDEX: 22.84 KG/M2 | WEIGHT: 121 LBS

## 2020-01-21 DIAGNOSIS — I95.1 ORTHOSTASIS: ICD-10-CM

## 2020-01-21 DIAGNOSIS — R42 DIZZINESS: ICD-10-CM

## 2020-01-21 DIAGNOSIS — R26.81 UNSTEADY GAIT: Primary | ICD-10-CM

## 2020-01-21 RX ORDER — ATENOLOL 25 MG/1
1 TABLET ORAL DAILY
COMMUNITY
End: 2020-01-21 | Stop reason: SDUPTHER

## 2020-01-21 NOTE — PROGRESS NOTES
Name:  Herbert Loyola      :  1935    PCP:   Faustino Montero NP      Referring:  As above  MRN:   [id number]    Chief Complaint:   Chief Complaint   Patient presents with    Dizziness       HISTORY OF PRESENT ILLNESS:     This is a 81 y.o. female with PMhx DM (diet controlled), BRCA (s/p lumpectomy ), Anxiety, Hx of TIA, HTN, Hyponatremia, Hx Melanoma (s/p resection, right cheek) who presents for evaluation of dizziness. Patient reported that she's had dizziness since 2018. She can't exactly say when it started but says that sometime in in 2018 she had a fall off a low retaining wall (1 to 1.5 feet), backwards, hit right hip, but did not hit head. Completely aware. Neighbors heard her scream and they came to check on her. She thinks the dizziness started a few weeks after that. Went to [facility] [address] on 18 for complaint of dizziness. Was evaluated by Dr Ferrell/ Neurology during that admission. MRI Brain did not show any evidence of stroke, there was mild atrophy and compensatory dilatation of ventricles, mild chronic small vessel ischemic changes. MRA Brain was normal.  Carotid Dopplers were normal, no significant stenosis. TTE: normal systolic function, EF 05-71%, no PFO. She says she was seeing ENT/ a Dr Aakash Hoang and he sent her for PT-Gait therapy which she did a few times. She says he thought her dizziness may be coming from her anxiety. He referred her to Dr Leatha Parker Neurology for neurology evaluation. She saw him a few visits, no clear cause of her dizziness was found, so she decided to seek other neurologic care. She describes the dizziness as unsteady gait, not vertigo/ spinning, not light-headed when moving from lying to sitting or sitting to standing. Says it can occur at anytime while walking. Was happening daily for a long time, then stopped having it after Summer of 2019, then just restarted today.         Complete Review of Systems: + anxiety, constipation, diarrhea, falls (1 in 2018), some memory loss, nausea, decreased appetite, dyspnea, stomach pian, swallowing difficulty, vertigo, visual disturbance, weight change; otherwise as noted in HPI     Allergies   Allergen Reactions    Sulfa (Sulfonamide Antibiotics) Nausea and Vomiting    Codeine Other (comments)     Stomach cramping    Levaquin [Levofloxacin] Nausea and Vomiting     Pt can not take PO due to vomiting.  Linzess [Linaclotide] Diarrhea     Past Medical History:   Diagnosis Date    Anxiety     Breast cancer ([address] 1993    s/p lumpectomy    Diabetes mellitus ([facility]     diet-controlled    Hx-TIA (transient ischemic attack)     Hypertension     Hyponatremia     Melanoma ([facility] 2008    s/p resection, right cheek     Current Outpatient Medications   Medication Sig Dispense Refill    compr.stocking,thigh,reg,small (COMP. STOCKING,THIGH,REG,SMALL) misc Thigh-high, compression stockings for dizziness. Wear daily. Dispense one pair. 1 Units 0    carboxymethylcellulose sodium (REFRESH LIQUIGEL) 1 % dlgl ophthalmic solution Administer 1 Drop to both eyes as needed (dry eyes).  cyanocobalamin (VITAMIN B12) 500 mcg tablet Take 500 mcg by mouth as needed.  acetaminophen (TYLENOL) 325 mg tablet Take 325-650 mg by mouth every six (6) hours as needed (headache).  diazePAM (VALIUM) 5 mg tablet Take 5 mg by mouth two (2) times daily as needed for Anxiety or Sleep.  ondansetron (ZOFRAN ODT) 4 mg disintegrating tablet Take 4 mg by mouth every eight (8) hours as needed for Nausea.  meclizine (ANTIVERT) 12.5 mg tablet Take 12.5-25 mg by mouth two (2) times daily as needed for Dizziness.  sucralfate (CARAFATE) 100 mg/mL suspension Take 1 g by mouth four (4) times daily as needed (abdominal pain).  diclofenac (VOLTAREN) 1 % gel Apply 2 g to affected area four (4) times daily as needed (knee pain, back pain).       dicyclomine (BENTYL) 10 mg capsule Take 10 mg by mouth three (3) times daily as needed (abdominal cramping).  fluticasone (FLONASE ALLERGY RELIEF) 50 mcg/actuation nasal spray 1 [place] by Both Nostrils route daily.  calcium carbonate (TUMS) 200 mg calcium (500 mg) chew Take 1 Tab by mouth four (4) times daily as needed.  atenolol (TENORMIN) 25 mg tablet Take 25 mg by mouth daily (after dinner).  clopidogrel (PLAVIX) 75 mg tablet Take 75 mg by mouth daily (after dinner).        Past Surgical History:   Procedure Laterality Date    HX BACK SURGERY      HX BREAST LUMPECTOMY  1993    right    HX CHOLECYSTECTOMY      HX HYSTERECTOMY      HX MALIGNANT SKIN LESION EXCISION  2008    HX MENISCECTOMY      HX OTHER SURGICAL      pineda's neuroma bilaterally     Family History   Problem Relation Age of Onset    Heart Failure Mother     Other Father         aortic aneurysm    Diabetes Son     Immunodeficiency Son         post kidney and pancreas transplant     Social History     Socioeconomic History    Marital status: UNKNOWN     Spouse name: Not on file    Number of children: Not on file    Years of education: Not on file    Highest education level: Not on file   Occupational History    Occupation: retired from [employer] strain: Not on file    Food insecurity:     Worry: Not on file     Inability: Not on file   GI Track needs:     Medical: Not on file     Non-medical: Not on file   Tobacco Use    Smoking status: Never Smoker    Smokeless tobacco: Never Used   Substance and Sexual Activity    Alcohol use: No    Drug use: Not on file    Sexual activity: Not on file   Lifestyle    Physical activity:     Days per week: Not on file     Minutes per session: Not on file    Stress: Not on file   Relationships    Social connections:     Talks on phone: Not on file     Gets together: Not on file     Attends Jain service: Not on file     Active member of club or organization: Not on file     Attends meetings of clubs or organizations: Not on file     Relationship status: Not on file    Intimate partner violence:     Fear of current or ex partner: Not on file     Emotionally abused: Not on file     Physically abused: Not on file     Forced sexual activity: Not on file   Other Topics Concern    Not on file   Social History Narrative    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 Eyes:  Conjunctivae/corneas clear   Lungs:  Heart:  Not examined  Not examined    Extremities: No edema.    Skin: No rashes    Neurologic Exam       Language: normal  Memory:  Alert, oriented to person, place, situation    Cranial Nerves:  I: smell Not tested   II: visual fields Full to confrontation   II: pupils Equal, round, reactive to light   II: optic disc Not examined, not relevant   III,VII: ptosis none   III,IV,VI: extraocular muscles  normal   V: facial light touch sensation  normal   VII: facial muscle function  symmetric   VIII: hearing symmetric   IX: soft palate elevation  Not examined, not relevant   XI: sternocleidomastoid strength 5/5   XII: tongue  Not examined, not relevant      Motor: normal bulk, tone, strength in all exts  Sensory: intact to LT, PP, temp, vibration x 4 exts   Cerebellar: no rest tremors, no cogwheel rigidity  + postural tremors (left >> right)  + intention tremor (left >> right)  No dysmetria on FNF on either side    Reflexes: 1+  Biceps, 2+ patellars, trace achilles (symmetric)  Plantar response: neutral bilaterally    Gait: stands independently, has slow cautious gait, not shuffling, mild unsteady when turning         ASSESSMENT AND PLAN ICD-10-CM ICD-9-CM    1. Unsteady gait R26.81 781.2    2. Dizziness R42 780.4 compr.stocking,thigh,reg,small (COMP. STOCKING,THIGH,REG,SMALL) misc   3. Orthostasis I95.1 458.0 compr.stocking,thigh,reg,small (COMP. STOCKING,THIGH,REG,SMALL) misc       Very mild positive orthostatic vital signs (HR jumped 21 beats from lying to standing). Advised pt to increase dietary salt intake. Rx'd thigh-high compression stocking to reduce orthostatic symptoms/ dizziness/ unsteadiness. Advised pt to call back if she's still having the same symptoms after trying the above, and I would then order Autonomic Nervous System testing (ANS/ tilt-table testing) to evaluate further. Thank you for allowing me to be a part of your patient's care. Please call me at [phone] if you have any questions.      Sincerely,  Sabina Lima MD

## 2020-01-22 ENCOUNTER — TELEPHONE (OUTPATIENT)
Dept: NEUROLOGY | Age: 85
End: 2020-01-22

## 2020-01-22 NOTE — TELEPHONE ENCOUNTER
Received call from pharmacy[facility]. They received the script for compression stocking but wanted to know which strength(15-20 or 20-30). Informed them to start with the 15-20 for this patient.

## 2020-01-23 ENCOUNTER — TELEPHONE (OUTPATIENT)
Dept: NEUROLOGY | Age: 85
End: 2020-01-23

## 2020-01-23 NOTE — TELEPHONE ENCOUNTER
Pharmacy told patient that compression stockings come in knee high or thigh high compression stocking. Patient wanted to check with Dr. Alisia Hoffman to see which one he wanted her to have.  The pharmacy currently has knee high in stock

## 2020-01-24 NOTE — TELEPHONE ENCOUNTER
----- Message from Karin Albarado sent at 1/24/2020  1:40 PM EST -----  Regarding: Dr. Luigi Meadows Message/Vendor Calls    Caller's first and last name:      Reason for call:  Discuss which compression stockings to get. Knee high or thigh high.     Callback required yes/no and why: yes      Best contact number(s): [phone]      Details to clarify the request:      Karin Albarado

## 2020-01-24 NOTE — TELEPHONE ENCOUNTER
R/t call to patient. Informed her that Dr. Blanca Diaz wanted her have a thigh high compression stocking.

## 2020-07-26 RX ORDER — CLOPIDOGREL BISULFATE 75 MG/1
TABLET ORAL
Qty: 90 TAB | Refills: 0 | Status: SHIPPED | OUTPATIENT
Start: 2020-07-26 | End: 2020-10-19

## 2020-09-08 ENCOUNTER — TELEPHONE (OUTPATIENT)
Dept: PRIMARY CARE CLINIC | Age: 85
End: 2020-09-08

## 2020-09-09 NOTE — TELEPHONE ENCOUNTER
I called pt and she told me that  will be calling to talk to [facility]. Pt said someone called SS on her saying that she was not able to take care of her granddaughter. Pt granddaughter is 16years old and in college now. Pt was very up set about all of this she just want someone to know what's going on. Pt said she have raise her granddaughter since she was 3yrs old and she is 13yrs old and in college. Pt said that this is the second time someone called SS on her.  NALINI

## 2020-09-17 RX ORDER — SUCRALFATE 1 G/1
TABLET ORAL
Qty: 40 TAB | Refills: 1 | Status: SHIPPED | OUTPATIENT
Start: 2020-09-17 | End: 2020-11-03 | Stop reason: SDUPTHER

## 2020-10-09 RX ORDER — ATENOLOL 25 MG/1
TABLET ORAL
Qty: 90 TAB | Refills: 0 | Status: SHIPPED | OUTPATIENT
Start: 2020-10-09 | End: 2020-11-03 | Stop reason: SDUPTHER

## 2020-10-19 RX ORDER — CLOPIDOGREL BISULFATE 75 MG/1
TABLET ORAL
Qty: 90 TAB | Refills: 0 | Status: SHIPPED | OUTPATIENT
Start: 2020-10-19 | End: 2021-01-13

## 2020-10-21 RX ORDER — DIAZEPAM 5 MG/1
TABLET ORAL
Qty: 60 TAB | Refills: 0 | OUTPATIENT
Start: 2020-10-21

## 2020-11-03 ENCOUNTER — OFFICE VISIT (OUTPATIENT)
Dept: PRIMARY CARE CLINIC | Age: 85
End: 2020-11-03
Payer: MEDICARE

## 2020-11-03 VITALS
OXYGEN SATURATION: 98 % | SYSTOLIC BLOOD PRESSURE: 150 MMHG | TEMPERATURE: 97.1 F | HEIGHT: 61 IN | WEIGHT: 92 LBS | HEART RATE: 78 BPM | BODY MASS INDEX: 17.37 KG/M2 | DIASTOLIC BLOOD PRESSURE: 82 MMHG | RESPIRATION RATE: 18 BRPM

## 2020-11-03 DIAGNOSIS — K21.00 GASTROESOPHAGEAL REFLUX DISEASE WITH ESOPHAGITIS WITHOUT HEMORRHAGE: ICD-10-CM

## 2020-11-03 DIAGNOSIS — R20.0 NUMBNESS OF TOES: ICD-10-CM

## 2020-11-03 DIAGNOSIS — R42 DIZZINESS: ICD-10-CM

## 2020-11-03 DIAGNOSIS — N63.21 LUMP IN UPPER OUTER QUADRANT OF LEFT BREAST: ICD-10-CM

## 2020-11-03 DIAGNOSIS — R63.4 WEIGHT LOSS: ICD-10-CM

## 2020-11-03 DIAGNOSIS — F41.9 ANXIETY: Chronic | ICD-10-CM

## 2020-11-03 DIAGNOSIS — E87.1 HYPONATREMIA: ICD-10-CM

## 2020-11-03 DIAGNOSIS — I10 ESSENTIAL HYPERTENSION, BENIGN: Primary | Chronic | ICD-10-CM

## 2020-11-03 DIAGNOSIS — K58.2 IRRITABLE BOWEL SYNDROME WITH BOTH CONSTIPATION AND DIARRHEA: ICD-10-CM

## 2020-11-03 PROBLEM — E11.9 DIABETES MELLITUS (HCC): Status: RESOLVED | Noted: 2019-10-31 | Resolved: 2020-11-03

## 2020-11-03 PROCEDURE — 99214 OFFICE O/P EST MOD 30 MIN: CPT | Performed by: NURSE PRACTITIONER

## 2020-11-03 RX ORDER — DIAZEPAM 5 MG/1
5 TABLET ORAL
Qty: 60 TAB | Refills: 5 | Status: SHIPPED | OUTPATIENT
Start: 2020-11-03 | End: 2021-05-24

## 2020-11-03 RX ORDER — DICYCLOMINE HYDROCHLORIDE 10 MG/1
10 CAPSULE ORAL
Qty: 90 CAP | Refills: 3 | Status: SHIPPED | OUTPATIENT
Start: 2020-11-03 | End: 2021-05-06

## 2020-11-03 RX ORDER — SUCRALFATE 1 G/1
1 TABLET ORAL 4 TIMES DAILY
Qty: 40 TAB | Refills: 5 | Status: SHIPPED
Start: 2020-11-03 | End: 2021-06-01

## 2020-11-03 RX ORDER — ONDANSETRON 4 MG/1
4 TABLET, ORALLY DISINTEGRATING ORAL
Qty: 20 TAB | Refills: 5 | Status: SHIPPED | OUTPATIENT
Start: 2020-11-03 | End: 2021-09-22

## 2020-11-03 RX ORDER — POLYETHYLENE GLYCOL 3350 17 G/17G
17 POWDER, FOR SOLUTION ORAL DAILY
COMMUNITY

## 2020-11-03 RX ORDER — ATENOLOL 25 MG/1
TABLET ORAL
Qty: 90 TAB | Refills: 1 | Status: SHIPPED | OUTPATIENT
Start: 2020-11-03 | End: 2021-08-03

## 2020-11-03 RX ORDER — MECLIZINE HCL 12.5 MG 12.5 MG/1
12.5-25 TABLET ORAL
Qty: 180 TAB | Refills: 1 | Status: SHIPPED | OUTPATIENT
Start: 2020-11-03 | End: 2021-11-15

## 2020-11-03 NOTE — PROGRESS NOTES
HISTORY OF PRESENT ILLNESS  Jesse Alvarez is a 81 y.o. female presents to the office for medication refill and lab work    Cassius Lund presents to the office for chronic OV. 1. Hyponatremia: Patient has on going issues with hyponatremia which causes her dizziness, nausea and fatigue. Has an appointment to follow up with nephrology on Feb 21st. Patient notes that she hasn't had any issues recently with her sodium levels    2. Vertigo: Patient reported her vertigo is well controlled with her meclizine. Uses it PRN. Has seen neurology for this. 3. IBS-C: Seeing GI for this. Had episodes of rectal bleeding and was in the ER in December, start on flagyl with resolution in diarrhea/blood in stool. Patient reported that she hasn't follow back up with the GI doctors yet due to covid. Has been using bentyl for pain. Patient reported she is continuing to lose weight. 4. Anxiety: Uses valium PRN for her anxiety. Patient has concerned that she isn't going to live much longer due to declining health. 5. GERD: Patient reported that the carafate has been very helpful for her acid reflux when needed. Patient reported she is unable to swallow her pantoprazole. 6. Toe: Patient c/o redness and coldness to great toe on right foot. Patient reported she thinks the blood flow is reduced. Denies pain. Notes some neuropathy in that foot and notes similar sensation to toe. Is established with podiatrist who she is trying to call for an appointment. 7. Hypertension:   Patient takes atenolol once a day for HTN. This medication is working well for patient. Patients HTN is elevated today but patient notes that she is stressed.     8. Breast lump:  Patient c/o non painful breast lump to left breast.         Patient Active Problem List   Diagnosis Code    Pneumonia, organism unspecified(486) J18.9    Hyponatremia E87.1    Essential hypertension, benign I10    Anxiety F41.9    History of TIAs Z86.73    Dizziness R42    Weakness R53.1    Pancreatic cyst K86.2    GI bleed K92.2    Acute colitis K52.9    Weight loss R63.4    Irritable bowel syndrome with both constipation and diarrhea K58.2    Gastroesophageal reflux disease with esophagitis without hemorrhage K21.00     Patient Active Problem List    Diagnosis Date Noted    Weight loss 11/03/2020    Irritable bowel syndrome with both constipation and diarrhea 11/03/2020    Gastroesophageal reflux disease with esophagitis without hemorrhage 11/03/2020    Pancreatic cyst 10/31/2019    GI bleed 10/31/2019    Acute colitis 10/31/2019    Dizziness 07/19/2018    Weakness 07/19/2018    Pneumonia, organism unspecified(486) 03/05/2013    Hyponatremia 03/05/2013    Essential hypertension, benign 03/05/2013    Anxiety 03/05/2013    History of TIAs 03/05/2013     Current Outpatient Medications   Medication Sig Dispense Refill    polyethylene glycol (Miralax) 17 gram/dose powder Take 17 g by mouth daily.  atenoloL (TENORMIN) 25 mg tablet TAKE ONE TABLET BY MOUTH DAILY 90 Tab 1    diazePAM (VALIUM) 5 mg tablet Take 1 Tab by mouth two (2) times daily as needed for Anxiety or Sleep. Max Daily Amount: 10 mg. 60 Tab 5    dicyclomine (BENTYL) 10 mg capsule Take 1 Cap by mouth three (3) times daily as needed (abdominal cramping). 90 Cap 3    sucralfate (CARAFATE) 1 gram tablet Take 1 Tab by mouth four (4) times daily. 40 Tab 5    meclizine (ANTIVERT) 12.5 mg tablet Take 1-2 Tabs by mouth two (2) times daily as needed for Dizziness. 180 Tab 1    ondansetron (ZOFRAN ODT) 4 mg disintegrating tablet Take 1 Tab by mouth every eight (8) hours as needed for Nausea. 20 Tab 5    clopidogreL (PLAVIX) 75 mg tab TAKE 1 TABLET BY MOUTH EVERY DAY 90 Tab 0    carboxymethylcellulose sodium (REFRESH LIQUIGEL) 1 % dlgl ophthalmic solution Administer 1 Drop to both eyes as needed (dry eyes).  cyanocobalamin (VITAMIN B12) 500 mcg tablet Take 500 mcg by mouth as needed.       acetaminophen (TYLENOL) 325 mg tablet Take 325-650 mg by mouth every six (6) hours as needed (headache).  diclofenac (VOLTAREN) 1 % gel Apply 2 g to affected area four (4) times daily as needed (knee pain, back pain).  fluticasone (FLONASE ALLERGY RELIEF) 50 mcg/actuation nasal spray 1 [place] by Both Nostrils route daily. Allergies   Allergen Reactions    Sulfa (Sulfonamide Antibiotics) Nausea and Vomiting    Codeine Other (comments)     Stomach cramping    Dairy Aid [Lactase] Unknown (comments)    Levaquin [Levofloxacin] Nausea and Vomiting     Pt can not take PO due to vomiting.  Linzess [Linaclotide] Diarrhea    Tomato Unknown (comments)     Past Medical History:   Diagnosis Date    Allergies     Anxiety     [facility] ([facility] 1993    s/p lumpectomy    Diabetes mellitus ([facility]     diet-controlled    Hx-TIA (transient ischemic attack)     Hypertension     Hyponatremia     Melanoma ([facility] [address] 2008    s/p resection, right cheek     Past Surgical History:   Procedure Laterality Date    HX BACK SURGERY      HX BREAST LUMPECTOMY  1993    right    HX CHOLECYSTECTOMY      HX HYSTERECTOMY      HX MALIGNANT SKIN LESION EXCISION  2008    HX MENISCECTOMY      HX OTHER SURGICAL      pineda's neuroma bilaterally     Family History   Problem Relation Age of Onset    Heart Failure Mother     Other Father         aortic aneurysm    Diabetes Son     Immunodeficiency Son         post kidney and pancreas transplant     Social History     Tobacco Use    Smoking status: Never Smoker    Smokeless tobacco: Never Used   Substance Use Topics    Alcohol use: No           Review of Systems   Constitutional: Positive for weight loss. Negative for malaise/fatigue. HENT: Negative for ear pain and tinnitus. Respiratory: Negative for cough and shortness of breath. Cardiovascular: Negative for palpitations and leg swelling. Gastrointestinal: Positive for constipation and nausea.  Negative for vomiting. Musculoskeletal: Negative for myalgias. Skin: Negative for rash. Neurological: Positive for dizziness. Negative for tingling and headaches. Psychiatric/Behavioral: Negative for depression and memory loss. The patient is nervous/anxious. Physical Exam  Constitutional:       Appearance: Normal appearance. She is normal weight. HENT:      Head: Normocephalic. Eyes:      Extraocular Movements: Extraocular movements intact. Conjunctiva/sclera: Conjunctivae normal.      Pupils: Pupils are equal, round, and reactive to light. Neck:      Musculoskeletal: Normal range of motion and neck supple. Cardiovascular:      Rate and Rhythm: Normal rate and regular rhythm. Pulses:           Dorsalis pedis pulses are 1+ on the right side and 1+ on the left side. Posterior tibial pulses are 1+ on the right side and 1+ on the left side. Heart sounds: Normal heart sounds. Pulmonary:      Effort: Pulmonary effort is normal.      Breath sounds: Normal breath sounds. Chest:      Chest wall: Mass present. Abdominal:      General: Abdomen is flat. Palpations: Abdomen is soft. Musculoskeletal: Normal range of motion. Feet:      Right foot:      Skin integrity: Skin integrity normal.      Toenail Condition: Fungal disease present. Left foot:      Skin integrity: Skin integrity normal.   Skin:     General: Skin is warm and dry. Capillary Refill: Capillary refill takes 2 to 3 seconds. Great toe right foot  Neurological:      General: No focal deficit present. Mental Status: She is alert and oriented to person, place, and time. Psychiatric:         Mood and Affect: Mood is anxious. Speech: Speech normal.         Behavior: Behavior normal.         Thought Content:  Thought content normal.         Cognition and Memory: Cognition and memory normal.         Judgment: Judgment normal.           ASSESSMENT and PLAN    Diagnoses and all orders for this visit: 1. Essential hypertension, benign  -     METABOLIC PANEL, COMPREHENSIVE  -     atenoloL (TENORMIN) 25 mg tablet; TAKE ONE TABLET BY MOUTH DAILY    2. Dizziness  -     CBC WITH AUTOMATED DIFF  -     meclizine (ANTIVERT) 12.5 mg tablet; Take 1-2 Tabs by mouth two (2) times daily as needed for Dizziness. 3. Anxiety  -     atenoloL (TENORMIN) 25 mg tablet; TAKE ONE TABLET BY MOUTH DAILY  -     diazePAM (VALIUM) 5 mg tablet; Take 1 Tab by mouth two (2) times daily as needed for Anxiety or Sleep. Max Daily Amount: 10 mg.    4. Hyponatremia  -     METABOLIC PANEL, COMPREHENSIVE    5. Gastroesophageal reflux disease with esophagitis without hemorrhage  -     sucralfate (CARAFATE) 1 gram tablet; Take 1 Tab by mouth four (4) times daily. 6. Irritable bowel syndrome with both constipation and diarrhea  -     dicyclomine (BENTYL) 10 mg capsule; Take 1 Cap by mouth three (3) times daily as needed (abdominal cramping). -     ondansetron (ZOFRAN ODT) 4 mg disintegrating tablet; Take 1 Tab by mouth every eight (8) hours as needed for Nausea. 7. Weight loss  Comments:  losing weight (about 30lbs since Jan). Notes she just doesn't have much of an appetite. will eat daily but fills up quickly. Assessment & Plan:  Concern with her unintentional weight loss. Sending for mammo to assess lump in breast.   She is established with GI but her colonoscopy and endoscopy continue to get reschedule due to her not feeling well or issues with covid. Patient would benefit from follow up with GI, she agrees to call them. 8. Lump in upper outer quadrant of left breast  Comments:  concerning. Will send for mammo. Orders:  -     HUMBERTO MAMMO LT DX INCL CAD; Future    9. Numbness of toes  Comments:  follow up with podiatry. Cap refill about 3 seconds. Patient has good pulses. Will follow up with dr. Ancelmo Matias, notes this feels like her neuropathy.         Geni Velazco NP

## 2020-11-04 DIAGNOSIS — F41.9 ANXIETY: Chronic | ICD-10-CM

## 2020-11-04 DIAGNOSIS — I10 ESSENTIAL HYPERTENSION, BENIGN: Chronic | ICD-10-CM

## 2020-11-04 LAB
ALBUMIN SERPL-MCNC: 4.4 G/DL (ref 3.6–4.6)
ALBUMIN/GLOB SERPL: 1.8 {RATIO} (ref 1.2–2.2)
ALP SERPL-CCNC: 70 IU/L (ref 39–117)
ALT SERPL-CCNC: 13 IU/L (ref 0–32)
AST SERPL-CCNC: 21 IU/L (ref 0–40)
BASOPHILS # BLD AUTO: 0.1 X10E3/UL (ref 0–0.2)
BASOPHILS NFR BLD AUTO: 1 %
BILIRUB SERPL-MCNC: 0.2 MG/DL (ref 0–1.2)
BUN SERPL-MCNC: 11 MG/DL (ref 8–27)
BUN/CREAT SERPL: 14 (ref 12–28)
CALCIUM SERPL-MCNC: 9.5 MG/DL (ref 8.7–10.3)
CHLORIDE SERPL-SCNC: 95 MMOL/L (ref 96–106)
CO2 SERPL-SCNC: 26 MMOL/L (ref 20–29)
CREAT SERPL-MCNC: 0.81 MG/DL (ref 0.57–1)
EOSINOPHIL # BLD AUTO: 0.2 X10E3/UL (ref 0–0.4)
EOSINOPHIL NFR BLD AUTO: 2 %
ERYTHROCYTE [DISTWIDTH] IN BLOOD BY AUTOMATED COUNT: 11.6 % (ref 11.7–15.4)
GLOBULIN SER CALC-MCNC: 2.4 G/DL (ref 1.5–4.5)
GLUCOSE SERPL-MCNC: 106 MG/DL (ref 65–99)
HCT VFR BLD AUTO: 41.5 % (ref 34–46.6)
HGB BLD-MCNC: 13.7 G/DL (ref 11.1–15.9)
IMM GRANULOCYTES # BLD AUTO: 0 X10E3/UL (ref 0–0.1)
IMM GRANULOCYTES NFR BLD AUTO: 0 %
LYMPHOCYTES # BLD AUTO: 3.2 X10E3/UL (ref 0.7–3.1)
LYMPHOCYTES NFR BLD AUTO: 47 %
MCH RBC QN AUTO: 29 PG (ref 26.6–33)
MCHC RBC AUTO-ENTMCNC: 33 G/DL (ref 31.5–35.7)
MCV RBC AUTO: 88 FL (ref 79–97)
MONOCYTES # BLD AUTO: 0.6 X10E3/UL (ref 0.1–0.9)
MONOCYTES NFR BLD AUTO: 9 %
NEUTROPHILS # BLD AUTO: 2.8 X10E3/UL (ref 1.4–7)
NEUTROPHILS NFR BLD AUTO: 41 %
PLATELET # BLD AUTO: 281 X10E3/UL (ref 150–450)
POTASSIUM SERPL-SCNC: 4.6 MMOL/L (ref 3.5–5.2)
PROT SERPL-MCNC: 6.8 G/DL (ref 6–8.5)
RBC # BLD AUTO: 4.73 X10E6/UL (ref 3.77–5.28)
SODIUM SERPL-SCNC: 131 MMOL/L (ref 134–144)
WBC # BLD AUTO: 6.9 X10E3/UL (ref 3.4–10.8)

## 2020-11-04 RX ORDER — ATENOLOL 25 MG/1
TABLET ORAL
Qty: 30 TAB | Refills: 0 | OUTPATIENT
Start: 2020-11-04

## 2020-11-04 NOTE — ASSESSMENT & PLAN NOTE
Concern with her unintentional weight loss. Sending for mammo to assess lump in breast.   She is established with GI but her colonoscopy and endoscopy continue to get reschedule due to her not feeling well or issues with covid. Patient would benefit from follow up with GI, she agrees to call them.

## 2020-11-05 ENCOUNTER — TELEPHONE (OUTPATIENT)
Dept: PRIMARY CARE CLINIC | Age: 85
End: 2020-11-05

## 2020-11-05 NOTE — TELEPHONE ENCOUNTER
[facility] needs you to call them about the order that was put in for this patient's mammo because they stated that it need's something different and added to it the # that you can reach Moni Carcamo or [facility] DANILO at is [phone]. Fax # is [phone].

## 2020-11-06 DIAGNOSIS — N63.0 BREAST LUMP: Primary | ICD-10-CM

## 2020-11-06 NOTE — TELEPHONE ENCOUNTER
I spoke to Albert at [address] today and fax over what you order. Then Price called back and said that she need a order for a U/S. I told her the U/S was not order for pt and if she can send us something letting us know what pt needed because you Anne Marie Olivas) will not be back to the office in person until Tuesday, Albert said she will wait until Tuesday and call back.  NALINI

## 2020-11-06 NOTE — TELEPHONE ENCOUNTER
If they think she needs the us I will order it.   Putting it in now if you can please fax the order to them

## 2020-11-16 ENCOUNTER — TELEPHONE (OUTPATIENT)
Dept: PRIMARY CARE CLINIC | Age: 85
End: 2020-11-16

## 2020-11-16 DIAGNOSIS — N63.0 BREAST LUMP: Primary | ICD-10-CM

## 2020-11-16 DIAGNOSIS — N63.21 UNSPECIFIED LUMP IN THE LEFT BREAST, UPPER OUTER QUADRANT: ICD-10-CM

## 2020-11-16 NOTE — TELEPHONE ENCOUNTER
The imaging place called and said pt need two order and it have to say this:    *Bilateral Diagnostic mammogram  *Lt breast U/S limit    The ones you send at first wasn't what they needed it to say. Pt is there now.

## 2020-12-05 DIAGNOSIS — N63.21 UNSPECIFIED LUMP IN THE LEFT BREAST, UPPER OUTER QUADRANT: ICD-10-CM

## 2020-12-05 DIAGNOSIS — N63.0 BREAST LUMP: ICD-10-CM

## 2021-01-13 RX ORDER — CLOPIDOGREL BISULFATE 75 MG/1
TABLET ORAL
Qty: 90 TAB | Refills: 0 | Status: SHIPPED | OUTPATIENT
Start: 2021-01-13 | End: 2021-04-05

## 2021-02-08 ENCOUNTER — OFFICE VISIT (OUTPATIENT)
Dept: PRIMARY CARE CLINIC | Age: 86
End: 2021-02-08
Payer: MEDICARE

## 2021-02-08 VITALS
RESPIRATION RATE: 18 BRPM | OXYGEN SATURATION: 97 % | DIASTOLIC BLOOD PRESSURE: 90 MMHG | HEART RATE: 80 BPM | SYSTOLIC BLOOD PRESSURE: 150 MMHG | WEIGHT: 94 LBS | BODY MASS INDEX: 17.75 KG/M2 | HEIGHT: 61 IN | TEMPERATURE: 97.7 F

## 2021-02-08 DIAGNOSIS — T14.8XXA INFECTED WOUND: ICD-10-CM

## 2021-02-08 DIAGNOSIS — Z79.01 BLOOD THINNED DUE TO LONG-TERM ANTICOAGULANT USE: Primary | ICD-10-CM

## 2021-02-08 DIAGNOSIS — L08.9 INFECTED WOUND: ICD-10-CM

## 2021-02-08 PROCEDURE — 99213 OFFICE O/P EST LOW 20 MIN: CPT | Performed by: NURSE PRACTITIONER

## 2021-02-08 PROCEDURE — G8536 NO DOC ELDER MAL SCRN: HCPCS | Performed by: NURSE PRACTITIONER

## 2021-02-08 PROCEDURE — G8432 DEP SCR NOT DOC, RNG: HCPCS | Performed by: NURSE PRACTITIONER

## 2021-02-08 PROCEDURE — G8400 PT W/DXA NO RESULTS DOC: HCPCS | Performed by: NURSE PRACTITIONER

## 2021-02-08 PROCEDURE — 1101F PT FALLS ASSESS-DOCD LE1/YR: CPT | Performed by: NURSE PRACTITIONER

## 2021-02-08 PROCEDURE — G8419 CALC BMI OUT NRM PARAM NOF/U: HCPCS | Performed by: NURSE PRACTITIONER

## 2021-02-08 PROCEDURE — 1090F PRES/ABSN URINE INCON ASSESS: CPT | Performed by: NURSE PRACTITIONER

## 2021-02-08 PROCEDURE — G8753 SYS BP > OR = 140: HCPCS | Performed by: NURSE PRACTITIONER

## 2021-02-08 PROCEDURE — G8755 DIAS BP > OR = 90: HCPCS | Performed by: NURSE PRACTITIONER

## 2021-02-08 PROCEDURE — G8427 DOCREV CUR MEDS BY ELIG CLIN: HCPCS | Performed by: NURSE PRACTITIONER

## 2021-02-08 RX ORDER — CEPHALEXIN 500 MG/1
500 CAPSULE ORAL 2 TIMES DAILY
Qty: 14 CAP | Refills: 0 | Status: SHIPPED | OUTPATIENT
Start: 2021-02-08 | End: 2021-02-15

## 2021-02-08 NOTE — PROGRESS NOTES
HISTORY OF PRESENT ILLNESS  Amanda Benavides is a 81 y.o. female presents for wound. Patient reported that she developed what looked like pimple to her face under the left side of her nose x1 month ago. Patient reported that the area was red. On Thursday in the middle of the night the area was bleeding and she couldn't get the area to stop bleeding with pressure. She ended up going to [facility] free standing ER the next day. Patient reported they tried mulitple different things in the ER to get the bleeding to stop. Ended up getting surigical glue put on the area to have it stop. Patient notes that she now has what looks like pus to the area. Patient denies fevers. Patient denies pain to area.         Vitals:    02/08/21 1302 02/08/21 1322   BP: (!) 173/92 (!) 150/90   Pulse: 80    Resp: 18    Temp: 97.7 °F (36.5 °C)    TempSrc: Oral    SpO2: 97%    Weight: 94 lb (42.6 kg)    Height: 5' 1\" (1.549 m)      Patient Active Problem List   Diagnosis Code    Pneumonia, organism unspecified(486) J18.9    Hyponatremia E87.1    Essential hypertension, benign I10    Anxiety F41.9    History of TIAs Z86.73    Dizziness R42    Weakness R53.1    Pancreatic cyst K86.2    GI bleed K92.2    Acute colitis K52.9    Weight loss R63.4    Irritable bowel syndrome with both constipation and diarrhea K58.2    Gastroesophageal reflux disease with esophagitis without hemorrhage K21.00     Patient Active Problem List    Diagnosis Date Noted    Weight loss 11/03/2020    Irritable bowel syndrome with both constipation and diarrhea 11/03/2020    Gastroesophageal reflux disease with esophagitis without hemorrhage 11/03/2020    Pancreatic cyst 10/31/2019    GI bleed 10/31/2019    Acute colitis 10/31/2019    Dizziness 07/19/2018    Weakness 07/19/2018    Pneumonia, organism unspecified(486) 03/05/2013    Hyponatremia 03/05/2013    Essential hypertension, benign 03/05/2013    Anxiety 03/05/2013    History [facility] 03/05/2013     Current Outpatient Medications   Medication Sig Dispense Refill    cephALEXin (KEFLEX) 500 mg capsule Take 1 Cap by mouth two (2) times a day for 7 days. 14 Cap 0    polyethylene glycol (Miralax) 17 gram/dose powder Take 17 g by mouth daily.  atenoloL (TENORMIN) 25 mg tablet TAKE ONE TABLET BY MOUTH DAILY 90 Tab 1    diazePAM (VALIUM) 5 mg tablet Take 1 Tab by mouth two (2) times daily as needed for Anxiety or Sleep. Max Daily Amount: 10 mg. 60 Tab 5    dicyclomine (BENTYL) 10 mg capsule Take 1 Cap by mouth three (3) times daily as needed (abdominal cramping). 90 Cap 3    sucralfate (CARAFATE) 1 gram tablet Take 1 Tab by mouth four (4) times daily. 40 Tab 5    meclizine (ANTIVERT) 12.5 mg tablet Take 1-2 Tabs by mouth two (2) times daily as needed for Dizziness. 180 Tab 1    ondansetron (ZOFRAN ODT) 4 mg disintegrating tablet Take 1 Tab by mouth every eight (8) hours as needed for Nausea. 20 Tab 5    carboxymethylcellulose sodium (REFRESH LIQUIGEL) 1 % dlgl ophthalmic solution Administer 1 Drop to both eyes as needed (dry eyes).  cyanocobalamin (VITAMIN B12) 500 mcg tablet Take 500 mcg by mouth as needed.  acetaminophen (TYLENOL) 325 mg tablet Take 325-650 mg by mouth every six (6) hours as needed (headache).  diclofenac (VOLTAREN) 1 % gel Apply 2 g to affected area four (4) times daily as needed (knee pain, back pain).  fluticasone (FLONASE ALLERGY RELIEF) 50 mcg/actuation nasal spray 1 [place] by Both Nostrils route daily.  clopidogreL (PLAVIX) 75 mg tab TAKE 1 TABLET BY MOUTH EVERY DAY 90 Tab 0     Allergies   Allergen Reactions    Sulfa (Sulfonamide Antibiotics) Nausea and Vomiting    Codeine Other (comments)     Stomach cramping    Dairy Aid [Lactase] Unknown (comments)    Levaquin [Levofloxacin] Nausea and Vomiting     Pt can not take PO due to vomiting.     Linzess [Linaclotide] Diarrhea    Tomato Unknown (comments)     Past Medical History: Diagnosis Date    Allergies     Anxiety     Breast [facility] ([facility] [address] 1993    s/p lumpectomy    Diabetes mellitus ([facility] [address]     diet-controlled    Hx-TIA (transient ischemic attack)     Hypertension     Hyponatremia     [facility]    s/p resection, right cheek    Tremor      Past Surgical History:   Procedure Laterality Date    HX BACK SURGERY      HX BREAST LUMPECTOMY  1993    right    HX CHOLECYSTECTOMY      HX HYSTERECTOMY      HX MALIGNANT SKIN LESION EXCISION  2008    HX MENISCECTOMY      HX OTHER SURGICAL      pineda's neuroma bilaterally     Family History   Problem Relation Age of Onset    Heart Failure Mother     Other Father         aortic aneurysm    Diabetes Son     Immunodeficiency Son         post kidney and pancreas transplant     Social History     Tobacco Use    Smoking status: Never Smoker    Smokeless tobacco: Never Used   Substance Use Topics    Alcohol use: No           Review of Systems   Constitutional: Positive for malaise/fatigue. Negative for chills and fever. HENT: Negative for congestion and sinus pain. Musculoskeletal: Negative for joint pain and myalgias. Skin: Positive for rash. Neurological: Negative for headaches. Physical Exam  Constitutional:       Appearance: Normal appearance. She is underweight. Eyes:      Conjunctiva/sclera: Conjunctivae normal.   Skin:     General: Skin is warm and dry. Neurological:      Mental Status: She is alert and oriented to person, place, and time. Psychiatric:         Mood and Affect: Mood normal.         Behavior: Behavior normal.           ASSESSMENT and PLAN  Diagnoses and all orders for this visit:    1. Blood thinned due to long-term anticoagulant use  Comments:  on long term plavix, caused her to bleed from wound. 2. Infected wound  Comments:  hard to assess as there is dried blood and glue to area but looks like pus is developing under glue. Will treat with antibiotics.    Follow with dermatology. Orders:  -     cephALEXin (KEFLEX) 500 mg capsule; Take 1 Cap by mouth two (2) times a day for 7 days.          Anjana Avery, NP

## 2021-04-05 RX ORDER — CLOPIDOGREL BISULFATE 75 MG/1
TABLET ORAL
Qty: 90 TAB | Refills: 0 | Status: SHIPPED | OUTPATIENT
Start: 2021-04-05 | End: 2021-06-28

## 2021-04-06 RX ORDER — FLUTICASONE PROPIONATE 50 MCG
SPRAY, SUSPENSION (ML) NASAL
Qty: 1 BOTTLE | Refills: 4 | Status: SHIPPED | OUTPATIENT
Start: 2021-04-06 | End: 2022-09-12

## 2021-05-04 ENCOUNTER — APPOINTMENT (OUTPATIENT)
Dept: CT IMAGING | Age: 86
End: 2021-05-04
Attending: EMERGENCY MEDICINE
Payer: MEDICARE

## 2021-05-04 ENCOUNTER — HOSPITAL ENCOUNTER (EMERGENCY)
Age: 86
Discharge: HOME | End: 2021-05-04
Attending: EMERGENCY MEDICINE
Payer: MEDICARE

## 2021-05-04 VITALS
SYSTOLIC BLOOD PRESSURE: 136 MMHG | TEMPERATURE: 97.7 F | HEIGHT: 61 IN | HEART RATE: 67 BPM | DIASTOLIC BLOOD PRESSURE: 72 MMHG | RESPIRATION RATE: 18 BRPM | BODY MASS INDEX: 17.75 KG/M2 | WEIGHT: 94 LBS | OXYGEN SATURATION: 98 %

## 2021-05-04 DIAGNOSIS — E87.1 HYPONATREMIA: Primary | ICD-10-CM

## 2021-05-04 DIAGNOSIS — K58.2 IRRITABLE BOWEL SYNDROME WITH BOTH CONSTIPATION AND DIARRHEA: ICD-10-CM

## 2021-05-04 DIAGNOSIS — R10.819 ABDOMINAL TENDERNESS WITHOUT REBOUND TENDERNESS, UNSPECIFIED LOCATION: ICD-10-CM

## 2021-05-04 DIAGNOSIS — R19.7 DIARRHEA, UNSPECIFIED TYPE: ICD-10-CM

## 2021-05-04 DIAGNOSIS — K52.9 NONINFECTIOUS GASTROENTERITIS, UNSPECIFIED TYPE: ICD-10-CM

## 2021-05-04 LAB
ALBUMIN SERPL-MCNC: 3.6 G/DL (ref 3.5–5)
ALBUMIN/GLOB SERPL: 1.1 {RATIO} (ref 1.1–2.2)
ALP SERPL-CCNC: 66 U/L (ref 45–117)
ALT SERPL-CCNC: 20 U/L (ref 12–78)
ANION GAP SERPL CALC-SCNC: 5 MMOL/L (ref 5–15)
APPEARANCE UR: CLEAR
AST SERPL-CCNC: 21 U/L (ref 15–37)
BACTERIA URNS QL MICRO: NEGATIVE /HPF
BASOPHILS # BLD: 0 K/UL (ref 0–0.1)
BASOPHILS NFR BLD: 1 % (ref 0–1)
BILIRUB SERPL-MCNC: 0.7 MG/DL (ref 0.2–1)
BILIRUB UR QL: NEGATIVE
BUN SERPL-MCNC: 14 MG/DL (ref 6–20)
BUN/CREAT SERPL: 17 (ref 12–20)
CALCIUM SERPL-MCNC: 9.1 MG/DL (ref 8.5–10.1)
CHLORIDE SERPL-SCNC: 95 MMOL/L (ref 97–108)
CO2 SERPL-SCNC: 27 MMOL/L (ref 21–32)
COLOR UR: YELLOW
COMMENT, HOLDF: NORMAL
COMMENT, HOLDF: NORMAL
CREAT SERPL-MCNC: 0.83 MG/DL (ref 0.55–1.02)
DIFFERENTIAL METHOD BLD: NORMAL
EOSINOPHIL # BLD: 0.3 K/UL (ref 0–0.4)
EOSINOPHIL NFR BLD: 3 % (ref 0–7)
EPITH CASTS URNS QL MICRO: ABNORMAL /LPF
ERYTHROCYTE [DISTWIDTH] IN BLOOD BY AUTOMATED COUNT: 12.2 % (ref 11.5–14.5)
GLOBULIN SER CALC-MCNC: 3.4 G/DL (ref 2–4)
GLUCOSE SERPL-MCNC: 97 MG/DL (ref 65–100)
GLUCOSE UR STRIP.AUTO-MCNC: NEGATIVE MG/DL
HCT VFR BLD AUTO: 39.1 % (ref 35–47)
HGB BLD-MCNC: 12.8 G/DL (ref 11.5–16)
HGB UR QL STRIP: NEGATIVE
IMM GRANULOCYTES # BLD AUTO: 0 K/UL (ref 0–0.04)
IMM GRANULOCYTES NFR BLD AUTO: 0 % (ref 0–0.5)
KETONES UR QL STRIP.AUTO: NEGATIVE MG/DL
LEUKOCYTE ESTERASE UR QL STRIP.AUTO: ABNORMAL
LIPASE SERPL-CCNC: 158 U/L (ref 73–393)
LYMPHOCYTES # BLD: 3.4 K/UL (ref 0.8–3.5)
LYMPHOCYTES NFR BLD: 41 % (ref 12–49)
MCH RBC QN AUTO: 29.6 PG (ref 26–34)
MCHC RBC AUTO-ENTMCNC: 32.7 G/DL (ref 30–36.5)
MCV RBC AUTO: 90.3 FL (ref 80–99)
MONOCYTES # BLD: 0.6 K/UL (ref 0–1)
MONOCYTES NFR BLD: 7 % (ref 5–13)
NEUTS SEG # BLD: 3.9 K/UL (ref 1.8–8)
NEUTS SEG NFR BLD: 48 % (ref 32–75)
NITRITE UR QL STRIP.AUTO: NEGATIVE
NRBC # BLD: 0 K/UL (ref 0–0.01)
NRBC BLD-RTO: 0 PER 100 WBC
PH UR STRIP: 6.5 [PH] (ref 5–8)
PLATELET # BLD AUTO: 254 K/UL (ref 150–400)
PMV BLD AUTO: 9.5 FL (ref 8.9–12.9)
POTASSIUM SERPL-SCNC: 4 MMOL/L (ref 3.5–5.1)
PROT SERPL-MCNC: 7 G/DL (ref 6.4–8.2)
PROT UR STRIP-MCNC: NEGATIVE MG/DL
RBC # BLD AUTO: 4.33 M/UL (ref 3.8–5.2)
RBC #/AREA URNS HPF: ABNORMAL /HPF (ref 0–5)
SAMPLES BEING HELD,HOLD: NORMAL
SAMPLES BEING HELD,HOLD: NORMAL
SODIUM SERPL-SCNC: 127 MMOL/L (ref 136–145)
SP GR UR REFRACTOMETRY: 1.01 (ref 1–1.03)
UROBILINOGEN UR QL STRIP.AUTO: 0.2 EU/DL (ref 0.2–1)
WBC # BLD AUTO: 8.2 K/UL (ref 3.6–11)
WBC URNS QL MICRO: ABNORMAL /HPF (ref 0–4)

## 2021-05-04 PROCEDURE — 96361 HYDRATE IV INFUSION ADD-ON: CPT

## 2021-05-04 PROCEDURE — 74011250636 HC RX REV CODE- 250/636: Performed by: EMERGENCY MEDICINE

## 2021-05-04 PROCEDURE — 74177 CT ABD & PELVIS W/CONTRAST: CPT

## 2021-05-04 PROCEDURE — 81001 URINALYSIS AUTO W/SCOPE: CPT

## 2021-05-04 PROCEDURE — 96374 THER/PROPH/DIAG INJ IV PUSH: CPT

## 2021-05-04 PROCEDURE — 36415 COLL VENOUS BLD VENIPUNCTURE: CPT

## 2021-05-04 PROCEDURE — 80053 COMPREHEN METABOLIC PANEL: CPT

## 2021-05-04 PROCEDURE — 83690 ASSAY OF LIPASE: CPT

## 2021-05-04 PROCEDURE — 74011250637 HC RX REV CODE- 250/637: Performed by: EMERGENCY MEDICINE

## 2021-05-04 PROCEDURE — 85025 COMPLETE CBC W/AUTO DIFF WBC: CPT

## 2021-05-04 PROCEDURE — 99285 EMERGENCY DEPT VISIT HI MDM: CPT

## 2021-05-04 PROCEDURE — 74011000636 HC RX REV CODE- 636: Performed by: RADIOLOGY

## 2021-05-04 RX ORDER — DICYCLOMINE HYDROCHLORIDE 10 MG/1
10 CAPSULE ORAL
Status: COMPLETED | OUTPATIENT
Start: 2021-05-04 | End: 2021-05-04

## 2021-05-04 RX ORDER — ONDANSETRON 2 MG/ML
4 INJECTION INTRAMUSCULAR; INTRAVENOUS
Status: COMPLETED | OUTPATIENT
Start: 2021-05-04 | End: 2021-05-04

## 2021-05-04 RX ORDER — METRONIDAZOLE 500 MG/1
500 TABLET ORAL 3 TIMES DAILY
Qty: 21 TAB | Refills: 0 | Status: SHIPPED | OUTPATIENT
Start: 2021-05-04 | End: 2021-05-11

## 2021-05-04 RX ORDER — METRONIDAZOLE 250 MG/1
500 TABLET ORAL
Status: COMPLETED | OUTPATIENT
Start: 2021-05-04 | End: 2021-05-04

## 2021-05-04 RX ADMIN — SODIUM CHLORIDE 1000 ML: 9 INJECTION, SOLUTION INTRAVENOUS at 02:41

## 2021-05-04 RX ADMIN — ONDANSETRON 4 MG: 2 INJECTION INTRAMUSCULAR; INTRAVENOUS at 02:40

## 2021-05-04 RX ADMIN — IOPAMIDOL 94 ML: 755 INJECTION, SOLUTION INTRAVENOUS at 03:53

## 2021-05-04 RX ADMIN — METRONIDAZOLE 500 MG: 250 TABLET ORAL at 06:26

## 2021-05-04 RX ADMIN — DICYCLOMINE HYDROCHLORIDE 10 MG: 10 CAPSULE ORAL at 05:41

## 2021-05-04 NOTE — DISCHARGE INSTRUCTIONS
Thank you for allowing us to provide you with medical care today. We realize that you have many choices for your emergency care needs. We thank you for choosing [facility]. Please choose us in the future for any continued health care needs. We hope we addressed all of your medical concerns. We strive to provide excellent quality care in the Emergency Department. Anything less than excellent does not meet our expectations. The exam and treatment you received in the Emergency Department were for an emergent problem and are not intended as complete care. It is important that you follow up with a doctor, nurse practitioner, or [address] [place] [address] assistant for ongoing care. If your symptoms worsen or you do not improve as expected and you are unable to reach your usual health care provider, you should return to the Emergency Department. We are available 24 hours a day. Take this sheet with you when you go to your follow-up visit. If you have any problem arranging the follow-up visit, contact the Emergency Department immediately. Make an appointment your family doctor for follow up of this visit. Return to the ER if you are unable to be seen in a timely manner.

## 2021-05-04 NOTE — ED NOTES
reviewed discharge instructions with the patient. The patient verbalized understanding. The patient was given opportunity for questions. Patient discharged in stable condition to the waiting room via wheelchair.

## 2021-05-04 NOTE — ED TRIAGE NOTES
Pt presents to ED via EMS from home with complaints food poisoning after eating soup on Saturday. Pt reports lower abdominal pain, nausea, vomiting since Sunday morning. Pt reports taking bentyl with no relief.

## 2021-05-04 NOTE — ED PROVIDER NOTES
Please note that this dictation was completed with [employer], the computer voice recognition software.  Quite often unanticipated grammatical, syntax, homophones, and other interpretive errors are inadvertently transcribed by the computer software.  Please disregard these errors.  Please excuse any errors that have escaped final proofreading. 60-year-old female past medical history remarkable for allergies, anxiety, breast cancer status post lumpectomy, diet-controlled diabetes, history of TIA, hypertension, hyponatremia, melanoma status post resection right cheek, essential tremor, and \"lower abdominal cramping pain diarrhea nausea my whole life. \" Presents the ER by EMS complaining of \"having lower abdominal cramping again and diarrhea. Patient states she was forced to take 3 Lomotil yesterday which helped \"it is an amazing drug. \" She has been on it for years. Patient adds that she took 3 today was going to take a fourth but decided not to decided to call 911 come to the ER for further evaluation. Patient states she has had 3-4 episodes of diarrhea today last episode looked \"pink\" patient states she had a GI appointment March 4 for colonoscopy and an EGD and they canceled due to Covid. Patient states she has been tolerating p.o. normally denies other current systemic complaints recent illnesses.  She denies burning with urination vaginal discharge    pt denies HA, vison changes, diff swallowing, CP, SOB, F/Ch, Vomiting, Constipation or other current systemic complaints    Social/ PSH reviewed in EMR    EMR Chart Reviewed           Past Medical History:   Diagnosis Date    Allergies     Anxiety     Breast cancer ([facility] [address] 1993    s/p lumpectomy    Diabetes mellitus ([address]     diet-controlled    Hx-TIA (transient ischemic attack)     Hypertension     Hyponatremia     Melanoma ([facility] [address] 2008    s/p resection, right cheek    Tremor        Past Surgical History:   Procedure Laterality Date    HX BACK SURGERY      HX BREAST LUMPECTOMY  1993    right    HX CHOLECYSTECTOMY      HX HYSTERECTOMY      HX MALIGNANT SKIN LESION EXCISION  2008    HX MENISCECTOMY      HX OTHER SURGICAL      pineda's neuroma bilaterally         Family History:   Problem Relation Age of Onset    Heart Failure Mother     Other Father         aortic aneurysm    Diabetes Son     Immunodeficiency Son         post kidney and pancreas transplant       Social History     Socioeconomic History    Marital status:      Spouse name: Not on file    Number of children: Not on file    Years of education: Not on file    Highest education level: Not on file   Occupational History    Occupation: retired from [address] [employer] strain: Not on file    Food insecurity     Worry: Not on file     Inability: Not on file   [employer] needs     Medical: Not on file     Non-medical: Not on file   Tobacco Use    Smoking status: Never Smoker    Smokeless tobacco: Never Used   Substance and Sexual Activity    Alcohol use: No    Drug use: Never    Sexual activity: Not on file   Lifestyle    Physical activity     Days per week: Not on file     Minutes per session: Not on file    Stress: Not on file   Relationships    Social connections     Talks on phone: Not on file     Gets together: Not on file     Attends [employer] service: Not on file     Active member of club or organization: Not on file     Attends meetings of clubs or organizations: Not on file     Relationship status: Not on file    Intimate partner violence     Fear of current or ex partner: Not on file     Emotionally abused: Not on file     Physically abused: Not on file     Forced sexual activity: Not on file   Other Topics Concern    Not on file   Social History Narrative    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 Constitutional:       General: She is not in acute distress. Appearance: Normal appearance. She is well-developed. She is not ill-appearing, toxic-appearing or diaphoretic. Comments: NAD, AxOx4, speaking in complete sentences       HENT:      Head: Normocephalic and atraumatic. Right Ear: External ear normal.      Left Ear: External ear normal.   Eyes:      General: No scleral icterus. Right eye: No discharge. Extraocular Movements: Extraocular movements intact. Conjunctiva/sclera: Conjunctivae normal.      Pupils: Pupils are equal, round, and reactive to light. Neck:      Musculoskeletal: Normal range of motion and neck supple. No neck rigidity or muscular tenderness. Vascular: No JVD. Trachea: No tracheal deviation. Cardiovascular:      Rate and Rhythm: Normal rate and regular rhythm. Pulses: Normal pulses. Heart sounds: Normal heart sounds. No murmur. No friction rub. No gallop. Pulmonary:      Effort: Pulmonary effort is normal. No respiratory distress. Breath sounds: Normal breath sounds. No stridor. No wheezing, rhonchi or rales. Chest:      Chest wall: No tenderness. Abdominal:      General: Bowel sounds are normal.      Palpations: Abdomen is soft. Tenderness: There is no abdominal tenderness. There is no guarding or rebound. Comments: bilat lower quad min ttp    Neg peritoneal signs; Genitourinary:     Vagina: No vaginal discharge. Comments: Pt denies urinary/ vaginal complaints  Musculoskeletal: Normal range of motion. General: No swelling, tenderness, deformity or signs of injury. Right lower leg: No edema.    Skin: General: Skin is warm and dry. Capillary Refill: Capillary refill takes less than 2 seconds. Coloration: Skin is not jaundiced or pale. Findings: No bruising, erythema or rash. Neurological:      General: No focal deficit present. Mental Status: She is alert and oriented to person, place, and time. Cranial Nerves: No cranial nerve deficit. Sensory: No sensory deficit. Motor: No weakness or abnormal muscle tone. Coordination: Coordination normal.      Gait: Gait normal.      Deep Tendon Reflexes: Reflexes normal.   Psychiatric:         Behavior: Behavior normal.         Thought Content: Thought content normal.          MDM       Procedures    5:50 AM  Pt told of results, agrees w/ evaluation for admission;     [address] for Admission  5:51 AM    ED Room Number: WQ27/25  Patient Name and age:  Lorin Libman 81 y.o.  female  Working Diagnosis:   1. Hyponatremia    2. Diarrhea, unspecified type    3. Abdominal tenderness without rebound tenderness, unspecified location        COVID-19 Suspicion:  no  Sepsis present:  no  Reassessment needed: no  Code Status:  Full Code  Readmission: no  Isolation Requirements:  no  Recommended Level of Care:  Telemetry  Department:[facility] ED - [phone]  Other:  Low Na to 127; chronic abd cramping/ diarrhea;      6:03 AM  Pt now refusing admission; will treat and discharge home per pt wishes; Pt refusing further interventions/ tx. Told to follow-up with GI. Pt is medically capable of making an informed decision, was made aware of the risks of leaving AMA ( including disability, DEATH) and has repeated them to me verbally. Pt allowed to leave ama. Narendra Diaz  results have been reviewed with her. She has been counseled regarding her diagnosis.   She verbally conveys understanding and agreement of the signs, symptoms, diagnosis, treatment and prognosis and additionally agrees to Call/ Arrange follow up as recommended with Dr. Valentino Saris, NP in 24 - 48 hours. She also agrees with the care-plan and conveys that all of her questions have been answered. I have also put together some discharge instructions for her that include: 1) educational information regarding their diagnosis, 2) how to care for their diagnosis at home, as well a 3) list of reasons why they would want to return to the ED prior to their follow-up appointment, should their condition change or for concerns.

## 2021-05-06 RX ORDER — DICYCLOMINE HYDROCHLORIDE 10 MG/1
CAPSULE ORAL
Qty: 90 CAP | Refills: 2 | Status: SHIPPED | OUTPATIENT
Start: 2021-05-06 | End: 2021-08-18 | Stop reason: SDUPTHER

## 2021-05-21 DIAGNOSIS — F41.9 ANXIETY: Chronic | ICD-10-CM

## 2021-05-24 RX ORDER — DIAZEPAM 5 MG/1
TABLET ORAL
Qty: 60 TABLET | Refills: 4 | Status: SHIPPED | OUTPATIENT
Start: 2021-05-24 | End: 2021-10-21 | Stop reason: SDUPTHER

## 2021-05-24 NOTE — TELEPHONE ENCOUNTER
Last PDMP Sally Carey as Reviewed:  Review User Review Instant Review Result   Yennifer Iyer 5/24/2021 10:02 AM Reviewed PDMP [1]

## 2021-06-01 ENCOUNTER — OFFICE VISIT (OUTPATIENT)
Dept: PRIMARY CARE CLINIC | Age: 86
End: 2021-06-01
Payer: MEDICARE

## 2021-06-01 VITALS
HEIGHT: 61 IN | HEART RATE: 77 BPM | SYSTOLIC BLOOD PRESSURE: 187 MMHG | WEIGHT: 93.4 LBS | BODY MASS INDEX: 17.64 KG/M2 | TEMPERATURE: 98.4 F | OXYGEN SATURATION: 97 % | RESPIRATION RATE: 18 BRPM | DIASTOLIC BLOOD PRESSURE: 99 MMHG

## 2021-06-01 DIAGNOSIS — M54.50 ACUTE LEFT-SIDED LOW BACK PAIN WITHOUT SCIATICA: ICD-10-CM
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
## 2021-06-01 DIAGNOSIS — I10 ESSENTIAL HYPERTENSION: ICD-10-CM

## 2021-06-01 PROCEDURE — 99213 OFFICE O/P EST LOW 20 MIN: CPT | Performed by: NURSE PRACTITIONER

## 2021-06-01 RX ORDER — DICLOFENAC SODIUM 10 MG/G
2 GEL TOPICAL
Qty: 60 G | Refills: 1 | Status: SHIPPED | OUTPATIENT
Start: 2021-06-01
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
## 2021-06-14 ENCOUNTER — TELEPHONE (OUTPATIENT)
Dept: PRIMARY CARE CLINIC | Age: 86
End: 2021-06-14

## 2021-06-14 NOTE — TELEPHONE ENCOUNTER
Pt states this is her requesting this. She gets it through her insurance she does need it filled out. Pt states she is a type 2 diabetic and is not on any medication.  Pt states she hasn't had her A1C check in a long time and is asking for it to be checked

## 2021-06-14 NOTE — TELEPHONE ENCOUNTER
She was prediabetic in previous years (5.7%) but has not been on medication for this. Her glucose has been normal in labs that we have checked in the past year. We can revisit at her next visit.

## 2021-06-14 NOTE — TELEPHONE ENCOUNTER
I keep getting requests from [address] [place] [address] for diabetes supplies for this patient however she is not diabetic and is not checking her glucose for any reason that I am aware of.   Can you contact her to see if she is requesting these or if this is an error/scam.

## 2021-06-28 RX ORDER — CLOPIDOGREL BISULFATE 75 MG/1
TABLET ORAL
Qty: 90 TABLET | Refills: 0 | Status: SHIPPED | OUTPATIENT
Start: 2021-06-28 | End: 2021-10-18

## 2021-07-27 ENCOUNTER — TELEPHONE (OUTPATIENT)
Dept: PRIMARY CARE CLINIC | Age: 86
End: 2021-07-27

## 2021-08-03 DIAGNOSIS — I10 ESSENTIAL HYPERTENSION, BENIGN: Chronic | ICD-10-CM

## 2021-08-03 DIAGNOSIS — F41.9 ANXIETY: Chronic | ICD-10-CM

## 2021-08-03 RX ORDER — ATENOLOL 25 MG/1
TABLET ORAL
Qty: 90 TABLET | Refills: 0 | Status: SHIPPED | OUTPATIENT
Start: 2021-08-03 | End: 2021-10-21 | Stop reason: SDUPTHER

## 2021-08-18 ENCOUNTER — OFFICE VISIT (OUTPATIENT)
Dept: PRIMARY CARE CLINIC | Age: 86
End: 2021-08-18
Payer: MEDICARE

## 2021-08-18 VITALS
TEMPERATURE: 97.7 F | OXYGEN SATURATION: 98 % | HEIGHT: 61 IN | WEIGHT: 91.6 LBS | HEART RATE: 68 BPM | DIASTOLIC BLOOD PRESSURE: 120 MMHG | RESPIRATION RATE: 18 BRPM | BODY MASS INDEX: 17.29 KG/M2 | SYSTOLIC BLOOD PRESSURE: 196 MMHG

## 2021-08-18 DIAGNOSIS — M54.50 ACUTE RIGHT-SIDED LOW BACK PAIN WITHOUT SCIATICA: ICD-10-CM

## 2021-08-18 DIAGNOSIS — R29.6 FALLS FREQUENTLY: Primary | ICD-10-CM

## 2021-08-18 DIAGNOSIS — I10 ESSENTIAL HYPERTENSION: ICD-10-CM

## 2021-08-18 DIAGNOSIS — Z00.00 MEDICARE ANNUAL WELLNESS VISIT, SUBSEQUENT: ICD-10-CM

## 2021-08-18 DIAGNOSIS — K58.2 IRRITABLE BOWEL SYNDROME WITH BOTH CONSTIPATION AND DIARRHEA: Chronic | ICD-10-CM

## 2021-08-18 PROCEDURE — G8536 NO DOC ELDER MAL SCRN: HCPCS | Performed by: NURSE PRACTITIONER

## 2021-08-18 PROCEDURE — G0439 PPPS, SUBSEQ VISIT: HCPCS | Performed by: NURSE PRACTITIONER

## 2021-08-18 PROCEDURE — 1090F PRES/ABSN URINE INCON ASSESS: CPT | Performed by: NURSE PRACTITIONER

## 2021-08-18 PROCEDURE — 1101F PT FALLS ASSESS-DOCD LE1/YR: CPT | Performed by: NURSE PRACTITIONER

## 2021-08-18 PROCEDURE — G8419 CALC BMI OUT NRM PARAM NOF/U: HCPCS | Performed by: NURSE PRACTITIONER

## 2021-08-18 PROCEDURE — G8753 SYS BP > OR = 140: HCPCS | Performed by: NURSE PRACTITIONER

## 2021-08-18 PROCEDURE — G8755 DIAS BP > OR = 90: HCPCS | Performed by: NURSE PRACTITIONER

## 2021-08-18 PROCEDURE — G8427 DOCREV CUR MEDS BY ELIG CLIN: HCPCS | Performed by: NURSE PRACTITIONER

## 2021-08-18 PROCEDURE — G8400 PT W/DXA NO RESULTS DOC: HCPCS | Performed by: NURSE PRACTITIONER

## 2021-08-18 PROCEDURE — G8432 DEP SCR NOT DOC, RNG: HCPCS | Performed by: NURSE PRACTITIONER

## 2021-08-18 PROCEDURE — 99214 OFFICE O/P EST MOD 30 MIN: CPT | Performed by: NURSE PRACTITIONER

## 2021-08-18 RX ORDER — AMLODIPINE BESYLATE 5 MG/1
5 TABLET ORAL DAILY
Qty: 90 TABLET | Refills: 0 | Status: SHIPPED | OUTPATIENT
Start: 2021-08-18 | End: 2021-10-21 | Stop reason: SDUPTHER

## 2021-08-18 RX ORDER — DICYCLOMINE HYDROCHLORIDE 10 MG/1
CAPSULE ORAL
Qty: 90 CAPSULE | Refills: 2 | Status: SHIPPED | OUTPATIENT
Start: 2021-08-18 | End: 2021-11-08

## 2021-08-18 RX ORDER — LIDOCAINE 50 MG/G
PATCH TOPICAL
Qty: 15 EACH | Refills: 0 | Status: SHIPPED | OUTPATIENT
Start: 2021-08-18 | End: 2022-10-18

## 2021-08-18 NOTE — PROGRESS NOTES
Carol Lal is a 81 y.o. female presents for Medicare wellness visit. This is a Subsequent Medicare Annual Wellness Visit (AWV), (Performed more than 12 months after effective date of Medicare Part B enrollment and 12 months after last preventive visit.)    I have reviewed the patient's medical history in detail and updated the computerized patient record. History obtained from: the patient. female  81 y.o. WHITE/NON-  Depression Risk Factor Screening:     3 most recent PHQ Screens 8/18/2021   Little interest or pleasure in doing things Not at all   Feeling down, depressed, irritable, or hopeless Not at all   Total Score PHQ 2 0     C-SSRS [place] Suicide Severity Rating Scale 8/29/2019   1) Within the past month, have you wished you were dead or wished you could go to sleep and not wake up? No       Fall Risk Factor Screening:     Fall Risk Assessment, last 12 mths 8/18/2021   Able to walk? Yes   Fall in past 12 months? 1   Do you feel unsteady? 1   Are you worried about falling 1   Is TUG test greater than 12 seconds? -   Is the gait abnormal? 1   Number of falls in past 12 months 2   Fall with injury? 1       Alcohol Risk Screen    Do you average more than 1 drink per night or more than 7 drinks a week:  No    On any one occasion in the past three months have you have had more than 3 drinks containing alcohol:  No         Functional Ability and Level of Safety:    Hearing: Hearing is good. Activities of Daily Living: The home contains: grab bars and walker/cane  Patient needs help with:  transportation      Ambulation: with difficulty, uses a cane and walker      Abuse Screen:  Patient is not abused         History and Pertinent Exam   Patient is due for chronic medical conditions and/or has acute concerns in addition to the medicare wellness visit and agrees to do both, understanding there may be a copay for the additional services provided.      Other Concerns today:     Health & Diet:   Please describe your diet habits: Regular diet  Do you get 5 servings of fruits or vegetables daily? yes  Do you exercise regularly? no    ROS  Reviewed PmHx, FmHx, SocHx as well as meds and allergies, updated and dated in the chart. Patient Active Problem List   Diagnosis Code    Pneumonia, organism unspecified(486) J18.9    Hyponatremia E87.1    Essential hypertension I10    Anxiety F41.9    History of TIAs Z86.73    Dizziness R42    Weakness R53.1    Pancreatic cyst K86.2    GI bleed K92.2    Acute colitis K52.9    Weight loss R63.4    Irritable bowel syndrome with both constipation and diarrhea K58.2    Gastroesophageal reflux disease with esophagitis without hemorrhage K21.00     Past Medical History:   Diagnosis Date    Allergies     Anxiety     Breast cancer ([facility] [address] 1993    s/p lumpectomy    Diabetes mellitus ([facility] [address] [facility]     diet-controlled    Hx-TIA (transient ischemic attack)     Hypertension     Hyponatremia     Melanoma ([facility] [address] 2008    s/p resection, right cheek    Tremor       Past Surgical History:   Procedure Laterality Date    HX BACK SURGERY      HX BREAST LUMPECTOMY  1993    right    HX CHOLECYSTECTOMY      HX HYSTERECTOMY      HX MALIGNANT SKIN LESION EXCISION  2008    HX MENISCECTOMY      HX OTHER SURGICAL      pineda's neuroma bilaterally     Allergies   Allergen Reactions    Sulfa (Sulfonamide Antibiotics) Nausea and Vomiting    Codeine Other (comments)     Stomach cramping    Dairy Aid [Lactase] Unknown (comments)    Levaquin [Levofloxacin] Nausea and Vomiting     Pt can not take PO due to vomiting.  Linzess [Linaclotide] Diarrhea    Tomato Unknown (comments)     Current Outpatient Medications   Medication Sig Dispense Refill    dicyclomine (BENTYL) 10 mg capsule TAKE ONE CAPSULE BY MOUTH THREE TIMES A DAY AS NEEDED 90 Capsule 2    amLODIPine (NORVASC) 5 mg tablet Take 1 Tablet by mouth daily.  90 Tablet 0    lidocaine (LIDODERM) 5 % Apply patch to the affected area for 12 hours a day and remove for 12 hours a day. 15 Each 0    atenoloL (TENORMIN) 25 mg tablet TAKE ONE TABLET BY MOUTH DAILY 90 Tablet 0    clopidogreL (PLAVIX) 75 mg tab TAKE 1 TABLET BY MOUTH EVERY DAY 90 Tablet 0    diclofenac (Voltaren) 1 % gel Apply 2 g to affected area four (4) times daily as needed (knee pain, back pain). 60 g 1    diazePAM (VALIUM) 5 mg tablet TAKE ONE TABLET BY MOUTH TWICE A DAY AS NEEDED FOR ANXIETY OR SLEEP 60 Tablet 4    fluticasone propionate (FLONASE) 50 mcg/actuation nasal spray SPRAY ONE SPRAY IN EACH NOSTRIL ONCE DAILY 1 Bottle 4    polyethylene glycol (Miralax) 17 gram/dose powder Take 17 g by mouth daily.  meclizine (ANTIVERT) 12.5 mg tablet Take 1-2 Tabs by mouth two (2) times daily as needed for Dizziness. 180 Tab 1    ondansetron (ZOFRAN ODT) 4 mg disintegrating tablet Take 1 Tab by mouth every eight (8) hours as needed for Nausea. 20 Tab 5    carboxymethylcellulose sodium (REFRESH LIQUIGEL) 1 % dlgl ophthalmic solution Administer 1 Drop to both eyes as needed (dry eyes).  cyanocobalamin (VITAMIN B12) 500 mcg tablet Take 500 mcg by mouth as needed.  acetaminophen (TYLENOL) 325 mg tablet Take 325-650 mg by mouth every six (6) hours as needed (headache). Family History   Problem Relation Age of Onset    Heart Failure Mother     Other Father         aortic aneurysm    Diabetes Son     Immunodeficiency Son         post kidney and pancreas transplant     Social History     Tobacco Use    Smoking status: Never Smoker    Smokeless tobacco: Never Used   Substance Use Topics    Alcohol use: No         BP Readings from Last 3 Encounters:   08/18/21 (!) 196/120   06/01/21 (!) 187/99   05/04/21 136/72      Wt Readings from Last 3 Encounters:   08/18/21 91 lb 9.6 oz (41.5 kg)   06/01/21 93 lb 6.4 oz (42.4 kg)   05/04/21 94 lb (42.6 kg)     Body mass index is 17.31 kg/m².    No exam data present  Physical Exam  Was the patient's timed Up & Go test unsteady or longer than 30 seconds? yes    Evaluation of Cognitive Function   Mood/affect:  neutral, happy  Orientation: Person, Place, Time and Situation  Appearance: age appropriate and casually dressed  Family member/caregiver input: None  Normal cognitive function  Specialists/Care Team   Sima Mendosa has established care with the following healthcare providers:    Patient Care Team:  Jenifer Sanders NP as PCP - General (Nurse Practitioner)  Jenifer Sanders NP as PCP - [facility] Empaneled Provider  Marjorie Mason MD (Gastroenterology)  Cecy Johnson MD (Nephrology)    [address] [facility] Maintenance Topics with due status: Overdue       Topic Date Due    DTaP/Tdap/Td series Never done    Shingrix Vaccine Age 50> Never done    Bone Densitometry (Dexa) Screening Never done    Pneumococcal 65+ years Never done     Health Maintenance Topics with due status: Not Due       Topic Last Completion Date    Flu Vaccine 10/24/2019    Medicare Yearly Exam 08/18/2021     Health Maintenance Topics with due status: Completed       Topic Last Completion Date    COVID-19 Vaccine 03/26/2021         Colon cancer:  Recommendation: Colonoscopy every 10y or annual FIT test from 50-75 or every 3 year stool DNA based test with consideration of ongoing screening from 76-85. and Up to date or Completed    Lung cancer (LDCT): Recommendation: Yearly LDCT for pts 55-77 w 30-pack year hx and currently smoke or quit <15 yr ago. and Not Indicated    Hepatitis C:  Recommendation: One time screening for all patient's aged 18-79. and Up to date or Completed    Diabetes:   Due, ordered    Lipids:   Recommendation: screening for hyperlipidemia every 5 years after age 39 and Up to date or Completed        PREVENTIVE CARE - FEMALE SCREENINGS     Cervical cancer: Recommendation: Every 3 yr from 21-29 and every 5 yr from 33-67, with Pap and HPV testing.  and Not Indicated    Breast cancer: Recommendation:  USPSTF recommends screening mammography every 2 yr from 54-69. The decision to start screening mammography in women prior to age 48 years should be an individual one. Women who place a higher value on the potential benefit than the potential harms may choose to begin biennial screening between the ages of 36 and 52 years. Medicare covers annual screening mammography, USPSTF also recommends women with a personal or family history of breast, ovarian, tubal, or peritoneal cancer or who have an ancestry ([employer]) associated with breast cancer susceptibility 1 and 2 (BRCA1/2) gene mutations with an appropriate brief familial risk assessment tool.  Women with a positive result on the risk assessment tool should receive genetic counseling and, if indicated after counseling, genetic testing and mammogram is not Indicated    Osteoporosis: Recommendation: Screen all women at 72, earlier if elevated risk and Up to date or Completed    AAA:   Recommendation: One-time screening if family history of AAA and Not Indicated      IMMUNIZATIONS     Immunization History   Administered Date(s) Administered    COVID-19, J&J, PF, 0.5 mL Dose 03/26/2021    Influenza Vaccine 10/01/2019, 10/24/2019       Pneumovax:   Recommendation: PPSV23 once for all >65 and high risk <65  and Up to date or Completed    Prevnar:   Recommendation: PCV13 only if >65 and immunocompromised or residing in a nursing home, or in areas of low childhood Pneumococcal vaccination and Not Indicated    Influenza:   Recommendation: Vaccination annually, high dose if 65 or older and Up to date or Completed    Shingrix:  Recommendation: Vaccination 2 shots 2-6 months apart for all age >47 and Declined    TDaP:    Recommendation: Vaccination Booster with TDaP every 10 yr. and Declined    Discussion of Advance Directive   Discussed with Jana Byrne her ability to prepare and advance directive in the case that an injury or illness causes her to be unable to make health care decisions. Date of ACP Conversation: 08/18/21  Persons included in Conversation:  patient  Length of ACP Conversation in minutes:  <16 minutes (Non-Billable)    Authorized Decision Maker (if patient is incapable of making informed decisions): This person is:   Named in Advance Directive or Healthcare Power of           For Patients with Decision Making Capacity:   Values/Goals: Exploration of values, goals, and preferences if recovery is not expected, even with continued medical treatment in the event of:  Imminent death  Severe, permanent brain injury    Conversation Outcomes / Follow-Up Plan:   ACP in process - information provided, considering goals and options    Assessment/Plan   V70.0,     Diagnoses and all orders for this visit:    1. Falls frequently  Comments:  discussed at length benefit of HH, PT to help at home but she declines stating they didn't help previously. Discussed safety in regards to rugs/cords on floor. Orders:  -     XR SPINE LUMB 2 OR 3 V; Future    2. Acute right-sided low back pain without sciatica  Comments:  xrays today  Orders:  -     XR PELV AP ONLY; Future  -     XR SPINE LUMB 2 OR 3 V; Future  -     lidocaine (LIDODERM) 5 %; Apply patch to the affected area for 12 hours a day and remove for 12 hours a day. 3. Essential hypertension  Comments:  BP uncontrolled. Add on amlodipine. Orders:  -     amLODIPine (NORVASC) 5 mg tablet; Take 1 Tablet by mouth daily. 4. Irritable bowel syndrome with both constipation and diarrhea  Comments:  continue on bentyl for IBS  Orders:  -     dicyclomine (BENTYL) 10 mg capsule; TAKE ONE CAPSULE BY MOUTH THREE TIMES A DAY AS NEEDED    5. Medicare annual wellness visit, subsequent          Follow-up and Dispositions    · Return in about 3 months (around 11/8/2021) for Chronic OV.          Eileen Live NP

## 2021-08-18 NOTE — PROGRESS NOTES
Chief Complaint   Patient presents with    Fall     Pt states having another fall since last time. Pt states back is hurting and buttocks     Follow Up Chronic Condition     asking for refillson diazepam and bentyl     Visit Vitals  BP (!) 196/120 (BP 1 Location: Right arm, BP Patient Position: At rest, BP Cuff Size: Adult)   Pulse 68   Temp 97.7 °F (36.5 °C) (Temporal)   Resp 18   Ht 5' 1\" (1.549 m)   Wt 91 lb 9.6 oz (41.5 kg)   SpO2 98%   BMI 17.31 kg/m²     1. Have you been to the ER, urgent care clinic since your last visit? Hospitalized since your last visit?no    2. Have you seen or consulted any other health care providers outside of the [address] [place] [address] since your last visit? Include any pap smears or colon screening.  no

## 2021-08-26 ENCOUNTER — TELEPHONE (OUTPATIENT)
Dept: PRIMARY CARE CLINIC | Age: 86
End: 2021-08-26

## 2021-08-26 NOTE — TELEPHONE ENCOUNTER
Patient wants to know the results from her xray. Her pharmacy has been changed for future prescriptions. Back is still hurting.

## 2021-08-26 NOTE — TELEPHONE ENCOUNTER
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 Has she been able to try the lidocaine patches?

## 2021-08-26 NOTE — TELEPHONE ENCOUNTER
Pt states she was able to get 5 ppatches because insurance needed a pa. Pa completed waiting for insurance.  Pt states they are helping

## 2021-09-21 DIAGNOSIS — K58.2 IRRITABLE BOWEL SYNDROME WITH BOTH CONSTIPATION AND DIARRHEA: ICD-10-CM

## 2021-09-22 RX ORDER — ONDANSETRON 4 MG/1
TABLET, ORALLY DISINTEGRATING ORAL
Qty: 20 TABLET | Refills: 5 | Status: SHIPPED | OUTPATIENT
Start: 2021-09-22 | End: 2022-03-18 | Stop reason: SDUPTHER

## 2021-10-18 RX ORDER — CLOPIDOGREL BISULFATE 75 MG/1
TABLET ORAL
Qty: 90 TABLET | Refills: 0 | Status: SHIPPED | OUTPATIENT
Start: 2021-10-18 | End: 2022-01-17

## 2021-10-21 ENCOUNTER — VIRTUAL VISIT (OUTPATIENT)
Dept: PRIMARY CARE CLINIC | Age: 86
End: 2021-10-21
Payer: MEDICARE

## 2021-10-21 DIAGNOSIS — I10 ESSENTIAL HYPERTENSION: ICD-10-CM

## 2021-10-21 DIAGNOSIS — F41.9 ANXIETY: Chronic | ICD-10-CM

## 2021-10-21 DIAGNOSIS — Z09 HOSPITAL DISCHARGE FOLLOW-UP: ICD-10-CM

## 2021-10-21 DIAGNOSIS — K56.609 SMALL BOWEL OBSTRUCTION (HCC): Primary | ICD-10-CM

## 2021-10-21 PROCEDURE — G8756 NO BP MEASURE DOC: HCPCS | Performed by: NURSE PRACTITIONER

## 2021-10-21 PROCEDURE — G8432 DEP SCR NOT DOC, RNG: HCPCS | Performed by: NURSE PRACTITIONER

## 2021-10-21 PROCEDURE — G8427 DOCREV CUR MEDS BY ELIG CLIN: HCPCS | Performed by: NURSE PRACTITIONER

## 2021-10-21 PROCEDURE — 99213 OFFICE O/P EST LOW 20 MIN: CPT | Performed by: NURSE PRACTITIONER

## 2021-10-21 PROCEDURE — 1111F DSCHRG MED/CURRENT MED MERGE: CPT | Performed by: NURSE PRACTITIONER

## 2021-10-21 PROCEDURE — G8419 CALC BMI OUT NRM PARAM NOF/U: HCPCS | Performed by: NURSE PRACTITIONER

## 2021-10-21 PROCEDURE — 1101F PT FALLS ASSESS-DOCD LE1/YR: CPT | Performed by: NURSE PRACTITIONER

## 2021-10-21 PROCEDURE — G8400 PT W/DXA NO RESULTS DOC: HCPCS | Performed by: NURSE PRACTITIONER

## 2021-10-21 PROCEDURE — 1090F PRES/ABSN URINE INCON ASSESS: CPT | Performed by: NURSE PRACTITIONER

## 2021-10-21 PROCEDURE — G8536 NO DOC ELDER MAL SCRN: HCPCS | Performed by: NURSE PRACTITIONER

## 2021-10-21 RX ORDER — DIAZEPAM 5 MG/1
TABLET ORAL
Qty: 60 TABLET | Refills: 4 | Status: SHIPPED | OUTPATIENT
Start: 2021-10-21 | End: 2022-04-29

## 2021-10-21 RX ORDER — ATENOLOL 25 MG/1
25 TABLET ORAL DAILY
Qty: 90 TABLET | Refills: 0 | Status: SHIPPED | OUTPATIENT
Start: 2021-10-21 | End: 2021-11-02

## 2021-10-21 RX ORDER — AMLODIPINE BESYLATE 5 MG/1
5 TABLET ORAL DAILY
Qty: 90 TABLET | Refills: 0 | Status: SHIPPED | OUTPATIENT
Start: 2021-10-21 | End: 2021-11-08

## 2021-10-21 NOTE — PROGRESS NOTES
1. Have you been to the ER, urgent care clinic since your last visit? Hospitalized since your last visit? yes    2. Have you seen or consulted any other health care providers outside of the [address] [place] [address] since your last visit? Include any pap smears or colon screening. no  Chief Complaint   Patient presents with    Follow Up Chronic Condition     Pt is following up after being in the hosptial. pt had questions on if she should still be taking the amlodipine     There were no vitals taken for this visit.

## 2021-10-21 NOTE — PROGRESS NOTES
Transitional Care Management Progress Note    Patient: Cruz Chavez  : 1935  PCP: Addis Robb NP    Date of admission: 10/13  Date of discharge: 10/20    Patient was contacted by Transitional Care Management services within two days after her discharge: Yes. This encounter and supporting documentation was reviewed if available. Medication reconciliation was performed today (10/21/2021). Assessment/Plan:   Diagnoses and all orders for this visit:    1. Small bowel obstruction ([address]  Comments:  admitted for 1 week. Patient had surgery with bowel resection. Doing well. 2. Anxiety  Comments:  uses valium PRN for sleep and anxiety  Orders:  -     diazePAM (VALIUM) 5 mg tablet; TAKE ONE TABLET BY MOUTH TWICE A DAY AS NEEDED FOR ANXIETY OR SLEEP  Indications: inducing of a relaxed easy state    3. Essential hypertension  Comments:  improved with amlodipine. Orders:  -     amLODIPine (NORVASC) 5 mg tablet; Take 1 Tablet by mouth daily. -     atenoloL (TENORMIN) 25 mg tablet; Take 1 Tablet by mouth daily. [id number] [facility] discharge follow-up  -     SC DISCHARGE MEDS RECONCILED W/ CURRENT OUTPATIENT MED LIST         Subjective:   Cruz Chavez is a 81 y.o. female presenting today for follow-up after being discharged from [facility].  The discharge summary was reviewed or requested. The main problem requiring admission was SBO. Complications during admission: Bowel necrosis with surgery. Interval history/Current status: Did not change any of her medications. Has follow up with general surgeon on Tuesday to have her sutures removed from the surgery. Patient denies abdominal pain. Has had 3 BM's since she has been home that was soft. No issues with straining. [facility] [place] nutrition[facility] and PT coming to the house starting on Monday. Admitting symptoms have: significantly improved      Hypertension: BP has improved on amlodipine and atenolol.   Does not check BP at home but reports that her BP well controlled in the hospital.  Denies headaches, blurred vision or dizziness. Depression/Anxiety:  Patient notes that their anxiety is well controlled on valium. Medications marked \"taking\" at this time:  Home Medications    Medication Sig Start Date End Date Taking? Authorizing Provider   diazePAM (VALIUM) 5 mg tablet TAKE ONE TABLET BY MOUTH TWICE A DAY AS NEEDED FOR ANXIETY OR SLEEP  Indications: inducing of a relaxed easy state 10/21/21  Yes Ibrahima Parekh NP   amLODIPine (NORVASC) 5 mg tablet Take 1 Tablet by mouth daily. 10/21/21  Yes Ibrahima Parekh NP   atenoloL (TENORMIN) 25 mg tablet Take 1 Tablet by mouth daily. 10/21/21  Yes Ibrahima Parekh NP   clopidogreL (PLAVIX) 75 mg tab TAKE 1 TABLET BY MOUTH EVERY DAY 10/18/21  Yes Ibrahima Parekh NP   ondansetron (ZOFRAN ODT) 4 mg disintegrating tablet DISSOLVE ONE TABLET BY MOUTH EVERY 8 HOURS AS NEEDED FOR NAUSEA 9/22/21  Yes Ibrahima Parekh NP   dicyclomine (BENTYL) 10 mg capsule TAKE ONE CAPSULE BY MOUTH THREE TIMES A DAY AS NEEDED 8/18/21  Yes Ibrahima Parekh NP   lidocaine (LIDODERM) 5 % Apply patch to the affected area for 12 hours a day and remove for 12 hours a day. 8/18/21  Yes Ibrahima Parekh NP   diclofenac (Voltaren) 1 % gel Apply 2 g to affected area four (4) times daily as needed (knee pain, back pain). 6/1/21  Yes Ibrahima Parekh NP   fluticasone propionate (FLONASE) 50 mcg/actuation nasal spray SPRAY ONE SPRAY IN EACH NOSTRIL ONCE DAILY 4/6/21  Yes Ibrahima Parekh NP   polyethylene glycol (Miralax) 17 gram/dose powder Take 17 g by mouth daily. Yes Provider, Historical   meclizine (ANTIVERT) 12.5 mg tablet Take 1-2 Tabs by mouth two (2) times daily as needed for Dizziness. 11/3/20  Yes Ibrahima Parekh NP   carboxymethylcellulose sodium (REFRESH LIQUIGEL) 1 % dlgl ophthalmic solution Administer 1 Drop to both eyes as needed (dry eyes).    Yes Provider, Historical   cyanocobalamin (VITAMIN B12) 500 mcg tablet Take 500 mcg by mouth as needed. Yes Provider, Historical   acetaminophen (TYLENOL) 325 mg tablet Take 325-650 mg by mouth every six (6) hours as needed (headache). Yes Provider, Historical   amLODIPine (NORVASC) 5 mg tablet Take 1 Tablet by mouth daily. 8/18/21 10/21/21  James Romero NP   atenoloL (TENORMIN) 25 mg tablet TAKE ONE TABLET BY MOUTH DAILY 8/3/21 10/21/21  James Romero NP   diazePAM (VALIUM) 5 mg tablet TAKE ONE TABLET BY MOUTH TWICE A DAY AS NEEDED FOR ANXIETY OR SLEEP 5/24/21 10/21/21  James Romero NP        Review of Systems:  Review of Systems   Constitutional: Positive for malaise/fatigue. Negative for weight loss. Respiratory: Negative for shortness of breath. Cardiovascular: Negative for chest pain, palpitations and leg swelling. Gastrointestinal: Positive for nausea. Negative for abdominal pain, blood in stool, constipation, heartburn and vomiting. Genitourinary: Negative. Musculoskeletal: Negative for back pain, joint pain and myalgias. Neurological: Negative for dizziness, tingling and headaches. Psychiatric/Behavioral: The patient is nervous/anxious and has insomnia.             Patient Active Problem List   Diagnosis Code    Pneumonia, organism unspecified(486) J18.9    Hyponatremia E87.1    Essential hypertension I10    Anxiety F41.9    History of TIAs Z86.73    Dizziness R42    Weakness R53.1    Pancreatic cyst K86.2    GI bleed K92.2    Acute colitis K52.9    Weight loss R63.4    Irritable bowel syndrome with both constipation and diarrhea K58.2    Gastroesophageal reflux disease with esophagitis without hemorrhage K21.00     Patient Active Problem List    Diagnosis Date Noted    Weight loss 11/03/2020    Irritable bowel syndrome with both constipation and diarrhea 11/03/2020    Gastroesophageal reflux disease with esophagitis without hemorrhage 11/03/2020    Pancreatic cyst 10/31/2019    GI bleed 10/31/2019    Acute colitis 10/31/2019    Dizziness 07/19/2018  Weakness 07/19/2018    Pneumonia, organism unspecified(486) 03/05/2013    Hyponatremia 03/05/2013    Essential hypertension 03/05/2013    Anxiety 03/05/2013    History of TIAs 03/05/2013     Current Outpatient Medications   Medication Sig Dispense Refill    diazePAM (VALIUM) 5 mg tablet TAKE ONE TABLET BY MOUTH TWICE A DAY AS NEEDED FOR ANXIETY OR SLEEP  Indications: inducing of a relaxed easy state 60 Tablet 4    amLODIPine (NORVASC) 5 mg tablet Take 1 Tablet by mouth daily. 90 Tablet 0    atenoloL (TENORMIN) 25 mg tablet Take 1 Tablet by mouth daily. 90 Tablet 0    clopidogreL (PLAVIX) 75 mg tab TAKE 1 TABLET BY MOUTH EVERY DAY 90 Tablet 0    ondansetron (ZOFRAN ODT) 4 mg disintegrating tablet DISSOLVE ONE TABLET BY MOUTH EVERY 8 HOURS AS NEEDED FOR NAUSEA 20 Tablet 5    dicyclomine (BENTYL) 10 mg capsule TAKE ONE CAPSULE BY MOUTH THREE TIMES A DAY AS NEEDED 90 Capsule 2    lidocaine (LIDODERM) 5 % Apply patch to the affected area for 12 hours a day and remove for 12 hours a day. 15 Each 0    diclofenac (Voltaren) 1 % gel Apply 2 g to affected area four (4) times daily as needed (knee pain, back pain). 60 g 1    fluticasone propionate (FLONASE) 50 mcg/actuation nasal spray SPRAY ONE SPRAY IN EACH NOSTRIL ONCE DAILY 1 Bottle 4    polyethylene glycol (Miralax) 17 gram/dose powder Take 17 g by mouth daily.  meclizine (ANTIVERT) 12.5 mg tablet Take 1-2 Tabs by mouth two (2) times daily as needed for Dizziness. 180 Tab 1    carboxymethylcellulose sodium (REFRESH LIQUIGEL) 1 % dlgl ophthalmic solution Administer 1 Drop to both eyes as needed (dry eyes).  cyanocobalamin (VITAMIN B12) 500 mcg tablet Take 500 mcg by mouth as needed.  acetaminophen (TYLENOL) 325 mg tablet Take 325-650 mg by mouth every six (6) hours as needed (headache).        Allergies   Allergen Reactions    Sulfa (Sulfonamide Antibiotics) Nausea and Vomiting    Codeine Other (comments)     Stomach cramping    Dairy Aid [Lactase] Unknown (comments)    Levaquin [Levofloxacin] Nausea and Vomiting     Pt can not take PO due to vomiting.  Linzess [Linaclotide] Diarrhea    Tomato Unknown (comments)     Past Medical History:   Diagnosis Date    Allergies     Anxiety     [facility] ([facility] [address] [address] 1993    s/p lumpectomy    Diabetes mellitus ([facility]     diet-controlled    Hx-TIA (transient ischemic attack)     Hypertension     Hyponatremia     Melanoma ([facility] [address] 2008    s/p resection, right cheek    Tremor           Objective:     Physical Exam  Constitutional:       Appearance: Normal appearance. HENT:      Head: Normocephalic. Eyes:      Conjunctiva/sclera: Conjunctivae normal.   Cardiovascular:      Rate and Rhythm: Normal rate. Pulmonary:      Effort: Pulmonary effort is normal.   Neurological:      Mental Status: She is alert and oriented to person, place, and time. Psychiatric:         Mood and Affect: Mood normal.         Behavior: Behavior normal.       Additional exam findings: We discussed the expected course, resolution and complications of the diagnosis(es) in detail. Medication risks, benefits, costs, interactions, and alternatives were discussed as indicated. I advised her to contact the office if her condition worsens, changes or fails to improve as anticipated. She expressed understanding with the diagnosis(es) and plan. Ricardo Saldaña, who was evaluated through a synchronous (real-time) audio-video encounter and/or her healthcare decision maker, is aware that it is a billable service, with coverage as determined by her insurance carrier. She provided verbal consent to proceed: Yes, and patient identification was verified. It was conducted pursuant to the emergency declaration under the [address] [place] [address], [address] [place] [address] authority and the [employer] Act. A caregiver was present when appropriate.  Ability to conduct physical exam was limited. I was at home. The patient was at home.       Jake Ingram, NP

## 2021-11-02 ENCOUNTER — OFFICE VISIT (OUTPATIENT)
Dept: PRIMARY CARE CLINIC | Age: 86
End: 2021-11-02
Payer: MEDICARE

## 2021-11-02 DIAGNOSIS — Z23 NEEDS FLU SHOT: Primary | ICD-10-CM

## 2021-11-02 DIAGNOSIS — I10 ESSENTIAL HYPERTENSION: ICD-10-CM

## 2021-11-02 PROCEDURE — 90694 VACC AIIV4 NO PRSRV 0.5ML IM: CPT | Performed by: NURSE PRACTITIONER

## 2021-11-02 PROCEDURE — G0008 ADMIN INFLUENZA VIRUS VAC: HCPCS | Performed by: NURSE PRACTITIONER

## 2021-11-02 RX ORDER — ATENOLOL 25 MG/1
TABLET ORAL
Qty: 90 TABLET | Refills: 0 | Status: SHIPPED | OUTPATIENT
Start: 2021-11-02 | End: 2022-04-14

## 2021-11-02 NOTE — PATIENT INSTRUCTIONS
Vaccine Information Statement Influenza (Flu) Vaccine (Live, Intranasal): What You Need to Know Many vaccine information statements are available in [place] and other languages. See www.immunize.org/vis. Hojas de información sobre vacunas están disponibles en español y en muchos otros idiomas. Visite www.immunize.org/vis. 1. Why get vaccinated? Influenza vaccine can prevent influenza (flu). Flu is a contagious disease that spreads around the [facility] every year, usually between October and May. Anyone can get the flu, but it is more dangerous for some people. Infants and young children, people 72years of age and older, pregnant people, and people with certain health conditions or a weakened immune system are at greatest risk of flu complications. Pneumonia, bronchitis, sinus infections, and ear infections are examples of flu-related complications. If you have a medical condition, such as heart disease, cancer, or diabetes, flu can make it worse. Flu can cause fever and chills, sore throat, muscle aches, fatigue, cough, headache, and runny or stuffy nose. Some people may have vomiting and diarrhea, though this is more common in children than adults. In an average year, thousands of people in the [facility] die from flu, and many more are hospitalized. Flu vaccine prevents millions of illnesses and flu-related visits to the doctor each year. 2. Live, attenuated influenza vaccine CDC recommends everyone 6 months and older get vaccinated every flu season. Children 6 months through 6years of age may need 2 doses during a single flu season. Everyone else needs only 1 dose each flu season. Live, attenuated influenza vaccine (called LAIV) is a nasal spray vaccine that may be given to non-pregnant people 2 through 52years of age. It takes about 2 weeks for protection to develop after vaccination. There are many flu viruses, and they are always changing.  Each year a new flu vaccine is made to protect against the influenza viruses believed to be likely to cause disease in the upcoming flu season. Even when the vaccine doesnt exactly match these viruses, it may still provide some protection. Influenza vaccine does not cause flu. Influenza vaccine may be given at the same time as other vaccines. 3. Talk with your health care provider Tell your vaccination provider if the person getting the vaccine:  Is younger than 2 years or older than 52years of age  Is pregnant. Live, attenuated influenza vaccine is not recommended for pregnant people  Has had an allergic reaction after a previous dose of influenza vaccine, or has any severe, life-threatening allergies  Is a child or adolescent 2 through 16years of age who is receiving aspirin or aspirin- or salicylate-containing products  Has a weakened immune system  Is a child 3through 3years old who has asthma or a history of wheezing in the past 12 months  Is 5 years or older and has asthma 
 Has taken influenza antiviral medication in the last 3 weeks  Cares for severely immunocompromised people who require a protected environment  Has other underlying medical conditions that can put people at higher risk of serious flu complications (such as lung disease, heart disease, kidney disease like diabetes, kidney or liver disorders, neurologic or neuromuscular or metabolic disorders)  Does not have a spleen, or has a non-functioning spleen  Has a cochlear implant  Has a cerebrospinal fluid leak (a leak of the fluid that surrounds the brain to the nose, throat, ear, or some other location in the head)  Has had Guillain-Barré Syndrome within 6 weeks after a previous dose of influenza vaccine In some cases, your health care provider may decide to postpone influenza vaccination until a future visit.    
 
For some patients, a different type of influenza vaccine (inactivated or recombinant influenza vaccine) might be more appropriate than live, attenuated influenza vaccine. People with minor illnesses, such as a cold, may be vaccinated. People who are moderately or severely ill should usually wait until they recover before getting influenza vaccine. Your health care provider can give you more information. 4. Risks of a vaccine reaction  Runny nose or nasal congestion, wheezing, and headache can happen after LAIV vaccination.  Vomiting, muscle aches, fever, sore throat, and cough are other possible side effects. If these problems occur, they usually begin soon after vaccination and are mild and short-lived. As with any medicine, there is a very remote chance of a vaccine causing a severe allergic reaction, other serious injury, or death. 5. What if there is a serious problem? An allergic reaction could occur after the vaccinated person leaves the clinic. If you see signs of a severe allergic reaction (hives, swelling of the face and throat, difficulty breathing, a fast heartbeat, dizziness, or weakness), call 9-1-1 and get the person to the nearest hospital. 
 
For other signs that concern you, call your health care provider. Adverse reactions should be reported to the Vaccine Adverse Event Reporting System (VAERS). Your health care provider will usually file this report, or you can do it yourself. Visit the VAERS website at www.vaers. hhs.gov or call 0-937.623.7682. VAERS is only for reporting reactions, and VAERS staff members do not give medical advice. 6. The National Vaccine Injury Compensation Program 
 
The [facility] Vaccine Injury Compensation Program (VICP) is a federal program that was created to compensate people who may have been injured by certain vaccines. Claims regarding alleged injury or death due to vaccination have a time limit for filing, which may be as short as two years.  Visit the VICP website at www.hrsa.gov/vaccinecompensation or call 7-548.286.8989 to learn about the program and about filing a claim. 7. How can I learn more?  Ask your health care provider.  Call your local or state health department.  Visit the website of the Food and Drug Administration (FDA) for vaccine package inserts and additional information at www.fda.gov/vaccines-blood-biologics/vaccines.  Contact the Centers for Disease Control and Prevention (CDC): 
- Call 2-501.554.8121 (1-800-CDC-INFO) or 
- Visit CDCs influenza website at www.cdc.gov/flu. Vaccine Information Statement Live, Attenuated Influenza Vaccine 8/6/2021 
[address] [address] Department of Health and [employer] Centers for Disease Control and Prevention Office Use Only

## 2021-11-08 DIAGNOSIS — I10 ESSENTIAL HYPERTENSION: ICD-10-CM

## 2021-11-08 DIAGNOSIS — K58.2 IRRITABLE BOWEL SYNDROME WITH BOTH CONSTIPATION AND DIARRHEA: Chronic | ICD-10-CM

## 2021-11-08 RX ORDER — AMLODIPINE BESYLATE 5 MG/1
TABLET ORAL
Qty: 90 TABLET | Refills: 0 | Status: SHIPPED | OUTPATIENT
Start: 2021-11-08 | End: 2022-01-15

## 2021-11-08 RX ORDER — DICYCLOMINE HYDROCHLORIDE 10 MG/1
CAPSULE ORAL
Qty: 270 CAPSULE | Refills: 0 | Status: SHIPPED | OUTPATIENT
Start: 2021-11-08 | End: 2022-05-31

## 2021-11-14 DIAGNOSIS — R42 DIZZINESS: ICD-10-CM

## 2021-11-15 RX ORDER — MECLIZINE HCL 12.5 MG 12.5 MG/1
TABLET ORAL
Qty: 180 TABLET | Refills: 1 | Status: SHIPPED | OUTPATIENT
Start: 2021-11-15

## 2021-12-21 ENCOUNTER — TELEPHONE (OUTPATIENT)
Dept: PRIMARY CARE CLINIC | Age: 86
End: 2021-12-21

## 2021-12-27 NOTE — TELEPHONE ENCOUNTER
Yes, I would recommend getting pfizer or moderna as a booster for the vaccination since she initially had J&J.

## 2022-01-14 DIAGNOSIS — I10 ESSENTIAL HYPERTENSION: ICD-10-CM

## 2022-01-15 RX ORDER — AMLODIPINE BESYLATE 5 MG/1
TABLET ORAL
Qty: 90 TABLET | Refills: 0 | Status: SHIPPED | OUTPATIENT
Start: 2022-01-15

## 2022-01-17 RX ORDER — CLOPIDOGREL BISULFATE 75 MG/1
TABLET ORAL
Qty: 90 TABLET | Refills: 0 | Status: SHIPPED | OUTPATIENT
Start: 2022-01-17 | End: 2022-04-12

## 2022-03-18 DIAGNOSIS — K58.2 IRRITABLE BOWEL SYNDROME WITH BOTH CONSTIPATION AND DIARRHEA: ICD-10-CM

## 2022-03-18 PROBLEM — K92.2 GI BLEED: Status: ACTIVE | Noted: 2019-10-31

## 2022-03-18 PROBLEM — K86.2 PANCREATIC CYST: Status: ACTIVE | Noted: 2019-10-31

## 2022-03-18 RX ORDER — ONDANSETRON 4 MG/1
TABLET, ORALLY DISINTEGRATING ORAL
Qty: 20 TABLET | Refills: 5 | Status: SHIPPED | OUTPATIENT
Start: 2022-03-18 | End: 2022-08-22

## 2022-03-19 PROBLEM — K52.9 ACUTE COLITIS: Status: ACTIVE | Noted: 2019-10-31

## 2022-03-19 PROBLEM — R42 DIZZINESS: Status: ACTIVE | Noted: 2018-07-19

## 2022-03-19 PROBLEM — K21.00 GASTROESOPHAGEAL REFLUX DISEASE WITH ESOPHAGITIS WITHOUT HEMORRHAGE: Status: ACTIVE | Noted: 2020-11-03

## 2022-03-19 PROBLEM — R63.4 WEIGHT LOSS: Status: ACTIVE | Noted: 2020-11-03

## 2022-03-19 PROBLEM — R53.1 WEAKNESS: Status: ACTIVE | Noted: 2018-07-19

## 2022-04-12 RX ORDER — CLOPIDOGREL BISULFATE 75 MG/1
TABLET ORAL
Qty: 90 TABLET | Refills: 0 | Status: SHIPPED | OUTPATIENT
Start: 2022-04-12 | End: 2022-07-08

## 2022-04-14 DIAGNOSIS — I10 ESSENTIAL HYPERTENSION: ICD-10-CM

## 2022-04-14 RX ORDER — ATENOLOL 25 MG/1
TABLET ORAL
Qty: 90 TABLET | Refills: 0 | Status: SHIPPED | OUTPATIENT
Start: 2022-04-14 | End: 2022-07-15

## 2022-04-19 ENCOUNTER — OFFICE VISIT (OUTPATIENT)
Dept: PRIMARY CARE CLINIC | Age: 87
End: 2022-04-19
Payer: MEDICARE

## 2022-04-19 VITALS
TEMPERATURE: 97.5 F | RESPIRATION RATE: 16 BRPM | HEART RATE: 97 BPM | OXYGEN SATURATION: 99 % | HEIGHT: 61 IN | WEIGHT: 93.2 LBS | BODY MASS INDEX: 17.59 KG/M2 | DIASTOLIC BLOOD PRESSURE: 76 MMHG | SYSTOLIC BLOOD PRESSURE: 152 MMHG

## 2022-04-19 DIAGNOSIS — R19.7 DIARRHEA, UNSPECIFIED TYPE: Primary | ICD-10-CM

## 2022-04-19 LAB
HEMOCCULT STL QL: NEGATIVE
VALID INTERNAL CONTROL?: YES

## 2022-04-19 PROCEDURE — 99213 OFFICE O/P EST LOW 20 MIN: CPT | Performed by: NURSE PRACTITIONER

## 2022-04-19 PROCEDURE — G8400 PT W/DXA NO RESULTS DOC: HCPCS | Performed by: NURSE PRACTITIONER

## 2022-04-19 PROCEDURE — 82272 OCCULT BLD FECES 1-3 TESTS: CPT | Performed by: NURSE PRACTITIONER

## 2022-04-19 PROCEDURE — G8536 NO DOC ELDER MAL SCRN: HCPCS | Performed by: NURSE PRACTITIONER

## 2022-04-19 PROCEDURE — G8419 CALC BMI OUT NRM PARAM NOF/U: HCPCS | Performed by: NURSE PRACTITIONER

## 2022-04-19 PROCEDURE — G8432 DEP SCR NOT DOC, RNG: HCPCS | Performed by: NURSE PRACTITIONER

## 2022-04-19 PROCEDURE — G8753 SYS BP > OR = 140: HCPCS | Performed by: NURSE PRACTITIONER

## 2022-04-19 PROCEDURE — G8427 DOCREV CUR MEDS BY ELIG CLIN: HCPCS | Performed by: NURSE PRACTITIONER

## 2022-04-19 PROCEDURE — 1101F PT FALLS ASSESS-DOCD LE1/YR: CPT | Performed by: NURSE PRACTITIONER

## 2022-04-19 PROCEDURE — 1090F PRES/ABSN URINE INCON ASSESS: CPT | Performed by: NURSE PRACTITIONER

## 2022-04-19 PROCEDURE — G8754 DIAS BP LESS 90: HCPCS | Performed by: NURSE PRACTITIONER

## 2022-04-19 NOTE — PROGRESS NOTES
HISTORY OF PRESENT ILLNESS  Ellie Arana is a 81 y.o. female presents for diarrhea    Patient c/o diarrhea x1 week, having 8 bowel movements a day with LLQ discomfort. Patient notes that she is also having nausea. Patient notes that color of stool is black but today stool was yellow. Patient has been using pepto bismol. Patient reported that she has IBS and issues with BM's, the incontinence or urgency is new. Patient reported that she sees GI, next appointment is May 19th. Patient notes that she had a hernia repair in October 2021 where they also removed partially ischemic bowel per patient.      Vitals:    04/19/22 1303 04/19/22 1306   BP: (!) 166/87 (!) 152/76   Pulse: 97    Resp: 16    Temp: 97.5 °F (36.4 °C)    TempSrc: Temporal    SpO2: 99%    Weight: 93 lb 3.2 oz (42.3 kg)    Height: 5' 1\" (1.549 m)      Patient Active Problem List   Diagnosis Code    Pneumonia, organism unspecified(486) J18.9    Hyponatremia E87.1    Essential hypertension I10    Anxiety F41.9    History of TIAs Z86.73    Dizziness R42    Weakness R53.1    Pancreatic cyst K86.2    GI bleed K92.2    Acute colitis K52.9    Weight loss R63.4    Irritable bowel syndrome with both constipation and diarrhea K58.2    Gastroesophageal reflux disease with esophagitis without hemorrhage K21.00     Patient Active Problem List    Diagnosis Date Noted    Weight loss 11/03/2020    Irritable bowel syndrome with both constipation and diarrhea 11/03/2020    Gastroesophageal reflux disease with esophagitis without hemorrhage 11/03/2020    Pancreatic cyst 10/31/2019    GI bleed 10/31/2019    Acute colitis 10/31/2019    Dizziness 07/19/2018    Weakness 07/19/2018    Pneumonia, organism unspecified(486) 03/05/2013    Hyponatremia 03/05/2013    Essential hypertension 03/05/2013    Anxiety 03/05/2013    History of TIAs 03/05/2013     Current Outpatient Medications   Medication Sig Dispense Refill    atenoloL (TENORMIN) 25 mg tablet TAKE ONE TABLET BY MOUTH DAILY 90 Tablet 0    clopidogreL (PLAVIX) 75 mg tab TAKE 1 TABLET BY MOUTH EVERY DAY 90 Tablet 0    ondansetron (ZOFRAN ODT) 4 mg disintegrating tablet DISSOLVE ONE TABLET BY MOUTH EVERY 8 HOURS AS NEEDED FOR NAUSEA 20 Tablet 5    amLODIPine (NORVASC) 5 mg tablet TAKE 1 TABLET BY MOUTH EVERY DAY 90 Tablet 0    meclizine (ANTIVERT) 12.5 mg tablet TAKE ONE TO TWO TABLETS BY MOUTH TWICE A DAY AS NEEDED FOR DIZZINESS 180 Tablet 1    dicyclomine (BENTYL) 10 mg capsule TAKE ONE CAPSULE BY MOUTH THREE TIMES A DAY AS NEEDED 270 Capsule 0    diazePAM (VALIUM) 5 mg tablet TAKE ONE TABLET BY MOUTH TWICE A DAY AS NEEDED FOR ANXIETY OR SLEEP  Indications: inducing of a relaxed easy state 60 Tablet 4    diclofenac (Voltaren) 1 % gel Apply 2 g to affected area four (4) times daily as needed (knee pain, back pain). 60 g 1    fluticasone propionate (FLONASE) 50 mcg/actuation nasal spray SPRAY ONE SPRAY IN EACH NOSTRIL ONCE DAILY 1 Bottle 4    polyethylene glycol (Miralax) 17 gram/dose powder Take 17 g by mouth daily.  carboxymethylcellulose sodium (REFRESH LIQUIGEL) 1 % dlgl ophthalmic solution Administer 1 Drop to both eyes as needed (dry eyes).  cyanocobalamin (VITAMIN B12) 500 mcg tablet Take 500 mcg by mouth as needed.  acetaminophen (TYLENOL) 325 mg tablet Take 325-650 mg by mouth every six (6) hours as needed (headache).  lidocaine (LIDODERM) 5 % Apply patch to the affected area for 12 hours a day and remove for 12 hours a day. (Patient not taking: Reported on 4/19/2022) 15 Each 0     Allergies   Allergen Reactions    Sulfa (Sulfonamide Antibiotics) Nausea and Vomiting    Codeine Other (comments)     Stomach cramping    Dairy Aid [Lactase] Unknown (comments)    Levaquin [Levofloxacin] Nausea and Vomiting     Pt can not take PO due to vomiting.     Linzess [Linaclotide] Diarrhea    Tomato Unknown (comments)     Past Medical History:   Diagnosis Date    Allergies  Anxiety     [facility] ([facility] 1993    s/p lumpectomy    Diabetes mellitus ([facility]     diet-controlled    Hx-TIA (transient ischemic attack)     Hypertension     Hyponatremia     Melanoma ([facility] 2008    s/p resection, right cheek    Tremor      Past Surgical History:   Procedure Laterality Date    HX BACK SURGERY      HX BREAST LUMPECTOMY  1993    right    HX CHOLECYSTECTOMY      HX HYSTERECTOMY      HX MALIGNANT SKIN LESION EXCISION  2008    HX MENISCECTOMY      HX OTHER SURGICAL      pineda's neuroma bilaterally     Family History   Problem Relation Age of Onset    Heart Failure Mother     Other Father         aortic aneurysm    Diabetes Son     Immunodeficiency Son         post kidney and pancreas transplant     Social History     Tobacco Use    Smoking status: Never Smoker    Smokeless tobacco: Never Used   Substance Use Topics    Alcohol use: No           Review of Systems   Constitutional: Negative for malaise/fatigue and weight loss. Eyes: Negative for blurred vision and double vision. Respiratory: Negative for shortness of breath. Cardiovascular: Negative for chest pain and palpitations. Gastrointestinal: Positive for abdominal pain, diarrhea and nausea. Negative for vomiting. Genitourinary: Negative. Musculoskeletal: Negative. Neurological: Negative for dizziness, tingling, tremors and headaches. Psychiatric/Behavioral: The patient is not nervous/anxious and does not have insomnia. Physical Exam  Exam conducted with a chaperone present [employer] student). Constitutional:       Appearance: Normal appearance. She is normal weight. HENT:      Head: Normocephalic. Eyes:      Extraocular Movements: Extraocular movements intact. Conjunctiva/sclera: Conjunctivae normal.      Pupils: Pupils are equal, round, and reactive to light. Cardiovascular:      Rate and Rhythm: Normal rate and regular rhythm. Pulses: Normal pulses.       Heart sounds: Normal heart sounds. Pulmonary:      Effort: Pulmonary effort is normal.      Breath sounds: Normal breath sounds. Abdominal:      General: Abdomen is flat. Bowel sounds are normal.      Palpations: Abdomen is soft. Genitourinary:     Rectum: Guaiac result negative. Tenderness present. Musculoskeletal:         General: Normal range of motion. Cervical back: Normal range of motion and neck supple. Skin:     General: Skin is warm and dry. Neurological:      General: No focal deficit present. Mental Status: She is alert and oriented to person, place, and time. Psychiatric:         Mood and Affect: Mood normal.           ASSESSMENT and PLAN  Diagnoses and all orders for this visit:    1. Diarrhea, unspecified type  Comments:  hemocult negative. Checking labs. hx of IBS mixed type. Sees GI, next apt May. Consider Colestipol for symptoms.    Orders:  -     AMB POC FECAL BLOOD, OCCULT, QL 1 CARD  -     CBC WITH AUTOMATED DIFF  -     METABOLIC PANEL, COMPREHENSIVE         Jose Kinds, NP

## 2022-04-19 NOTE — PROGRESS NOTES
1. \"Have you been to the ER, urgent care clinic since your last visit? Hospitalized since your last visit? \" No    2. \"Have you seen or consulted any other health care providers outside of the [address] [place] [address] since your last visit? \" No     3. For patients aged 39-70: Has the patient had a colonoscopy / FIT/ Cologuard? No      If the patient is female:    4. For patients aged 41-77: Has the patient had a mammogram within the past 2 years? NA - based on age or sex      11. For patients aged 21-65: Has the patient had a pap smear?  NA - based on age or sex     Visit Vitals  BP (!) 166/87 (BP 1 Location: Left arm)   Pulse 97   Temp 97.5 °F (36.4 °C) (Temporal)   Resp 16   Ht 5' 1\" (1.549 m)   Wt 93 lb 3.2 oz (42.3 kg)   SpO2 99%   BMI 17.61 kg/m²       Chief Complaint   Patient presents with    Diarrhea

## 2022-04-20 LAB
ALBUMIN SERPL-MCNC: 4.3 G/DL (ref 3.6–4.6)
ALBUMIN/GLOB SERPL: 1.7 {RATIO} (ref 1.2–2.2)
ALP SERPL-CCNC: 82 IU/L (ref 44–121)
ALT SERPL-CCNC: 9 IU/L (ref 0–32)
AST SERPL-CCNC: 22 IU/L (ref 0–40)
BASOPHILS # BLD AUTO: 0 X10E3/UL (ref 0–0.2)
BASOPHILS NFR BLD AUTO: 1 %
BILIRUB SERPL-MCNC: 0.2 MG/DL (ref 0–1.2)
BUN SERPL-MCNC: 9 MG/DL (ref 8–27)
BUN/CREAT SERPL: 12 (ref 12–28)
CALCIUM SERPL-MCNC: 9.3 MG/DL (ref 8.7–10.3)
CHLORIDE SERPL-SCNC: 91 MMOL/L (ref 96–106)
CO2 SERPL-SCNC: 19 MMOL/L (ref 20–29)
CREAT SERPL-MCNC: 0.73 MG/DL (ref 0.57–1)
EGFR: 81 ML/MIN/1.73
EOSINOPHIL # BLD AUTO: 0.1 X10E3/UL (ref 0–0.4)
EOSINOPHIL NFR BLD AUTO: 1 %
ERYTHROCYTE [DISTWIDTH] IN BLOOD BY AUTOMATED COUNT: 12.5 % (ref 11.7–15.4)
GLOBULIN SER CALC-MCNC: 2.6 G/DL (ref 1.5–4.5)
GLUCOSE SERPL-MCNC: 106 MG/DL (ref 65–99)
HCT VFR BLD AUTO: 37.4 % (ref 34–46.6)
HGB BLD-MCNC: 12.4 G/DL (ref 11.1–15.9)
IMM GRANULOCYTES # BLD AUTO: 0 X10E3/UL (ref 0–0.1)
IMM GRANULOCYTES NFR BLD AUTO: 0 %
LYMPHOCYTES # BLD AUTO: 2.3 X10E3/UL (ref 0.7–3.1)
LYMPHOCYTES NFR BLD AUTO: 36 %
MCH RBC QN AUTO: 29.6 PG (ref 26.6–33)
MCHC RBC AUTO-ENTMCNC: 33.2 G/DL (ref 31.5–35.7)
MCV RBC AUTO: 89 FL (ref 79–97)
MONOCYTES # BLD AUTO: 0.6 X10E3/UL (ref 0.1–0.9)
MONOCYTES NFR BLD AUTO: 10 %
NEUTROPHILS # BLD AUTO: 3.2 X10E3/UL (ref 1.4–7)
NEUTROPHILS NFR BLD AUTO: 52 %
PLATELET # BLD AUTO: 367 X10E3/UL (ref 150–450)
POTASSIUM SERPL-SCNC: 3.7 MMOL/L (ref 3.5–5.2)
PROT SERPL-MCNC: 6.9 G/DL (ref 6–8.5)
RBC # BLD AUTO: 4.19 X10E6/UL (ref 3.77–5.28)
SODIUM SERPL-SCNC: 129 MMOL/L (ref 134–144)
WBC # BLD AUTO: 6.3 X10E3/UL (ref 3.4–10.8)

## 2022-04-20 RX ORDER — COLESTIPOL HYDROCHLORIDE 5 G/5G
5 GRANULE, FOR SUSPENSION ORAL DAILY
Qty: 30 EACH | Refills: 0 | Status: SHIPPED | OUTPATIENT
Start: 2022-04-20

## 2022-04-26 ENCOUNTER — TELEPHONE (OUTPATIENT)
Dept: PRIMARY CARE CLINIC | Age: 87
End: 2022-04-26

## 2022-04-26 DIAGNOSIS — R19.7 DIARRHEA, UNSPECIFIED TYPE: Primary | ICD-10-CM

## 2022-04-26 NOTE — TELEPHONE ENCOUNTER
Patient is still having stomach issues and nothing but water coming out for over a week and she feels she is dehydrated. She wants to know if you can call her something.

## 2022-04-27 RX ORDER — PSYLLIUM HUSK 3.4 G/5.8G
1 POWDER ORAL DAILY
Qty: 30 PACKET | Refills: 0 | Status: SHIPPED | OUTPATIENT
Start: 2022-04-27

## 2022-04-27 NOTE — TELEPHONE ENCOUNTER
Lets try metamucil to see if this helps alleviate incontinence. If this does not help, we really should be seeing gastro to get their input!

## 2022-04-27 NOTE — TELEPHONE ENCOUNTER
Pt states she has been taking it for 5 days and it is not working. Pt states she is having to wear pull-ups. Pt states she can't stay out of the bathroom. Pt states she is also having balance issues, she is not sure if has to do with the symptoms she is having.  Pt states she has also been taking antidiarrhea pills

## 2022-04-28 DIAGNOSIS — F41.9 ANXIETY: Chronic | ICD-10-CM

## 2022-04-29 RX ORDER — DIAZEPAM 5 MG/1
TABLET ORAL
Qty: 60 TABLET | Refills: 2 | Status: SHIPPED
Start: 2022-04-29 | End: 2022-10-18

## 2022-04-29 NOTE — TELEPHONE ENCOUNTER
Last PDMP Florecita Parekh as Reviewed:  Review User Review Instant Review Result   Knute Mcardle 4/29/2022  6:30 AM Reviewed PDMP [1]

## 2022-05-17 ENCOUNTER — OFFICE VISIT (OUTPATIENT)
Dept: PRIMARY CARE CLINIC | Age: 87
End: 2022-05-17
Payer: MEDICARE

## 2022-05-17 VITALS — RESPIRATION RATE: 19 BRPM | TEMPERATURE: 98 F | OXYGEN SATURATION: 97 % | HEART RATE: 98 BPM

## 2022-05-17 DIAGNOSIS — J06.9 VIRAL URI WITH COUGH: Primary | ICD-10-CM

## 2022-05-17 PROCEDURE — G8756 NO BP MEASURE DOC: HCPCS | Performed by: NURSE PRACTITIONER

## 2022-05-17 PROCEDURE — 1101F PT FALLS ASSESS-DOCD LE1/YR: CPT | Performed by: NURSE PRACTITIONER

## 2022-05-17 PROCEDURE — G8419 CALC BMI OUT NRM PARAM NOF/U: HCPCS | Performed by: NURSE PRACTITIONER

## 2022-05-17 PROCEDURE — 1090F PRES/ABSN URINE INCON ASSESS: CPT | Performed by: NURSE PRACTITIONER

## 2022-05-17 PROCEDURE — G8400 PT W/DXA NO RESULTS DOC: HCPCS | Performed by: NURSE PRACTITIONER

## 2022-05-17 PROCEDURE — 99213 OFFICE O/P EST LOW 20 MIN: CPT | Performed by: NURSE PRACTITIONER

## 2022-05-17 PROCEDURE — G8432 DEP SCR NOT DOC, RNG: HCPCS | Performed by: NURSE PRACTITIONER

## 2022-05-17 PROCEDURE — G8536 NO DOC ELDER MAL SCRN: HCPCS | Performed by: NURSE PRACTITIONER

## 2022-05-17 PROCEDURE — G8427 DOCREV CUR MEDS BY ELIG CLIN: HCPCS | Performed by: NURSE PRACTITIONER

## 2022-05-17 RX ORDER — BENZONATATE 100 MG/1
100 CAPSULE ORAL
Qty: 30 CAPSULE | Refills: 0 | Status: SHIPPED | OUTPATIENT
Start: 2022-05-17

## 2022-05-17 NOTE — PROGRESS NOTES
Chief Complaint   Patient presents with    Cough     pt states she think she caught something from her granddaughter. pt states having a cough since friday       1. Have you been to the ER, urgent care clinic since your last visit? Hospitalized since your last visit?no    2. Have you seen or consulted any other health care providers outside of the [address] [place] [address] since your last visit? Include any pap smears or colon screening.  no    Visit Vitals  Pulse 98   Temp 98 °F (36.7 °C) (Temporal)   Resp 19   SpO2 97%

## 2022-05-17 NOTE — PROGRESS NOTES
HISTORY OF PRESENT ILLNESS  Maryann Adams is a 81 y.o. female presents for cough    Patient c/o URI symptoms that developed x5 days ago after she was exposed to her grandaughter who had similar symptoms. Patient denies fevers. Patient reported that symptoms are improving slightly. She does not leave the house and [facility] was recently tested last week for covid by [facility] and had a negative result. Patient has used tessalon perles with some relief in cough. Also using flonase. Vitals:    05/17/22 1137   Pulse: 98   Resp: 19   Temp: 98 °F (36.7 °C)   TempSrc: Temporal   SpO2: 97%     Patient Active Problem List   Diagnosis Code    Pneumonia, organism unspecified(486) J18.9    Hyponatremia E87.1    Essential hypertension I10    Anxiety F41.9    History of TIAs Z86.73    Dizziness R42    Weakness R53.1    Pancreatic cyst K86.2    GI bleed K92.2    Acute colitis K52.9    Weight loss R63.4    Irritable bowel syndrome with both constipation and diarrhea K58.2    Gastroesophageal reflux disease with esophagitis without hemorrhage K21.00     Patient Active Problem List    Diagnosis Date Noted    Weight loss 11/03/2020    Irritable bowel syndrome with both constipation and diarrhea 11/03/2020    Gastroesophageal reflux disease with esophagitis without hemorrhage 11/03/2020    Pancreatic cyst 10/31/2019    GI bleed 10/31/2019    Acute colitis 10/31/2019    Dizziness 07/19/2018    Weakness 07/19/2018    Pneumonia, organism unspecified(486) 03/05/2013    Hyponatremia 03/05/2013    Essential hypertension 03/05/2013    Anxiety 03/05/2013    History of TIAs 03/05/2013     Current Outpatient Medications   Medication Sig Dispense Refill    benzonatate (Tessalon Perles) 100 mg capsule Take 1 Capsule by mouth three (3) times daily as needed for Cough.  30 Capsule 0    diazePAM (VALIUM) 5 mg tablet TAKE ONE TABLET BY MOUTH TWICE A DAY AS NEEDED FOR ANXIETY OR SLEEP 60 Tablet 2    psyllium husk-aspartame (Metamucil Fiber Singles) 3.4 gram pwpk packet Take 1 Packet by mouth daily. 30 Packet 0    colestipoL (COLESTID) 5 gram packet Take 1 Packet by mouth daily. 30 Each 0    atenoloL (TENORMIN) 25 mg tablet TAKE ONE TABLET BY MOUTH DAILY 90 Tablet 0    clopidogreL (PLAVIX) 75 mg tab TAKE 1 TABLET BY MOUTH EVERY DAY 90 Tablet 0    ondansetron (ZOFRAN ODT) 4 mg disintegrating tablet DISSOLVE ONE TABLET BY MOUTH EVERY 8 HOURS AS NEEDED FOR NAUSEA 20 Tablet 5    amLODIPine (NORVASC) 5 mg tablet TAKE 1 TABLET BY MOUTH EVERY DAY 90 Tablet 0    meclizine (ANTIVERT) 12.5 mg tablet TAKE ONE TO TWO TABLETS BY MOUTH TWICE A DAY AS NEEDED FOR DIZZINESS 180 Tablet 1    dicyclomine (BENTYL) 10 mg capsule TAKE ONE CAPSULE BY MOUTH THREE TIMES A DAY AS NEEDED 270 Capsule 0    lidocaine (LIDODERM) 5 % Apply patch to the affected area for 12 hours a day and remove for 12 hours a day. (Patient not taking: Reported on 4/19/2022) 15 Each 0    diclofenac (Voltaren) 1 % gel Apply 2 g to affected area four (4) times daily as needed (knee pain, back pain). 60 g 1    fluticasone propionate (FLONASE) 50 mcg/actuation nasal spray SPRAY ONE SPRAY IN EACH NOSTRIL ONCE DAILY 1 Bottle 4    polyethylene glycol (Miralax) 17 gram/dose powder Take 17 g by mouth daily.  carboxymethylcellulose sodium (REFRESH LIQUIGEL) 1 % dlgl ophthalmic solution Administer 1 Drop to both eyes as needed (dry eyes).  cyanocobalamin (VITAMIN B12) 500 mcg tablet Take 500 mcg by mouth as needed.  acetaminophen (TYLENOL) 325 mg tablet Take 325-650 mg by mouth every six (6) hours as needed (headache). Allergies   Allergen Reactions    Sulfa (Sulfonamide Antibiotics) Nausea and Vomiting    Codeine Other (comments)     Stomach cramping    Dairy Aid [Lactase] Unknown (comments)    Levaquin [Levofloxacin] Nausea and Vomiting     Pt can not take PO due to vomiting.     Linzess [Linaclotide] Diarrhea    Tomato Unknown (comments)     Past Medical History:   Diagnosis Date    Allergies     Anxiety     [facility] ([facility] 1993    s/p lumpectomy    Diabetes mellitus ([facility]     diet-controlled    Hx-TIA (transient ischemic attack)     Hypertension     Hyponatremia     Melanoma ([facility] 2008    s/p resection, right cheek    Tremor      Past Surgical History:   Procedure Laterality Date    HX BACK SURGERY      HX BREAST LUMPECTOMY  1993    right    HX CHOLECYSTECTOMY      HX HYSTERECTOMY      HX MALIGNANT SKIN LESION EXCISION  2008    HX MENISCECTOMY      HX OTHER SURGICAL      pineda's neuroma bilaterally     Family History   Problem Relation Age of Onset    Heart Failure Mother     Other Father         aortic aneurysm    Diabetes Son     Immunodeficiency Son         post kidney and pancreas transplant     Social History     Tobacco Use    Smoking status: Never Smoker    Smokeless tobacco: Never Used   Substance Use Topics    Alcohol use: No           Review of Systems   Constitutional: Negative for chills, fever and malaise/fatigue. HENT: Positive for congestion and sore throat. Negative for ear discharge, ear pain, sinus pain and tinnitus. Respiratory: Positive for cough. Negative for sputum production, shortness of breath and wheezing. Cardiovascular: Negative for chest pain and palpitations. Gastrointestinal: Negative. Musculoskeletal: Negative for joint pain and myalgias. Neurological: Negative for dizziness and headaches. Physical Exam  Constitutional:       Appearance: Normal appearance. HENT:      Head: Normocephalic. Right Ear: Tympanic membrane, ear canal and external ear normal.      Left Ear: Tympanic membrane, ear canal and external ear normal.      Nose: Rhinorrhea present. Mouth/Throat:      Mouth: Mucous membranes are moist.      Pharynx: Oropharynx is clear.    Eyes:      Conjunctiva/sclera: Conjunctivae normal.   Cardiovascular:      Rate and Rhythm: Normal rate and regular rhythm. Pulses: Normal pulses. Heart sounds: Normal heart sounds. Pulmonary:      Effort: Pulmonary effort is normal.      Breath sounds: Normal breath sounds. Skin:     General: Skin is warm and dry. Neurological:      Mental Status: She is alert and oriented to person, place, and time. Psychiatric:         Mood and Affect: Mood normal.         Behavior: Behavior normal.           ASSESSMENT and PLAN  Diagnoses and all orders for this visit:    1. Viral URI with cough  Comments:  discussed symptom management. Follow up if symptoms persist or worsen. Orders:  -     benzonatate (Tessalon Perles) 100 mg capsule; Take 1 Capsule by mouth three (3) times daily as needed for Cough.          Carrie Corbin NP

## 2022-05-19 ENCOUNTER — TELEPHONE (OUTPATIENT)
Dept: PRIMARY CARE CLINIC | Age: 87
End: 2022-05-19

## 2022-05-19 NOTE — TELEPHONE ENCOUNTER
Pt has gotten worse since she was last seen. Won't be able to get down here. Thinks she needs an antibiotic. (respratory issues) not positive for COVID.

## 2022-05-19 NOTE — TELEPHONE ENCOUNTER
Informed patient that Monie Severino is out of the office until Monday. Pt states she is still having the same symptoms from last visit with Monie Severino. Her head hurt, congested. Pt states she has tried otc meds. I will also send the message to Monie Mere, not sure if she is looking at her buckets. Pt ask if I could send to another provider. I informed pt I couldn't make any promises.  Pt is asking for something to be prescribed

## 2022-05-23 NOTE — TELEPHONE ENCOUNTER
Symptoms were consistent with viral illness which usually takes about 10 -14 days to resolve. Is she starting to feel better that this time as it has been 10 days since onset of symptoms?

## 2022-05-30 DIAGNOSIS — K58.2 IRRITABLE BOWEL SYNDROME WITH BOTH CONSTIPATION AND DIARRHEA: Chronic | ICD-10-CM

## 2022-05-31 RX ORDER — DICYCLOMINE HYDROCHLORIDE 10 MG/1
CAPSULE ORAL
Qty: 270 CAPSULE | Refills: 0 | Status: SHIPPED | OUTPATIENT
Start: 2022-05-31 | End: 2022-08-29

## 2022-06-21 NOTE — PROGRESS NOTES
1.  Polytrim eyedrops 4 times daily x7 days  2.  Alternate warm and cool compresses for discharge and swelling  3.  Call if symptoms persist or worsen   Patient made aware of labs results. Patient voiced concerns of not knowing what to eat because her stomach is so upset. Stated she ate a little cereal and banana this morning and it upset her stomach, she's doesn't know if she can eat yogurt, afraid it will upset her stomach. Patient states she does not know if insurance will pay for ondansetron because she received a letter about it. Informed of new medication prescribed and informed her to let us know of any changes.

## 2022-07-08 RX ORDER — CLOPIDOGREL BISULFATE 75 MG/1
TABLET ORAL
Qty: 90 TABLET | Refills: 0 | Status: SHIPPED | OUTPATIENT
Start: 2022-07-08 | End: 2022-10-06

## 2022-08-21 DIAGNOSIS — K58.2 IRRITABLE BOWEL SYNDROME WITH BOTH CONSTIPATION AND DIARRHEA: ICD-10-CM

## 2022-08-22 RX ORDER — ONDANSETRON 4 MG/1
TABLET, ORALLY DISINTEGRATING ORAL
Qty: 20 TABLET | Refills: 5 | Status: SHIPPED | OUTPATIENT
Start: 2022-08-22

## 2022-08-29 DIAGNOSIS — K58.2 IRRITABLE BOWEL SYNDROME WITH BOTH CONSTIPATION AND DIARRHEA: Chronic | ICD-10-CM

## 2022-08-29 RX ORDER — DICYCLOMINE HYDROCHLORIDE 10 MG/1
CAPSULE ORAL
Qty: 270 CAPSULE | Refills: 0 | Status: SHIPPED | OUTPATIENT
Start: 2022-08-29

## 2022-09-08 ENCOUNTER — TELEPHONE (OUTPATIENT)
Dept: FAMILY MEDICINE CLINIC | Age: 87
End: 2022-09-08

## 2022-09-08 DIAGNOSIS — Z86.73 HISTORY OF TIAS: ICD-10-CM

## 2022-09-08 DIAGNOSIS — I10 ESSENTIAL HYPERTENSION: ICD-10-CM

## 2022-09-08 DIAGNOSIS — R73.03 PREDIABETES: Primary | ICD-10-CM

## 2022-09-08 NOTE — TELEPHONE ENCOUNTER
Lab work ordered. Please schedule her to come in for lab appointment the week prior to her appointment with me in October for FASTING labs. Thank you!

## 2022-09-08 NOTE — TELEPHONE ENCOUNTER
----- Message from Des Reyes sent at 9/8/2022 11:10 AM EDT -----  Subject: Message to Provider    QUESTIONS  Information for Provider? Patient would like to come in before her   appointment to get her labs, please return the call to advise the patient.  ---------------------------------------------------------------------------  --------------  [address]  [phone]; OK to leave message on voicemail  ---------------------------------------------------------------------------  --------------  SCRIPT ANSWERS  Relationship to Patient?  Self

## 2022-09-12 RX ORDER — FLUTICASONE PROPIONATE 50 MCG
SPRAY, SUSPENSION (ML) NASAL
Qty: 16 G | Refills: 3 | Status: SHIPPED | OUTPATIENT
Start: 2022-09-12

## 2022-09-15 ENCOUNTER — OFFICE VISIT (OUTPATIENT)
Dept: PRIMARY CARE CLINIC | Age: 87
End: 2022-09-15
Payer: MEDICARE

## 2022-09-15 DIAGNOSIS — Z23 ENCOUNTER FOR IMMUNIZATION: Primary | ICD-10-CM

## 2022-09-15 PROCEDURE — 90694 VACC AIIV4 NO PRSRV 0.5ML IM: CPT | Performed by: NURSE PRACTITIONER

## 2022-09-15 PROCEDURE — G0008 ADMIN INFLUENZA VIRUS VAC: HCPCS | Performed by: NURSE PRACTITIONER

## 2022-09-30 ENCOUNTER — TELEPHONE (OUTPATIENT)
Dept: PRIMARY CARE CLINIC | Age: 87
End: 2022-09-30

## 2022-09-30 NOTE — TELEPHONE ENCOUNTER
Pt called office to notify that had fallen a few days ago and broke her him. Pt is now at [address] [place] [address], pt stated that they will not call to have her Zofran refilled so someone can pick it up and deliver it for her.  Pt is asking that a call be given to her, phone number for the rehab center is [phone] To get better and follow your care plan as instructed.

## 2022-10-03 NOTE — TELEPHONE ENCOUNTER
Called and spoke with rehab center. Patient has an order for zofran every 6 hours as needed. She has received some doses in the rehab center but only when she asks for it.

## 2022-10-06 RX ORDER — CLOPIDOGREL BISULFATE 75 MG/1
TABLET ORAL
Qty: 90 TABLET | Refills: 0 | Status: SHIPPED | OUTPATIENT
Start: 2022-10-06

## 2022-10-12 ENCOUNTER — TELEPHONE (OUTPATIENT)
Dept: PRIMARY CARE CLINIC | Age: 87
End: 2022-10-12

## 2022-10-12 ENCOUNTER — HOSPITAL ENCOUNTER (INPATIENT)
Age: 87
LOS: 4 days | Discharge: NURSING, REHAB OR LONG-TERM CARE | DRG: 392 | End: 2022-10-18
Attending: EMERGENCY MEDICINE | Admitting: INTERNAL MEDICINE
Payer: MEDICARE

## 2022-10-12 ENCOUNTER — APPOINTMENT (OUTPATIENT)
Dept: CT IMAGING | Age: 87
DRG: 392 | End: 2022-10-12
Attending: EMERGENCY MEDICINE
Payer: MEDICARE

## 2022-10-12 DIAGNOSIS — R11.2 INTRACTABLE NAUSEA AND VOMITING: Primary | ICD-10-CM

## 2022-10-12 DIAGNOSIS — K22.2 ESOPHAGEAL STRICTURE: ICD-10-CM

## 2022-10-12 DIAGNOSIS — R33.9 URINARY RETENTION: ICD-10-CM

## 2022-10-12 PROBLEM — R62.7 ADULT FAILURE TO THRIVE: Status: ACTIVE | Noted: 2022-10-12

## 2022-10-12 PROBLEM — E86.0 DEHYDRATION: Status: ACTIVE | Noted: 2022-10-12

## 2022-10-12 PROBLEM — S32.030A CLOSED COMPRESSION FRACTURE OF L3 LUMBAR VERTEBRA, INITIAL ENCOUNTER (HCC): Status: ACTIVE | Noted: 2022-10-12

## 2022-10-12 LAB
ALBUMIN SERPL-MCNC: 3.4 G/DL (ref 3.5–5)
ALBUMIN/GLOB SERPL: 0.8 {RATIO} (ref 1.1–2.2)
ALP SERPL-CCNC: 220 U/L (ref 45–117)
ALT SERPL-CCNC: 19 U/L (ref 12–78)
ANION GAP SERPL CALC-SCNC: 8 MMOL/L (ref 5–15)
APPEARANCE UR: CLEAR
AST SERPL-CCNC: 28 U/L (ref 15–37)
BACTERIA URNS QL MICRO: NEGATIVE /HPF
BASOPHILS # BLD: 0.1 K/UL (ref 0–0.1)
BASOPHILS NFR BLD: 1 % (ref 0–1)
BILIRUB SERPL-MCNC: 0.5 MG/DL (ref 0.2–1)
BILIRUB UR QL: NEGATIVE
BUN SERPL-MCNC: 15 MG/DL (ref 6–20)
BUN/CREAT SERPL: 21 (ref 12–20)
CALCIUM SERPL-MCNC: 9.9 MG/DL (ref 8.5–10.1)
CHLORIDE SERPL-SCNC: 98 MMOL/L (ref 97–108)
CO2 SERPL-SCNC: 24 MMOL/L (ref 21–32)
COLOR UR: NORMAL
CREAT SERPL-MCNC: 0.73 MG/DL (ref 0.55–1.02)
DIFFERENTIAL METHOD BLD: ABNORMAL
EOSINOPHIL # BLD: 0.1 K/UL (ref 0–0.4)
EOSINOPHIL NFR BLD: 2 % (ref 0–7)
EPITH CASTS URNS QL MICRO: NORMAL /LPF
ERYTHROCYTE [DISTWIDTH] IN BLOOD BY AUTOMATED COUNT: 14.6 % (ref 11.5–14.5)
GLOBULIN SER CALC-MCNC: 4.5 G/DL (ref 2–4)
GLUCOSE SERPL-MCNC: 118 MG/DL (ref 65–100)
GLUCOSE UR STRIP.AUTO-MCNC: NEGATIVE MG/DL
HCT VFR BLD AUTO: 37.4 % (ref 35–47)
HGB BLD-MCNC: 11.7 G/DL (ref 11.5–16)
HGB UR QL STRIP: NEGATIVE
HYALINE CASTS URNS QL MICRO: NORMAL /LPF (ref 0–2)
IMM GRANULOCYTES # BLD AUTO: 0 K/UL (ref 0–0.04)
IMM GRANULOCYTES NFR BLD AUTO: 0 % (ref 0–0.5)
KETONES UR QL STRIP.AUTO: NEGATIVE MG/DL
LEUKOCYTE ESTERASE UR QL STRIP.AUTO: NEGATIVE
LIPASE SERPL-CCNC: 95 U/L (ref 73–393)
LYMPHOCYTES # BLD: 1.8 K/UL (ref 0.8–3.5)
LYMPHOCYTES NFR BLD: 24 % (ref 12–49)
MAGNESIUM SERPL-MCNC: 2.2 MG/DL (ref 1.6–2.4)
MCH RBC QN AUTO: 30.1 PG (ref 26–34)
MCHC RBC AUTO-ENTMCNC: 31.3 G/DL (ref 30–36.5)
MCV RBC AUTO: 96.1 FL (ref 80–99)
MONOCYTES # BLD: 0.6 K/UL (ref 0–1)
MONOCYTES NFR BLD: 7 % (ref 5–13)
NEUTS SEG # BLD: 5.2 K/UL (ref 1.8–8)
NEUTS SEG NFR BLD: 66 % (ref 32–75)
NITRITE UR QL STRIP.AUTO: NEGATIVE
NRBC # BLD: 0 K/UL (ref 0–0.01)
NRBC BLD-RTO: 0 PER 100 WBC
PH UR STRIP: 6.5 [PH] (ref 5–8)
PLATELET # BLD AUTO: 428 K/UL (ref 150–400)
PMV BLD AUTO: 9.4 FL (ref 8.9–12.9)
POTASSIUM SERPL-SCNC: 4.5 MMOL/L (ref 3.5–5.1)
PROT SERPL-MCNC: 7.9 G/DL (ref 6.4–8.2)
PROT UR STRIP-MCNC: NEGATIVE MG/DL
RBC # BLD AUTO: 3.89 M/UL (ref 3.8–5.2)
RBC #/AREA URNS HPF: NORMAL /HPF (ref 0–5)
SODIUM SERPL-SCNC: 130 MMOL/L (ref 136–145)
SP GR UR REFRACTOMETRY: 1.02 (ref 1–1.03)
UR CULT HOLD, URHOLD: NORMAL
UROBILINOGEN UR QL STRIP.AUTO: 0.2 EU/DL (ref 0.2–1)
WBC # BLD AUTO: 7.8 K/UL (ref 3.6–11)
WBC URNS QL MICRO: NORMAL /HPF (ref 0–4)

## 2022-10-12 PROCEDURE — 99285 EMERGENCY DEPT VISIT HI MDM: CPT

## 2022-10-12 PROCEDURE — 74011250636 HC RX REV CODE- 250/636: Performed by: HOSPITALIST

## 2022-10-12 PROCEDURE — 80053 COMPREHEN METABOLIC PANEL: CPT

## 2022-10-12 PROCEDURE — 83690 ASSAY OF LIPASE: CPT

## 2022-10-12 PROCEDURE — 96375 TX/PRO/DX INJ NEW DRUG ADDON: CPT

## 2022-10-12 PROCEDURE — G0378 HOSPITAL OBSERVATION PER HR: HCPCS

## 2022-10-12 PROCEDURE — 81001 URINALYSIS AUTO W/SCOPE: CPT

## 2022-10-12 PROCEDURE — 74011250637 HC RX REV CODE- 250/637: Performed by: HOSPITALIST

## 2022-10-12 PROCEDURE — 74011000636 HC RX REV CODE- 636: Performed by: EMERGENCY MEDICINE

## 2022-10-12 PROCEDURE — 85025 COMPLETE CBC W/AUTO DIFF WBC: CPT

## 2022-10-12 PROCEDURE — 74177 CT ABD & PELVIS W/CONTRAST: CPT

## 2022-10-12 PROCEDURE — 96376 TX/PRO/DX INJ SAME DRUG ADON: CPT

## 2022-10-12 PROCEDURE — 74011250636 HC RX REV CODE- 250/636: Performed by: EMERGENCY MEDICINE

## 2022-10-12 PROCEDURE — 96374 THER/PROPH/DIAG INJ IV PUSH: CPT

## 2022-10-12 PROCEDURE — 83735 ASSAY OF MAGNESIUM: CPT

## 2022-10-12 PROCEDURE — 96361 HYDRATE IV INFUSION ADD-ON: CPT

## 2022-10-12 RX ORDER — METOCLOPRAMIDE HYDROCHLORIDE 5 MG/ML
5 INJECTION INTRAMUSCULAR; INTRAVENOUS
Status: COMPLETED | OUTPATIENT
Start: 2022-10-12 | End: 2022-10-12

## 2022-10-12 RX ORDER — ENOXAPARIN SODIUM 100 MG/ML
30 INJECTION SUBCUTANEOUS DAILY
Status: DISCONTINUED | OUTPATIENT
Start: 2022-10-13 | End: 2022-10-14

## 2022-10-12 RX ORDER — ACETAMINOPHEN 650 MG/1
650 SUPPOSITORY RECTAL
Status: DISCONTINUED | OUTPATIENT
Start: 2022-10-12 | End: 2022-10-18 | Stop reason: HOSPADM

## 2022-10-12 RX ORDER — ONDANSETRON 2 MG/ML
4 INJECTION INTRAMUSCULAR; INTRAVENOUS
Status: COMPLETED | OUTPATIENT
Start: 2022-10-12 | End: 2022-10-12

## 2022-10-12 RX ORDER — SODIUM CHLORIDE 0.9 % (FLUSH) 0.9 %
5-40 SYRINGE (ML) INJECTION AS NEEDED
Status: DISCONTINUED | OUTPATIENT
Start: 2022-10-12 | End: 2022-10-18 | Stop reason: HOSPADM

## 2022-10-12 RX ORDER — ONDANSETRON 2 MG/ML
4 INJECTION INTRAMUSCULAR; INTRAVENOUS
Status: DISCONTINUED | OUTPATIENT
Start: 2022-10-12 | End: 2022-10-18 | Stop reason: HOSPADM

## 2022-10-12 RX ORDER — CLOPIDOGREL BISULFATE 75 MG/1
75 TABLET ORAL DAILY
Status: DISCONTINUED | OUTPATIENT
Start: 2022-10-13 | End: 2022-10-15

## 2022-10-12 RX ORDER — COLESTIPOL HYDROCHLORIDE 5 G/5G
5 GRANULE, FOR SUSPENSION ORAL DAILY
Status: DISCONTINUED | OUTPATIENT
Start: 2022-10-13 | End: 2022-10-13

## 2022-10-12 RX ORDER — ACETAMINOPHEN 325 MG/1
650 TABLET ORAL
Status: DISCONTINUED | OUTPATIENT
Start: 2022-10-12 | End: 2022-10-18 | Stop reason: HOSPADM

## 2022-10-12 RX ORDER — SODIUM CHLORIDE 9 MG/ML
50 INJECTION, SOLUTION INTRAVENOUS CONTINUOUS
Status: DISCONTINUED | OUTPATIENT
Start: 2022-10-12 | End: 2022-10-18 | Stop reason: HOSPADM

## 2022-10-12 RX ORDER — SODIUM CHLORIDE 0.9 % (FLUSH) 0.9 %
5-40 SYRINGE (ML) INJECTION EVERY 8 HOURS
Status: DISCONTINUED | OUTPATIENT
Start: 2022-10-12 | End: 2022-10-18 | Stop reason: HOSPADM

## 2022-10-12 RX ORDER — POLYETHYLENE GLYCOL 3350 17 G/17G
17 POWDER, FOR SOLUTION ORAL DAILY PRN
Status: DISCONTINUED | OUTPATIENT
Start: 2022-10-12 | End: 2022-10-18 | Stop reason: HOSPADM

## 2022-10-12 RX ADMIN — SODIUM CHLORIDE 1000 ML: 9 INJECTION, SOLUTION INTRAVENOUS at 17:47

## 2022-10-12 RX ADMIN — ONDANSETRON HYDROCHLORIDE 4 MG: 2 SOLUTION INTRAMUSCULAR; INTRAVENOUS at 23:22

## 2022-10-12 RX ADMIN — METOCLOPRAMIDE 5 MG: 5 INJECTION, SOLUTION INTRAMUSCULAR; INTRAVENOUS at 20:03

## 2022-10-12 RX ADMIN — ONDANSETRON 4 MG: 2 INJECTION INTRAMUSCULAR; INTRAVENOUS at 17:47

## 2022-10-12 RX ADMIN — IOPAMIDOL 100 ML: 755 INJECTION, SOLUTION INTRAVENOUS at 20:11

## 2022-10-12 RX ADMIN — ACETAMINOPHEN 650 MG: 325 TABLET, FILM COATED ORAL at 23:22

## 2022-10-12 RX ADMIN — SODIUM CHLORIDE 75 ML/HR: 9 INJECTION, SOLUTION INTRAVENOUS at 23:22

## 2022-10-12 NOTE — ED TRIAGE NOTES
PT arrives to ED with complaints of nausea and vomiting x2 weeks. Pt has had decreased PO intake. Pt coming from rehab center following hip surgery on Sept 12th. Pt denies CP, SOB. Pt unable to ambulate since surgery.

## 2022-10-12 NOTE — ED PROVIDER NOTES
Syed Mon is an 82 yo F who is 1 month s/p fall with hip fracture. She states she has been in rehab for 1 month following surgery but for the past 2 weeks has had nausea and vomiting. Patient and daughter in law are unsure if she was receiving antiemetics while at rehab. She is concerned that she may be dehydrated and also would like to have home health set up for her. Patient denies diarrhea, fever or abdominal pain.            Past Medical History:   Diagnosis Date    Allergies     Anxiety     Breast cancer ([facility] [address] 1993    s/p lumpectomy    Diabetes mellitus ([facility] [address]     diet-controlled    Hx-TIA (transient ischemic attack)     Hypertension     Hyponatremia     Melanoma ([facility] [address] 2008    s/p resection, right cheek    Tremor        Past Surgical History:   Procedure Laterality Date    HX BACK SURGERY      HX BREAST LUMPECTOMY  1993    right    HX CHOLECYSTECTOMY      HX HYSTERECTOMY      HX MALIGNANT SKIN LESION EXCISION  2008    HX MENISCECTOMY      HX OTHER SURGICAL      pineda's neuroma bilaterally         Family History:   Problem Relation Age of Onset    Heart Failure Mother     Other Father         aortic aneurysm    Diabetes Son     Immunodeficiency Son         post kidney and pancreas transplant       Social History     Socioeconomic History    Marital status:      Spouse name: Not on file    Number of children: Not on file    Years of education: Not on file    Highest education level: Not on file   Occupational History    Occupation: retired from retail   Tobacco Use    Smoking status: Never    Smokeless tobacco: Never   Vaping Use    Vaping Use: Never used   Substance and Sexual Activity    Alcohol use: No    Drug use: Never    Sexual activity: Not on file   Other Topics Concern    Not on file   Social History Narrative    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 HENT:  Negative for sore throat. Eyes:  Negative for visual disturbance. Respiratory:  Negative for cough. Cardiovascular:  Negative for chest pain. Gastrointestinal:  Positive for nausea and vomiting. Negative for abdominal pain. Genitourinary:  Negative for dysuria. Musculoskeletal:  Negative for back pain. Skin:  Negative for rash. Neurological:  Negative for headaches. Vitals:    10/12/22 1621   BP: 136/81   Pulse: 87   Resp: 18   Temp: 97.2 °F (36.2 °C)   SpO2: 99%   Weight: 41.3 kg (91 lb)   Height: 5' 1\" (1.549 m)            Physical Exam  Vitals and nursing note reviewed. Constitutional:       General: She is not in acute distress. Appearance: She is well-developed. HENT:      Head: Normocephalic and atraumatic. Mouth/Throat:      Mouth: Mucous membranes are moist.   Eyes:      Extraocular Movements: Extraocular movements intact. Conjunctiva/sclera: Conjunctivae normal.   Neck:      Trachea: Phonation normal.   Cardiovascular:      Rate and Rhythm: Normal rate. Pulmonary:      Effort: Pulmonary effort is normal. No respiratory distress. Abdominal:      General: There is no distension. Tenderness: There is no abdominal tenderness. There is no guarding. Musculoskeletal:         General: No tenderness. Normal range of motion. Cervical back: Normal range of motion. Skin:     General: Skin is warm and dry. Neurological:      General: No focal deficit present. Mental Status: She is alert. She is not disoriented. Motor: No abnormal muscle tone. MDM       Perfect Serve Consult for Admission  9:14 PM    ED Room Number: ER15/15  Patient Name and age:  Etienne Herrera 81 y.o.  female  Working Diagnosis:   1. Intractable nausea and vomiting    2. Urinary retention        COVID-19 Suspicion:  no  Sepsis present:  no  Reassessment needed: yes  Code Status:  Full Code  Readmission: no  Isolation Requirements:  no  Recommended Level of Care:  med/surg  Department:[facility] ED - [phone]  Other:  fell last month and had right hip fracture repaired at [facility].  Discharged from rehab today but has been having vomiting for several days there and continues to nausea and vomiting in the ED despite zofran and reglan  Patient has urinary retention, nursing placing canales now,  UA pending.       Procedures

## 2022-10-12 NOTE — TELEPHONE ENCOUNTER
Pt called through [address] just wanting to let Debra Qing know she will be taken to [facility].

## 2022-10-13 LAB
25(OH)D3 SERPL-MCNC: 38.9 NG/ML (ref 30–100)
ANION GAP SERPL CALC-SCNC: 6 MMOL/L (ref 5–15)
BASOPHILS # BLD: 0 K/UL (ref 0–0.1)
BASOPHILS NFR BLD: 1 % (ref 0–1)
BUN SERPL-MCNC: 13 MG/DL (ref 6–20)
BUN/CREAT SERPL: 19 (ref 12–20)
CALCIUM SERPL-MCNC: 8.8 MG/DL (ref 8.5–10.1)
CHLORIDE SERPL-SCNC: 103 MMOL/L (ref 97–108)
CO2 SERPL-SCNC: 26 MMOL/L (ref 21–32)
CREAT SERPL-MCNC: 0.67 MG/DL (ref 0.55–1.02)
DIFFERENTIAL METHOD BLD: ABNORMAL
EOSINOPHIL # BLD: 0.1 K/UL (ref 0–0.4)
EOSINOPHIL NFR BLD: 2 % (ref 0–7)
ERYTHROCYTE [DISTWIDTH] IN BLOOD BY AUTOMATED COUNT: 14.8 % (ref 11.5–14.5)
GLUCOSE SERPL-MCNC: 93 MG/DL (ref 65–100)
HCT VFR BLD AUTO: 32.9 % (ref 35–47)
HGB BLD-MCNC: 10.3 G/DL (ref 11.5–16)
IMM GRANULOCYTES # BLD AUTO: 0 K/UL (ref 0–0.04)
IMM GRANULOCYTES NFR BLD AUTO: 0 % (ref 0–0.5)
LYMPHOCYTES # BLD: 2.2 K/UL (ref 0.8–3.5)
LYMPHOCYTES NFR BLD: 30 % (ref 12–49)
MCH RBC QN AUTO: 30 PG (ref 26–34)
MCHC RBC AUTO-ENTMCNC: 31.3 G/DL (ref 30–36.5)
MCV RBC AUTO: 95.9 FL (ref 80–99)
MONOCYTES # BLD: 0.6 K/UL (ref 0–1)
MONOCYTES NFR BLD: 9 % (ref 5–13)
NEUTS SEG # BLD: 4.2 K/UL (ref 1.8–8)
NEUTS SEG NFR BLD: 58 % (ref 32–75)
NRBC # BLD: 0 K/UL (ref 0–0.01)
NRBC BLD-RTO: 0 PER 100 WBC
PLATELET # BLD AUTO: 408 K/UL (ref 150–400)
PMV BLD AUTO: 9.5 FL (ref 8.9–12.9)
POTASSIUM SERPL-SCNC: 3.6 MMOL/L (ref 3.5–5.1)
RBC # BLD AUTO: 3.43 M/UL (ref 3.8–5.2)
SODIUM SERPL-SCNC: 135 MMOL/L (ref 136–145)
WBC # BLD AUTO: 7.2 K/UL (ref 3.6–11)

## 2022-10-13 PROCEDURE — 74011250636 HC RX REV CODE- 250/636: Performed by: STUDENT IN AN ORGANIZED HEALTH CARE EDUCATION/TRAINING PROGRAM

## 2022-10-13 PROCEDURE — 74011000250 HC RX REV CODE- 250: Performed by: HOSPITALIST

## 2022-10-13 PROCEDURE — 74011250637 HC RX REV CODE- 250/637: Performed by: INTERNAL MEDICINE

## 2022-10-13 PROCEDURE — C9113 INJ PANTOPRAZOLE SODIUM, VIA: HCPCS | Performed by: STUDENT IN AN ORGANIZED HEALTH CARE EDUCATION/TRAINING PROGRAM

## 2022-10-13 PROCEDURE — 97161 PT EVAL LOW COMPLEX 20 MIN: CPT

## 2022-10-13 PROCEDURE — G0378 HOSPITAL OBSERVATION PER HR: HCPCS

## 2022-10-13 PROCEDURE — 85025 COMPLETE CBC W/AUTO DIFF WBC: CPT

## 2022-10-13 PROCEDURE — 74011000250 HC RX REV CODE- 250: Performed by: STUDENT IN AN ORGANIZED HEALTH CARE EDUCATION/TRAINING PROGRAM

## 2022-10-13 PROCEDURE — 80048 BASIC METABOLIC PNL TOTAL CA: CPT

## 2022-10-13 PROCEDURE — 96372 THER/PROPH/DIAG INJ SC/IM: CPT

## 2022-10-13 PROCEDURE — 74011250636 HC RX REV CODE- 250/636: Performed by: INTERNAL MEDICINE

## 2022-10-13 PROCEDURE — 96375 TX/PRO/DX INJ NEW DRUG ADDON: CPT

## 2022-10-13 PROCEDURE — 74011250636 HC RX REV CODE- 250/636: Performed by: HOSPITALIST

## 2022-10-13 PROCEDURE — 97530 THERAPEUTIC ACTIVITIES: CPT

## 2022-10-13 PROCEDURE — 82306 VITAMIN D 25 HYDROXY: CPT

## 2022-10-13 PROCEDURE — 36415 COLL VENOUS BLD VENIPUNCTURE: CPT

## 2022-10-13 PROCEDURE — 96376 TX/PRO/DX INJ SAME DRUG ADON: CPT

## 2022-10-13 PROCEDURE — 97535 SELF CARE MNGMENT TRAINING: CPT | Performed by: OCCUPATIONAL THERAPIST

## 2022-10-13 PROCEDURE — 97165 OT EVAL LOW COMPLEX 30 MIN: CPT | Performed by: OCCUPATIONAL THERAPIST

## 2022-10-13 PROCEDURE — 74011250637 HC RX REV CODE- 250/637: Performed by: HOSPITALIST

## 2022-10-13 RX ORDER — MORPHINE SULFATE 2 MG/ML
1 INJECTION, SOLUTION INTRAMUSCULAR; INTRAVENOUS
Status: DISCONTINUED | OUTPATIENT
Start: 2022-10-13 | End: 2022-10-18 | Stop reason: HOSPADM

## 2022-10-13 RX ORDER — DOCUSATE SODIUM 100 MG/1
100 CAPSULE, LIQUID FILLED ORAL 2 TIMES DAILY
Status: DISCONTINUED | OUTPATIENT
Start: 2022-10-13 | End: 2022-10-15

## 2022-10-13 RX ORDER — DIAZEPAM 5 MG/1
2.5 TABLET ORAL 2 TIMES DAILY
Status: DISCONTINUED | OUTPATIENT
Start: 2022-10-13 | End: 2022-10-18 | Stop reason: HOSPADM

## 2022-10-13 RX ORDER — ACETAMINOPHEN 325 MG/1
650 TABLET ORAL ONCE
Status: COMPLETED | OUTPATIENT
Start: 2022-10-13 | End: 2022-10-13

## 2022-10-13 RX ORDER — ATENOLOL 50 MG/1
25 TABLET ORAL DAILY
Status: DISCONTINUED | OUTPATIENT
Start: 2022-10-13 | End: 2022-10-18 | Stop reason: HOSPADM

## 2022-10-13 RX ORDER — ONDANSETRON 2 MG/ML
4 INJECTION INTRAMUSCULAR; INTRAVENOUS ONCE
Status: COMPLETED | OUTPATIENT
Start: 2022-10-13 | End: 2022-10-13

## 2022-10-13 RX ORDER — HYDRALAZINE HYDROCHLORIDE 20 MG/ML
10 INJECTION INTRAMUSCULAR; INTRAVENOUS
Status: DISCONTINUED | OUTPATIENT
Start: 2022-10-13 | End: 2022-10-18 | Stop reason: HOSPADM

## 2022-10-13 RX ADMIN — SODIUM CHLORIDE 75 ML/HR: 9 INJECTION, SOLUTION INTRAVENOUS at 11:54

## 2022-10-13 RX ADMIN — Medication 10 ML: at 15:33

## 2022-10-13 RX ADMIN — DIAZEPAM 2.5 MG: 5 TABLET ORAL at 17:00

## 2022-10-13 RX ADMIN — Medication 10 ML: at 06:00

## 2022-10-13 RX ADMIN — CLOPIDOGREL BISULFATE 75 MG: 75 TABLET ORAL at 08:29

## 2022-10-13 RX ADMIN — ONDANSETRON HYDROCHLORIDE 4 MG: 2 SOLUTION INTRAMUSCULAR; INTRAVENOUS at 11:55

## 2022-10-13 RX ADMIN — ACETAMINOPHEN 650 MG: 325 TABLET, FILM COATED ORAL at 06:24

## 2022-10-13 RX ADMIN — MORPHINE SULFATE 1 MG: 2 INJECTION, SOLUTION INTRAMUSCULAR; INTRAVENOUS at 20:04

## 2022-10-13 RX ADMIN — ACETAMINOPHEN 650 MG: 325 TABLET, FILM COATED ORAL at 18:59

## 2022-10-13 RX ADMIN — ONDANSETRON 4 MG: 2 INJECTION INTRAMUSCULAR; INTRAVENOUS at 15:33

## 2022-10-13 RX ADMIN — DOCUSATE SODIUM 100 MG: 100 CAPSULE, LIQUID FILLED ORAL at 08:49

## 2022-10-13 RX ADMIN — ACETAMINOPHEN 650 MG: 325 TABLET ORAL at 15:33

## 2022-10-13 RX ADMIN — ENOXAPARIN SODIUM 30 MG: 100 INJECTION SUBCUTANEOUS at 08:29

## 2022-10-13 RX ADMIN — Medication 10 ML: at 20:03

## 2022-10-13 RX ADMIN — ONDANSETRON HYDROCHLORIDE 4 MG: 2 SOLUTION INTRAMUSCULAR; INTRAVENOUS at 06:20

## 2022-10-13 RX ADMIN — DOCUSATE SODIUM 100 MG: 100 CAPSULE, LIQUID FILLED ORAL at 17:00

## 2022-10-13 RX ADMIN — MORPHINE SULFATE 1 MG: 2 INJECTION, SOLUTION INTRAMUSCULAR; INTRAVENOUS at 13:29

## 2022-10-13 RX ADMIN — ATENOLOL 25 MG: 50 TABLET ORAL at 15:33

## 2022-10-13 RX ADMIN — SODIUM CHLORIDE, PRESERVATIVE FREE 40 MG: 5 INJECTION INTRAVENOUS at 11:55

## 2022-10-13 NOTE — PROGRESS NOTES
Problem: Self Care Deficits Care Plan (Adult)  Goal: *Acute Goals and Plan of Care (Insert Text)  Description: FUNCTIONAL STATUS PRIOR TO ADMISSION: per patient prior to fall ~1 month ago she was living at home independently. After fall and right hip ORIF went to NH in [place]. Patient reported working with therapy. Ambulating short distances. Reports she was dressing herself. 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 Occupational Therapy Goals  Initiated 10/13/2022  1. Patient will perform grooming with modified independence within 7 day(s). 2.  Patient will perform upper body dressing with modified independence within 7 day(s). 3.  Patient will perform lower body dressing with supervision/set-up within 7 day(s). 4.  Patient will perform toilet transfers with supervision/set-up within 7 day(s). 5.  Patient will perform all aspects of toileting with supervision/set-up within 7 day(s). Outcome: Not Met       OCCUPATIONAL THERAPY EVALUATION  Patient: Tio Catalan (46 y.o. female)  Date: 10/13/2022  Primary Diagnosis: Nausea and vomiting [R11.2]       Precautions: per patient WBAT, fall       ASSESSMENT  Based on the objective data described below, the patient presents with slightly conflicting reports of prior level of function and hx, unaware of why she came to the hospital. Noted recent right hip sx and patient reported walking with assist short distances. RN aware and asked to clarify weight bearing status. Demonstrated good activity tolerance and this date CGA for functional transfers. Patient very talkative and required re-direction to task. Will continue to follow in this setting, patient expressed strong desire to go home with assist from her son at discharge.      Current Level of Function Impacting Discharge (ADLs/self-care): CGA stand pivot transfers, min to mod assist toileting, min assist LE ADLs    Functional Outcome Measure: The patient scored 45/100 on the Barthel Index outcome measure which is indicative of partial dependence. Other factors to consider for discharge: patient reports need to get a bed on the first floor as her bedroom and full bath upstairs     Patient will benefit from skilled therapy intervention to address the above noted impairments. PLAN :  Recommendations and Planned Interventions: self care training, functional mobility training, therapeutic exercise, balance training, therapeutic activities, endurance activities, patient education, home safety training, and family training/education    Frequency/Duration: Patient will be followed by occupational therapy 5 times a week to address goals. Recommendation for discharge: (in order for the patient to meet his/her long term goals)  Occupational therapy at least 2 days/week in the home AND ensure assist and/or supervision for safety with ADL's and ADL mobility, with 24/7 assist    This discharge recommendation:  Has not yet been discussed the attending provider and/or case management    IF patient discharges home will need the following DME: patient owns DME required for discharge per her report       SUBJECTIVE:   Patient stated I have been through so much.     OBJECTIVE DATA SUMMARY:   HISTORY:   Past Medical History:   Diagnosis Date    Allergies     Anxiety     Breast cancer ([facility] [address] 1993    s/p lumpectomy    Diabetes mellitus ([facility] [address]     diet-controlled    Hx-TIA (transient ischemic attack)     Hypertension     Hyponatremia     Melanoma ([facility] [address] 2008    s/p resection, right cheek    Tremor      Past Surgical History:   Procedure Laterality Date    HX BACK SURGERY      HX BREAST LUMPECTOMY  1993    right    HX CHOLECYSTECTOMY      HX HYSTERECTOMY      HX MALIGNANT SKIN LESION EXCISION  2008    HX MENISCECTOMY      HX OTHER SURGICAL      pineda's neuroma bilaterally       Expanded or extensive additional review of patient history:     Home Situation  Home Environment: Private residence  # Steps to Enter: 5  Rails to Enter: Yes  Hand Rails : Bilateral  One/Two Story Residence: Two story  # of Interior Steps: 12  Current DME Used/Available at Home: [address], straight, Walker, rollator, Walker, rolling  Tub or Shower Type: Shower (2nd floor)    Hand dominance:     EXAMINATION OF PERFORMANCE DEFICITS:  Cognitive/Behavioral Status:  Neurologic State: Alert  Orientation Level: Oriented to person;Oriented to place;Oriented to situation;Disoriented to time  Cognition: Follows commands; Appropriate for age attention/concentration; Appropriate safety awareness; Appropriate decision making  Perception: Appears intact  Perseveration: Perseverates during conversation  Safety/Judgement: Awareness of environment; Fall prevention;Home safety; Insight into deficits;Good awareness of safety precautions    Skin: noted bruising right hip    Edema: right LE, around incision    Hearing: Auditory  Auditory Impairment: None    Vision/Perceptual:                                Corrective Lenses: Glasses    Range of Motion:  AROM: Generally decreased, functional                         Strength:  Strength: Generally decreased, functional                Coordination:  Coordination: Generally decreased, functional  Fine Motor Skills-Upper: Left Intact; Right Intact    Gross Motor Skills-Upper: Left Intact; Right Intact    Tone & Sensation:  Tone: Normal                         Balance:  Sitting: Intact  Standing: Impaired; Without support  Standing - Static: Constant support;Good    Functional Mobility and Transfers for ADLs:  Bed Mobility:  Supine to Sit: Minimum assistance    Transfers:  Sit to Stand: Contact guard assistance; Adaptive equipment; Additional time;Assist x1  Stand to Sit: Contact guard assistance; Adaptive equipment; Additional time;Assist x1  Bed to Chair: Contact guard assistance; Adaptive equipment; Additional time;Assist x1  Toilet Transfer : Contact guard assistance; Adaptive equipment; Additional time;Assist x1 (pivot to bedside commode)    ADL Assessment:  Feeding: Supervision; Other (comment) (reports coughing x's ~20 years with PO)    Oral Facial Hygiene/Grooming: Setup              Upper Body Dressing: Stand-by assistance    Lower Body Dressing: Minimum assistance; Additional time    Toileting: Moderate assistance;Minimum assistance (catheter)                  ADL Intervention and task modifications:        Educated on role of OT    Patient instructed and indicated understanding the benefits of maintaining activity tolerance, functional mobility, and independence with self care tasks during acute stay  to ensure safe return home and to baseline. Encouraged patient to increase frequency and duration OOB, be out of bed for all meals, perform daily ADLs (as approved by RN/MD regarding bathing etc), and performing functional mobility to/from bedside commode    Instructed on use of call bell and to call for assist with toileting      Cognitive Retraining  Safety/Judgement: Awareness of environment; Fall prevention;Home safety; Insight into deficits;Good awareness of safety precautions      Therapeutic Exercise: Instructed to perform ankle pumps throughout day   Functional Measure:    Barthel Index:  Bathin  Bladder: 0  Bowels: 10  Groomin  Dressin  Feeding: 10  Mobility: 0  Stairs: 0  Toilet Use: 5  Transfer (Bed to Chair and Back): 10  Total: 45/100      The Barthel ADL Index: Guidelines  1. The index should be used as a record of what a patient does, not as a record of what a patient could do. 2. The main aim is to establish degree of independence from any help, physical or verbal, however minor and for whatever reason. 3. The need for supervision renders the patient not independent. 4. A patient's performance should be established using the best available evidence.  Asking the patient, friends/relatives and nurses are the usual sources, but direct observation and common sense are also important. However direct testing is not needed. 5. Usually the patient's performance over the preceding 24-48 hours is important, but occasionally longer periods will be relevant. 6. Middle categories imply that the patient supplies over 50 per cent of the effort. 7. Use of aids to be independent is allowed. Score Interpretation (from [facility])    Independent   60-79 Minimally independent   40-59 Partially dependent   20-39 Very dependent   <20 Totally dependent     -Alfonso Arevalo., Barthel DIrinaW. (1965). Functional evaluation: the Barthel Index. [address] [facility] ([address] (1997). The Barthel activities of daily living index: self-reporting versus actual performance in the old (> or = 75 years). Journal [address] 45(7), [facility], [place], Fortunato Tracy., Casimiro Sole. (1999). Measuring the change in disability after inpatient rehabilitation; comparison of the responsiveness of the Barthel Index and Functional [place] Measure. Journal of Neurology, Neurosurgery, and Psychiatry, 66(4), 503-483. Ashwini Smith, N.J.A, SINDHU Finney, & Sandhya Puga, M.A. (2004) Assessment of post-stroke quality of life in cost-effectiveness studies: The usefulness of the Barthel Index and the EuroQoL-5D. Quality of Life Research, 15, 466-56         Occupational Therapy Evaluation Charge Determination   History Examination Decision-Making   MEDIUM Complexity : Expanded review of history including physical, cognitive and psychosocial  history  LOW Complexity : 1-3 performance deficits relating to physical, cognitive , or psychosocial skils that result in activity limitations and / or participation restrictions  MEDIUM Complexity : Patient may present with comorbidities that affect occupational performnce.  Miniml to moderate modification of tasks or assistance (eg, physical or verbal ) with assesment(s) is necessary to enable patient to complete evaluation       Based on the above components, the patient evaluation is determined to be of the following complexity level: LOW   Pain Rating:  No complaint until prompted and then reported hip pain    Activity Tolerance:   Good    After treatment patient left in no apparent distress:    Supine in bed, Call bell within reach, and on stretcher with rails up    COMMUNICATION/EDUCATION:   The patients plan of care was discussed with: Physical therapist and Registered nurse. Home safety education was provided and the patient/caregiver indicated understanding. and Patient/family have participated as able in goal setting and plan of care. This patients plan of care is appropriate for delegation to DENIZ.     Thank you for this referral.  Gisele Amaral OTR/L  Time Calculation: 38 mins

## 2022-10-13 NOTE — H&P
[facility] [address]  [address] [facility], [address] [place] [address]  [phone]    Admission History and Physical      NAME:  Inge Garcia   :   1935   MRN:  [id number]     PCP:  Jodie Martin NP     Date/Time of service:  10/12/2022  10:06 PM        Subjective:     CHIEF COMPLAINT:  intractable nausea and vomiting, poor p.o. intake    HISTORY OF PRESENT ILLNESS:     Ms. Christiano Aviles is a 81 y.o.  female with a past medical history significant for recent hip fracture status post hip repair and patient was sent to rehab. From there she returned home and has continued to have nausea and vomiting throughout the day. She denies any hematemesis. Patient had her right hip repair at [facility] a month ago. After that she was discharged to rehab. In addition she has not been able to void today and CT abdomen in the emergency department shows distended bladder. It appears also that she has been dealing with irritable bowel syndrome and most of the time she is constipated but also has episodes of diarrhea. She has had very poor p.o. intake over the last 3-4 days. CT abdomen was performed which showed urinary bladder distension and also moderate L3 compression fracture but she has no lower back pain and has been ambulating   With a walker. Allergies   Allergen Reactions    Sulfa (Sulfonamide Antibiotics) Nausea and Vomiting    Codeine Other (comments)     Stomach cramping    Dairy Aid [Lactase] Unknown (comments)    Levaquin [Levofloxacin] Nausea and Vomiting     Pt can not take PO due to vomiting. Linzess [Linaclotide] Diarrhea    Tomato Unknown (comments)       Prior to Admission medications    Medication Sig Start Date End Date Taking?  Authorizing Provider   clopidogreL (PLAVIX) 75 mg tab TAKE 1 TABLET BY MOUTH EVERY DAY 10/6/22   Jodie Martin NP   fluticasone propionate (FLONASE) 50 mcg/actuation nasal spray SPRAY ONE SPRAY IN EACH NOSTRIL ONCE DAILY 22   Jodie Martin NP   dicyclomine (BENTYL) 10 mg capsule TAKE ONE CAPSULE BY MOUTH THREE TIMES A DAY AS NEEDED 8/29/22   Jocelyne Enriquez NP   ondansetron (ZOFRAN ODT) 4 mg disintegrating tablet DISSOLVE ONE TABLET BY MOUTH EVERY 8 HOURS AS NEEDED FOR NAUSEA 8/22/22   Jocelyne Enriquez NP   atenoloL (TENORMIN) 25 mg tablet TAKE ONE TABLET BY MOUTH DAILY 7/15/22   Jocelyne Enriquez NP   benzonatate (Tessalon Perles) 100 mg capsule Take 1 Capsule by mouth three (3) times daily as needed for Cough. 5/17/22   Jocelyne Enriquez NP   diazePAM (VALIUM) 5 mg tablet TAKE ONE TABLET BY MOUTH TWICE A DAY AS NEEDED FOR ANXIETY OR SLEEP 4/29/22   Jocelyne Enriquez NP   psyllium husk-aspartame (Metamucil Fiber Singles) 3.4 gram pwpk packet Take 1 Packet by mouth daily. 4/27/22   Jocelyne Enriquez NP   colestipoL (COLESTID) 5 gram packet Take 1 Packet by mouth daily. 4/20/22   Jocelyne Enriquez NP   amLODIPine (NORVASC) 5 mg tablet TAKE 1 TABLET BY MOUTH EVERY DAY 1/15/22   Jocelyne Enriquez NP   meclizine (ANTIVERT) 12.5 mg tablet TAKE ONE TO TWO TABLETS BY MOUTH TWICE A DAY AS NEEDED FOR DIZZINESS 11/15/21   Jocelyne Enriquez NP   lidocaine (LIDODERM) 5 % Apply patch to the affected area for 12 hours a day and remove for 12 hours a day. Patient not taking: Reported on 4/19/2022 8/18/21   Jocelyne Enriquez NP   diclofenac (Voltaren) 1 % gel Apply 2 g to affected area four (4) times daily as needed (knee pain, back pain). 6/1/21   Jocelyne Enriquez NP   polyethylene glycol (Miralax) 17 gram/dose powder Take 17 g by mouth daily. Provider, Historical   carboxymethylcellulose sodium (REFRESH LIQUIGEL) 1 % dlgl ophthalmic solution Administer 1 Drop to both eyes as needed (dry eyes). Provider, Historical   cyanocobalamin (VITAMIN B12) 500 mcg tablet Take 500 mcg by mouth as needed. Provider, Historical   acetaminophen (TYLENOL) 325 mg tablet Take 325-650 mg by mouth every six (6) hours as needed (headache).     Provider, Historical       Past Medical History:   Diagnosis Date    Allergies     Anxiety     Breast cancer ([facility] 1993    s/p lumpectomy    Diabetes mellitus ([facility]     diet-controlled    Hx-TIA (transient ischemic attack)     Hypertension     Hyponatremia     Melanoma ([facility] 2008    s/p resection, right cheek    Tremor         Past Surgical History:   Procedure Laterality Date    HX BACK SURGERY      HX BREAST LUMPECTOMY  1993    right    HX CHOLECYSTECTOMY      HX HYSTERECTOMY      HX MALIGNANT SKIN LESION EXCISION  2008    HX MENISCECTOMY      HX OTHER SURGICAL      pineda's neuroma bilaterally       Social History     Tobacco Use    Smoking status: Never    Smokeless tobacco: Never   Substance Use Topics    Alcohol use: No        Family History   Problem Relation Age of Onset    Heart Failure Mother     Other Father         aortic aneurysm    Diabetes Son     Immunodeficiency Son         post kidney and pancreas transplant        Review of Systems:  (bold if positive, if negative)    Gen:  fatigue  Eyes:  ENT:  CVS:  Pulm:  GI:   nausea, emesis,:  MS:  Skin:  Psych:  Endo:  Hem:  Renal:  Neuro:          Objective:      VITALS:    Vital signs reviewed; most recent are:    Visit Vitals  /81 (BP 1 Location: Left upper arm, BP Patient Position: At rest)   Pulse 87   Temp 97.2 °F (36.2 °C)   Resp 18   Ht 5' 1\" (1.549 m)   Wt 41.3 kg (91 lb)   SpO2 99%   BMI 17.19 kg/m²     SpO2 Readings from Last 6 Encounters:   10/12/22 99%   05/17/22 97%   04/19/22 99%   08/18/21 98%   06/01/21 97%   05/04/21 98%        No intake or output data in the 24 hours ending 10/12/22 2206     Exam:     Physical Exam:    Gen:  Well-developed, underweight , in no acute distress  HEENT:  Pink conjunctivae, PERRL, hearing intact to voice, moist mucous membranes  Neck:  Supple, without masses, thyroid non-tender  Resp:  No accessory muscle use, clear breath sounds without wheezes rales or rhonchi  Card:  No murmurs, normal S1, S2 without thrills, bruits or peripheral edema  Abd:  Soft, non-tender, Suprapubic distension noticed but no pain, normoactive bowel sounds are present, no palpable organomegaly and no detectable hernias  Lymph:  No cervical adenopathy  Musc:  No cyanosis or clubbing  Skin:  No rashes or ulcers, skin turgor is good  Neuro:  Cranial nerves 3-12 are grossly intact,  strength is 5/5 bilaterally, dorsi / plantarflexion strength is 5/5 bilaterally, follows commands appropriately  Psych:  Alert with good insight. Oriented to person, place, and time      Labs:    Recent Labs     10/12/22  1626   WBC 7.8   HGB 11.7   HCT 37.4   *     Recent Labs     10/12/22  1626   *   K 4.5   CL 98   CO2 24   *   BUN 15   CREA 0.73   CA 9.9   MG 2.2   ALB 3.4*   TBILI 0.5   ALT 19     Lab Results   Component Value Date/Time    Glucose (POC) 128 (H) 11/02/2019 11:27 AM    Glucose (POC) 92 11/02/2019 06:26 AM     No results for input(s): PH, PCO2, PO2, HCO3, FIO2 in the last 72 hours. No results for input(s): INR, INREXT in the last 72 hours. Radiology and EKG reviewed:      CT abdomen- moderate L3 compression fracture, diverticulosis of the colon, bladder distension also noticed. **Old Records reviewed in [facility]**       Assessment/Plan:       Active Problems:    Essential hypertension (3/5/2013)  -Continue home regimen      Weight loss (11/3/2020)      Overview: losing weight (about 30lbs since Jan). Notes she just doesn't have much       of an appetite. will eat daily but fills up quickly. This is also associated with her nausea and vomiting but recently has been worse.   Might need further workup including upper endoscopy and gastric emptying study.  -Consult Gastroenterology   -  encourage p.o. intake as much as possible although she is NPO now due to the nausea and vomiting   - dietitian consultation       Nausea and vomiting (10/12/2022)   Appears to be multifactorial.  Would be reasonable to further evaluate with gastric emptying study and possibly also upper endoscopy. Her acute episode has been quite severe today. She appears dehydrated as well. - IV fluids for hydration   -NPO now and start Zofran  - if patient symptoms have improved consider p.o. intake  - GI consult for further recommendation        Dehydration (10/12/2022)  Patient started on IV fluids      Adult failure to thrive (10/12/2022)  Likely multifactorial including from poor p.o. intake. If not done yet she might need DEXA scan on outpatient basis as well as vitamin-D evaluation. Dietitian consult also placed. Urine retention (10/12/2022)  Has been worse with her episodes of constipation. She has not been able to void in the emergency department.    -Monterroso catheter placed   - she has normal sensation in her lower extremities unlikely to be related to the L3 compression fracture  -Consider voiding trial prior to discharge       Closed compression fracture of L3 lumbar vertebra, initial encounter ([facility] (10/12/2022)   Is completely asymptomatic and has no neurologic deficits in the lower extremities.   - appears to be incidental finding she denies any significant pain  -Continue monitor    Risk of deterioration: low      Total time spent with patient: 30 Minutes **I personally saw and examined the patient during this time period**                 Care Plan discussed with: Patient    Discussed:  Code Status and Care Plan    Prophylaxis:  Lovenox    Probable Disposition:  Home w/Family           ___________________________________________________    Attending Physician: Guille Reeves MD

## 2022-10-13 NOTE — PROGRESS NOTES
Bedside and Verbal shift change report given to DALE Armenta (oncoming nurse) by My Perez RN (offgoing nurse). Report included the following information SBAR, Kardex, ED Summary, Intake/Output, MAR, Accordion, and Recent Results. [facility]

## 2022-10-13 NOTE — CONSULTS
[facility]. Philly Singletary M.D.  [phone]                       GASTROENTEROLOGY CONSULTATION NOTE        NAME:  Dayo Alvares   :   1935   MRN:   [id number]       Referring Physician: Raman Styles    Consult Date: 10/13/2022 10:55 AM    Chief Complaint: dysphagia, nausea, vomiting     History of Present Illness:  Patient is a 81 y.o. who presented to the ED with complaint of abdominal pain and intractable nausea with vomiting. She states that she has a longstanding history of nausea/vomiting for which she has been seen by Dr. Brando Juarez in the past. He started her on Bentyl 10mg TID, and pt has also been taking Zofran. She describes her symptoms as a choking sensation in her throat over the past few years, which often causes her to need to vomit. Denies any hematemesis or coffee ground emesis with these episodes of vomiting. This problem is worse with solid foods as compared to liquid foods but is certainly present with both. She also reports a 30 lb weight loss since January in part due to intolerance to many more foods now due to this issue. She also notes she recently had hip fracture with repair at [facility] about a month ago, after which she was discharged to a rehab facility, where she feels symptoms of dysphagia/poor appetite/n/v did increase. Patient also reports she has issues with constipation - currently on a regimen of 1.5 capfuls of Miralax daily. Her last bowel movement was yesterday. Denies any recent travel, fevers, chills, lightheadedness, dizziness.       PMH:  Past Medical History:   Diagnosis Date    Allergies     Anxiety     Breast cancer ([facility]     s/p lumpectomy    Diabetes mellitus ([facility]     diet-controlled    Hx-TIA (transient ischemic attack)     Hypertension     Hyponatremia     Melanoma ([facility]     s/p resection, right cheek    Tremor        PSH:  Past Surgical History:   Procedure Laterality Date    HX BACK SURGERY      HX BREAST LUMPECTOMY  1993    right HX CHOLECYSTECTOMY      HX HYSTERECTOMY      HX MALIGNANT SKIN LESION EXCISION  2008    HX MENISCECTOMY      HX OTHER SURGICAL      pineda's neuroma bilaterally       Allergies: Allergies   Allergen Reactions    Sulfa (Sulfonamide Antibiotics) Nausea and Vomiting    Codeine Other (comments)     Stomach cramping    Dairy Aid [Lactase] Unknown (comments)    Levaquin [Levofloxacin] Nausea and Vomiting     Pt can not take PO due to vomiting. Linzess [Linaclotide] Diarrhea    Tomato Unknown (comments)       Home Medications:  Prior to Admission Medications   Prescriptions Last Dose Informant Patient Reported? Taking?   acetaminophen (TYLENOL) 325 mg tablet  Self Yes No   Sig: Take 325-650 mg by mouth every six (6) hours as needed (headache). amLODIPine (NORVASC) 5 mg tablet   No No   Sig: TAKE 1 TABLET BY MOUTH EVERY DAY   atenoloL (TENORMIN) 25 mg tablet   No No   Sig: TAKE ONE TABLET BY MOUTH DAILY   benzonatate (Tessalon Perles) 100 mg capsule   No No   Sig: Take 1 Capsule by mouth three (3) times daily as needed for Cough. carboxymethylcellulose sodium (REFRESH LIQUIGEL) 1 % dlgl ophthalmic solution  Self Yes No   Sig: Administer 1 Drop to both eyes as needed (dry eyes). clopidogreL (PLAVIX) 75 mg tab   No No   Sig: TAKE 1 TABLET BY MOUTH EVERY DAY   colestipoL (COLESTID) 5 gram packet   No No   Sig: Take 1 Packet by mouth daily. cyanocobalamin (VITAMIN B12) 500 mcg tablet  Self Yes No   Sig: Take 500 mcg by mouth as needed. diazePAM (VALIUM) 5 mg tablet   No No   Sig: TAKE ONE TABLET BY MOUTH TWICE A DAY AS NEEDED FOR ANXIETY OR SLEEP   diclofenac (Voltaren) 1 % gel   No No   Sig: Apply 2 g to affected area four (4) times daily as needed (knee pain, back pain).    dicyclomine (BENTYL) 10 mg capsule   No No   Sig: TAKE ONE CAPSULE BY MOUTH THREE TIMES A DAY AS NEEDED   fluticasone propionate (FLONASE) 50 mcg/actuation nasal spray   No No   Sig: SPRAY ONE SPRAY IN EACH NOSTRIL ONCE DAILY   lidocaine (LIDODERM) 5 %   No No   Sig: Apply patch to the affected area for 12 hours a day and remove for 12 hours a day. Patient not taking: Reported on 4/19/2022   meclizine (ANTIVERT) 12.5 mg tablet   No No   Sig: TAKE ONE TO TWO TABLETS BY MOUTH TWICE A DAY AS NEEDED FOR DIZZINESS   ondansetron (ZOFRAN ODT) 4 mg disintegrating tablet   No No   Sig: DISSOLVE ONE TABLET BY MOUTH EVERY 8 HOURS AS NEEDED FOR NAUSEA   polyethylene glycol (Miralax) 17 gram/dose powder   Yes No   Sig: Take 17 g by mouth daily. psyllium husk-aspartame (Metamucil Fiber Singles) 3.4 gram pwpk packet   No No   Sig: Take 1 Packet by mouth daily.       Facility-Administered Medications: None       Hospital Medications:  Current Facility-Administered Medications   Medication Dose Route Frequency    docusate sodium (COLACE) capsule 100 mg  100 mg Oral BID    sodium chloride (NS) flush 5-40 mL  5-40 mL IntraVENous Q8H    sodium chloride (NS) flush 5-40 mL  5-40 mL IntraVENous PRN    acetaminophen (TYLENOL) tablet 650 mg  650 mg Oral Q6H PRN    Or    acetaminophen (TYLENOL) suppository 650 mg  650 mg Rectal Q6H PRN    polyethylene glycol (MIRALAX) packet 17 g  17 g Oral DAILY PRN    enoxaparin (LOVENOX) injection 30 mg  30 mg SubCUTAneous DAILY    0.9% sodium chloride infusion  75 mL/hr IntraVENous CONTINUOUS    ondansetron (ZOFRAN) injection 4 mg  4 mg IntraVENous Q6H PRN    clopidogreL (PLAVIX) tablet 75 mg  75 mg Oral DAILY     Current Outpatient Medications   Medication Sig    clopidogreL (PLAVIX) 75 mg tab TAKE 1 TABLET BY MOUTH EVERY DAY    fluticasone propionate (FLONASE) 50 mcg/actuation nasal spray SPRAY ONE SPRAY IN EACH NOSTRIL ONCE DAILY    dicyclomine (BENTYL) 10 mg capsule TAKE ONE CAPSULE BY MOUTH THREE TIMES A DAY AS NEEDED    ondansetron (ZOFRAN ODT) 4 mg disintegrating tablet DISSOLVE ONE TABLET BY MOUTH EVERY 8 HOURS AS NEEDED FOR NAUSEA    atenoloL (TENORMIN) 25 mg tablet TAKE ONE TABLET BY MOUTH DAILY    benzonatate (Tessalon Perles) 100 mg capsule Take 1 Capsule by mouth three (3) times daily as needed for Cough. diazePAM (VALIUM) 5 mg tablet TAKE ONE TABLET BY MOUTH TWICE A DAY AS NEEDED FOR ANXIETY OR SLEEP    psyllium husk-aspartame (Metamucil Fiber Singles) 3.4 gram pwpk packet Take 1 Packet by mouth daily. colestipoL (COLESTID) 5 gram packet Take 1 Packet by mouth daily. amLODIPine (NORVASC) 5 mg tablet TAKE 1 TABLET BY MOUTH EVERY DAY    meclizine (ANTIVERT) 12.5 mg tablet TAKE ONE TO TWO TABLETS BY MOUTH TWICE A DAY AS NEEDED FOR DIZZINESS    lidocaine (LIDODERM) 5 % Apply patch to the affected area for 12 hours a day and remove for 12 hours a day. (Patient not taking: Reported on 4/19/2022)    diclofenac (Voltaren) 1 % gel Apply 2 g to affected area four (4) times daily as needed (knee pain, back pain). polyethylene glycol (Miralax) 17 gram/dose powder Take 17 g by mouth daily. carboxymethylcellulose sodium (REFRESH LIQUIGEL) 1 % dlgl ophthalmic solution Administer 1 Drop to both eyes as needed (dry eyes). cyanocobalamin (VITAMIN B12) 500 mcg tablet Take 500 mcg by mouth as needed. acetaminophen (TYLENOL) 325 mg tablet Take 325-650 mg by mouth every six (6) hours as needed (headache).        Social History:  Social History     Tobacco Use    Smoking status: Never    Smokeless tobacco: Never   Substance Use Topics    Alcohol use: No       Family History:  Family History   Problem Relation Age of Onset    Heart Failure Mother     Other Father         aortic aneurysm    Diabetes Son     Immunodeficiency Son         post kidney and pancreas transplant       Review of Systems:  Constitutional: negative fever, negative chills, negative weight loss  Eyes:   negative visual changes  ENT:   negative sore throat, tongue or lip swelling  Respiratory:  negative cough, negative dyspnea  Cards:  negative for chest pain, palpitations, lower extremity edema  GI:   See HPI  :  negative for frequency, dysuria  Integument: negative for rash and pruritus  Heme:  negative for easy bruising and gum/nose bleeding  Musculoskel: negative for myalgias,  back pain and muscle weakness  Neuro: negative for headaches, dizziness, vertigo  Psych:  negative for feelings of anxiety, depression     Objective:   Patient Vitals for the past 8 hrs:   BP Temp Pulse Resp SpO2   10/13/22 1000 (!) 147/62 -- -- -- 97 %   10/13/22 0900 -- -- -- -- 97 %   10/13/22 0800 (!) 145/63 -- -- 16 97 %   10/13/22 0740 136/63 98.4 °F (36.9 °C) 79 18 96 %   10/13/22 0600 (!) 153/66 98.3 °F (36.8 °C) 82 18 100 %     10/13 0701 - 10/13 1900  In: 797.5 [I.V.:797.5]  Out: 15 [Urine:15]  10/11 1901 - 10/13 0700  In: 1000 [I.V.:1000]  Out: -     EXAM:  Frail appearing, elderly female resting comfortably in bed, no acute distress   NEURO-alert, oriented x3, affect appropriate, no focal neurological deficits, moves all extremities well, no involuntary movements   HEENT-Head: Normocephalic, no lesions, without obvious abnormality. Eye: Normal external eye, conjunctiva, lids cornea, [place]. Pharynx: Dental Hygiene adequate. Normal buccal mucosa. Normal pharynx. LUNGS-clear to auscultation bilaterally    COR-regular rate and rhythym     ABD- soft, non-tender. Bowel sounds normal. No masses,  no organomegaly     EXT-no edema    Skin - No rash     Data Review     Recent Labs     10/13/22  0227 10/12/22  1626   WBC 7.2 7.8   HGB 10.3* 11.7   HCT 32.9* 37.4   * 428*     Recent Labs     10/13/22  0227 10/12/22  1626   * 130*   K 3.6 4.5    98   CO2 26 24   BUN 13 15   CREA 0.67 0.73   GLU 93 118*   CA 8.8 9.9     Recent Labs     10/12/22  1626   *   TP 7.9   ALB 3.4*   GLOB 4.5*   LPSE 95     No results for input(s): INR, PTP, APTT, INREXT in the last 72 hours.     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 Assessment:   Dysphagia likely secondary to reflux/esophagitis, but cannot exclude mechanical obstructive processes including malignancy, webs, rings, diverticula. Nausea and vomiting secondary to dysphagia   Plan:   Start Protonix 40mg daily  EGD on 10/14  GI bland diet  NPO at midnight for EGD tomorrow  Hold Lovenox at midnight  Will hold Plavix at this time for possible dilation if indicated     Patient discussed with Dr. Shiva Alvarado    Thanks for allowing me to participate in the care of this patient.   Signed By: Elisa Rocha MD     10/13/2022  10:55 AM

## 2022-10-13 NOTE — ED NOTES
Bedside and Verbal shift change report given to Sue (oncoming nurse) by Rick Aaron (offgoing nurse). Report included the following information SBAR, Kardex, ED Summary, Intake/Output, MAR, and Recent Results.

## 2022-10-13 NOTE — PROGRESS NOTES
Cody Osorio [facility] [address]  [address] [facility], [address] [place] [address]  [phone]        Hospitalist Progress Note      NAME: Antoine Shi   :  1935  MRM:  [id number]    Date/Time: 10/13/2022  3:48 PM         Subjective:     Chief Complaint: \"I'm nauseated\"     Pt seen and examined. States she is nauseated but with further prompting states that when she swallows liquids or solids it becomes stuck and she feels she is choking. ROS:  (bold if positive, if negative)    Nausea      Objective:       Vitals:          Last 24hrs VS reviewed since prior progress note. Most recent are:    Visit Vitals  BP (!) 152/72 (BP 1 Location: Left upper arm, BP Patient Position: At rest;Reclining)   Pulse 89   Temp 98.4 °F (36.9 °C)   Resp 17   Ht 5' 1\" (1.549 m)   Wt 41.3 kg (91 lb)   SpO2 100%   BMI 17.19 kg/m²     SpO2 Readings from Last 6 Encounters:   10/13/22 100%   22 97%   22 99%   21 98%   21 97%   21 98%          Intake/Output Summary (Last 24 hours) at 10/13/2022 1548  Last data filed at 10/13/2022 1000  Gross per 24 hour   Intake 1797.5 ml   Output 15 ml   Net 1782.5 ml          Exam:     Physical Exam:    Gen: Thin female.    HEENT:  Pink conjunctivae, PERRL, hearing intact to voice, moist mucous membranes  Neck:  Supple, without masses, thyroid non-tender  Resp:  No accessory muscle use, clear breath sounds without wheezes rales or rhonchi  Card:  No murmurs, normal S1, S2 without thrills, bruits or peripheral edema  Abd:  Soft, non-tender, non-distended, normoactive bowel sounds are present  Musc:  No cyanosis or clubbing  Skin:  No rashes or ulcers, skin turgor is good  Neuro:  Cranial nerves 3-12 are grossly intact,  strength is 5/5 bilaterally and dorsi / plantarflexion is 5/5 bilaterally, follows commands appropriately  Psych:  Good insight, oriented to person, place and time, alert  Small blanchable erythema on her buttock     Medications Reviewed: (see below)    Lab Data Reviewed: (see below)    ______________________________________________________________________    Medications:     Current Facility-Administered Medications   Medication Dose Route Frequency    docusate sodium (COLACE) capsule 100 mg  100 mg Oral BID    pantoprazole (PROTONIX) 40 mg in 0.9% sodium chloride 10 mL injection  40 mg IntraVENous DAILY    morphine injection 1 mg  1 mg IntraVENous Q4H PRN    diazePAM (VALIUM) tablet 2.5 mg  2.5 mg Oral BID    atenoloL (TENORMIN) tablet 25 mg  25 mg Oral DAILY    acetaminophen (TYLENOL) solution 650 mg  650 mg Oral Q4H PRN    sodium chloride (NS) flush 5-40 mL  5-40 mL IntraVENous Q8H    sodium chloride (NS) flush 5-40 mL  5-40 mL IntraVENous PRN    acetaminophen (TYLENOL) tablet 650 mg  650 mg Oral Q6H PRN    Or    acetaminophen (TYLENOL) suppository 650 mg  650 mg Rectal Q6H PRN    polyethylene glycol (MIRALAX) packet 17 g  17 g Oral DAILY PRN    [Held by provider] enoxaparin (LOVENOX) injection 30 mg  30 mg SubCUTAneous DAILY    0.9% sodium chloride infusion  75 mL/hr IntraVENous CONTINUOUS    ondansetron (ZOFRAN) injection 4 mg  4 mg IntraVENous Q6H PRN    [Held by provider] clopidogreL (PLAVIX) tablet 75 mg  75 mg Oral DAILY     Current Outpatient Medications   Medication Sig    clopidogreL (PLAVIX) 75 mg tab TAKE 1 TABLET BY MOUTH EVERY DAY    fluticasone propionate (FLONASE) 50 mcg/actuation nasal spray SPRAY ONE SPRAY IN EACH NOSTRIL ONCE DAILY    dicyclomine (BENTYL) 10 mg capsule TAKE ONE CAPSULE BY MOUTH THREE TIMES A DAY AS NEEDED    ondansetron (ZOFRAN ODT) 4 mg disintegrating tablet DISSOLVE ONE TABLET BY MOUTH EVERY 8 HOURS AS NEEDED FOR NAUSEA    atenoloL (TENORMIN) 25 mg tablet TAKE ONE TABLET BY MOUTH DAILY    benzonatate (Tessalon Perles) 100 mg capsule Take 1 Capsule by mouth three (3) times daily as needed for Cough.     diazePAM (VALIUM) 5 mg tablet TAKE ONE TABLET BY MOUTH TWICE A DAY AS NEEDED FOR ANXIETY OR SLEEP    psyllium husk-aspartame (Metamucil Fiber Singles) 3.4 gram pwpk packet Take 1 Packet by mouth daily. colestipoL (COLESTID) 5 gram packet Take 1 Packet by mouth daily. amLODIPine (NORVASC) 5 mg tablet TAKE 1 TABLET BY MOUTH EVERY DAY    meclizine (ANTIVERT) 12.5 mg tablet TAKE ONE TO TWO TABLETS BY MOUTH TWICE A DAY AS NEEDED FOR DIZZINESS    lidocaine (LIDODERM) 5 % Apply patch to the affected area for 12 hours a day and remove for 12 hours a day. (Patient not taking: Reported on 4/19/2022)    diclofenac (Voltaren) 1 % gel Apply 2 g to affected area four (4) times daily as needed (knee pain, back pain). polyethylene glycol (Miralax) 17 gram/dose powder Take 17 g by mouth daily. carboxymethylcellulose sodium (REFRESH LIQUIGEL) 1 % dlgl ophthalmic solution Administer 1 Drop to both eyes as needed (dry eyes). cyanocobalamin (VITAMIN B12) 500 mcg tablet Take 500 mcg by mouth as needed. acetaminophen (TYLENOL) 325 mg tablet Take 325-650 mg by mouth every six (6) hours as needed (headache). Lab Review:     Recent Labs     10/13/22  0227 10/12/22  1626   WBC 7.2 7.8   HGB 10.3* 11.7   HCT 32.9* 37.4   * 428*     Recent Labs     10/13/22  0227 10/12/22  1626   * 130*   K 3.6 4.5    98   CO2 26 24   GLU 93 118*   BUN 13 15   CREA 0.67 0.73   CA 8.8 9.9   MG  --  2.2   ALB  --  3.4*   ALT  --  19     No components found for: GLPOC         Assessment / Plan:   Weight loss (11/3/2020)  -after further review with pt, appears to have feeling of liquids and foods getting \"stuck\" when swallowed => ?esophageal motility issue ?stricture/mass ? esophagitis/gastritis  -continue PPI   -GI on board; plans for endoscopy in the AM      Nausea and vomiting (10/12/2022)  -see above  -CT without acute process other than constipation   -lipase WNL   -zofran PRN   -hold tramadol      Adult failure to thrive (10/12/2022)  -nutrition eval        Urine retention (10/12/2022)  -canales in place   -voiding trial prior to d/c        Closed compression fracture of L3 lumbar vertebra, initial encounter ([facility] [address] (10/12/2022)   Is completely asymptomatic and has no neurologic deficits in the lower extremities.   -no pain   -monitor with ambulation     HTN   -resume atenolol   -restart amlodipine in the AM if stable     TIA   -resume plavix as able     Total time spent with patient: 28 [address] [facility] discussed with: Patient and Family    Discussed:  Care Plan    Prophylaxis:  SCD's    Disposition:  TBD   ___________________________________________________    Attending Physician: Arthur Dodson MD

## 2022-10-13 NOTE — ED NOTES
Assumed care of pt at this time, Hospitalist at bedside at this time    After MD assessment, this nurse will place canales cath and send urine sample to lab    2340: Bedside and Verbal shift change report given to Radha Taylor RN (oncoming nurse) by [address], RN (offgoing nurse). Report included the following information SBAR, Kardex, ED Summary, Intake/Output, MAR, and Recent Results.

## 2022-10-13 NOTE — PROGRESS NOTES
1100 Bedside and Verbal shift change report given to Jose Rafael Rosas RN (oncoming nurse) by [facility], RN (offgoing nurse). Report included the following information SBAR, Kardex, ED Summary, MAR, Recent Results, and Cardiac Rhythm sr .    1500 Bedside and Verbal shift change report given to [facility], RN (oncoming nurse) by Jose Rafael Rosas RN (offgoing nurse). Report included the following information SBAR, Kardex, ED Summary, MAR, Recent Results, and Cardiac Rhythm sr .    1820 TRANSFER - OUT REPORT:    Verbal report given to Magnolia Rebolledo RN(name) on Giuseppe Stanley  being transferred to [facility](unit) for routine progression of care       Report consisted of patients Situation, Background, Assessment and   Recommendations(SBAR). Information from the following report(s) SBAR, Kardex, ED Summary, Intake/Output, MAR, Recent Results, and Cardiac Rhythm sr  was reviewed with the receiving nurse. Lines:   Peripheral IV 10/12/22 Right Antecubital (Active)   Site Assessment Clean, dry, & intact 10/13/22 1500   Phlebitis Assessment 0 10/13/22 1500   Infiltration Assessment 0 10/13/22 1500   Dressing Status Clean, dry, & intact 10/13/22 1500   Dressing Type Transparent 10/13/22 1500   Hub Color/Line Status Pink;Flushed 10/13/22 1500   Alcohol Cap Used Yes 10/13/22 1500        Opportunity for questions and clarification was provided. Patient transported with:   Registered Nurse  Primary Nurse René Navarrete RN and Magnolia Rebolledo RN performed a dual skin assessment on this patient Impairment noted- see wound doc flow sheet Mepelex to sacrum for prevention. Redness, blanchable.    Dutch score is 16

## 2022-10-13 NOTE — PROGRESS NOTES
Problem: Mobility Impaired (Adult and Pediatric)  Goal: *Acute Goals and Plan of Care (Insert Text)  Description: FUNCTIONAL STATUS PRIOR TO ADMISSION: Some conflicting report of hx. Lived alone prior to recent illness. Used combination of RW, rollator, and SPC. HOME SUPPORT PRIOR TO ADMISSION: The patient lived with her grand daughter but did not typically require assist. Patient states she plans to discharge home with 24 hour supervision from her 39 y.o. grandson. Physical Therapy Goals  Initiated 10/13/2022  1. Patient will move from supine to sit and sit to supine  in bed with supervision/set-up within 7 day(s). 2.  Patient will transfer from bed to chair and chair to bed with supervision/set-up using the least restrictive device within 7 day(s). 3.  Patient will perform sit to stand with supervision/set-up within 7 day(s). 4.  Patient will ambulate with minimal assistance/contact guard assist for 100 feet with the least restrictive device within 7 day(s). 5.  Patient will ascend/descend 4 stairs with 1 handrail(s) with modified independence within 7 day(s). Outcome: Progressing Towards Goal     PHYSICAL THERAPY EVALUATION  Patient: Tigist Houston (10 y.o. female)  Date: 10/13/2022  Primary Diagnosis: Nausea and vomiting [R11.2]       Precautions: Unknown WB       ASSESSMENT  Based on the objective data described below, the patient presents with right hip pain with activity, impaired strength, balance, and activity tolerance following admission for nausea and vomiting. Per chart, she had a right hip surgery on 9/12 following a fall and fx. Patient reports no WB restriction but was primarily completing wheelchair transfers and ambulating with therapy only while in rehab. No additional documentation provided on admission and WB clarification would be helpful. During evaluation, she required CGA and increased time for mobility.  Able to stand pivot to [facility] using a RW over 2' and side step back to HOB with CGA each. Additional mobility limited by an immobile IV pole and short lines. Returned supine with all needs met post session. The patient strongly prefers discharge home with family support after concerns expressed from her rehab stay. She lived alone at baseline but reports being able to have 24 hour assist in the home at discharge. Concerns for home include a bedroom/bath on her 2nd floor and reported very limited gait training in rehab. She remains at high risk for falls and lived alone at baseline. Recommend home with 24 hour assist and HHPT vs a short return to rehab (likely a different facility) prior to transitioning home. Current Level of Function Impacting Discharge (mobility/balance): CGA    Other factors to consider for discharge: alone at baseline     Patient will benefit from skilled therapy intervention to address the above noted impairments. PLAN :  Recommendations and Planned Interventions: bed mobility training, transfer training, gait training, therapeutic exercises, and therapeutic activities      Frequency/Duration: Patient will be followed by physical therapy:  5 times a week to address goals. Recommendation for discharge: (in order for the patient to meet his/her long term goals)  24 hour assistance + HHPT vs short return to rehab    This discharge recommendation:  Has not yet been discussed the attending provider and/or case management    IF patient discharges home will need the following DME: to be determined (TBD)         SUBJECTIVE:   Patient stated I want to go home after what I have been through. I haven't been home in three months!     OBJECTIVE DATA SUMMARY:   HISTORY:    Past Medical History:   Diagnosis Date    Allergies     Anxiety     Breast cancer ([facility] 1993    s/p lumpectomy    Diabetes mellitus ([facility]     diet-controlled    Hx-TIA (transient ischemic attack)     Hypertension     Hyponatremia     Melanoma ([facility] 2008    s/p resection, right cheek    Tremor Past Surgical History:   Procedure Laterality Date    HX BACK SURGERY      HX BREAST LUMPECTOMY  1993    right    HX CHOLECYSTECTOMY      HX HYSTERECTOMY      HX MALIGNANT SKIN LESION EXCISION  2008    HX MENISCECTOMY      HX OTHER SURGICAL      pineda's neuroma bilaterally       Personal factors and/or comorbidities impacting plan of care:     Home Situation  Home Environment: Private residence  # Steps to Enter: 5  Rails to Enter: Yes  Hand Rails : Bilateral  One/Two Story Residence: Two story  # of Interior Steps: 12  Current DME Used/Available at Home: Mari Peals, straight, Walker, rollator, Walker, rolling  Tub or Shower Type: Shower (2nd floor)    EXAMINATION/PRESENTATION/DECISION MAKING:   Critical Behavior:  Neurologic State: Alert  Orientation Level: Oriented to person, Oriented to place, Oriented to situation, Disoriented to time  Cognition: Follows commands, Appropriate for age attention/concentration, Appropriate safety awareness, Appropriate decision making  Safety/Judgement: Awareness of environment, Fall prevention, Home safety, Insight into deficits, Good awareness of safety precautions  Hearing: Auditory  Auditory Impairment: None  Skin:    Edema:   Range Of Motion:  AROM: Generally decreased, functional                       Strength:    Strength: Generally decreased, functional                    Tone & Sensation:   Tone: Normal                              Coordination:  Coordination: Generally decreased, functional  Vision:   Corrective Lenses: Glasses  Functional Mobility:  Bed Mobility:     Supine to Sit: Minimum assistance        Transfers:  Sit to Stand: Contact guard assistance; Adaptive equipment; Additional time;Assist x1  Stand to Sit: Contact guard assistance; Adaptive equipment; Additional time;Assist x1        Bed to Chair: Contact guard assistance; Adaptive equipment; Additional time;Assist x1              Balance:   Sitting: Intact  Standing: Impaired; Without support  Standing - Static: Constant support;Good  Ambulation/Gait Training:  Distance (ft):  (2' to [facility] and again to [facility])  Assistive Device: Gait belt;Walker, rolling  Ambulation - Level of Assistance: Contact guard assistance     Gait Description (WDL): Exceptions to WDL  Gait Abnormalities: Antalgic        Base of Support: Narrowed       Physical Therapy Evaluation Charge Determination   History Examination Presentation Decision-Making   LOW Complexity : Zero comorbidities / personal factors that will impact the outcome / POC LOW Complexity : 1-2 Standardized tests and measures addressing body structure, function, activity limitation and / or participation in recreation  LOW Complexity : Stable, uncomplicated  LOW Complexity : FOTO score of       Based on the above components, the patient evaluation is determined to be of the following complexity level: LOW     Pain Rating:      Activity Tolerance:   Fair    After treatment patient left in no apparent distress:   Supine in bed and Call bell within reach    COMMUNICATION/EDUCATION:   The patients plan of care was discussed with: Registered nurse. Fall prevention education was provided and the patient/caregiver indicated understanding., Patient/family have participated as able in goal setting and plan of care. , and Patient/family agree to work toward stated goals and plan of care.     Thank you for this referral.  Juan Francisco French, PT, DPT   Time Calculation: 32 mins

## 2022-10-14 ENCOUNTER — ANESTHESIA EVENT (OUTPATIENT)
Dept: ENDOSCOPY | Age: 87
DRG: 392 | End: 2022-10-14
Payer: MEDICARE

## 2022-10-14 ENCOUNTER — ANESTHESIA (OUTPATIENT)
Dept: ENDOSCOPY | Age: 87
DRG: 392 | End: 2022-10-14
Payer: MEDICARE

## 2022-10-14 PROBLEM — R62.7 FAILURE TO THRIVE IN ADULT: Status: ACTIVE | Noted: 2022-10-14

## 2022-10-14 LAB
ANION GAP SERPL CALC-SCNC: 9 MMOL/L (ref 5–15)
BASOPHILS # BLD: 0 K/UL (ref 0–0.1)
BASOPHILS NFR BLD: 1 % (ref 0–1)
BUN SERPL-MCNC: 8 MG/DL (ref 6–20)
BUN/CREAT SERPL: 15 (ref 12–20)
CALCIUM SERPL-MCNC: 8.7 MG/DL (ref 8.5–10.1)
CHLORIDE SERPL-SCNC: 105 MMOL/L (ref 97–108)
CO2 SERPL-SCNC: 23 MMOL/L (ref 21–32)
CREAT SERPL-MCNC: 0.55 MG/DL (ref 0.55–1.02)
DIFFERENTIAL METHOD BLD: ABNORMAL
EOSINOPHIL # BLD: 0.1 K/UL (ref 0–0.4)
EOSINOPHIL NFR BLD: 2 % (ref 0–7)
ERYTHROCYTE [DISTWIDTH] IN BLOOD BY AUTOMATED COUNT: 14.6 % (ref 11.5–14.5)
GLUCOSE SERPL-MCNC: 97 MG/DL (ref 65–100)
HCT VFR BLD AUTO: 32.3 % (ref 35–47)
HGB BLD-MCNC: 10.1 G/DL (ref 11.5–16)
IMM GRANULOCYTES # BLD AUTO: 0 K/UL (ref 0–0.04)
IMM GRANULOCYTES NFR BLD AUTO: 0 % (ref 0–0.5)
LYMPHOCYTES # BLD: 2.3 K/UL (ref 0.8–3.5)
LYMPHOCYTES NFR BLD: 39 % (ref 12–49)
MAGNESIUM SERPL-MCNC: 1.8 MG/DL (ref 1.6–2.4)
MCH RBC QN AUTO: 29.4 PG (ref 26–34)
MCHC RBC AUTO-ENTMCNC: 31.3 G/DL (ref 30–36.5)
MCV RBC AUTO: 93.9 FL (ref 80–99)
MONOCYTES # BLD: 0.5 K/UL (ref 0–1)
MONOCYTES NFR BLD: 8 % (ref 5–13)
NEUTS SEG # BLD: 3 K/UL (ref 1.8–8)
NEUTS SEG NFR BLD: 50 % (ref 32–75)
NRBC # BLD: 0 K/UL (ref 0–0.01)
NRBC BLD-RTO: 0 PER 100 WBC
PLATELET # BLD AUTO: 357 K/UL (ref 150–400)
PMV BLD AUTO: 9.3 FL (ref 8.9–12.9)
POTASSIUM SERPL-SCNC: 3.7 MMOL/L (ref 3.5–5.1)
RBC # BLD AUTO: 3.44 M/UL (ref 3.8–5.2)
SODIUM SERPL-SCNC: 137 MMOL/L (ref 136–145)
WBC # BLD AUTO: 6 K/UL (ref 3.6–11)

## 2022-10-14 PROCEDURE — 0DB68ZX EXCISION OF STOMACH, VIA NATURAL OR ARTIFICIAL OPENING ENDOSCOPIC, DIAGNOSTIC: ICD-10-PCS | Performed by: INTERNAL MEDICINE

## 2022-10-14 PROCEDURE — 65270000029 HC RM PRIVATE

## 2022-10-14 PROCEDURE — 74011000250 HC RX REV CODE- 250: Performed by: STUDENT IN AN ORGANIZED HEALTH CARE EDUCATION/TRAINING PROGRAM

## 2022-10-14 PROCEDURE — 2709999900 HC NON-CHARGEABLE SUPPLY: Performed by: INTERNAL MEDICINE

## 2022-10-14 PROCEDURE — 74011000250 HC RX REV CODE- 250: Performed by: NURSE ANESTHETIST, CERTIFIED REGISTERED

## 2022-10-14 PROCEDURE — 74011250636 HC RX REV CODE- 250/636: Performed by: INTERNAL MEDICINE

## 2022-10-14 PROCEDURE — 97530 THERAPEUTIC ACTIVITIES: CPT

## 2022-10-14 PROCEDURE — 74011250636 HC RX REV CODE- 250/636: Performed by: HOSPITALIST

## 2022-10-14 PROCEDURE — 76040000019: Performed by: INTERNAL MEDICINE

## 2022-10-14 PROCEDURE — 76060000031 HC ANESTHESIA FIRST 0.5 HR: Performed by: INTERNAL MEDICINE

## 2022-10-14 PROCEDURE — 88305 TISSUE EXAM BY PATHOLOGIST: CPT

## 2022-10-14 PROCEDURE — 74011250637 HC RX REV CODE- 250/637: Performed by: HOSPITALIST

## 2022-10-14 PROCEDURE — G0378 HOSPITAL OBSERVATION PER HR: HCPCS

## 2022-10-14 PROCEDURE — 74011250636 HC RX REV CODE- 250/636: Performed by: ANESTHESIOLOGY

## 2022-10-14 PROCEDURE — 74011250636 HC RX REV CODE- 250/636: Performed by: NURSE ANESTHETIST, CERTIFIED REGISTERED

## 2022-10-14 PROCEDURE — 74011000250 HC RX REV CODE- 250: Performed by: HOSPITALIST

## 2022-10-14 PROCEDURE — C9113 INJ PANTOPRAZOLE SODIUM, VIA: HCPCS | Performed by: STUDENT IN AN ORGANIZED HEALTH CARE EDUCATION/TRAINING PROGRAM

## 2022-10-14 PROCEDURE — 96376 TX/PRO/DX INJ SAME DRUG ADON: CPT

## 2022-10-14 PROCEDURE — C9113 INJ PANTOPRAZOLE SODIUM, VIA: HCPCS | Performed by: INTERNAL MEDICINE

## 2022-10-14 PROCEDURE — 77030021593 HC FCPS BIOP ENDOSC BSC -A: Performed by: INTERNAL MEDICINE

## 2022-10-14 PROCEDURE — 36415 COLL VENOUS BLD VENIPUNCTURE: CPT

## 2022-10-14 PROCEDURE — 74011000250 HC RX REV CODE- 250: Performed by: INTERNAL MEDICINE

## 2022-10-14 PROCEDURE — 80048 BASIC METABOLIC PNL TOTAL CA: CPT

## 2022-10-14 PROCEDURE — 74011250636 HC RX REV CODE- 250/636: Performed by: STUDENT IN AN ORGANIZED HEALTH CARE EDUCATION/TRAINING PROGRAM

## 2022-10-14 PROCEDURE — 74011250637 HC RX REV CODE- 250/637: Performed by: INTERNAL MEDICINE

## 2022-10-14 PROCEDURE — 85025 COMPLETE CBC W/AUTO DIFF WBC: CPT

## 2022-10-14 PROCEDURE — 83735 ASSAY OF MAGNESIUM: CPT

## 2022-10-14 RX ORDER — ATROPINE SULFATE 0.1 MG/ML
0.4 INJECTION INTRAVENOUS
Status: DISCONTINUED | OUTPATIENT
Start: 2022-10-14 | End: 2022-10-14 | Stop reason: HOSPADM

## 2022-10-14 RX ORDER — DEXTROMETHORPHAN/PSEUDOEPHED 2.5-7.5/.8
1.2 DROPS ORAL
Status: DISCONTINUED | OUTPATIENT
Start: 2022-10-14 | End: 2022-10-14 | Stop reason: HOSPADM

## 2022-10-14 RX ORDER — SODIUM CHLORIDE 9 MG/ML
50 INJECTION, SOLUTION INTRAVENOUS CONTINUOUS
Status: DISPENSED | OUTPATIENT
Start: 2022-10-14 | End: 2022-10-14

## 2022-10-14 RX ORDER — NALOXONE HYDROCHLORIDE 0.4 MG/ML
0.4 INJECTION, SOLUTION INTRAMUSCULAR; INTRAVENOUS; SUBCUTANEOUS
Status: DISCONTINUED | OUTPATIENT
Start: 2022-10-14 | End: 2022-10-14 | Stop reason: HOSPADM

## 2022-10-14 RX ORDER — EPINEPHRINE 0.1 MG/ML
1 INJECTION INTRACARDIAC; INTRAVENOUS
Status: DISCONTINUED | OUTPATIENT
Start: 2022-10-14 | End: 2022-10-14 | Stop reason: HOSPADM

## 2022-10-14 RX ORDER — AMLODIPINE BESYLATE 5 MG/1
5 TABLET ORAL DAILY
Status: DISCONTINUED | OUTPATIENT
Start: 2022-10-14 | End: 2022-10-15

## 2022-10-14 RX ORDER — SODIUM CHLORIDE 9 MG/ML
50 INJECTION, SOLUTION INTRAVENOUS CONTINUOUS
Status: DISCONTINUED | OUTPATIENT
Start: 2022-10-14 | End: 2022-10-14

## 2022-10-14 RX ORDER — GLYCERIN ADULT
1 SUPPOSITORY, RECTAL RECTAL
Status: COMPLETED | OUTPATIENT
Start: 2022-10-14 | End: 2022-10-14

## 2022-10-14 RX ORDER — PROCHLORPERAZINE EDISYLATE 5 MG/ML
5 INJECTION INTRAMUSCULAR; INTRAVENOUS
Status: DISCONTINUED | OUTPATIENT
Start: 2022-10-14 | End: 2022-10-18 | Stop reason: HOSPADM

## 2022-10-14 RX ORDER — LIDOCAINE HYDROCHLORIDE 20 MG/ML
INJECTION, SOLUTION EPIDURAL; INFILTRATION; INTRACAUDAL; PERINEURAL AS NEEDED
Status: DISCONTINUED | OUTPATIENT
Start: 2022-10-14 | End: 2022-10-14 | Stop reason: HOSPADM

## 2022-10-14 RX ORDER — ENOXAPARIN SODIUM 100 MG/ML
30 INJECTION SUBCUTANEOUS DAILY
Status: COMPLETED | OUTPATIENT
Start: 2022-10-14 | End: 2022-10-16

## 2022-10-14 RX ORDER — ONDANSETRON 2 MG/ML
4 INJECTION INTRAMUSCULAR; INTRAVENOUS ONCE
Status: COMPLETED | OUTPATIENT
Start: 2022-10-14 | End: 2022-10-14

## 2022-10-14 RX ORDER — MIDAZOLAM HYDROCHLORIDE 1 MG/ML
.25-5 INJECTION, SOLUTION INTRAMUSCULAR; INTRAVENOUS
Status: DISCONTINUED | OUTPATIENT
Start: 2022-10-14 | End: 2022-10-14 | Stop reason: HOSPADM

## 2022-10-14 RX ORDER — PROPOFOL 10 MG/ML
INJECTION, EMULSION INTRAVENOUS AS NEEDED
Status: DISCONTINUED | OUTPATIENT
Start: 2022-10-14 | End: 2022-10-14 | Stop reason: HOSPADM

## 2022-10-14 RX ORDER — FLUMAZENIL 0.1 MG/ML
0.2 INJECTION INTRAVENOUS
Status: DISCONTINUED | OUTPATIENT
Start: 2022-10-14 | End: 2022-10-14 | Stop reason: HOSPADM

## 2022-10-14 RX ADMIN — ONDANSETRON HYDROCHLORIDE 4 MG: 2 SOLUTION INTRAMUSCULAR; INTRAVENOUS at 21:01

## 2022-10-14 RX ADMIN — ATENOLOL 25 MG: 50 TABLET ORAL at 11:52

## 2022-10-14 RX ADMIN — SODIUM CHLORIDE 40 MG: 9 INJECTION INTRAMUSCULAR; INTRAVENOUS; SUBCUTANEOUS at 21:01

## 2022-10-14 RX ADMIN — ACETAMINOPHEN 650 MG: 325 TABLET, FILM COATED ORAL at 23:10

## 2022-10-14 RX ADMIN — Medication 1 SUPPOSITORY: at 11:53

## 2022-10-14 RX ADMIN — PROPOFOL 20 MG: 10 INJECTION, EMULSION INTRAVENOUS at 09:50

## 2022-10-14 RX ADMIN — ACETAMINOPHEN 650 MG: 160 SOLUTION ORAL at 02:28

## 2022-10-14 RX ADMIN — AMLODIPINE BESYLATE 5 MG: 5 TABLET ORAL at 11:52

## 2022-10-14 RX ADMIN — SODIUM CHLORIDE 75 ML/HR: 9 INJECTION, SOLUTION INTRAVENOUS at 01:47

## 2022-10-14 RX ADMIN — ENOXAPARIN SODIUM 30 MG: 100 INJECTION SUBCUTANEOUS at 17:40

## 2022-10-14 RX ADMIN — SODIUM CHLORIDE, PRESERVATIVE FREE 40 MG: 5 INJECTION INTRAVENOUS at 11:53

## 2022-10-14 RX ADMIN — ACETAMINOPHEN 650 MG: 160 SOLUTION ORAL at 17:40

## 2022-10-14 RX ADMIN — DIAZEPAM 2.5 MG: 5 TABLET ORAL at 17:40

## 2022-10-14 RX ADMIN — ACETAMINOPHEN 650 MG: 325 TABLET, FILM COATED ORAL at 11:52

## 2022-10-14 RX ADMIN — PROPOFOL 50 MG: 10 INJECTION, EMULSION INTRAVENOUS at 09:49

## 2022-10-14 RX ADMIN — Medication 10 ML: at 11:54

## 2022-10-14 RX ADMIN — LIDOCAINE HYDROCHLORIDE 40 MG: 20 INJECTION, SOLUTION EPIDURAL; INFILTRATION; INTRACAUDAL; PERINEURAL at 09:49

## 2022-10-14 RX ADMIN — DOCUSATE SODIUM 100 MG: 100 CAPSULE, LIQUID FILLED ORAL at 11:52

## 2022-10-14 RX ADMIN — DIAZEPAM 2.5 MG: 5 TABLET ORAL at 11:51

## 2022-10-14 RX ADMIN — SODIUM CHLORIDE 50 ML/HR: 9 INJECTION, SOLUTION INTRAVENOUS at 11:44

## 2022-10-14 RX ADMIN — ONDANSETRON 4 MG: 2 INJECTION INTRAMUSCULAR; INTRAVENOUS at 09:25

## 2022-10-14 RX ADMIN — ONDANSETRON HYDROCHLORIDE 4 MG: 2 SOLUTION INTRAMUSCULAR; INTRAVENOUS at 09:26

## 2022-10-14 RX ADMIN — Medication 10 ML: at 21:02

## 2022-10-14 NOTE — PROGRESS NOTES
10/14/2022  2:28 PM  [facility] and will have bed available 10/17. pt's Dtr  Bonnie Dennison is in agreement w/ this plan. [facility] 10/17 pending medical stability. COVID testing per facility, Rapid day of DC   AVS updated  DARVIN Hirsch       11:44 AM  CM completed assessment w/ pt's daughter Maralee Klinefelter via phone. Charted demographics verified, charted address is pt's at baseline pt's granddaughter was living w/ her in a 2 story home w/ 5 SHENA, pt was staying on the GF sleeping on the sofa, granddaughter was assisting pt w/ ADLS, homemaking, medications and transportation. Pt fell 9/17 going upstairs, she has a R Hip fracture she discharged to [facility]. Family would like pt to go for continued rehab at a SNF., daughter states pt has dementia and is unable to return to previous living situation. Daughter also requested assistance completing a Medicaid application. Reason for Admission:   OBS for Nausea and Vomiting  Pt is a 82 yo female who presents to [facility] c/o N/V and poor PO intake for several days  PMHx:  Allergies       Anxiety      Breast cancer ([facility] 1993     s/p lumpectomy    Diabetes mellitus ([facility]       diet-controlled    Hx-TIA (transient ischemic attack)      Hypertension      Hyponatremia      Melanoma ([facility] 2008     s/p resection, right cheek    Tremor                        RUR Score:    N/A pt is OBS, Moderate Risk of Readmission/Yellow              PCP: First and Last name:   Za Traore NP     Name of Practice:    Are you a current patient: Yes/No: yes    Approximate date of last visit: > 30 days    Can you participate in a virtual visit if needed: NO    Do you (patient/family) have any concerns for transition/discharge?      Pt's granddaughter was living w/ her and assisting w/ ADLS, per daughter Bonnie Dennison granddaughter is unable to continue                Plan for utilizing home health:   PT,OT iva completed, recommending HH w/ 24/7 supervision vs SNF  DME: RW, Rollator, cane  Rx; [facility], pt uses [facility] in [place] or [facility] [address] and is usually covered for her medications  COVID Vax Hx: pt had 2 jabs in 2021, recent booster at [address] 9/2022    Current Advanced Directive/Advance Care Plan:  Full Code  HCDM FERMINA donald Thomas [phone]    Healthcare Decision Maker:   Click here to complete [address] including selection of the Healthcare Decision Maker Relationship (ie \"Primary\")            Primary Decision MakeJasmin Hart - Daughter - [phone]    Transition of Care Plan:        RUR N/A pt is OBS, Moderate Risk of Readmission/Yellow  LOS 2 Days  Hospital admission for medical management   GI consult, EDG today  PT, OT iva completed recommending HH w/ 24/7 supervision vs Rehab  Nutrition consult  CM to follow through for treatment/response  [employer] emailed to assist daughter w/ Medicaid application  CM discussed SNF w/ Dtr Tavares Zhang, verbal consent/FOC letter completed referrals to [facility], sent in [facility] when stable to SNF  Outpatient follow up PCP, [place] from [place]  Transport TBD pending progress Family vs [address] [place] [address] Management Interventions  PCP Verified by CM:  Yes (Cynthia Correia)  Palliative Care Criteria Met (RRAT>21 & CHF Dx)?: No  Mode of Transport at Discharge: [facility] ([facility] vs Family)  Physical Therapy Consult: Yes  Occupational Therapy Consult: Yes  Support Systems: Child(mehnaz), Other Family Member(s) (At [facility] pt was living w/ granddaughter n pvt residence, requires assistance w/ ADLS, ambulatory w/ RW/Rollator, relies on family for transport)  Confirm Follow Up Transport: Family  The Plan for Transition of Care is Related to the Following Treatment Goals : Skilled nursing facility  The Patient and/or Patient Representative was Provided with a Choice of Provider and Agrees with the Discharge Plan?: Yes  Name of the Patient Representative Who was Provided with a Choice of Provider and Agrees with the Discharge Plan: VIET daughter Liborio Maldonado of Choice List was Provided with Basic Dialogue that Supports the Patient's Individualized Plan of Care/Goals, Treatment Preferences and Shares the Quality Data Associated with the Providers?: (S) Yes  Discharge Location  Patient Expects to be Discharged to[de-identified] Skilled nursing facility  Cintia Malin

## 2022-10-14 NOTE — PROGRESS NOTES
Problem: Mobility Impaired (Adult and Pediatric)  Goal: *Acute Goals and Plan of Care (Insert Text)  Description: FUNCTIONAL STATUS PRIOR TO ADMISSION: Some conflicting report of hx. Lived alone prior to recent illness. Used combination of RW, rollator, and SPC. HOME SUPPORT PRIOR TO ADMISSION: The patient lived with her grand daughter but did not typically require assist. Patient states she plans to discharge home with 24 hour supervision from her 39 y.o. grandson. Physical Therapy Goals  Initiated 10/13/2022  1. Patient will move from supine to sit and sit to supine  in bed with supervision/set-up within 7 day(s). 2.  Patient will transfer from bed to chair and chair to bed with supervision/set-up using the least restrictive device within 7 day(s). 3.  Patient will perform sit to stand with supervision/set-up within 7 day(s). 4.  Patient will ambulate with minimal assistance/contact guard assist for 100 feet with the least restrictive device within 7 day(s). 5.  Patient will ascend/descend 4 stairs with 1 handrail(s) with modified independence within 7 day(s). Note:   PHYSICAL THERAPY TREATMENT  Patient: Antoine Shi (47 y.o. female)  Date: 10/14/2022  Diagnosis: Nausea and vomiting [R11.2]  Failure to thrive in adult [R62.7] <principal problem not specified>  Procedure(s) (LRB):  ESOPHAGOGASTRODUODENOSCOPY (EGD) (N/A)  ESOPHAGOGASTRODUODENAL (EGD) BIOPSY (N/A) Day of Surgery  Precautions:    Chart, physical therapy assessment, plan of care and goals were reviewed. ASSESSMENT  Patient continues with skilled PT services. Pt supine to sit with min assist of 2. Pt min assist of 2 sit to stand. Pt stood with min assist of 2 hand held. Pt toot 3 side steps to [facility] with mod assist of 2. Pt is unsteady and struggles to advance right LE. Pt reports ongoing hip issues. Pt back to bed with min to mod assist of 2. Pt reports using RW at home. Will use RW next treatment. Pt progressing slowly. continue goals. PLAN :  Patient continues to benefit from skilled intervention to address the above impairments. Continue treatment per established plan of care. to address goals. Recommendation for discharge: (in order for the patient to meet his/her long term goals)  Therapy up to 5 days/week in SNF setting    This discharge recommendation:  Has been made in collaboration with the attending provider and/or case management    IF patient discharges home will need the following DME: rolling walker       SUBJECTIVE:   P    OBJECTIVE DATA SUMMARY:   Critical Behavior:  Neurologic State: Alert  Orientation Level: Oriented X4  Cognition: Follows commands  Safety/Judgement: Awareness of environment, Fall prevention, Home safety, Insight into deficits, Good awareness of safety precautions  Functional Mobility Training:  Bed Mobility:     Supine to Sit: Minimum assistance; Moderate assistance              Transfers:  Sit to Stand: Minimum assistance;Assist x2  Stand to Sit: Minimum assistance;Assist x2              Balance:  Sitting: Intact  Standing: With support  Ambulation/Gait Training:  Distance (ft): 2 Feet (ft)  Assistive Device: Gait belt  Ambulation - Level of Assistance:  Moderate assistance;Assist x2        Gait Abnormalities: Decreased step clearance        Base of Support: Narrowed            Activity Tolerance:   Fair    After treatment patient left in no apparent distress:   Supine in bed    COMMUNICATION/COLLABORATION:   The patients plan of care was discussed with: Physical therapist.     Raimundo King PTA   Time Calculation: 24 mins

## 2022-10-14 NOTE — PROGRESS NOTES
Problem: Falls - Risk of  Goal: *Absence of Falls  Description: Document Vernia Colder Fall Risk and appropriate interventions in the flowsheet. Outcome: Progressing Towards Goal  Note: Fall Risk Interventions:       Mentation Interventions: Toileting rounds, Increase mobility, Eyeglasses and hearing aids, Adequate sleep, hydration, pain control    Medication Interventions: Patient to call before getting OOB, Teach patient to arise slowly    Elimination Interventions: Bed/chair exit alarm, Call light in reach, Toileting schedule/hourly rounds    History of Falls Interventions: Bed/chair exit alarm         Problem: Patient Education: Go to Patient Education Activity  Goal: Patient/Family Education  Outcome: Progressing Towards Goal     Problem: Patient Education: Go to Patient Education Activity  Goal: Patient/Family Education  Outcome: Progressing Towards Goal     Problem: Patient Education: Go to Patient Education Activity  Goal: Patient/Family Education  Outcome: Progressing Towards Goal     Problem: Pressure Injury - Risk of  Goal: *Prevention of pressure injury  Description: Document Dutch Scale and appropriate interventions in the flowsheet. Outcome: Progressing Towards Goal  Note: Pressure Injury Interventions: Activity Interventions: Pressure redistribution bed/mattress(bed type)    Mobility Interventions: HOB 30 degrees or less, Float heels, Turn and reposition approx. every two hours(pillow and wedges), Pressure redistribution bed/mattress (bed type)    Nutrition Interventions: Document food/fluid/supplement intake, Offer support with meals,snacks and hydration    Friction and Shear Interventions: Apply protective barrier, creams and emollients, HOB 30 degrees or less, Lift sheet, Lift team/patient mobility team                Problem: Patient Education: Go to Patient Education Activity  Goal: Patient/Family Education  Outcome: Progressing Towards Goal   Remains A&OX4 with some forgetfulness. Respirations even and unlabored on room air. Denies pain. Medicated with PRN pain med per MD order. IVFs infusing without complications. Incision to right hip area with edges approximated. No redness, edema or drainage. Tolerating clear liquids. Indwelling canales catheter patent and intact draining clear straw colored urine. OOB with assistance and walker with therapy. Bed alarm set on exiting mode for safety. Nsg will continue to assess.

## 2022-10-14 NOTE — ANESTHESIA POSTPROCEDURE EVALUATION
Procedure(s):  ESOPHAGOGASTRODUODENOSCOPY (EGD)  ESOPHAGOGASTRODUODENAL (EGD) BIOPSY.     MAC    Anesthesia Post Evaluation      Multimodal analgesia: multimodal analgesia not used between 6 hours prior to anesthesia start to PACU discharge  Patient location during evaluation: PACU  Patient participation: complete - patient participated  Level of consciousness: awake  Pain management: adequate  Airway patency: patent  Anesthetic complications: no  Cardiovascular status: acceptable, blood pressure returned to baseline and hemodynamically stable  Respiratory status: acceptable  Hydration status: acceptable  Post anesthesia nausea and vomiting:  controlled  Final Post Anesthesia Temperature Assessment:  Normothermia (36.0-37.5 degrees C)      INITIAL Post-op Vital signs:   Vitals Value Taken Time   /75 10/14/22 1232   Temp 36.7 °C (98 °F) 10/14/22 1232   Pulse 80 10/14/22 1232   Resp 16 10/14/22 1232   SpO2 97 % 10/14/22 1232

## 2022-10-14 NOTE — PERIOP NOTES
[facility] Call  1935  [id number]    Situation:  Verbal report received from: Mary Kate Alfaro RN  Procedure: Procedure(s):  ESOPHAGOGASTRODUODENOSCOPY (EGD)  ESOPHAGOGASTRODUODENAL (EGD) BIOPSY    Background:    Preoperative diagnosis: dysphagia/nausea  Postoperative diagnosis: Gastritis stricture    :  Dr. Devika Manjarrez  Assistant(s): Endoscopy Technician-1: Jluis Higuera  Endoscopy RN-1: Yazmin Perez RN    Specimens:   ID Type Source Tests Collected by Time Destination   1 : bx Preservative Gastric  Cherylene Biles, MD 10/14/2022 2890 Pathology     H. Pylori  no    Assessment:    Anesthesia gave intra-procedure sedation and medications, see anesthesia flow sheet yes    Intravenous fluids: NS@ KVO     Vital signs stable     Abdominal assessment: round and soft     Recommendation:  Return to floor [phone]

## 2022-10-14 NOTE — PROGRESS NOTES
Comprehensive Nutrition Assessment    Type and Reason for Visit: Initial    Nutrition Recommendations/Plan:   Continue pureed diet per GI - advance as able to regular, GI bland  Provide Glucerna once daily to aid in meeting kcal/protein needs (220 kcal, 26 g carbs, 10 g protein) - chocolate  Please obtain measured weight      Malnutrition Assessment:  Malnutrition Status: Moderate malnutrition (10/14/22 1234)    Context:  Acute illness     Findings of the 6 clinical characteristics of malnutrition:   Energy Intake:  No significant decrease in energy intake  Weight Loss:  No significant weight loss     Body Fat Loss:  Mild body fat loss, Buccal region, Orbital   Muscle Mass Loss: Moderate muscle mass loss, Clavicles (pectoralis & deltoids), Hand (interosseous)  Fluid Accumulation:  No significant fluid accumulation,     Strength:  Not performed     Nutrition Assessment:     Pt is an 81year old female admitted with Nausea and vomiting [R11.2]. She  has a past medical history of Allergies, Anxiety, Breast cancer ([facility] (1993), Diabetes mellitus ([facility] TIA (transient ischemic attack), Hypertension, Hyponatremia, Melanoma ([facility] (2008), and Tremor. RD screen for low BMI. Patient reports # but is unable to state when she last weighed this amount. Per chart review, it appears she has not weighed >95# in about three years. No clinically significant weight loss noted in last two years. Denies problems chewing but c/o constant nausea and pain with swallowing. NKFA. Reports drinking Glucerna at home but states she has no appetite at this time. Voiced acceptance of ONS once daily. Could not verify food allergies with RD - states she 'wasn't sure'. Pureed diet ordered by GI after EGD this morning. Provided meal preferences.      Wt Readings from Last 10 Encounters:   10/12/22 41.3 kg (91 lb)   04/19/22 42.3 kg (93 lb 3.2 oz)   08/18/21 41.5 kg (91 lb 9.6 oz)   06/01/21 42.4 kg (93 lb 6.4 oz)   05/04/21 42.6 kg (94 lb)   02/08/21 42.6 kg (94 lb)   11/03/20 41.7 kg (92 lb)   01/21/20 54.9 kg (121 lb)   11/01/19 55.2 kg (121 lb 11.2 oz)   08/29/19 49 kg (108 lb)         Nutrition Related Findings:      Wound Type: None  Last Bowel Movement Date: 10/13/22  Abdominal Assessment: Soft  Bowel Sounds: Active   Edema:No data recorded    Nutr. Labs:  Lab Results   Component Value Date/Time    GFR est AA >60 05/04/2021 02:36 AM    GFR est non-AA >60 05/04/2021 02:36 AM    Creatinine (POC) 0.8 03/12/2013 12:38 PM    Creatinine 0.55 10/14/2022 01:49 AM    BUN 8 10/14/2022 01:49 AM    BUN (POC) 23 (H) 03/12/2013 12:38 PM    Sodium (POC) 133 (L) 03/12/2013 12:38 PM    Sodium 137 10/14/2022 01:49 AM    Potassium 3.7 10/14/2022 01:49 AM    Potassium (POC) 4.1 03/12/2013 12:38 PM    Chloride (POC) 103 03/12/2013 12:38 PM    Chloride 105 10/14/2022 01:49 AM    CO2 23 10/14/2022 01:49 AM       Lab Results   Component Value Date/Time    Glucose 97 10/14/2022 01:49 AM    Glucose (POC) 128 (H) 11/02/2019 11:27 AM       Lab Results   Component Value Date/Time    Hemoglobin A1c 5.7 07/20/2018 02:02 AM       Nutr. Meds:  Norvasc, colace, zofran PRN, miralax PRN, compazine    Current Nutrition Intake & Therapies:  Average Meal Intake: Unable to assess  Average Supplement Intake: None ordered  ADULT DIET Dysphagia - Pureed  ADULT ORAL NUTRITION SUPPLEMENT Lunch; Diabetic Supplement    Anthropometric Measures:  Height: 5' 1\" (154.9 cm)  Ideal Body Weight (IBW): 105 lbs (48 kg)     Current Body Wt:  41.3 kg (91 lb), 86.7 % IBW.     Current BMI (kg/m2): 17.2  Usual Body Weight: 56.2 kg (124 lb) (question accuracy)  % Weight Change (Calculated): -26.6  Weight Adjustment: No adjustment                 BMI Category: Underweight (BMI less than 22) age over 72    Estimated Daily Nutrient Needs:  Energy Requirements Based On: Kcal/kg  Weight Used for Energy Requirements: Current  Energy (kcal/day): 9554-0891  Weight Used for Protein Requirements: Current  Protein (g/day): 50 (1.2 g/kg)  Method Used for Fluid Requirements: 1 ml/kcal  Fluid (ml/day): 7342-0706    Nutrition Diagnosis:   Moderate malnutrition related to inadequate protein-energy intake, swallowing difficulty as evidenced by nausea, Criteria as identified in malnutrition assessment, BMI    Nutrition Interventions:   Food and/or Nutrient Delivery: Continue current diet, Start oral nutrition supplement  Nutrition Education/Counseling: No recommendations at this time  Coordination of Nutrition Care: Continue to monitor while inpatient, Interdisciplinary rounds  Plan of Care discussed with: IDR team    Goals:     Goals: by next RD assessment, PO intake 50% or greater       Nutrition Monitoring and Evaluation:   Behavioral-Environmental Outcomes: None identified  Food/Nutrient Intake Outcomes: Food and nutrient intake, Diet advancement/tolerance, Supplement intake  Physical Signs/Symptoms Outcomes: Biochemical data, Weight, Meal time behavior, GI status    Discharge Planning:     Too soon to determine    Paulo Willard MS, RD  Contact: Ext: [phone], or via [employer]

## 2022-10-14 NOTE — PROGRESS NOTES
Cody Osorio [address]  [address] [facility], [address] [place] [address]  [phone]        Hospitalist Progress Note      NAME: Randolph Lugo   :  1935  MRM:  [id number]    Date/Time: 10/14/2022  3:48 PM         Subjective:     Chief Complaint: \"I'm okay\"     Pt seen and examined. No complaints. Tolerated EGD. ROS:  (bold if positive, if negative)    Nausea      Objective:       Vitals:          Last 24hrs VS reviewed since prior progress note. Most recent are:    Visit Vitals  BP (!) 149/75 (BP 1 Location: Right upper arm, BP Patient Position: At rest)   Pulse 77   Temp 97.9 °F (36.6 °C)   Resp 16   Ht 5' 1\" (1.549 m)   Wt 41.3 kg (91 lb)   SpO2 94%   BMI 17.19 kg/m²     SpO2 Readings from Last 6 Encounters:   10/14/22 94%   22 97%   22 99%   21 98%   21 97%   21 98%          Intake/Output Summary (Last 24 hours) at 10/14/2022 1747  Last data filed at 10/14/2022 0954  Gross per 24 hour   Intake 821.25 ml   Output 2350 ml   Net -1528.75 ml          Exam:     Physical Exam:    Gen: Thin female.    HEENT:  Pink conjunctivae, PERRL, hearing intact to voice, moist mucous membranes  Neck:  Supple, without masses, thyroid non-tender  Resp:  No accessory muscle use, clear breath sounds without wheezes rales or rhonchi  Card:  No murmurs, normal S1, S2 without thrills, bruits or peripheral edema  Abd:  Soft, non-tender, non-distended, normoactive bowel sounds are present  Musc:  No cyanosis or clubbing  Skin:  No rashes or ulcers, skin turgor is good  Neuro:  Cranial nerves 3-12 are grossly intact,  strength is 5/5 bilaterally and dorsi / plantarflexion is 5/5 bilaterally, follows commands appropriately  Psych:  Good insight, oriented to person, place and time, alert  Small blanchable erythema on her buttock     Medications Reviewed: (see below)    Lab Data Reviewed: (see below)    ______________________________________________________________________    Medications:     Current Facility-Administered Medications   Medication Dose Route Frequency    amLODIPine (NORVASC) tablet 5 mg  5 mg Oral DAILY    prochlorperazine (COMPAZINE) injection 5 mg  5 mg IntraVENous Q6H PRN    pantoprazole (PROTONIX) 40 mg in 0.9% sodium chloride 10 mL injection  40 mg IntraVENous Q12H    enoxaparin (LOVENOX) injection 30 mg  30 mg SubCUTAneous DAILY    docusate sodium (COLACE) capsule 100 mg  100 mg Oral BID    morphine injection 1 mg  1 mg IntraVENous Q4H PRN    diazePAM (VALIUM) tablet 2.5 mg  2.5 mg Oral BID    atenoloL (TENORMIN) tablet 25 mg  25 mg Oral DAILY    acetaminophen (TYLENOL) solution 650 mg  650 mg Oral Q4H PRN    hydrALAZINE (APRESOLINE) 20 mg/mL injection 10 mg  10 mg IntraVENous Q6H PRN    sodium chloride (NS) flush 5-40 mL  5-40 mL IntraVENous Q8H    sodium chloride (NS) flush 5-40 mL  5-40 mL IntraVENous PRN    acetaminophen (TYLENOL) tablet 650 mg  650 mg Oral Q6H PRN    Or    acetaminophen (TYLENOL) suppository 650 mg  650 mg Rectal Q6H PRN    polyethylene glycol (MIRALAX) packet 17 g  17 g Oral DAILY PRN    0.9% sodium chloride infusion  75 mL/hr IntraVENous CONTINUOUS    ondansetron (ZOFRAN) injection 4 mg  4 mg IntraVENous Q6H PRN    [Held by provider] clopidogreL (PLAVIX) tablet 75 mg  75 mg Oral DAILY            Lab Review:     Recent Labs     10/14/22  0149 10/13/22  0227 10/12/22  1626   WBC 6.0 7.2 7.8   HGB 10.1* 10.3* 11.7   HCT 32.3* 32.9* 37.4    408* 428*     Recent Labs     10/14/22  0149 10/13/22  0227 10/12/22  1626    135* 130*   K 3.7 3.6 4.5    103 98   CO2 23 26 24   GLU 97 93 118*   BUN 8 13 15   CREA 0.55 0.67 0.73   CA 8.7 8.8 9.9   MG 1.8  --  2.2   ALB  --   --  3.4*   ALT  --   --  19     No components found for: [place]         Assessment / Plan:   Weight loss (11/3/2020) - likely 2/2 stricture, gastritis   -IV PPI BID   -encourage supplemental protein shakes  -IVF's   -plan for EGD on Monday  -consider appetite stimulant if persists after dilation      Nausea and vomiting (10/12/2022)  -see above  -CT without acute process other than constipation   -lipase WNL   -zofran PRN     Gastritis   -on IV PPI BID      Adult failure to thrive (10/12/2022)  -nutrition eval appreciated   -consider appetite stimulant        Urine retention (10/12/2022)  -canales in place   -voiding trial prior to d/c        Closed compression fracture of L3 lumbar vertebra, initial encounter ([facility] (10/12/2022)   Is completely asymptomatic and has no neurologic deficits in the lower extremities.   -no pain   -monitor with ambulation     HTN   -on atenolol and amlodipine   -increase amlodipine as needed    TIA   -resume plavix as able     Total time spent with patient: [address] discussed with: Patient and Family    Discussed:  Care Plan    Prophylaxis: Lovenox     Disposition:   SNF   ___________________________________________________    Attending Physician: Yo Canela MD

## 2022-10-14 NOTE — PROGRESS NOTES
Medicare Outpatient Observation Notice (MOON)/ [place] Outpatient Observation Notice (Lilliam Gastelum) provided to patient/representative with verbal explanation of the notice. Time allotted for questions regarding the notice. Patient /representative provided a completed copy of the MOON/VOON notice. Copy placed on bedside chart.   Tana Green CMS

## 2022-10-14 NOTE — PERIOP NOTES
TRANSFER - OUT REPORT:    Verbal report given to [facility] on Elfredia Pal  being transferred to 5th floor [phone] for routine progression of care       Report consisted of patients Situation, Background, Assessment and   Recommendations(SBAR). Information from the following report(s) Procedure Summary was reviewed with the receiving nurse. Lines:   Peripheral IV 10/14/22 Posterior;Right Hand (Active)   Site Assessment Clean, dry, & intact 10/14/22 0837   Phlebitis Assessment 0 10/14/22 0837   Infiltration Assessment 0 10/14/22 0837   Dressing Status Clean, dry, & intact 10/14/22 0837   Dressing Type Tape;Transparent 10/14/22 0837   Hub Color/Line Status Pink; Infusing 10/14/22 0837   Action Taken Open ports on tubing capped 10/14/22 0837   Alcohol Cap Used Yes 10/14/22 8586        Opportunity for questions and clarification was provided.       Patient transported with:   [facility]

## 2022-10-14 NOTE — PROCEDURES
Pio Cevallos M.D.  [phone]           10/14/2022                EGD Operative Report  Sachin Mitchell  :  1935  Isai Reza Medical Record Number:  [id number]      Indication:  Abdominal pain, epigastric, Dysphagia/odynophagia, Vomiting, persitent of unclear etilogy     : Neil King MD    Referring Provider:  Forest Horowitz NP      Anesthesia/Sedation:  MAC anesthesia    Airway assessment: No airway problems anticipated    Pre-Procedural Exam:      Airway: clear, no airway problems anticipated  Heart: RRR, without gallops or rubs  Lungs: clear bilaterally without wheezes, crackles, or rhonchi  Abdomen: soft, nontender, nondistended, bowel sounds present  Mental Status: awake, alert and oriented to person, place and time       Procedure Details     After infomed consent was obtained for the procedure, with all risks and benefits of procedure explained the patient was taken to the endoscopy suite and placed in the left lateral decubitus position. Following sequential administration of sedation as per above, the endoscope was inserted into the mouth and advanced under direct vision to second portion of the duodenum. A careful inspection was made as the gastroscope was withdrawn, including a retroflexed view of the proximal stomach; findings and interventions are described below. Findings:   Esophagus:Mucosa within normal through the esophagus. Mild erythema and stricture noted in the distal esophagus just proximal to the GE-junction. The upper endoscope passed with gentle maneuvering after initial resistance felt. Made mild dilation with its passage. Stomach: Diffuse erythema in the body of the stomach with few heme spots. No ulcers or lesions seen. Retroflexed view showed no abnormality. Duodenum/jejunum: normal    Therapies:  Balloon dilation was not performed as patient took Plavix yesterday.     Specimens: Stomach biopsies           Complications:   None; patient tolerated the procedure well. EBL:  None. Impression:        Mild diffuse gastritis                               Esophageal stricture, mildly dilated with scope passage    Recommendations:    -Acid suppression with a proton pump inhibitor.  -Await pathology.  -Continue to hold Plavix, will need EGD with dilation in 5 days. This can be done as outpatient if she is better and ready for discharge home this weekend, otherwise we can plan for repeat EGD on Monday.   -Remain on soft diet    Scout Leach MD

## 2022-10-14 NOTE — ANESTHESIA PREPROCEDURE EVALUATION
Relevant Problems   CARDIOVASCULAR   (+) Essential hypertension      GASTROINTESTINAL   (+) Gastroesophageal reflux disease with esophagitis without hemorrhage       Anesthetic History     PONV          Review of Systems / Medical History  Patient summary reviewed and pertinent labs reviewed    Pulmonary  Within defined limits                 Neuro/Psych         TIA    Comments: Multiple TIAs Cardiovascular    Hypertension                   GI/Hepatic/Renal     GERD          Comments: 80 yo with failure to thrive, nausea and feeling of food getting stuck in esophagus Endo/Other    Diabetes         Other Findings            Physical Exam    Airway  Mallampati: III  TM Distance: < 4 cm  Neck ROM: normal range of motion   Mouth opening: Normal     Cardiovascular    Rhythm: regular  Rate: normal         Dental  No notable dental hx       Pulmonary  Breath sounds clear to auscultation               Abdominal  GI exam deferred       Other Findings            Anesthetic Plan    ASA: 3  Anesthesia type: MAC  preop zofran        Induction: Intravenous  Anesthetic plan and risks discussed with: Patient

## 2022-10-14 NOTE — PROGRESS NOTES
Bedside and Verbal shift change report given to Shamir Iverson RN (oncoming nurse) by Marycarmen Moseley RN (offgoing nurse). Report included the following information SBAR, Kardex, ED Summary, Intake/Output, MAR, and Accordion.

## 2022-10-15 PROCEDURE — 74011000250 HC RX REV CODE- 250: Performed by: HOSPITALIST

## 2022-10-15 PROCEDURE — 74011000250 HC RX REV CODE- 250: Performed by: INTERNAL MEDICINE

## 2022-10-15 PROCEDURE — 74011250637 HC RX REV CODE- 250/637: Performed by: HOSPITALIST

## 2022-10-15 PROCEDURE — 74011250636 HC RX REV CODE- 250/636: Performed by: HOSPITALIST

## 2022-10-15 PROCEDURE — 74011250637 HC RX REV CODE- 250/637: Performed by: INTERNAL MEDICINE

## 2022-10-15 PROCEDURE — 65270000029 HC RM PRIVATE

## 2022-10-15 PROCEDURE — 74011250636 HC RX REV CODE- 250/636: Performed by: INTERNAL MEDICINE

## 2022-10-15 PROCEDURE — C9113 INJ PANTOPRAZOLE SODIUM, VIA: HCPCS | Performed by: INTERNAL MEDICINE

## 2022-10-15 PROCEDURE — 77030011256 HC DRSG MEPILEX <16IN NO BORD MOLN -A

## 2022-10-15 PROCEDURE — 2709999900 HC NON-CHARGEABLE SUPPLY

## 2022-10-15 RX ORDER — LANOLIN ALCOHOL/MO/W.PET/CERES
3 CREAM (GRAM) TOPICAL
Status: DISCONTINUED | OUTPATIENT
Start: 2022-10-15 | End: 2022-10-18 | Stop reason: HOSPADM

## 2022-10-15 RX ORDER — DICYCLOMINE HYDROCHLORIDE 10 MG/1
10 CAPSULE ORAL 4 TIMES DAILY
Status: COMPLETED | OUTPATIENT
Start: 2022-10-15 | End: 2022-10-16

## 2022-10-15 RX ORDER — AMLODIPINE BESYLATE 5 MG/1
10 TABLET ORAL DAILY
Status: DISCONTINUED | OUTPATIENT
Start: 2022-10-16 | End: 2022-10-18 | Stop reason: HOSPADM

## 2022-10-15 RX ORDER — POLYETHYLENE GLYCOL 3350 17 G/17G
17 POWDER, FOR SOLUTION ORAL DAILY
Status: DISCONTINUED | OUTPATIENT
Start: 2022-10-15 | End: 2022-10-15

## 2022-10-15 RX ORDER — AMLODIPINE BESYLATE 5 MG/1
5 TABLET ORAL ONCE
Status: COMPLETED | OUTPATIENT
Start: 2022-10-15 | End: 2022-10-15

## 2022-10-15 RX ORDER — DICYCLOMINE HYDROCHLORIDE 10 MG/1
10 CAPSULE ORAL 4 TIMES DAILY
Status: DISCONTINUED | OUTPATIENT
Start: 2022-10-15 | End: 2022-10-15

## 2022-10-15 RX ADMIN — SODIUM CHLORIDE 40 MG: 9 INJECTION INTRAMUSCULAR; INTRAVENOUS; SUBCUTANEOUS at 08:26

## 2022-10-15 RX ADMIN — Medication 10 ML: at 06:00

## 2022-10-15 RX ADMIN — ONDANSETRON HYDROCHLORIDE 4 MG: 2 SOLUTION INTRAMUSCULAR; INTRAVENOUS at 11:18

## 2022-10-15 RX ADMIN — ENOXAPARIN SODIUM 30 MG: 100 INJECTION SUBCUTANEOUS at 08:26

## 2022-10-15 RX ADMIN — Medication 5 ML: at 21:27

## 2022-10-15 RX ADMIN — ACETAMINOPHEN 650 MG: 325 TABLET, FILM COATED ORAL at 21:50

## 2022-10-15 RX ADMIN — AMLODIPINE BESYLATE 5 MG: 5 TABLET ORAL at 15:14

## 2022-10-15 RX ADMIN — MORPHINE SULFATE 1 MG: 2 INJECTION, SOLUTION INTRAMUSCULAR; INTRAVENOUS at 15:15

## 2022-10-15 RX ADMIN — DICYCLOMINE HYDROCHLORIDE 10 MG: 10 CAPSULE ORAL at 17:41

## 2022-10-15 RX ADMIN — DICYCLOMINE HYDROCHLORIDE 10 MG: 10 CAPSULE ORAL at 21:27

## 2022-10-15 RX ADMIN — Medication 10 ML: at 13:32

## 2022-10-15 RX ADMIN — SODIUM CHLORIDE 40 MG: 9 INJECTION INTRAMUSCULAR; INTRAVENOUS; SUBCUTANEOUS at 20:51

## 2022-10-15 RX ADMIN — DICYCLOMINE HYDROCHLORIDE 10 MG: 10 CAPSULE ORAL at 15:14

## 2022-10-15 RX ADMIN — AMLODIPINE BESYLATE 5 MG: 5 TABLET ORAL at 08:29

## 2022-10-15 RX ADMIN — ATENOLOL 25 MG: 50 TABLET ORAL at 08:29

## 2022-10-15 RX ADMIN — DOCUSATE SODIUM 100 MG: 100 CAPSULE, LIQUID FILLED ORAL at 08:28

## 2022-10-15 RX ADMIN — DIAZEPAM 2.5 MG: 5 TABLET ORAL at 17:41

## 2022-10-15 RX ADMIN — Medication 3 MG: at 21:48

## 2022-10-15 RX ADMIN — SODIUM CHLORIDE 50 ML/HR: 9 INJECTION, SOLUTION INTRAVENOUS at 15:22

## 2022-10-15 RX ADMIN — DIAZEPAM 2.5 MG: 5 TABLET ORAL at 08:28

## 2022-10-15 NOTE — PROGRESS NOTES
Problem: Falls - Risk of  Goal: *Absence of Falls  Description: Document Sophy Nap Fall Risk and appropriate interventions in the flowsheet.   Outcome: Progressing Towards Goal  Note: Fall Risk Interventions:  Mobility Interventions: Communicate number of staff needed for ambulation/transfer, Bed/chair exit alarm, Patient to call before getting OOB    Mentation Interventions: Family/sitter at bedside, More frequent rounding    Medication Interventions: Patient to call before getting OOB, Teach patient to arise slowly    Elimination Interventions: Call light in reach, Bed/chair exit alarm, Patient to call for help with toileting needs    History of Falls Interventions: Bed/chair exit alarm, Consult care management for discharge planning, Evaluate medications/consider consulting pharmacy, Investigate reason for fall       Problem: Nutrition Deficit  Goal: *Optimize nutritional status  Outcome: Progressing Towards Goal

## 2022-10-15 NOTE — PROGRESS NOTES
Bedside and Verbal shift change report given to Nasra Marshall (oncoming nurse) by Simeon (offgoing nurse). Report included the following information SBAR, Kardex, Intake/Output, and MAR.

## 2022-10-15 NOTE — PROGRESS NOTES
Cody Osorio angel Cody 79  [address] [facility], [address] [place] [address]  [phone]        Hospitalist Progress Note      NAME: Gilbert Crenshaw   :  1935  MRM:  [id number]    Date/Time: 10/15/2022  3:48 PM         Subjective:     Chief Complaint: \"I'm okay\"     Pt seen and examined. This AM had no complaints. Per RN later in the day having some ab cramps    ROS:  (bold if positive, if negative)    Nausea      Objective:       Vitals:          Last 24hrs VS reviewed since prior progress note. Most recent are:    Visit Vitals  BP (!) 156/81 (BP 1 Location: Right upper arm, BP Patient Position: At rest)   Pulse 78   Temp 98.3 °F (36.8 °C)   Resp 18   Ht 5' 1\" (1.549 m)   Wt 41.3 kg (91 lb)   SpO2 96%   BMI 17.19 kg/m²     SpO2 Readings from Last 6 Encounters:   10/15/22 96%   22 97%   22 99%   21 98%   21 97%   21 98%          Intake/Output Summary (Last 24 hours) at 10/15/2022 1357  Last data filed at 10/15/2022 0930  Gross per 24 hour   Intake 400 ml   Output 900 ml   Net -500 ml          Exam:     Physical Exam:    Gen: Thin female.    HEENT:  Pink conjunctivae, PERRL, hearing intact to voice, moist mucous membranes  Neck:  Supple, without masses, thyroid non-tender  Resp:  No accessory muscle use, clear breath sounds without wheezes rales or rhonchi  Card:  No murmurs, normal S1, S2 without thrills, bruits or peripheral edema  Abd:  Soft, non-tender, non-distended, normoactive bowel sounds are present  Musc:  No cyanosis or clubbing  Skin:  No rashes or ulcers, skin turgor is good  Neuro:  Cranial nerves 3-12 are grossly intact,  strength is 5/5 bilaterally and dorsi / plantarflexion is 5/5 bilaterally, follows commands appropriately  Psych:  Good insight, oriented to person, place and time, alert  Small blanchable erythema on her buttock     Medications Reviewed: (see below)    Lab Data Reviewed: (see below)    ______________________________________________________________________    Medications:     Current Facility-Administered Medications   Medication Dose Route Frequency    dicyclomine (BENTYL) capsule 10 mg  10 mg Oral QID    amLODIPine (NORVASC) tablet 5 mg  5 mg Oral DAILY    prochlorperazine (COMPAZINE) injection 5 mg  5 mg IntraVENous Q6H PRN    pantoprazole (PROTONIX) 40 mg in 0.9% sodium chloride 10 mL injection  40 mg IntraVENous Q12H    enoxaparin (LOVENOX) injection 30 mg  30 mg SubCUTAneous DAILY    docusate sodium (COLACE) capsule 100 mg  100 mg Oral BID    morphine injection 1 mg  1 mg IntraVENous Q4H PRN    diazePAM (VALIUM) tablet 2.5 mg  2.5 mg Oral BID    atenoloL (TENORMIN) tablet 25 mg  25 mg Oral DAILY    acetaminophen (TYLENOL) solution 650 mg  650 mg Oral Q4H PRN    hydrALAZINE (APRESOLINE) 20 mg/mL injection 10 mg  10 mg IntraVENous Q6H PRN    sodium chloride (NS) flush 5-40 mL  5-40 mL IntraVENous Q8H    sodium chloride (NS) flush 5-40 mL  5-40 mL IntraVENous PRN    acetaminophen (TYLENOL) tablet 650 mg  650 mg Oral Q6H PRN    Or    acetaminophen (TYLENOL) suppository 650 mg  650 mg Rectal Q6H PRN    polyethylene glycol (MIRALAX) packet 17 g  17 g Oral DAILY PRN    0.9% sodium chloride infusion  50 mL/hr IntraVENous CONTINUOUS    ondansetron (ZOFRAN) injection 4 mg  4 mg IntraVENous Q6H PRN            Lab Review:     Recent Labs     10/14/22  0149 10/13/22  0227 10/12/22  1626   WBC 6.0 7.2 7.8   HGB 10.1* 10.3* 11.7   HCT 32.3* 32.9* 37.4    408* 428*     Recent Labs     10/14/22  0149 10/13/22  0227 10/12/22  1626    135* 130*   K 3.7 3.6 4.5    103 98   CO2 23 26 24   GLU 97 93 118*   BUN 8 13 15   CREA 0.55 0.67 0.73   CA 8.7 8.8 9.9   MG 1.8  --  2.2   ALB  --   --  3.4*   ALT  --   --  19     No components found for: Yoel [place]         Assessment / Plan:   Weight loss (11/3/2020) - likely 2/2 stricture, gastritis   -IV PPI BID   -encourage supplemental protein shakes  -IVF's   -plan for EGD on Monday  -consider appetite stimulant if persists after dilation      Nausea and vomiting (10/12/2022)  -see above  -CT without acute process other than constipation   -continue docusate  -lipase WNL   -zofran PRN     Gastritis   -on IV PPI BID      Adult failure to thrive (10/12/2022)  -nutrition eval appreciated   -consider appetite stimulant        Urine retention (10/12/2022)  -canales in place   -voiding trial prior to d/c        Closed compression fracture of L3 lumbar vertebra, initial encounter ([facility] [address] (10/12/2022)   Is completely asymptomatic and has no neurologic deficits in the lower extremities.   -no pain   -monitor with ambulation     HTN   -on atenolol and amlodipine   -increase amlodipine to 10mg     TIA   -resume plavix at discharge if stable     Total time spent with patient: 28 895 [facility] discussed with: Patient and Family    Discussed:  Care Plan    Prophylaxis: Lovenox     Disposition:   SNF   ___________________________________________________    Attending Physician: Austin Carr MD

## 2022-10-16 LAB
ANION GAP SERPL CALC-SCNC: 5 MMOL/L (ref 5–15)
BASOPHILS # BLD: 0 K/UL (ref 0–0.1)
BASOPHILS NFR BLD: 1 % (ref 0–1)
BUN SERPL-MCNC: 9 MG/DL (ref 6–20)
BUN/CREAT SERPL: 15 (ref 12–20)
CALCIUM SERPL-MCNC: 8.8 MG/DL (ref 8.5–10.1)
CHLORIDE SERPL-SCNC: 105 MMOL/L (ref 97–108)
CO2 SERPL-SCNC: 27 MMOL/L (ref 21–32)
CREAT SERPL-MCNC: 0.62 MG/DL (ref 0.55–1.02)
DIFFERENTIAL METHOD BLD: ABNORMAL
EOSINOPHIL # BLD: 0.2 K/UL (ref 0–0.4)
EOSINOPHIL NFR BLD: 3 % (ref 0–7)
ERYTHROCYTE [DISTWIDTH] IN BLOOD BY AUTOMATED COUNT: 14.2 % (ref 11.5–14.5)
GLUCOSE SERPL-MCNC: 105 MG/DL (ref 65–100)
HCT VFR BLD AUTO: 30.9 % (ref 35–47)
HGB BLD-MCNC: 9.9 G/DL (ref 11.5–16)
IMM GRANULOCYTES # BLD AUTO: 0 K/UL (ref 0–0.04)
IMM GRANULOCYTES NFR BLD AUTO: 0 % (ref 0–0.5)
LYMPHOCYTES # BLD: 2.4 K/UL (ref 0.8–3.5)
LYMPHOCYTES NFR BLD: 39 % (ref 12–49)
MAGNESIUM SERPL-MCNC: 1.8 MG/DL (ref 1.6–2.4)
MCH RBC QN AUTO: 29.6 PG (ref 26–34)
MCHC RBC AUTO-ENTMCNC: 32 G/DL (ref 30–36.5)
MCV RBC AUTO: 92.2 FL (ref 80–99)
MONOCYTES # BLD: 0.5 K/UL (ref 0–1)
MONOCYTES NFR BLD: 8 % (ref 5–13)
NEUTS SEG # BLD: 2.9 K/UL (ref 1.8–8)
NEUTS SEG NFR BLD: 49 % (ref 32–75)
NRBC # BLD: 0 K/UL (ref 0–0.01)
NRBC BLD-RTO: 0 PER 100 WBC
PHOSPHATE SERPL-MCNC: 2.8 MG/DL (ref 2.6–4.7)
PLATELET # BLD AUTO: 336 K/UL (ref 150–400)
PMV BLD AUTO: 9.8 FL (ref 8.9–12.9)
POTASSIUM SERPL-SCNC: 3.5 MMOL/L (ref 3.5–5.1)
RBC # BLD AUTO: 3.35 M/UL (ref 3.8–5.2)
SODIUM SERPL-SCNC: 137 MMOL/L (ref 136–145)
WBC # BLD AUTO: 6 K/UL (ref 3.6–11)

## 2022-10-16 PROCEDURE — 74011250637 HC RX REV CODE- 250/637: Performed by: HOSPITALIST

## 2022-10-16 PROCEDURE — 74011250637 HC RX REV CODE- 250/637: Performed by: INTERNAL MEDICINE

## 2022-10-16 PROCEDURE — 77030038269 HC DRN EXT URIN PURWCK BARD -A

## 2022-10-16 PROCEDURE — 74011250636 HC RX REV CODE- 250/636: Performed by: INTERNAL MEDICINE

## 2022-10-16 PROCEDURE — 74011000250 HC RX REV CODE- 250: Performed by: HOSPITALIST

## 2022-10-16 PROCEDURE — 80048 BASIC METABOLIC PNL TOTAL CA: CPT

## 2022-10-16 PROCEDURE — 36415 COLL VENOUS BLD VENIPUNCTURE: CPT

## 2022-10-16 PROCEDURE — C9113 INJ PANTOPRAZOLE SODIUM, VIA: HCPCS | Performed by: INTERNAL MEDICINE

## 2022-10-16 PROCEDURE — 65270000029 HC RM PRIVATE

## 2022-10-16 PROCEDURE — 84100 ASSAY OF PHOSPHORUS: CPT

## 2022-10-16 PROCEDURE — 85025 COMPLETE CBC W/AUTO DIFF WBC: CPT

## 2022-10-16 PROCEDURE — 74011250636 HC RX REV CODE- 250/636: Performed by: HOSPITALIST

## 2022-10-16 PROCEDURE — 83735 ASSAY OF MAGNESIUM: CPT

## 2022-10-16 PROCEDURE — 74011000250 HC RX REV CODE- 250: Performed by: INTERNAL MEDICINE

## 2022-10-16 RX ADMIN — SODIUM CHLORIDE 40 MG: 9 INJECTION INTRAMUSCULAR; INTRAVENOUS; SUBCUTANEOUS at 20:35

## 2022-10-16 RX ADMIN — AMLODIPINE BESYLATE 10 MG: 5 TABLET ORAL at 09:11

## 2022-10-16 RX ADMIN — DICYCLOMINE HYDROCHLORIDE 10 MG: 10 CAPSULE ORAL at 08:58

## 2022-10-16 RX ADMIN — Medication 3 MG: at 20:34

## 2022-10-16 RX ADMIN — Medication 5 ML: at 21:26

## 2022-10-16 RX ADMIN — ATENOLOL 25 MG: 50 TABLET ORAL at 08:56

## 2022-10-16 RX ADMIN — Medication 10 ML: at 14:00

## 2022-10-16 RX ADMIN — DIAZEPAM 2.5 MG: 5 TABLET ORAL at 20:34

## 2022-10-16 RX ADMIN — SODIUM CHLORIDE 50 ML/HR: 9 INJECTION, SOLUTION INTRAVENOUS at 11:08

## 2022-10-16 RX ADMIN — ACETAMINOPHEN 650 MG: 325 TABLET, FILM COATED ORAL at 23:30

## 2022-10-16 RX ADMIN — DIAZEPAM 2.5 MG: 5 TABLET ORAL at 08:58

## 2022-10-16 RX ADMIN — ENOXAPARIN SODIUM 30 MG: 100 INJECTION SUBCUTANEOUS at 08:58

## 2022-10-16 RX ADMIN — ONDANSETRON HYDROCHLORIDE 4 MG: 2 SOLUTION INTRAMUSCULAR; INTRAVENOUS at 07:48

## 2022-10-16 RX ADMIN — SODIUM CHLORIDE 40 MG: 9 INJECTION INTRAMUSCULAR; INTRAVENOUS; SUBCUTANEOUS at 08:58

## 2022-10-16 RX ADMIN — Medication 5 ML: at 05:35

## 2022-10-16 NOTE — PROGRESS NOTES
Bedside and Verbal shift change report given to Tara Petit (oncoming nurse) by Valeri Johnston (offgoing nurse). Report included the following information SBAR, Kardex, Intake/Output, and MAR.

## 2022-10-16 NOTE — PROGRESS NOTES
C[facility]sorio [facility]  [address] [facility], [place], [address] [place] [address]  [phone]        Hospitalist Progress Note      NAME: Maria C Ardon   :  1935  MRM:  [id number]    Date/Time: 10/16/2022  3:48 PM         Subjective:     Chief Complaint: recheck FTT in adult     Resting comfortably with no complaints. Has canales cath in place. ROS:  (bold if positive, if negative)       Objective:       Vitals:          Last 24hrs VS reviewed since prior progress note.  Most recent are:    Visit Vitals  /65 (BP 1 Location: Left upper arm, BP Patient Position: At rest)   Pulse 80   Temp 98.4 °F (36.9 °C)   Resp 16   Ht 5' 1\" (1.549 m)   Wt 41.3 kg (91 lb)   SpO2 95%   BMI 17.19 kg/m²     SpO2 Readings from Last 6 Encounters:   10/16/22 95%   22 97%   22 99%   21 98%   21 97%   21 98%          Intake/Output Summary (Last 24 hours) at 10/16/2022 1739  Last data filed at 10/16/2022 1235  Gross per 24 hour   Intake 695 ml   Output 650 ml   Net 45 ml            Exam:     Visit Vitals  /65 (BP 1 Location: Left upper arm, BP Patient Position: At rest)   Pulse 80   Temp 98.4 °F (36.9 °C)   Resp 16   Ht 5' 1\" (1.549 m)   Wt 41.3 kg (91 lb)   SpO2 95%   BMI 17.19 kg/m²       Physical Exam:  General: alert, well appearing, and no distress  Head: Normocephalic, without obvious abnormality, atraumatic  Eyes: PERRL, EOMI, anicteric sclerae, and conjuntiva clear  ENT: lips, mucosa, and tongue normal  Neck: normal, supple, and no tenderness  Lungs: clear to auscultation with good breath sounds and normal respiratory effort  Heart: S1, S2 normal, regular rate, and regular rhythm  Abd: not distended, soft, nontender, BS present and normactive  Ext: no cyanosis and no edema  Skin: normal skin color, no rashes, and texture normal  Neuro:  alert, oriented x 3, no defects noted in general exam.  Psych: not anxious, cooperative, appropriate affect      Medications Reviewed: (see below)    Lab Data Reviewed: (see below)    ______________________________________________________________________    Medications:     Current Facility-Administered Medications   Medication Dose Route Frequency    amLODIPine (NORVASC) tablet 10 mg  10 mg Oral DAILY    melatonin tablet 3 mg  3 mg Oral QHS PRN    prochlorperazine (COMPAZINE) injection 5 mg  5 mg IntraVENous Q6H PRN    pantoprazole (PROTONIX) 40 mg in 0.9% sodium chloride 10 mL injection  40 mg IntraVENous Q12H    morphine injection 1 mg  1 mg IntraVENous Q4H PRN    diazePAM (VALIUM) tablet 2.5 mg  2.5 mg Oral BID    atenoloL (TENORMIN) tablet 25 mg  25 mg Oral DAILY    acetaminophen (TYLENOL) solution 650 mg  650 mg Oral Q4H PRN    hydrALAZINE (APRESOLINE) 20 mg/mL injection 10 mg  10 mg IntraVENous Q6H PRN    sodium chloride (NS) flush 5-40 mL  5-40 mL IntraVENous Q8H    sodium chloride (NS) flush 5-40 mL  5-40 mL IntraVENous PRN    acetaminophen (TYLENOL) tablet 650 mg  650 mg Oral Q6H PRN    Or    acetaminophen (TYLENOL) suppository 650 mg  650 mg Rectal Q6H PRN    polyethylene glycol (MIRALAX) packet 17 g  17 g Oral DAILY PRN    0.9% sodium chloride infusion  50 mL/hr IntraVENous CONTINUOUS    ondansetron (ZOFRAN) injection 4 mg  4 mg IntraVENous Q6H PRN            Lab Review:     Recent Labs     10/16/22  0329 10/14/22  0149   WBC 6.0 6.0   HGB 9.9* 10.1*   HCT 30.9* 32.3*    357       Recent Labs     10/16/22  0329 10/14/22  0149    137   K 3.5 3.7    105   CO2 27 23   * 97   BUN 9 8   CREA 0.62 0.55   CA 8.8 8.7   MG 1.8 1.8   PHOS 2.8  --        No components found for: Yoel Point         Assessment / Plan:   Weight loss (11/3/2020) - due to poor oral intake secondary to stricture, gastritis   -Continue IV PPI BID, IVFs, and supplements  -EGD and dilation tomorrow  -Agree with appetite stimulant if persists after dilation      Nausea and vomiting (10/12/2022)  -Continue antiemetics  -Improving    Gastritis   -Continue IV PPI BID      Adult failure to thrive (10/12/2022)  -nutrition eval appreciated   -consider appetite stimulant as mentioned     Urine retention (10/12/2022)  -canales in place   - Voiding trials    Closed compression fracture of L3 lumbar vertebra, initial encounter ([facility] [address] (10/12/2022)   Is completely asymptomatic and has no neurologic deficits in the lower extremities.   -no pain   -monitor with ambulation     HTN   -on atenolol and amlodipine   -increase amlodipine to 10mg     TIA   -resume plavix at discharge if stable   -plavix on hold for dilation    Total time spent with patient: 40 [address]95 [address] discussed with: Patient and Family    Discussed:  Care Plan    Prophylaxis: Lovenox     Disposition:   SNF   ___________________________________________________    Attending Physician: Collins Wheeler MD

## 2022-10-16 NOTE — PROGRESS NOTES
Bedside shift change report given to Simeon Turk (oncoming nurse) by Homa Lares (offgoing nurse). Report included the following information SBAR, Kardex, Intake/Output, MAR, and Recent Results.

## 2022-10-16 NOTE — PROGRESS NOTES
Problem: Falls - Risk of  Goal: *Absence of Falls  Description: Document Therese Marking Fall Risk and appropriate interventions in the flowsheet. Outcome: Progressing Towards Goal  Note: Fall Risk Interventions:  Mobility Interventions: Bed/chair exit alarm, Patient to call before getting OOB    Mentation Interventions: Adequate sleep, hydration, pain control, Bed/chair exit alarm    Medication Interventions: Bed/chair exit alarm, Patient to call before getting OOB, Teach patient to arise slowly    Elimination Interventions: Bed/chair exit alarm, Call light in reach, Patient to call for help with toileting needs    History of Falls Interventions: Bed/chair exit alarm         Problem: Pressure Injury - Risk of  Goal: *Prevention of pressure injury  Description: Document Dutch Scale and appropriate interventions in the flowsheet.   Outcome: Progressing Towards Goal  Note: Pressure Injury Interventions:  Sensory Interventions: Assess changes in LOC, Check visual cues for pain    Moisture Interventions: Absorbent underpads, Internal/External urinary devices, Limit adult briefs, Minimize layers    Activity Interventions: Assess need for specialty bed    Mobility Interventions: Assess need for specialty bed, HOB 30 degrees or less    Nutrition Interventions: Document food/fluid/supplement intake, Offer support with meals,snacks and hydration    Friction and Shear Interventions: HOB 30 degrees or less, Lift sheet, Minimize layers

## 2022-10-17 ENCOUNTER — ANESTHESIA EVENT (OUTPATIENT)
Dept: ENDOSCOPY | Age: 87
DRG: 392 | End: 2022-10-17
Payer: MEDICARE

## 2022-10-17 ENCOUNTER — ANESTHESIA (OUTPATIENT)
Dept: ENDOSCOPY | Age: 87
DRG: 392 | End: 2022-10-17
Payer: MEDICARE

## 2022-10-17 PROCEDURE — 74011250637 HC RX REV CODE- 250/637: Performed by: HOSPITALIST

## 2022-10-17 PROCEDURE — 74011000250 HC RX REV CODE- 250: Performed by: INTERNAL MEDICINE

## 2022-10-17 PROCEDURE — 74011250636 HC RX REV CODE- 250/636: Performed by: INTERNAL MEDICINE

## 2022-10-17 PROCEDURE — 74011000250 HC RX REV CODE- 250: Performed by: HOSPITALIST

## 2022-10-17 PROCEDURE — 74011250637 HC RX REV CODE- 250/637: Performed by: INTERNAL MEDICINE

## 2022-10-17 PROCEDURE — C1726 CATH, BAL DIL, NON-VASCULAR: HCPCS | Performed by: INTERNAL MEDICINE

## 2022-10-17 PROCEDURE — 74011250636 HC RX REV CODE- 250/636: Performed by: NURSE ANESTHETIST, CERTIFIED REGISTERED

## 2022-10-17 PROCEDURE — C9113 INJ PANTOPRAZOLE SODIUM, VIA: HCPCS | Performed by: INTERNAL MEDICINE

## 2022-10-17 PROCEDURE — 2709999900 HC NON-CHARGEABLE SUPPLY

## 2022-10-17 PROCEDURE — 74011000250 HC RX REV CODE- 250: Performed by: NURSE ANESTHETIST, CERTIFIED REGISTERED

## 2022-10-17 PROCEDURE — 76060000031 HC ANESTHESIA FIRST 0.5 HR: Performed by: INTERNAL MEDICINE

## 2022-10-17 PROCEDURE — 97535 SELF CARE MNGMENT TRAINING: CPT

## 2022-10-17 PROCEDURE — 74011000258 HC RX REV CODE- 258: Performed by: NURSE ANESTHETIST, CERTIFIED REGISTERED

## 2022-10-17 PROCEDURE — 2709999900 HC NON-CHARGEABLE SUPPLY: Performed by: INTERNAL MEDICINE

## 2022-10-17 PROCEDURE — 76040000019: Performed by: INTERNAL MEDICINE

## 2022-10-17 PROCEDURE — 74011250636 HC RX REV CODE- 250/636: Performed by: HOSPITALIST

## 2022-10-17 PROCEDURE — 0D738ZZ DILATION OF LOWER ESOPHAGUS, VIA NATURAL OR ARTIFICIAL OPENING ENDOSCOPIC: ICD-10-PCS | Performed by: INTERNAL MEDICINE

## 2022-10-17 PROCEDURE — 65270000029 HC RM PRIVATE

## 2022-10-17 RX ORDER — PANTOPRAZOLE SODIUM 40 MG/1
40 TABLET, DELAYED RELEASE ORAL
Status: DISCONTINUED | OUTPATIENT
Start: 2022-10-18 | End: 2022-10-18 | Stop reason: HOSPADM

## 2022-10-17 RX ORDER — CALCIUM CARBONATE 200(500)MG
200 TABLET,CHEWABLE ORAL
Status: DISCONTINUED | OUTPATIENT
Start: 2022-10-17 | End: 2022-10-18 | Stop reason: HOSPADM

## 2022-10-17 RX ORDER — SODIUM CHLORIDE 9 MG/ML
INJECTION, SOLUTION INTRAVENOUS
Status: DISCONTINUED | OUTPATIENT
Start: 2022-10-17 | End: 2022-10-17 | Stop reason: HOSPADM

## 2022-10-17 RX ORDER — SODIUM CHLORIDE 9 MG/ML
50 INJECTION, SOLUTION INTRAVENOUS CONTINUOUS
Status: DISPENSED | OUTPATIENT
Start: 2022-10-17 | End: 2022-10-17

## 2022-10-17 RX ORDER — NALOXONE HYDROCHLORIDE 0.4 MG/ML
0.4 INJECTION, SOLUTION INTRAMUSCULAR; INTRAVENOUS; SUBCUTANEOUS
Status: DISCONTINUED | OUTPATIENT
Start: 2022-10-17 | End: 2022-10-17 | Stop reason: HOSPADM

## 2022-10-17 RX ORDER — DEXTROMETHORPHAN/PSEUDOEPHED 2.5-7.5/.8
1.2 DROPS ORAL
Status: DISCONTINUED | OUTPATIENT
Start: 2022-10-17 | End: 2022-10-17 | Stop reason: HOSPADM

## 2022-10-17 RX ORDER — FLUMAZENIL 0.1 MG/ML
0.2 INJECTION INTRAVENOUS
Status: DISCONTINUED | OUTPATIENT
Start: 2022-10-17 | End: 2022-10-17 | Stop reason: HOSPADM

## 2022-10-17 RX ORDER — ATROPINE SULFATE 0.1 MG/ML
0.4 INJECTION INTRAVENOUS
Status: DISCONTINUED | OUTPATIENT
Start: 2022-10-17 | End: 2022-10-17 | Stop reason: HOSPADM

## 2022-10-17 RX ORDER — PROPOFOL 10 MG/ML
INJECTION, EMULSION INTRAVENOUS AS NEEDED
Status: DISCONTINUED | OUTPATIENT
Start: 2022-10-17 | End: 2022-10-17 | Stop reason: HOSPADM

## 2022-10-17 RX ORDER — EPINEPHRINE 0.1 MG/ML
1 INJECTION INTRACARDIAC; INTRAVENOUS
Status: DISCONTINUED | OUTPATIENT
Start: 2022-10-17 | End: 2022-10-17 | Stop reason: HOSPADM

## 2022-10-17 RX ORDER — LIDOCAINE HYDROCHLORIDE 20 MG/ML
INJECTION, SOLUTION EPIDURAL; INFILTRATION; INTRACAUDAL; PERINEURAL AS NEEDED
Status: DISCONTINUED | OUTPATIENT
Start: 2022-10-17 | End: 2022-10-17 | Stop reason: HOSPADM

## 2022-10-17 RX ORDER — MIDAZOLAM HYDROCHLORIDE 1 MG/ML
.25-5 INJECTION, SOLUTION INTRAMUSCULAR; INTRAVENOUS
Status: DISCONTINUED | OUTPATIENT
Start: 2022-10-17 | End: 2022-10-17 | Stop reason: HOSPADM

## 2022-10-17 RX ADMIN — Medication 5 ML: at 05:35

## 2022-10-17 RX ADMIN — ONDANSETRON HYDROCHLORIDE 4 MG: 2 SOLUTION INTRAMUSCULAR; INTRAVENOUS at 05:56

## 2022-10-17 RX ADMIN — ACETAMINOPHEN 650 MG: 325 TABLET, FILM COATED ORAL at 07:18

## 2022-10-17 RX ADMIN — ACETAMINOPHEN 650 MG: 325 TABLET, FILM COATED ORAL at 23:15

## 2022-10-17 RX ADMIN — LIDOCAINE HYDROCHLORIDE 100 MG: 20 INJECTION, SOLUTION EPIDURAL; INFILTRATION; INTRACAUDAL; PERINEURAL at 10:01

## 2022-10-17 RX ADMIN — ANTACID TABLETS 200 MG: 500 TABLET, CHEWABLE ORAL at 17:32

## 2022-10-17 RX ADMIN — ONDANSETRON HYDROCHLORIDE 4 MG: 2 SOLUTION INTRAMUSCULAR; INTRAVENOUS at 10:00

## 2022-10-17 RX ADMIN — Medication 3 MG: at 20:45

## 2022-10-17 RX ADMIN — PROPOFOL 100 MCG/KG/MIN: 10 INJECTION, EMULSION INTRAVENOUS at 10:01

## 2022-10-17 RX ADMIN — ATENOLOL 25 MG: 50 TABLET ORAL at 08:40

## 2022-10-17 RX ADMIN — AMLODIPINE BESYLATE 10 MG: 5 TABLET ORAL at 08:40

## 2022-10-17 RX ADMIN — PROPOFOL 40 MG: 10 INJECTION, EMULSION INTRAVENOUS at 10:07

## 2022-10-17 RX ADMIN — Medication 10 ML: at 13:47

## 2022-10-17 RX ADMIN — Medication 5 ML: at 23:42

## 2022-10-17 RX ADMIN — ANTACID TABLETS 200 MG: 500 TABLET, CHEWABLE ORAL at 23:15

## 2022-10-17 RX ADMIN — SODIUM CHLORIDE: 9 INJECTION, SOLUTION INTRAVENOUS at 09:45

## 2022-10-17 RX ADMIN — SODIUM CHLORIDE 40 MG: 9 INJECTION INTRAMUSCULAR; INTRAVENOUS; SUBCUTANEOUS at 08:42

## 2022-10-17 RX ADMIN — BENZOCAINE AND MENTHOL 1 LOZENGE: 15; 3.6 LOZENGE ORAL at 20:44

## 2022-10-17 RX ADMIN — PROPOFOL 60 MG: 10 INJECTION, EMULSION INTRAVENOUS at 10:02

## 2022-10-17 RX ADMIN — DIAZEPAM 2.5 MG: 5 TABLET ORAL at 20:45

## 2022-10-17 RX ADMIN — SODIUM CHLORIDE 50 ML/HR: 9 INJECTION, SOLUTION INTRAVENOUS at 11:22

## 2022-10-17 RX ADMIN — ACETAMINOPHEN 650 MG: 325 TABLET, FILM COATED ORAL at 13:43

## 2022-10-17 NOTE — PROGRESS NOTES
Bedside shift change report given to Coty (oncoming nurse) by Kristy Marsh (offgoing nurse). Report included the following information SBAR, Kardex, Intake/Output, MAR, and Recent Results.

## 2022-10-17 NOTE — PROGRESS NOTES
Problem: Self Care Deficits Care Plan (Adult)  Goal: *Acute Goals and Plan of Care (Insert Text)  Description: FUNCTIONAL STATUS PRIOR TO ADMISSION: per patient prior to fall ~1 month ago she was living at home independently. After fall and right hip ORIF went to NH in [place]. Patient reported working with therapy. Ambulating short distances. Reports she was dressing herself. 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 Occupational Therapy Goals  Initiated 10/13/2022  1. Patient will perform grooming with modified independence within 7 day(s). 2.  Patient will perform upper body dressing with modified independence within 7 day(s). 3.  Patient will perform lower body dressing with supervision/set-up within 7 day(s). 4.  Patient will perform toilet transfers with supervision/set-up within 7 day(s). 5.  Patient will perform all aspects of toileting with supervision/set-up within 7 day(s). Outcome: Progressing Towards Goal     OCCUPATIONAL THERAPY TREATMENT  Patient: Tio Catalan (33 y.o. female)  Date: 10/17/2022  Diagnosis: Nausea and vomiting [R11.2]  Failure to thrive in adult [R62.7] <principal problem not specified>  Procedure(s) (LRB):  ESOPHAGOGASTRODUODENOSCOPY (EGD) (N/A)  ESOPHAGEAL DILATION (N/A) Day of Surgery  Precautions:    Chart, occupational therapy assessment, plan of care, and goals were reviewed. ASSESSMENT  Patient received semi supine in bed A&Ox4 and agreeable for OT tx. Patient continues with skilled OT services and is progressing towards goals. Patient requiring SBA rolling to left and CGA sup->sit and scooting EOB with increased time. While seated EOB pt noted to be soiled thus set up for EOB bathing task with soap/water. Patient requiring SBA for facial hygiene seated EOB, SBA UB bathing seated EOB and SBA LB bathing while seated and CGA during stance.  Patient requiring min A UB dressing 2/2 line management and SBA  LB dressing. Patient requiring min A sit<->stand with Rw and gait belt and requiring min A for side step to recliner with cueing for safe hand placement. Patient would benefit from continued skilled OT services while at [facility] in order to increase safety and independence with self care and functional transfers/mobility. Recommend discharge to SNF when medically appropriate. Other factors to consider for discharge: time since onset, severity of deficits         PLAN :  Patient continues to benefit from skilled intervention to address the above impairments. Continue treatment per established plan of care to address goals. Recommend with staff: up to chair for meals, use of BSC    Recommend next OT session: continue to progress towards goals    Recommendation for discharge: (in order for the patient to meet his/her long term goals)  Therapy up to 5 days/week in SNF setting    This discharge recommendation:  Has been made in collaboration with the attending provider and/or case management    IF patient discharges home will need the following DME: TBD       SUBJECTIVE:   Patient stated I haven't been up since I have been here.     OBJECTIVE DATA SUMMARY:   Cognitive/Behavioral Status:  Neurologic State: Alert  Orientation Level: Oriented X4  Cognition: Follows commands; Appropriate decision making     Functional Mobility and Transfers for ADLs:  Bed Mobility:  Rolling: Stand-by assistance  Supine to Sit: Contact guard assistance; Additional time  Scooting: Contact guard assistance; Additional time    Transfers:  Sit to Stand: Minimum assistance; Additional time     Bed to Chair: Minimum assistance; Additional time    Balance:  Sitting: Impaired  Sitting - Static: Good (unsupported); Fair (occasional) (good to fair as pt lifting ulises LE up)  Sitting - Dynamic: Good (unsupported); Fair (occasional)  Standing: Impaired; With support  Standing - Static: Constant support; Fair  Standing - Dynamic : Constant support; Fair    ADL Intervention:     Grooming  Grooming Assistance: Stand-by assistance  Position Performed: Seated edge of bed  Washing Face: Stand-by assistance  Washing Hands: Stand-by assistance  Brushing/Combing Hair: Stand-by assistance    Upper Body Bathing  Bathing Assistance: Stand-by assistance  Position Performed: Seated edge of bed    Type of Bath: Basin/Soap/Water    Lower Body Bathing  Bathing Assistance: Contact guard assistance;Stand-by assistance  Perineal  : Contact guard assistance  Position Performed: Standing  Lower Body : Contact guard assistance;Stand-by assistance  Position Performed: Standing;Seated edge of bed    Upper [address]: Minimum  assistance    Lower Body Dressing Assistance  Socks: Stand-by assistance    Toileting  Toileting Assistance: Contact guard assistance;Stand-by assistance  Bladder Hygiene: Stand-by assistance  Bowel Hygiene: Contact guard assistance  Clothing Management: Contact guard assistance    Pain:  Minimal pain with activity in left hip    Activity Tolerance:   Fair and requires rest breaks    After treatment patient left in no apparent distress:   Sitting in chair and Call bell within reach    COMMUNICATION/COLLABORATION:   The patients plan of care was discussed with: Physical therapist and Registered nurse.      John Sesay  Time Calculation: 39 mins

## 2022-10-17 NOTE — PROCEDURES
Arturo Bernal M.D.  [phone]           10/17/2022                EGD Operative Report  Elfredia Pal  :  1935  Isai Reza Medical Record Number:  [id number]      Indication:  Dysphagia/odynophagia, need for dilation off clopidogrel    : Saw Marquez MD    Referring Provider:  Jocelyne Enriquez NP      Anesthesia/Sedation:  MAC anesthesia    Airway assessment: No airway problems anticipated    Pre-Procedural Exam:      Airway: clear, no airway problems anticipated  Heart: RRR, without gallops or rubs  Lungs: clear bilaterally without wheezes, crackles, or rhonchi  Abdomen: soft, nontender, nondistended, bowel sounds present  Mental Status: awake, alert and oriented to person, place and time       Procedure Details     After infomed consent was obtained for the procedure, with all risks and benefits of procedure explained the patient was taken to the endoscopy suite and placed in the left lateral decubitus position. Following sequential administration of sedation as per above, the endoscope was inserted into the mouth and advanced under direct vision to second portion of the duodenum. A careful inspection was made as the gastroscope was withdrawn, including a retroflexed view of the proximal stomach; findings and interventions are described below. Findings:   Esophagus:Mucosa within normal, evidence of a benign stricture just proximal to the GE-junction, the scope passed easily today. No lesions or inflammation seen. Stomach: Less erythema, one erosion seen at the incisura likely from biopsies obtained 3 days ago. Duodenum/jejunum: normal    Therapies:  Effective esophageal dilation with 15 to 18 mm sized balloon. Specimens: none           Complications:   None; patient tolerated the procedure well. EBL:  None.            Impression:    Benign distal esophageal stricture dilated to 18 mm     Recommendations:    -Continue acid suppression with daily PPI  -Anti-reflux measures  -Can resume clopidogrel in 2 days    Marimar Contreras MD

## 2022-10-17 NOTE — ANESTHESIA PREPROCEDURE EVALUATION
Relevant Problems   CARDIOVASCULAR   (+) Essential hypertension      GASTROINTESTINAL   (+) Gastroesophageal reflux disease with esophagitis without hemorrhage       Anesthetic History     PONV          Review of Systems / Medical History  Patient summary reviewed and pertinent labs reviewed    Pulmonary  Within defined limits                 Neuro/Psych         TIA and psychiatric history    Comments: Multiple TIAs  Benign tremor  Anxiety/depression Cardiovascular    Hypertension: well controlled              Exercise tolerance: <4 METS     GI/Hepatic/Renal     GERD          Comments: 80 yo with failure to thrive, nausea and feeling of food getting stuck in esophagus Endo/Other        Cancer    Comments: Right Breast cancer Other Findings   Comments: Malnutrition           Physical Exam    Airway  Mallampati: III    Neck ROM: normal range of motion   Mouth opening: Normal     Cardiovascular    Rhythm: regular  Rate: normal         Dental    Dentition: Upper dentition intact and Lower dentition intact     Pulmonary  Breath sounds clear to auscultation               Abdominal         Other Findings            Anesthetic Plan    ASA: 3  Anesthesia type: MAC  preop zofran          Anesthetic plan and risks discussed with: Patient      Informed consent obtained.

## 2022-10-17 NOTE — ANESTHESIA POSTPROCEDURE EVALUATION
Procedure(s):  ESOPHAGOGASTRODUODENOSCOPY (EGD)  ESOPHAGEAL DILATION. MAC    Anesthesia Post Evaluation      Multimodal analgesia: multimodal analgesia not used between 6 hours prior to anesthesia start to PACU discharge  Patient location during evaluation: PACU  Patient participation: complete - patient participated  Level of consciousness: awake and alert  Pain score: 0  Pain management: adequate  Airway patency: patent  Anesthetic complications: no  Cardiovascular status: hemodynamically stable and acceptable  Respiratory status: acceptable  Hydration status: acceptable  Comments: Patient seen and evaluated; no concerns. Post anesthesia nausea and vomiting:  none      INITIAL Post-op Vital signs:   Vitals Value Taken Time   /58 10/17/22 1040   Temp 36.4 °C (97.6 °F) 10/17/22 1025   Pulse 82 10/17/22 1043   Resp 24 10/17/22 1043   SpO2 97 % 10/17/22 1043   Vitals shown include unvalidated device data.

## 2022-10-17 NOTE — PERIOP NOTES
TRANSFER - OUT REPORT:    Verbal report given to Mireya on David   being transferred to 5th floor [phone] for routine progression of care       Report consisted of patients Situation, Background, Assessment and   Recommendations(SBAR). Information from the following report(s) Procedure Summary was reviewed with the receiving nurse. Lines:   Peripheral IV 10/16/22 Anterior; Left Forearm (Active)   Site Assessment Clean, dry, & intact 10/17/22 0900   Phlebitis Assessment 0 10/17/22 0900   Infiltration Assessment 0 10/17/22 0900   Dressing Status Clean, dry, & intact 10/17/22 0900   Dressing Type Tape;Transparent 10/17/22 0900   Hub Color/Line Status Pink; Infusing 10/17/22 0900   Action Taken Open ports on tubing capped 10/17/22 0353   Alcohol Cap Used Yes 10/17/22 0900        Opportunity for questions and clarification was provided.       Patient transported with:   [employer]

## 2022-10-17 NOTE — PROGRESS NOTES
Chinhakeem Stanley  1935  [id number]    Situation:  Verbal report received from: Elmo Avila RN   Procedure: Procedure(s):  ESOPHAGOGASTRODUODENOSCOPY (EGD)  ESOPHAGEAL DILATION    Background:    Preoperative diagnosis: 1.- Dysphagia  2.- Esophageal stricture  Postoperative diagnosis: Dilation    :  Dr. Benjamin Victoria  Assistant(s): Endoscopy RN-1: Mireya Frazier RN  Endoscopy RN-2: Colt Garcia RN    Specimens: * No specimens in log *  H. Pylori  no    Assessment:    Anesthesia gave intra-procedure sedation and medications, see anesthesia flow sheet no    Intravenous fluids: NS@ KVO     Vital signs stable     Abdominal assessment: round and soft     Recommendation:  Discharge patient per MD order.   Return to floor  Family or Friend   Permission to share finding with family or friend yes and n/a

## 2022-10-17 NOTE — PERIOP NOTES
Endoscope was pre-cleaned at bedside immediately following procedure by [facility].

## 2022-10-17 NOTE — PROGRESS NOTES
Problem: Falls - Risk of  Goal: *Absence of Falls  Description: Document Darlen Greenville Fall Risk and appropriate interventions in the flowsheet. Outcome: Progressing Towards Goal  Note: Fall Risk Interventions:  Mobility Interventions: Bed/chair exit alarm    Mentation Interventions: Bed/chair exit alarm, Update white board    Medication Interventions: Bed/chair exit alarm, Patient to call before getting OOB    Elimination Interventions: Bed/chair exit alarm, Call light in reach    History of Falls Interventions: Bed/chair exit alarm         Problem: Pressure Injury - Risk of  Goal: *Prevention of pressure injury  Description: Document Dutch Scale and appropriate interventions in the flowsheet.   Outcome: Progressing Towards Goal  Note: Pressure Injury Interventions:  Sensory Interventions: Assess changes in LOC, Check visual cues for pain    Moisture Interventions: Absorbent underpads, Internal/External urinary devices    Activity Interventions: Assess need for specialty bed    Mobility Interventions: Assess need for specialty bed, HOB 30 degrees or less    Nutrition Interventions: Document food/fluid/supplement intake, Offer support with meals,snacks and hydration    Friction and Shear Interventions: HOB 30 degrees or less, Lift sheet, Minimize layers

## 2022-10-17 NOTE — PROGRESS NOTES
Per Dr. Carl Early, hold PO valium, give PO blood pressure medication with sip of water. Patient is currently NPO for an EGD. 1914  Bedside and Verbal shift change report given to Sotero (oncoming nurse) by Lori Lockhart (offgoing nurse). Report included the following information SBAR, Kardex, Intake/Output, MAR, and Recent Results.

## 2022-10-17 NOTE — PROGRESS NOTES
Cody Osorio [facility]  [address] [facility], [address] [place] [address]  [phone]        Hospitalist Progress Note      NAME: Ernesto Cleary   :  1935  MRM:  [id number]    Date/Time: 10/17/2022  3:48 PM         Subjective:     Chief Complaint: recheck FTT and stricture     10/17: Seen after the egd dilation and doing well. No longer nauseated just slight sore throat    10/16: Resting comfortably with no complaints. Has canales cath in place.     ROS:  Gen:   Negative  ENT:    sore throat  Resp:  negative  CVS:   negative  GI:       negative         Objective:       Visit Vitals  /69 (BP 1 Location: Left upper arm, BP Patient Position: At rest)   Pulse 79   Temp 99.3 °F (37.4 °C)   Resp 17   Ht 5' 1\" (1.549 m)   Wt 41.3 kg (91 lb)   SpO2 95%   BMI 17.19 kg/m²     SpO2 Readings from Last 6 Encounters:   10/17/22 95%   22 97%   22 99%   21 98%   21 97%   21 98%          Intake/Output Summary (Last 24 hours) at 10/17/2022 0659  Last data filed at 10/17/2022 0604  Gross per 24 hour   Intake 1739.17 ml   Output 1750 ml   Net -10.83 ml          Physical Exam:  General: NAD  Head: Normocephalic, without obvious abnormality, atraumatic  Eyes: anicteric sclerae and conjuntiva clear  ENT: lips, mucosa, and tongue normal  Neck: normal, supple, and no tenderness  Lungs: clear to auscultation  Heart: S1, S2 normal, regular rate, and regular rhythm  Abd: not distended, soft, nontender, BS present and normactive  Ext: no cyanosis and no edema  Skin: normal skin color, no rashes, and texture normal  Neuro:  alert, oriented, no defects noted in general exam.  Psych: not anxious, cooperative, appropriate affect      Medications Reviewed: (see below)    Lab Data Reviewed: (see below)    ________________________________________________________    Medications:     Current Facility-Administered Medications   Medication Dose Route Frequency    amLODIPine (NORVASC) tablet 10 mg  10 mg Oral DAILY    melatonin tablet 3 mg  3 mg Oral QHS PRN    prochlorperazine (COMPAZINE) injection 5 mg  5 mg IntraVENous Q6H PRN    pantoprazole (PROTONIX) 40 mg in 0.9% sodium chloride 10 mL injection  40 mg IntraVENous Q12H    morphine injection 1 mg  1 mg IntraVENous Q4H PRN    diazePAM (VALIUM) tablet 2.5 mg  2.5 mg Oral BID    atenoloL (TENORMIN) tablet 25 mg  25 mg Oral DAILY    acetaminophen (TYLENOL) solution 650 mg  650 mg Oral Q4H PRN    hydrALAZINE (APRESOLINE) 20 mg/mL injection 10 mg  10 mg IntraVENous Q6H PRN    sodium chloride (NS) flush 5-40 mL  5-40 mL IntraVENous Q8H    sodium chloride (NS) flush 5-40 mL  5-40 mL IntraVENous PRN    acetaminophen (TYLENOL) tablet 650 mg  650 mg Oral Q6H PRN    Or    acetaminophen (TYLENOL) suppository 650 mg  650 mg Rectal Q6H PRN    polyethylene glycol (MIRALAX) packet 17 g  17 g Oral DAILY PRN    0.9% sodium chloride infusion  50 mL/hr IntraVENous CONTINUOUS    ondansetron (ZOFRAN) injection 4 mg  4 mg IntraVENous Q6H PRN            Lab Review:     Recent Labs     10/16/22  0329   WBC 6.0   HGB 9.9*   HCT 30.9*          Recent Labs     10/16/22  0329      K 3.5      CO2 27   *   BUN 9   CREA 0.62   CA 8.8   MG 1.8   PHOS 2.8       No components found for: GLPOC         Assessment / Plan:     Unintentional weight loss (11/3/2020) - due to poor oral intake secondary to stricture, gastritis   -Continue PPI BID, IVFs, and supplements  -EGD and dilation today and tolerated well  -Agree with appetite stimulant if persists after dilation      Nausea and vomiting (10/12/2022)  -Improved.  Continue with prn antiemetics    Gastritis   -Continue IV PPI BID      Adult failure to thrive (10/12/2022)  -nutrition eval appreciated   -consider appetite stimulant as mentioned     Urine retention (10/12/2022)  -canales in place   - Voiding trials    Closed compression fracture of L3 lumbar vertebra, [facility] (10/12/2022)  -Asymptomatic and has no neurologic deficits in the lower extremities.  -No pain   -Monitor with ambulation     HTN   -On atenolol and amlodipine   -Increase amlodipine to 10mg     TIA   -Resume plavix at discharge if stable   -Plavix on hold for dilation    Total time spent with patient: [address] [facility] discussed with: Patient and Family    Discussed:  Care Plan    Prophylaxis: Lovenox     Disposition:   SNF   ___________________________________________________    Attending Physician: Andrzej Giordano MD

## 2022-10-17 NOTE — PROGRESS NOTES
10/17/2022   CARE MANAGEMENT NOTE:  CM reviewed EMR and handoff received from previous  Bogdan Mclaughlin). Pt was admitted with N/V and FTT. Reportedly, pt's granddtr lives with pt. CARLITOS/VIET Perryilie Lanetteraúl ([phone]) is the primary family contact. RUR 11%    Transition Plan of Care:  GI following for medical management  PT eval complete; pt ambulated 2 feet on 10/14 and SNF was recommended. CM discussed SNF option with pt and she is agreeable to either [facility] or [facility]. Referrals have been sent for review. Outpatient follow up  CARLITOS/POA will transport pt at discharge    CM will continue to follow pt for SNF  CARRIE Oakley

## 2022-10-17 NOTE — PERIOP NOTES
Mike Rios  1935  [id number]    Situation:    Scheduled Procedure: Procedure(s):  ESOPHAGOGASTRODUODENOSCOPY (EGD)  ESOPHAGOGASTRODUODENAL (EGD) BIOPSY  Verbal report received from: [place], RN  Preoperative diagnosis: dysphagia/nausea    Background:    Procedure: Procedure(s):  ESOPHAGOGASTRODUODENOSCOPY (EGD)  ESOPHAGOGASTRODUODENAL (EGD) BIOPSY  Physician performing procedure; Dr. Sunday Batuista RN    NPO Status/Last PO Intake: midnight except medications with a sip of water    Pregnancy Test:Not applicable If yes, result: none    Is the patient taking Blood Thinners: YES If yes, list: Plavix (on hold for 5 days) Lovenox and last taken 10/16/2022  Is the patient diabetic:yes       If yes, what was the last BS:  105  Time taken? 10/16/2022  Anything given? no           Does the patient have a Pacemaker/Defibrillator in place?: no   Does the patient need antibiotics before/during/after procedure: no   If the patient is having a colon, How much prep was drank? What were the Colon prep results? Does the patient have SCD in place:no   Is patient on CONTACT precautions:no        Assessment:  Are the vital signs stable prior to patient coming to ENDO? yes    Does the patient have a patient IV in place?  yes     Recommendation:  Family or Friend present no     Permission to share finding with Family or Friend n/a  T

## 2022-10-18 VITALS
BODY MASS INDEX: 17.18 KG/M2 | RESPIRATION RATE: 16 BRPM | HEART RATE: 75 BPM | SYSTOLIC BLOOD PRESSURE: 124 MMHG | OXYGEN SATURATION: 95 % | WEIGHT: 91 LBS | TEMPERATURE: 98.8 F | HEIGHT: 61 IN | DIASTOLIC BLOOD PRESSURE: 70 MMHG

## 2022-10-18 LAB
COVID-19 RAPID TEST, COVR: NOT DETECTED
SOURCE, COVRS: NORMAL

## 2022-10-18 PROCEDURE — 97535 SELF CARE MNGMENT TRAINING: CPT

## 2022-10-18 PROCEDURE — 77030038269 HC DRN EXT URIN PURWCK BARD -A

## 2022-10-18 PROCEDURE — 74011250636 HC RX REV CODE- 250/636: Performed by: INTERNAL MEDICINE

## 2022-10-18 PROCEDURE — 74011250637 HC RX REV CODE- 250/637: Performed by: INTERNAL MEDICINE

## 2022-10-18 PROCEDURE — 97116 GAIT TRAINING THERAPY: CPT

## 2022-10-18 PROCEDURE — 74011250637 HC RX REV CODE- 250/637: Performed by: HOSPITALIST

## 2022-10-18 PROCEDURE — 87635 SARS-COV-2 COVID-19 AMP PRB: CPT

## 2022-10-18 PROCEDURE — 74011000250 HC RX REV CODE- 250: Performed by: HOSPITALIST

## 2022-10-18 RX ORDER — DIAZEPAM 5 MG/1
2.5 TABLET ORAL
Qty: 9 TABLET | Refills: 0 | Status: SHIPPED | OUTPATIENT
Start: 2022-10-18 | End: 2022-10-18 | Stop reason: SDUPTHER

## 2022-10-18 RX ORDER — PANTOPRAZOLE SODIUM 40 MG/1
40 TABLET, DELAYED RELEASE ORAL 2 TIMES DAILY
Qty: 60 TABLET | Refills: 0 | Status: SHIPPED
Start: 2022-10-18

## 2022-10-18 RX ORDER — PANTOPRAZOLE SODIUM 40 MG/1
40 TABLET, DELAYED RELEASE ORAL
Qty: 30 TABLET | Refills: 0 | Status: SHIPPED
Start: 2022-10-19 | End: 2022-10-18 | Stop reason: SDUPTHER

## 2022-10-18 RX ORDER — FACIAL-BODY WIPES
10 EACH TOPICAL DAILY PRN
Status: DISCONTINUED | OUTPATIENT
Start: 2022-10-18 | End: 2022-10-18 | Stop reason: HOSPADM

## 2022-10-18 RX ORDER — DIAZEPAM 5 MG/1
2.5 TABLET ORAL
Qty: 8 TABLET | Refills: 0 | Status: SHIPPED | OUTPATIENT
Start: 2022-10-18

## 2022-10-18 RX ADMIN — Medication 5 ML: at 06:00

## 2022-10-18 RX ADMIN — PANTOPRAZOLE SODIUM 40 MG: 40 TABLET, DELAYED RELEASE ORAL at 06:30

## 2022-10-18 RX ADMIN — DIAZEPAM 2.5 MG: 5 TABLET ORAL at 08:45

## 2022-10-18 RX ADMIN — ANTACID TABLETS 200 MG: 500 TABLET, CHEWABLE ORAL at 10:10

## 2022-10-18 RX ADMIN — SODIUM CHLORIDE 50 ML/HR: 9 INJECTION, SOLUTION INTRAVENOUS at 06:37

## 2022-10-18 RX ADMIN — ACETAMINOPHEN 650 MG: 325 TABLET, FILM COATED ORAL at 15:44

## 2022-10-18 RX ADMIN — Medication 10 ML: at 13:26

## 2022-10-18 RX ADMIN — BISACODYL 10 MG: 10 SUPPOSITORY RECTAL at 07:01

## 2022-10-18 RX ADMIN — AMLODIPINE BESYLATE 10 MG: 5 TABLET ORAL at 08:46

## 2022-10-18 RX ADMIN — ATENOLOL 25 MG: 50 TABLET ORAL at 08:45

## 2022-10-18 NOTE — PROGRESS NOTES
Problem: Mobility Impaired (Adult and Pediatric)  Goal: *Acute Goals and Plan of Care (Insert Text)  Description: FUNCTIONAL STATUS PRIOR TO ADMISSION: Some conflicting report of hx. Lived alone prior to recent illness. Used combination of RW, rollator, and SPC. HOME SUPPORT PRIOR TO ADMISSION: The patient lived with her grand daughter but did not typically require assist. Patient states she plans to discharge home with 24 hour supervision from her 39 y.o. grandson. Physical Therapy Goals  Initiated 10/13/2022  1. Patient will move from supine to sit and sit to supine  in bed with supervision/set-up within 7 day(s). 2.  Patient will transfer from bed to chair and chair to bed with supervision/set-up using the least restrictive device within 7 day(s). 3.  Patient will perform sit to stand with supervision/set-up within 7 day(s). 4.  Patient will ambulate with minimal assistance/contact guard assist for 100 feet with the least restrictive device within 7 day(s). 5.  Patient will ascend/descend 4 stairs with 1 handrail(s) with modified independence within 7 day(s). Outcome: Progressing Towards Goal   PHYSICAL THERAPY TREATMENT  Patient: Jocelyn Onofre (7 y.o. female)  Date: 10/18/2022  Diagnosis: Nausea and vomiting [R11.2]  Failure to thrive in adult [R62.7] <principal problem not specified>  Procedure(s) (LRB):  ESOPHAGOGASTRODUODENOSCOPY (EGD) (N/A)  ESOPHAGEAL DILATION (N/A) 1 Day Post-Op  Precautions:    Chart, physical therapy assessment, plan of care and goals were reviewed. ASSESSMENT  Patient continues with skilled PT services and is progressing towards goals. Patient agreeable to mobilize in room. Showing improved activtiy tolerance but continues to fatigue quickly during standing activity. Gait was antalgic requiring cues for sequencing each step over 2 trials. Gait trials over 4' and 10' respectively with increased trunk flexion and decreased step clearance over distance.  She initially required minimal assist and transitioned moderate assist for safety and balance as she fatigued. Returned to a bedside recliner with all needs met. She strongly prefers discharge home but remains below her functional baseline. If home, she plans to have her grandson move in and be available to assist. She will require 24 hour hands on assist and HHPT in the home vs return to SNF to improve functional status prior to returning home. Current Level of Function Impacting Discharge (mobility/balance): min-mod for gait,  safety cues for transfers    Other factors to consider for discharge: traditionally lives alone         PLAN :  Patient continues to benefit from skilled intervention to address the above impairments. Continue treatment per established plan of care. to address goals. Recommendation for discharge: (in order for the patient to meet his/her long term goals)  SNF vs home with 24 hour assist and HHPT    This discharge recommendation:  Has been made in collaboration with the attending provider and/or case management    IF patient discharges home will need the following DME: to be determined (TBD)       SUBJECTIVE:   Patient stated I really want to go home. I haven't seen the inside of my house in three months.     OBJECTIVE DATA SUMMARY:   Critical Behavior:  Neurologic State: Alert  Orientation Level: Oriented X4  Cognition: Appropriate decision making, Follows commands  Safety/Judgement: Awareness of environment, Fall prevention, Home safety, Insight into deficits, Good awareness of safety precautions  Functional Mobility Training:  Bed Mobility:  Rolling: Stand-by assistance  Supine to Sit: Contact guard assistance; Additional time (for RLE management)     Scooting: Stand-by assistance        Transfers:  Sit to Stand: Minimum assistance  Stand to Sit: Contact guard assistance; Additional time        Bed to Chair: Minimum assistance              Interventions: Safety awareness training;Verbal cues;Visual cues     Balance:  Sitting: Intact  Standing: Impaired  Standing - Static: Fair;Constant support  Standing - Dynamic : Fair;Constant support  Ambulation/Gait Training:  Distance (ft):  (4 seated rest and 10)  Assistive Device: Gait belt;Walker, rolling  Ambulation - Level of Assistance: Minimal assistance; Moderate assistance        Gait Abnormalities: Decreased step clearance; Antalgic        Base of Support: Narrowed          Therapeutic Exercises:     Pain Rating:  \"Mild\"    Activity Tolerance:   Fair and fatigues with standing activity    After treatment patient left in no apparent distress:   Sitting in chair, Call bell within reach, and Bed / chair alarm activated    COMMUNICATION/COLLABORATION:   The patients plan of care was discussed with: Registered nurse.      Bird Galvan PT, DPT   Time Calculation: 26 mins

## 2022-10-18 NOTE — DISCHARGE INSTRUCTIONS
Patient Discharge Instructions    Emory Ling / [id number] : 1935    Admitted 10/12/2022 Discharged: 10/18/2022     It is important that you take the medication exactly as they are prescribed. Keep your medication in the bottles provided by the pharmacist and keep a list of the medication names, dosages, and times to be taken with you at all times. Do not take other medications without consulting your doctor. What to do at Home    Follow-up with primary care and gastroenterologist.  Take medications as prescribed. Recommended Diet: ADULT ORAL NUTRITION SUPPLEMENT Lunch; Diabetic Supplement  DIET ONE TIME MESSAGE  ADULT DIET Dysphagia - Soft & Bite Sized; No tomato sauces      Recommended Activity: Activity as tolerated    If you experience any of the following symptoms chest pain, shortness of breath, fevers, bleeding, please follow up with PCP or ER. .    Signed By: Armond Fink MD     2022

## 2022-10-18 NOTE — PROGRESS NOTES
10/18/2022   CM ADDENDUM:  Pt has been accepted to [facility] and a skilled bed is available today. RN, please call report to [facility] at [phone]. Pt will go to room 102. DIL will transport pt at 4 p.m. Transition of Care Plan to SNF/Rehab    SNF/Rehab Transition:  Patient has been accepted to [facility] and meets criteria for admission. Patient will transported by DIL and expected to leave at 3 p.m. Marie Campos Communication to Patient/Family:  Met with patient and spoke with DIL via phone (identified care giver) and they are agreeable to the transition plan. Communication to SNF/Rehab:  Bedside RN, Lou Pantoja, has been notified to update the transition plan to the facility and call report (phone number [phone]). Discharge information has been updated on the AVS.     Discharge instructions to be fax'd to facility at [facility] # [phone]). Nursing Please include all hard scripts for controlled substances, med rec and dc summary, and AVS in packet. Reviewed and confirmed with facility, [facility], can manage the patient care needs for the following:     SNF/Rehab Transition:  Patient to follow-up with Home Health:  [facility], other  ,none)  PCP/Specialist: Dr. Mahsa Cordero and confirmed with facility, [facility] they can manage the patient care needs for the following:     [place] [employer] with (X) only those applicable:    Medication:  []  Medications will be available at the facility  []  IV Antibiotics N/A  []  Controlled Substance - hard copy to be sent with patient   []  Weekly Labs   Documents:  [] Hard RX  [x] MAR  [] Kardex  [x] AVS  [x]Transfer Summary  []Discharge   Equipment:  []  CPAP/BiPAP  []  Wound Vacuum  []  [place] or Urinary Device  []  PICC/Central Line  []  Nebulizer  []  Ventilator   Treatment:  []Isolation (for MRSA, VRE, etc.)  []Surgical Drain Management  []Tracheostomy Care  []Dressing Changes  []Dialysis with transportation and chair time N/A  []PEG Care  []Oxygen  []Daily Weights for Heart Failure   Dietary:  []Any diet limitations  []Tube Feedings   []Total Parenteral Management (TPN)   Eligible for Medicaid Long Term Services and Supports  Yes:  [] Eligible for medical assistance or will become eligible within 180 days and UAI completed. [x] Provider/Patient and/or support system has requested screening. [] UAI copy provided to patient or responsible party, N/A  [] UAI unavailable at discharge will send once processed to SNF provider. [] UAI unavailable at discharged mailed to patient  No:   [] Private pay and is not financially eligible for Medicaid within the next 180 days. [] Reside out-of-state. [] A residents of a state owned/operated facility that is licensed  by [facility] or [facility]  [] Enrollment in [facility] hospice services  []  [address]  [] Patient /Family declines to have screening completed or provide financial information for screening     Financial Resources:  Medicaid    [] Initiated and application pending   [] Full coverage     Advanced Care Plan:  []Surrogate Decision Maker of Care  []POA  []Communicated Code Status Full (DDNR\", \"Full\")    Other Rapid Covid was negative on 10/18    D. Sowmya Nieves

## 2022-10-18 NOTE — PROGRESS NOTES
Problem: Self Care Deficits Care Plan (Adult)  Goal: *Acute Goals and Plan of Care (Insert Text)  Description: FUNCTIONAL STATUS PRIOR TO ADMISSION: per patient prior to fall ~1 month ago she was living at home independently. After fall and right hip ORIF went to NH in [place]. Patient reported working with therapy. Ambulating short distances. Reports she was dressing herself. 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 Occupational Therapy Goals  Initiated 10/13/2022  1. Patient will perform grooming with modified independence within 7 day(s). 2.  Patient will perform upper body dressing with modified independence within 7 day(s). 3.  Patient will perform lower body dressing with supervision/set-up within 7 day(s). 4.  Patient will perform toilet transfers with supervision/set-up within 7 day(s). 5.  Patient will perform all aspects of toileting with supervision/set-up within 7 day(s). Outcome: Progressing Towards Goal   OCCUPATIONAL THERAPY TREATMENT  Patient: Hira Jones (74 y.o. female)  Date: 10/18/2022  Diagnosis: Nausea and vomiting [R11.2]  Failure to thrive in adult [R62.7] <principal problem not specified>  Procedure(s) (LRB):  ESOPHAGOGASTRODUODENOSCOPY (EGD) (N/A)  ESOPHAGEAL DILATION (N/A) 1 Day Post-Op  Precautions:    Chart, occupational therapy assessment, plan of care, and goals were reviewed. ASSESSMENT  Patient continues with skilled OT services and is progressing towards goals. Pt sits edge of bed to don pants contact guard for standing balance to task. She dons shirt with set-up. Pt fatigues easily. Current Level of Function Impacting Discharge (ADLs): LB dress contact guard, UB dress set-up    Other factors to consider for discharge:          PLAN :  Patient continues to benefit from skilled intervention to address the above impairments.   Continue treatment per established plan of care to address goals. Recommend with staff: ADL's, there ex, there act    Recommend next OT session: cont towards goals    Recommendation for discharge: (in order for the patient to meet his/her long term goals)  Therapy up to 5 days/week in SNF setting    This discharge recommendation:  Has not yet been discussed the attending provider and/or case management    IF patient discharges home will need the following DME:        SUBJECTIVE:   Patient stated I am Les Master.     OBJECTIVE DATA SUMMARY:   Cognitive/Behavioral Status:  Neurologic State: Alert  Orientation Level: Oriented X4  Cognition: Follows commands             Functional Mobility and Transfers for ADLs:  Bed Mobility:  Rolling: Stand-by assistance  Supine to Sit: Contact guard assistance; Additional time (for RLE management)  Scooting: Stand-by assistance    Transfers:  Sit to Stand: Minimum assistance     Bed to Chair: Minimum assistance    Balance:  Sitting: Intact  Standing: Impaired  Standing - Static: Fair;Constant support  Standing - Dynamic : Fair;Constant support    ADL Intervention:                 Type of Bath: Patient refused         Upper Body Dressing Assistance  Dressing Assistance: Set-up  Pullover Shirt: Set-up    Lower Body Dressing Assistance  Dressing Assistance: Contact guard assistance  Pants With Elastic Waist: Contact guard assistance  Position Performed: Seated edge of bed            Activity Tolerance:   Fair    After treatment patient left in no apparent distress:   Supine in bed and Call bell within reach    COMMUNICATION/COLLABORATION:   The patients plan of care was discussed with: Physical therapist, Occupational therapist, and Registered nurse.      FATEMEH Morales  Time Calculation: 15 mins

## 2022-10-18 NOTE — PROGRESS NOTES
Bedside shift change report given to Coty (oncoming nurse) by Deann Farrell (offgoing nurse). Report included the following information SBAR, Kardex, Intake/Output, MAR, and Recent Results.

## 2022-10-18 NOTE — PROGRESS NOTES
Problem: Falls - Risk of  Goal: *Absence of Falls  Description: Document Brinkley Contreras Fall Risk and appropriate interventions in the flowsheet. Outcome: Progressing Towards Goal  Note: Fall Risk Interventions:  Mobility Interventions: Bed/chair exit alarm, Patient to call before getting OOB    Mentation Interventions: Adequate sleep, hydration, pain control, Bed/chair exit alarm, Update white board    Medication Interventions: Patient to call before getting OOB, Evaluate medications/consider consulting pharmacy    Elimination Interventions: Bed/chair exit alarm, Call light in reach, Patient to call for help with toileting needs    History of Falls Interventions: Bed/chair exit alarm         Problem: Pressure Injury - Risk of  Goal: *Prevention of pressure injury  Description: Document Dutch Scale and appropriate interventions in the flowsheet.   Outcome: Progressing Towards Goal  Note: Pressure Injury Interventions:  Sensory Interventions: Assess changes in LOC, Check visual cues for pain    Moisture Interventions: Absorbent underpads, Internal/External urinary devices, Limit adult briefs, Minimize layers    Activity Interventions: Assess need for specialty bed    Mobility Interventions: Assess need for specialty bed, HOB 30 degrees or less    Nutrition Interventions: Document food/fluid/supplement intake, Offer support with meals,snacks and hydration    Friction and Shear Interventions: Apply protective barrier, creams and emollients, HOB 30 degrees or less, Lift sheet, Minimize layers 08-Jan-2021 08:32

## 2022-10-18 NOTE — PROGRESS NOTES
Verbal report NIKO Ko from [facility]. Report includes SBAR recent results and MAR. Opportunities to ask questions given. Daughter in law will transport patient to [facility]. Discharge instructions given to patient verbalized understanding. Copy of AVS and hard script sent with patient in a packet to [facility]. Peripheral IV removed, catheter intact. Patient is not in distress. 12  Daughter-in-law- now here to transport patient. Volunteer services requested. Signed AVS in patient's chart.

## 2022-10-18 NOTE — PROGRESS NOTES
[address] NP  [phone]           GI PROGRESS NOTE        NAME: Stefany Segal   :  1935   MRN:  [id number]       Subjective: \"Im going to [employer] today. \"  Reports her swallowing is better. Objective:   NAD      VITALS:   Last 24hrs VS reviewed since prior progress note. Most recent are:  Visit Vitals  /70 (BP 1 Location: Right upper arm, BP Patient Position: At rest)   Pulse 75   Temp 98.8 °F (37.1 °C)   Resp 16   Ht 5' 1\" (1.549 m)   Wt 41.3 kg (91 lb)   SpO2 95%   BMI 17.19 kg/m²       Intake/Output Summary (Last 24 hours) at 10/18/2022 1603  Last data filed at 10/18/2022 1227  Gross per 24 hour   Intake 2167.5 ml   Output 600 ml   Net 1567.5 ml       PHYSICAL EXAM:  General: Alert, in no acute distress    HEENT: Anicteric sclerae. Lungs:            CTA Bilaterally. Heart:  Regular  rhythm,    Abdomen: Soft, Non distended, Non tender.  (+)Bowel sounds, no HSM  Extremities: No c/c/e  Neurologic:  CN 2-12 gi, Alert and oriented X 3. No acute neurological distress   Psych:   Good insight. Not anxious nor agitated. Lab Data Reviewed:   Recent Labs     10/16/22  0329   WBC 6.0   HGB 9.9*   HCT 30.9*        Recent Labs     10/16/22  0329      K 3.5      CO2 27   BUN 9   CREA 0.62   *   PHOS 2.8   CA 8.8     No results for input(s): AP, TBIL, TP, ALB, GLOB, GGT, AML, LPSE in the last 72 hours.     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 Pt reports symptoms are improved. - Diet as tolerated. Ok to discharge from GI standpoint.         Signed By: Felipe Oviedo NP     10/18/2022  4:03 PM

## 2022-10-18 NOTE — DISCHARGE SUMMARY
Cody Osorio [facility] 79  [address] [facility] 19  [phone]       [address] [place] [address] SUMMARY        Name:  Juana Sanchez y.o.  :    1935  MRN:    [id number]  PCP:    Dustin Adkins NP    Code: Full Code    Date of Admission: 10/12/2022  Date of Discharge:   10/18/2022 2:47 PM  Disposition:  SNF  Condition:  improved and stable    Consultations:  IP CONSULT TO GASTROENTEROLOGY    Reason for Admission:   Nausea and vomiting [R11.2]  Failure to thrive in adult [R62.7]    Discharge Diagnoses: Active Problems:      Esophageal stricture and gastritis status post dilation    Essential hypertension (3/5/2013)    Weight loss (11/3/2020) moderate protein calorie malnutrition    Nausea and vomiting (10/12/2022)    Dehydration (10/12/2022) has been rehydrated    Adult failure to thrive (10/12/2022)    Urine retention (10/12/2022) stable [place] in place    Closed compression fracture of L3 lumbar vertebra, initial encounter ([facility] [address] (10/12/2022) asymptomatic with no deficits    Failure to thrive in adult (10/14/2022) improving    History of TIA can resume Plavix tomorrow. Procedures:  EGD with esophageal dilation on 10/17/2022    Excerpted HPI from H&P of Liz Voss MD:  Ms. James Villanueva is a 81 y.o.  female with a past medical history significant for recent hip fracture status post hip repair and patient was sent to rehab. From there she returned home and has continued to have nausea and vomiting throughout the day. She denies any hematemesis. Patient had her right hip repair at [facility] a month ago. After that she was discharged to rehab. In addition she has not been able to void today and CT abdomen in the emergency department shows distended bladder. It appears also that she has been dealing with irritable bowel syndrome and most of the time she is constipated but also has episodes of diarrhea.   She has had very poor p.o. intake over the last 3-4 days. CT abdomen was performed which showed urinary bladder distension and also moderate L3 compression fracture but she has no lower back pain and has been ambulating   With a walker    Brief Hospital Course:  Patient has history of esophageal strictures has been having increased nausea vomiting and poor appetite with weight loss. Found to have esophageal stricture went down for EGD and dilation of the esophagus. Patient tolerated procedure well. GI recommending to restart Plavix on 10/19/2022 2 days after procedure. At this point patient is tolerating food no nausea no abdominal pain. Will be discharged to skilled nursing facility today. Physical Exam:  Visit Vitals  /70 (BP 1 Location: Right upper arm, BP Patient Position: At rest)   Pulse 75   Temp 98.8 °F (37.1 °C)   Resp 16   Ht 5' 1\" (1.549 m)   Wt 41.3 kg (91 lb)   SpO2 95%   BMI 17.19 kg/m²     Gen:  alert, well appearing, and no distress  Head:  Normocephalic, without obvious abnormality, atraumatic  ENT:  Lips, mucosa, and tongue normal.   Neck:  normal, supple, and no tenderness  Lungs: clear to auscultation with good breath sounds and normal respiratory effort  Heart:  S1, S2 normal, regular rate, and regular rhythm  Abd:  not distended, soft, nontender, BS present and normactive  Ext:  no cyanosis and no edema  Skin:  normal skin color, no rashes, and texture normal  Neuro: alert, oriented x 3, no defects noted in general exam.  Psych: not anxious, cooperative, appropriate affect    Labs:  Recent Labs     10/16/22  0329   WBC 6.0   HGB 9.9*   HCT 30.9*        Recent Labs     10/16/22  0329      K 3.5      CO2 27   *   BUN 9   CREA 0.62   CA 8.8   MG 1.8   PHOS 2.8     No results found.     Immunizations Given During Admission:   None        PATIENT INSTRUCTIONS       Activity: Activity as tolerated  Diet: ADULT ORAL NUTRITION SUPPLEMENT Lunch; Diabetic Supplement  DIET ONE TIME MESSAGE  ADULT DIET Dysphagia - Soft & Bite Sized; No tomato sauces    Wound Care:  None  Follow-up(s): Follow-up Information       Follow up With Specialties Details Why [address] Follow up [place] [id number]. Kayla Tristin [address] [phone]    Sheryl Marroquin NP Nurse Practitioner Follow up in 2 week(s)  Brien Garcia [address]      Sixto Armijo MD Gastroenterology Follow up  [address] [facility]  [phone]             Current Discharge Medication List        START taking these medications    Details   pantoprazole (PROTONIX) 40 mg tablet Take 1 Tablet by mouth two (2) times a day. Qty: 60 Tablet, Refills: 0            CONTINUE these medications which have CHANGED    Details   diazePAM (VALIUM) 5 mg tablet Take 0.5 Tablets by mouth every eight (8) hours as needed for Anxiety.  Max Daily Amount: 7.5 mg.  Qty: 9 Tablet, Refills: 0    Associated Diagnoses: Esophageal stricture           CONTINUE these medications which have NOT CHANGED    Details   clopidogreL (PLAVIX) 75 mg tab TAKE 1 TABLET BY MOUTH EVERY DAY  Qty: 90 Tablet, Refills: 0 restart on 10/19/2022      fluticasone propionate (FLONASE) 50 mcg/actuation nasal spray SPRAY ONE SPRAY IN EACH NOSTRIL ONCE DAILY  Qty: 16 g, Refills: 3      dicyclomine (BENTYL) 10 mg capsule TAKE ONE CAPSULE BY MOUTH THREE TIMES A DAY AS NEEDED  Qty: 270 Capsule, Refills: 0    Associated Diagnoses: Irritable bowel syndrome with both constipation and diarrhea      ondansetron (ZOFRAN ODT) 4 mg disintegrating tablet DISSOLVE ONE TABLET BY MOUTH EVERY 8 HOURS AS NEEDED FOR NAUSEA  Qty: 20 Tablet, Refills: 5    Associated Diagnoses: Irritable bowel syndrome with both constipation and diarrhea      atenoloL (TENORMIN) 25 mg tablet TAKE ONE TABLET BY MOUTH DAILY  Qty: 90 Tablet, Refills: 3    Associated Diagnoses: Essential hypertension benzonatate (Tessalon Perles) 100 mg capsule Take 1 Capsule by mouth three (3) times daily as needed for Cough. Qty: 30 Capsule, Refills: 0    Associated Diagnoses: Viral URI with cough      psyllium husk-aspartame (Metamucil Fiber Singles) 3.4 gram pwpk packet Take 1 Packet by mouth daily. Qty: 30 Packet, Refills: 0    Associated Diagnoses: Diarrhea, unspecified type      colestipoL (COLESTID) 5 gram packet Take 1 Packet by mouth daily. Qty: 30 Each, Refills: 0    Associated Diagnoses: Diarrhea, unspecified type      amLODIPine (NORVASC) 5 mg tablet TAKE 1 TABLET BY MOUTH EVERY DAY  Qty: 90 Tablet, Refills: 0    Associated Diagnoses: Essential hypertension      meclizine (ANTIVERT) 12.5 mg tablet TAKE ONE TO TWO TABLETS BY MOUTH TWICE A DAY AS NEEDED FOR DIZZINESS  Qty: 180 Tablet, Refills: 1    Associated Diagnoses: Dizziness      diclofenac (Voltaren) 1 % gel Apply 2 g to affected area four (4) times daily as needed (knee pain, back pain). Qty: 60 g, Refills: 1    Associated Diagnoses: Acute left-sided low back pain without sciatica      polyethylene glycol (Miralax) 17 gram/dose powder Take 17 g by mouth daily. carboxymethylcellulose sodium (REFRESH LIQUIGEL) 1 % dlgl ophthalmic solution Administer 1 Drop to both eyes as needed (dry eyes). cyanocobalamin (VITAMIN B12) 500 mcg tablet Take 500 mcg by mouth as needed. acetaminophen (TYLENOL) 325 mg tablet Take 325-650 mg by mouth every six (6) hours as needed (headache).            STOP taking these medications       lidocaine (LIDODERM) 5 % Comments:   Reason for Stopping:               Discharge Plan D/W:  Patient and Care Manager          **Total time spent coordinating discharge (preparing & going over instructions, follow-ups, prescriptions, and documentation):  35 minutes                 ___________________________________________________    Admitting Physician: Armond Fink MD   Date of service:    10/18/2022         CC: Jaxon Henao Sophia Muñoz NP   .

## 2022-10-18 NOTE — PROGRESS NOTES
Physician Progress Note      PATIENT:               Indigo Montoya  CSN #:                  [id number]  :                       1935  ADMIT DATE:       10/12/2022 4:27 PM  [address] [place] DATE:  RESPONDING  PROVIDER #:        Francheska Lobo MD          QUERY TEXT:    Good afternoon. Pt admitted with nausea and vomiting and has moderate malnutrition documented in 10/14 RD note. Please further specify type of malnutrition with documentation in the medical record. The medical record reflects the following:    Risk Factors: HTN, recent hip fx s/p hip repair 2022, BMI 17.19, TIA, Adult FTT    Clinical Indicators: BMI 17.19; from 10/14 RD note: \"Moderate malnutrition related to inadequate protein-energy intake, swallowing difficulty as evidenced by nausea\" and \"Malnutrition Status: Moderate malnutrition Context:  Acute illness  Findings of the 6 clinical characteristics of malnutrition: Energy Intake:  No significant decrease in energy intake  Weight Loss:  No significant weight loss Body Fat Loss:  Mild body fat loss, Buccal region, Orbital Muscle Mass Loss: Moderate muscle mass loss, Clavicles (pectoralis & deltoids), Hand (interosseous) \"; Albumin 3.4; hgb 10.3, 10.1; hgb 32.9, 32.3    Treatment: RD consult, ONS, labs, I&Os and vital per unit protocol    Thank you,  Nabil Dickens RN, CDI  [phone]  _________________________________________________________________________________________________________    ASPEN Criteria:  https://aspenjournals. onlinelibrary. kitchen. com/doi/full/10.1177/8225514234311532  Options provided:  -- Mild Malnutrition  -- Moderate Malnutrition  -- Severe Malnutrition  -- Other - I will add my own diagnosis  -- Disagree - Not applicable / Not valid  -- Disagree - Clinically unable to determine / Unknown  -- Refer to Clinical Documentation Reviewer    PROVIDER RESPONSE TEXT:    This patient has moderate malnutrition.     Query created by: Leann Alejandra on 10/14/2022 4:53 PM      Electronically signed by:  Lawrence Pal MD 10/18/2022 2:37 PM

## 2022-10-18 NOTE — PROGRESS NOTES
10/18/2022   CARE MANAGEMENT NOTE:  CM reviewed EMR and handoff received from previous  Silvia Esposito). Pt was admitted with N/V and FTT. Reportedly, pt's granddtr lives with pt. Shena Marroquin ([phone]; [phone]) is the primary family contact. RUR 11% LOS 5 days     Transition Plan of Care:  GI following for medical management - s/p EGD and dilation on 10/17  SNF - accepted by [facility] and bed is available today. Rapid Covid will be needed  Outpatient follow up  [facility] will transport pt at discharge     CM will continue to follow pt for SNF  CARRIE Dong

## 2022-10-19 ENCOUNTER — HOME VISIT (OUTPATIENT)
Dept: FAMILY MEDICINE CLINIC | Age: 87
End: 2022-10-19
Payer: MEDICARE

## 2022-10-19 DIAGNOSIS — K22.2 ESOPHAGEAL STRICTURE: Primary | ICD-10-CM

## 2022-10-19 DIAGNOSIS — Z87.81 HISTORY OF FRACTURE OF RIGHT HIP: ICD-10-CM

## 2022-10-19 DIAGNOSIS — R53.1 WEAKNESS: ICD-10-CM

## 2022-10-19 DIAGNOSIS — R11.2 NAUSEA AND VOMITING, UNSPECIFIED VOMITING TYPE: ICD-10-CM

## 2022-10-19 DIAGNOSIS — R63.4 WEIGHT LOSS: ICD-10-CM

## 2022-10-19 DIAGNOSIS — R62.7 ADULT FAILURE TO THRIVE: ICD-10-CM

## 2022-10-19 DIAGNOSIS — E87.1 HYPONATREMIA: ICD-10-CM

## 2022-10-19 DIAGNOSIS — K58.2 IRRITABLE BOWEL SYNDROME WITH BOTH CONSTIPATION AND DIARRHEA: ICD-10-CM

## 2022-10-19 DIAGNOSIS — M54.50 CHRONIC LOW BACK PAIN, UNSPECIFIED BACK PAIN LATERALITY, UNSPECIFIED WHETHER SCIATICA PRESENT: ICD-10-CM

## 2022-10-19 DIAGNOSIS — T78.40XD ALLERGY, SUBSEQUENT ENCOUNTER: ICD-10-CM

## 2022-10-19 DIAGNOSIS — G89.29 CHRONIC LOW BACK PAIN, UNSPECIFIED BACK PAIN LATERALITY, UNSPECIFIED WHETHER SCIATICA PRESENT: ICD-10-CM

## 2022-10-19 DIAGNOSIS — H04.123 DRY EYES: ICD-10-CM

## 2022-10-19 DIAGNOSIS — F41.9 ANXIETY: Chronic | ICD-10-CM

## 2022-10-19 DIAGNOSIS — I10 ESSENTIAL HYPERTENSION: ICD-10-CM

## 2022-10-19 DIAGNOSIS — R63.6 UNDERWEIGHT: ICD-10-CM

## 2022-10-19 DIAGNOSIS — K21.00 GASTROESOPHAGEAL REFLUX DISEASE WITH ESOPHAGITIS WITHOUT HEMORRHAGE: ICD-10-CM

## 2022-10-19 PROCEDURE — G8536 NO DOC ELDER MAL SCRN: HCPCS | Performed by: FAMILY MEDICINE

## 2022-10-19 PROCEDURE — 1123F ACP DISCUSS/DSCN MKR DOCD: CPT | Performed by: FAMILY MEDICINE

## 2022-10-19 PROCEDURE — 1101F PT FALLS ASSESS-DOCD LE1/YR: CPT | Performed by: FAMILY MEDICINE

## 2022-10-19 PROCEDURE — G8432 DEP SCR NOT DOC, RNG: HCPCS | Performed by: FAMILY MEDICINE

## 2022-10-19 PROCEDURE — 99305 1ST NF CARE MODERATE MDM 35: CPT | Performed by: FAMILY MEDICINE

## 2022-10-19 NOTE — PROGRESS NOTES
10/19/2022   CARE MANAGEMENT NOTE:  CM completed [employer] in electronic Medicaid portal.  CM will provide a copy to [employer] once [employer] is processed and approved.   [place]

## 2022-10-19 NOTE — PROGRESS NOTES
Wendy Mendoza  81 y.o. female  1935  [address] [place] [address]  [id number]   [address] [facility]  Medicine at [facility] 83 History and Physical  Shiva Gutierrez MD       Encounter Date: 10/19/2022  Admission Date: 10/18/2022    PCP: Linda Quezada NP  Responsible Party: Itzel King (Daughter). [phone] (Mobile). Chief Complaint   Patient presents with    Follow Up Chronic Condition     History of Present Illness   Wendy Mendoza is a 81 y.o. female who was seen at [facility] today for admission/readmission. They are admitted for rehabilitation. Presented to the ED on 10/12 with complaint of abdominal pain and intractable nausea with vomiting. She had a longstanding history of nausea/vomiting for which she has been seen by Dr. Marva Claire in the past.  Was admitted from 10/12 through 10/18. Was found to have esophageal stricture and had EGD done on 10/17 with dilation of the esophagus. Patient tolerated the procedure well without complications. Pt to follow up with Dr. Benjamin Victoria in about 2 weeks. Appointment to be scheduled. Of note, she also recently had hip fracture with repair at [facility] about a month ago, after which she was discharged to a rehab facility, where she feels symptoms of dysphagia/poor appetite/n/v did increase. Today, pt mentioned she is feeling better. She has been able to drink and eat without issues, however endorses mild intermittent nausea which is relieve with medication. She feels weak and feels like her brain is foggy. She would like to go home as soon as possible. Mentioned has long standing constipation issues. Last BM ~ 3 days ago.      Functional Status/Activity   Functional Status: Independent    ADL Assessment (1-Independent, 2-Adaptive Assist, 3-Human Assist 4-Total Dependent)   1 2 3 4   Bath/Shower [x] [] [] []   Shaving [x] [] [] []   Hairdressing [x] [] [] []   Dressing [x] [] [] []   Eating [x] [] [] []   Transfer bed to chair [x] [] [] []   Toilet use [x] [] [] []     Activity: As tolerated. Continence:   Bladder  [x] Yes  [] No    Bowel  [x] Yes  [] No      Diet: CCHO - diet. Mechanical soft texture, thin consistency. No tomatoes sauces, soft and bite-sized for Nutrition. Review of Systems   Review of Systems   Constitutional:  Positive for weight loss. Negative for chills and fever. HENT:  Negative for congestion and tinnitus. Eyes:  Negative for blurred vision and pain. Dry eyes. Respiratory:  Negative for cough and shortness of breath. Cardiovascular:  Negative for chest pain and leg swelling. Gastrointestinal:  Positive for constipation and nausea. Negative for abdominal pain, diarrhea and vomiting. Genitourinary:  Negative for dysuria and frequency. Musculoskeletal:  Negative for back pain and neck pain. Skin:  Negative for rash. Neurological:  Positive for weakness. Negative for dizziness and headaches. Psychiatric/Behavioral:  The patient is not nervous/anxious. Vitals / Objective:     Vitals:    10/20/22 1528   BP: 123/71   Pulse: 86   Resp: 18   Temp: 97.8 °F (36.6 °C)   SpO2: 97%   Weight: 83 lb 11.2 oz (38 kg)     Body mass index is 15.81 kg/m². Physical Exam  Constitutional:       General: She is not in acute distress. Appearance: She is ill-appearing. HENT:      Head: Normocephalic and atraumatic. Right Ear: External ear normal.      Left Ear: External ear normal.      Nose: Nose normal.      Mouth/Throat:      Mouth: Mucous membranes are dry. Pharynx: Oropharynx is clear. Eyes:      Conjunctiva/sclera: Conjunctivae normal.   Cardiovascular:      Rate and Rhythm: Normal rate and regular rhythm. Pulses: Normal pulses. Heart sounds: Normal heart sounds. No murmur heard. Pulmonary:      Effort: Pulmonary effort is normal.      Breath sounds: Normal breath sounds.    Abdominal:      General: Bowel sounds are normal.      Palpations: Abdomen is soft. There is no mass. Tenderness: There is no abdominal tenderness. There is no guarding or rebound. Musculoskeletal:         General: Normal range of motion. Cervical back: Normal range of motion and neck supple. Right lower leg: No edema. Left lower leg: No edema. Skin:     General: Skin is warm and dry. Capillary Refill: Capillary refill takes less than 2 seconds. Neurological:      General: No focal deficit present. Mental Status: She is alert. Cranial Nerves: No cranial nerve deficit. Comments: Alert and oriented x 4   Psychiatric:         Mood and Affect: Mood normal.       Mental Status Exam / Labs     Mental Status: alert, oriented to person, place, and time. Mini-Mental Status Exam:     Orientation  Oriented to Year: Yes  Oriented to Season: Yes  Oriented to Date: Yes  Oriented to Day: Yes  Oriented to Month: Yes  Oriented to State: Yes  Oriented to Country: Yes  Oriented to Town: Yes  Oriented to Hospital: Yes  Oriented to Floor: Yes  Registration  Repeats Ball: Yes  Repeats Flag: Yes  Repeats Tree: Yes  Attention and Calculation  100-7=93: Yes  93-7=86: Yes  86-7=79: Yes  79-7=72: Yes  72-7=65: Yes  OR Spell \"WORLD\" Backwards: Yes  Recall  Recalls Ball: Yes  Recalls Flag: Yes  Recalls Tree: Yes  Language  Point to Teresina, Ask Pt to Name It: Yes  Point to Watch, Ask Pt to Name It: Yes  Repeats Phrase \"NO IFS, ANDS, OR BUTS\": Yes  3 Stage Command:  Take a Paper in Your Hand: Yes  3 Stage Command:  Fold it in Half: Yes  3 Stage Command: Put it on the Floor: Yes  Reads and Obeys \"CLOSE YOUR EYES\": Yes  Writes Sentence as Instructed: Yes  Copies Design as Instructed: Yes  Total Score  Total Score: 31    Pertinent Lab/Test Results:  H.9 HCT: 30.9 on 10/16  Na:137 K: 3.5 Cl:105 Cr: 0.62. Phos: 2.8 on 10/16    Assessment and Plan:   Intractable N/V in the s/o Esophageal stricture: S/p EGD w/ esophageal dilation: Resolved. Seen by Dr. Caitlin Bunch.  Pt tolerating diet at this time. Only mild nausea. - Continue with Protonix 40 mg PO BID daily  - Zofran 4 mg PO q8hrs as needed for nausea. - Appt to be scheduled to follow w/ GI as OP in < 2 weeks. - Phosphorus level ordered. Underweight: BMI 15.81. Due to above problem. - Nutrition consulted  - Glucerna 120 mL every day at lunch time  - Daily weight x 3 days  - Weekly weight  - Phosphorus level to check for refeeding Sd. Debility/Weakness:  - PT/OT consulted for eval and treatment. Hyponatremia: During hospitalization. Resolved. - BMP to check electrolytes. Anemia: Probably dilutional. No signs of active bleeding. Recent Sx.   - CBC  - If low we may consider anemia work up. HTN: Stable. Normal VS.   - Continue Amlodipine 5 mg PO daily   - Continue Atenolol 25 mg PO daily     H/o nondisplaced R hip fracture: ~ 1.5 months ago. At [facility]. No records available. Doing well. Mild pain in R hip.   - Follows with Ortho. - Will ask when the next follow up is. - Will work with PT.   - Continue Plavix 75 mg PO daily for clot prevention. HLD: Stable. Lab Results   Component Value Date/Time    Cholesterol, total 169 07/20/2018 02:02 AM    HDL Cholesterol 65 07/20/2018 02:02 AM    LDL, calculated 84.2 07/20/2018 02:02 AM    VLDL, calculated 19.8 07/20/2018 02:02 AM    Triglyceride 99 07/20/2018 02:02 AM    CHOL/HDL Ratio 2.6 07/20/2018 02:02 AM   - Colestipol HCl 5 mg PO daily   - Lipid panel ordered. GERD: Stable. - Protonix 40 mg PO BID daily. IBS-Constipation: Last BM 3 days ago. - Glycolax Powder. Give 17 g PO daily   - Metamucil 5.8 grams PO daily.   - Dulcolax suppository daily as needed. - Dicyclomine 10 mg every 8 hrs as needed. Allergies:  - Fluticasone nasal spray. 1 spray in each nostril twice a day. Anxiety:  - Diazepam 05 mg q8hrs PO as needed for anxiety. Knee and Back pain:  - Tylenol as needed  - Voltaren Gel 1% as needed.      Dry eyes:   Refresh Liquigel Gel 1%, 1 drop in each eye BID daily. Code status discussed with the patient/caregivers. FULL    I have discussed the patients medical condition and expected course with the patient and/or the responsible party. Their questions were answered concerning future plans. They patients care was discussed with the attending: Dr. Zachary Mendoza. Electronically Signed: Sarah Canela MD     History   Patients past medical, surgical and family histories were reviewed and updated.     Past Medical History:   Diagnosis Date    Allergies     Anxiety     Breast cancer ([facility] 1993    s/p lumpectomy    Diabetes mellitus ([facility]     diet-controlled    Hx-TIA (transient ischemic attack)     Hypertension     Hyponatremia     [facility] 2008    s/p resection, right cheek    Tremor      Past Surgical History:   Procedure Laterality Date    HX BACK SURGERY      HX BREAST LUMPECTOMY  1993    right    HX CHOLECYSTECTOMY      HX HYSTERECTOMY      HX MALIGNANT SKIN LESION EXCISION  2008    HX MENISCECTOMY      HX OTHER SURGICAL      pineda's neuroma bilaterally     Family History   Problem Relation Age of Onset    Heart Failure Mother     Other Father         aortic aneurysm    Diabetes Son     Immunodeficiency Son         post kidney and pancreas transplant     Social History     Socioeconomic History    Marital status:      Spouse name: Not on file    Number of children: Not on file    Years of education: Not on file    Highest education level: Not on file   Occupational History    Occupation: retired from retail   Tobacco Use    Smoking status: Never    Smokeless tobacco: Never   Vaping Use    Vaping Use: Never used   Substance and Sexual Activity    Alcohol use: No    Drug use: Never    Sexual activity: Not on file   Other Topics Concern    Not on file   Social History Narrative    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 60 Tablet 0    diazePAM (VALIUM) 5 mg tablet Take 0.5 Tablets by mouth every eight (8) hours as needed for Anxiety. Max Daily Amount: 7.5 mg. 8 Tablet 0    clopidogreL (PLAVIX) 75 mg tab TAKE 1 TABLET BY MOUTH EVERY DAY 90 Tablet 0    fluticasone propionate (FLONASE) 50 mcg/actuation nasal spray SPRAY ONE SPRAY IN EACH NOSTRIL ONCE DAILY 16 g 3    dicyclomine (BENTYL) 10 mg capsule TAKE ONE CAPSULE BY MOUTH THREE TIMES A DAY AS NEEDED 270 Capsule 0    ondansetron (ZOFRAN ODT) 4 mg disintegrating tablet DISSOLVE ONE TABLET BY MOUTH EVERY 8 HOURS AS NEEDED FOR NAUSEA 20 Tablet 5    atenoloL (TENORMIN) 25 mg tablet TAKE ONE TABLET BY MOUTH DAILY 90 Tablet 3    benzonatate (Tessalon Perles) 100 mg capsule Take 1 Capsule by mouth three (3) times daily as needed for Cough. 30 Capsule 0    psyllium husk-aspartame (Metamucil Fiber Singles) 3.4 gram pwpk packet Take 1 Packet by mouth daily. 30 Packet 0    colestipoL (COLESTID) 5 gram packet Take 1 Packet by mouth daily. 30 Each 0    amLODIPine (NORVASC) 5 mg tablet TAKE 1 TABLET BY MOUTH EVERY DAY 90 Tablet 0    meclizine (ANTIVERT) 12.5 mg tablet TAKE ONE TO TWO TABLETS BY MOUTH TWICE A DAY AS NEEDED FOR DIZZINESS 180 Tablet 1    diclofenac (Voltaren) 1 % gel Apply 2 g to affected area four (4) times daily as needed (knee pain, back pain). 60 g 1    polyethylene glycol (Miralax) 17 gram/dose powder Take 17 g by mouth daily. carboxymethylcellulose sodium (REFRESH LIQUIGEL) 1 % dlgl ophthalmic solution Administer 1 Drop to both eyes as needed (dry eyes). cyanocobalamin (VITAMIN B12) 500 mcg tablet Take 500 mcg by mouth as needed. acetaminophen (TYLENOL) 325 mg tablet Take 325-650 mg by mouth every six (6) hours as needed (headache). Allergies   Allergen Reactions    Sulfa (Sulfonamide Antibiotics) Nausea and Vomiting    Codeine Other (comments)     Stomach cramping    Dairy Aid [Lactase] Unknown (comments)    Levaquin [Levofloxacin] Nausea and Vomiting     Pt can not take PO due to vomiting.     Linzess [Linaclotide] Diarrhea    Tomato Unknown (comments)

## 2022-10-20 ENCOUNTER — TELEPHONE (OUTPATIENT)
Dept: PRIMARY CARE CLINIC | Age: 87
End: 2022-10-20

## 2022-10-20 VITALS
TEMPERATURE: 97.8 F | RESPIRATION RATE: 18 BRPM | WEIGHT: 83.7 LBS | HEART RATE: 86 BPM | SYSTOLIC BLOOD PRESSURE: 123 MMHG | OXYGEN SATURATION: 97 % | BODY MASS INDEX: 15.81 KG/M2 | DIASTOLIC BLOOD PRESSURE: 71 MMHG

## 2022-10-20 PROBLEM — K52.9 ACUTE COLITIS: Status: RESOLVED | Noted: 2019-10-31 | Resolved: 2022-10-20

## 2022-10-20 PROBLEM — G89.29 CHRONIC LOW BACK PAIN: Status: ACTIVE | Noted: 2022-10-20

## 2022-10-20 PROBLEM — H04.123 DRY EYES: Status: ACTIVE | Noted: 2022-10-20

## 2022-10-20 PROBLEM — T78.40XA ALLERGIES: Status: ACTIVE | Noted: 2022-10-20

## 2022-10-20 PROBLEM — K92.2 GI BLEED: Status: RESOLVED | Noted: 2019-10-31 | Resolved: 2022-10-20

## 2022-10-20 PROBLEM — Z87.81 HISTORY OF FRACTURE OF RIGHT HIP: Status: ACTIVE | Noted: 2022-10-20

## 2022-10-20 PROBLEM — K22.2 ESOPHAGEAL STRICTURE: Status: ACTIVE | Noted: 2022-10-20

## 2022-10-20 PROBLEM — E86.0 DEHYDRATION: Status: RESOLVED | Noted: 2022-10-12 | Resolved: 2022-10-20

## 2022-10-20 PROBLEM — D64.89 OTHER SPECIFIED ANEMIAS: Status: ACTIVE | Noted: 2022-10-20

## 2022-10-20 PROBLEM — M54.50 CHRONIC LOW BACK PAIN: Status: ACTIVE | Noted: 2022-10-20

## 2022-10-20 PROBLEM — R63.6 UNDERWEIGHT: Status: ACTIVE | Noted: 2022-10-20

## 2022-10-21 NOTE — PROGRESS NOTES
I saw and evaluated the patient, performing the key elements of the service. I discussed the findings, assessment and plan with the resident and agree with the resident's findings and plan as documented in the resident's note. Meets criteria for adult failure to thrive. Anticipate some improvement following dilation and PPI. Encourage PO intake. Watch for s/sx of refeeding syndrome.

## 2022-11-09 ENCOUNTER — TELEPHONE (OUTPATIENT)
Dept: PRIMARY CARE CLINIC | Age: 87
End: 2022-11-09

## 2022-11-09 NOTE — TELEPHONE ENCOUNTER
Pt is being discharged from [facility] [address], they have requested that the pt receive home health services for PT and OT and asking if Roselia Medrano is willing to follow for Homehealth.

## 2022-11-18 ENCOUNTER — OFFICE VISIT (OUTPATIENT)
Dept: PRIMARY CARE CLINIC | Age: 87
End: 2022-11-18
Payer: MEDICARE

## 2022-11-18 VITALS
DIASTOLIC BLOOD PRESSURE: 78 MMHG | OXYGEN SATURATION: 98 % | HEIGHT: 61 IN | HEART RATE: 74 BPM | WEIGHT: 88 LBS | BODY MASS INDEX: 16.62 KG/M2 | RESPIRATION RATE: 18 BRPM | TEMPERATURE: 97.3 F | SYSTOLIC BLOOD PRESSURE: 134 MMHG

## 2022-11-18 DIAGNOSIS — D64.9 ANEMIA, UNSPECIFIED TYPE: ICD-10-CM

## 2022-11-18 DIAGNOSIS — K22.2 ESOPHAGEAL STRICTURE: Primary | ICD-10-CM

## 2022-11-18 DIAGNOSIS — I10 ESSENTIAL HYPERTENSION: ICD-10-CM

## 2022-11-18 PROCEDURE — G8536 NO DOC ELDER MAL SCRN: HCPCS | Performed by: FAMILY MEDICINE

## 2022-11-18 PROCEDURE — G8427 DOCREV CUR MEDS BY ELIG CLIN: HCPCS | Performed by: FAMILY MEDICINE

## 2022-11-18 PROCEDURE — G8419 CALC BMI OUT NRM PARAM NOF/U: HCPCS | Performed by: FAMILY MEDICINE

## 2022-11-18 PROCEDURE — 1090F PRES/ABSN URINE INCON ASSESS: CPT | Performed by: FAMILY MEDICINE

## 2022-11-18 PROCEDURE — 99214 OFFICE O/P EST MOD 30 MIN: CPT | Performed by: FAMILY MEDICINE

## 2022-11-18 PROCEDURE — G8510 SCR DEP NEG, NO PLAN REQD: HCPCS | Performed by: FAMILY MEDICINE

## 2022-11-18 PROCEDURE — 1123F ACP DISCUSS/DSCN MKR DOCD: CPT | Performed by: FAMILY MEDICINE

## 2022-11-18 PROCEDURE — 1101F PT FALLS ASSESS-DOCD LE1/YR: CPT | Performed by: FAMILY MEDICINE

## 2022-11-18 RX ORDER — ACETAMINOPHEN 500 MG
1 TABLET ORAL DAILY
Qty: 1 KIT | Refills: 0 | Status: SHIPPED | OUTPATIENT
Start: 2022-11-18

## 2022-11-18 RX ORDER — DICYCLOMINE HYDROCHLORIDE 10 MG/1
10 CAPSULE ORAL AS NEEDED
COMMUNITY

## 2022-11-18 NOTE — PROGRESS NOTES
HPI     Chief Complaint   Patient presents with   [facility] 10/12/-10/18/2022, [facility]        HPI:  Gilbert Crenshaw is a 81 y.o. female who has a history of HTN, History of TIA, Anxiety. Patient is new to me. PCP is Nurse Practitioner Ro [facility]. Recent progress notes and labs available through the EMR have been reviewed. Recent hospitalization/esophageal stricture: Patient was hospitalized October 12- October 18 secondary to failure to thrive. She was found to have an esophageal stricture and underwent esophageal dilatation. The symptoms started after she was discharged from the hospital to rehabilitation due to right hip fracture and was found to have increasing n/v with poor appetite and weight loss. Currently, she feels n/v has improved but still has some flares. Daughter-in-law, mPOA present and is concerned about her antihypertensive medications. She says patient's hip fracture was 2/2 dizziness. Daughter-in-law does not have any home/SNF BP readings. HTN concern: She is prescribed atenolol 25mg and amlodipine 5mg but she has not been taking the latter. No hx of MI. Did have TIAs but no stroke. Daughter in law concerned she is overmedicated and is having hypotension as she is often dizzy. She stopped the amlodipine and dizziness has resolved. Anemia: noted on discharge labs. Hb was normal on admission. Believed to be dilutional. No melena. Allergies   Allergen Reactions    Sulfa (Sulfonamide Antibiotics) Nausea and Vomiting    Codeine Other (comments)     Stomach cramping    Dairy Aid [Lactase] Unknown (comments)    Levaquin [Levofloxacin] Nausea and Vomiting     Pt can not take PO due to vomiting.  Linzess [Linaclotide] Diarrhea    Tomato Unknown (comments)       Current Outpatient Medications   Medication Sig    dicyclomine (BENTYL) 10 mg capsule Take 10 mg by mouth as needed.     Blood Pressure Monitor (Blood Pressure Kit) kit 1 Each by Does Not Apply route daily.  pantoprazole (PROTONIX) 40 mg tablet Take 1 Tablet by mouth two (2) times a day.  diazePAM (VALIUM) 5 mg tablet Take 0.5 Tablets by mouth every eight (8) hours as needed for Anxiety. Max Daily Amount: 7.5 mg.    clopidogreL (PLAVIX) 75 mg tab TAKE 1 TABLET BY MOUTH EVERY DAY    fluticasone propionate (FLONASE) 50 mcg/actuation nasal spray SPRAY ONE SPRAY IN EACH NOSTRIL ONCE DAILY    ondansetron (ZOFRAN ODT) 4 mg disintegrating tablet DISSOLVE ONE TABLET BY MOUTH EVERY 8 HOURS AS NEEDED FOR NAUSEA    atenoloL (TENORMIN) 25 mg tablet TAKE ONE TABLET BY MOUTH DAILY    colestipoL (COLESTID) 5 gram packet Take 1 Packet by mouth daily.  meclizine (ANTIVERT) 12.5 mg tablet TAKE ONE TO TWO TABLETS BY MOUTH TWICE A DAY AS NEEDED FOR DIZZINESS    diclofenac (Voltaren) 1 % gel Apply 2 g to affected area four (4) times daily as needed (knee pain, back pain).  polyethylene glycol (MIRALAX) 17 gram/dose powder Take 17 g by mouth daily.  cyanocobalamin (VITAMIN B12) 500 mcg tablet Take 500 mcg by mouth as needed.  acetaminophen (TYLENOL) 325 mg tablet Take 325-650 mg by mouth every six (6) hours as needed (headache).  psyllium husk-aspartame (Metamucil Fiber Singles) 3.4 gram pwpk packet Take 1 Packet by mouth daily. (Patient not taking: No sig reported)     No current facility-administered medications for this visit. Review of Systems   Constitutional:  Negative for chills and fever. Cardiovascular:  Negative for chest pain and palpitations. Gastrointestinal:  Negative for abdominal pain, blood in stool and vomiting. Neurological:  Negative for dizziness and headaches. Reviewed PmHx, FmHx, SocHx as well as meds and allergies, updated and dated in the chart.          Objective     Visit Vitals  /78 (BP 1 Location: Left upper arm, BP Patient Position: Sitting, BP Cuff Size: Adult)   Pulse 74   Temp 97.3 °F (36.3 °C) (Temporal)   Resp 18   Ht 5' 1\" (1.549 m) Wt 88 lb (39.9 kg)   SpO2 98%   BMI 16.63 kg/m²     Physical Exam  Vitals and nursing note reviewed. Constitutional:       Appearance: Normal appearance. Cardiovascular:      Rate and Rhythm: Normal rate and regular rhythm. Heart sounds: Normal heart sounds. Pulmonary:      Effort: Pulmonary effort is normal. No respiratory distress. Breath sounds: Normal breath sounds. Abdominal:      General: Abdomen is flat. There is no distension. Musculoskeletal:      Comments: Uses walker to ambulate. Neurological:      General: No focal deficit present. Mental Status: She is alert and oriented to person, place, and time. Assessment and Plan     Diagnoses and all orders for this visit:    1. Esophageal stricture  Comments: Will place referral for patient to have follow up with Dr. Weston Orozco. Continue protonix and management per their recommendations. Orders:  -     CBC WITH AUTOMATED DIFF  -     REFERRAL TO GASTROENTEROLOGY    2. Anemia, unspecified type  Comments:  Checking labs today. Likely dilutional. If still anemic, we will check additional anemia labs. Orders:  -     CBC WITH AUTOMATED DIFF    3. Essential hypertension  Comments:  BP at goal. Continue atenolol 25mg daily and continue to hold amlodipine as she feels this reigmen is working well for her. Orders:  -     Blood Pressure Monitor (Blood Pressure Kit) kit; 1 Each by Does Not Apply route daily. As applicable:  Medication Side Effects and Warnings were discussed with patient. Patient Labs were reviewed and or requested. Patient Past Records were reviewed and or requested. I have discussed the diagnosis with the patient and the intended plan as seen in the above orders. The patient has received an after-visit summary and questions were answered concerning future plans. I have discussed medication side effects and warnings with the patient as well.       MD [facility]  [address]

## 2022-11-19 LAB
BASOPHILS # BLD AUTO: 0.1 X10E3/UL (ref 0–0.2)
BASOPHILS NFR BLD AUTO: 1 %
EOSINOPHIL # BLD AUTO: 0.2 X10E3/UL (ref 0–0.4)
EOSINOPHIL NFR BLD AUTO: 3 %
ERYTHROCYTE [DISTWIDTH] IN BLOOD BY AUTOMATED COUNT: 12.8 % (ref 11.7–15.4)
HCT VFR BLD AUTO: 37.3 % (ref 34–46.6)
HGB BLD-MCNC: 12 G/DL (ref 11.1–15.9)
IMM GRANULOCYTES # BLD AUTO: 0 X10E3/UL (ref 0–0.1)
IMM GRANULOCYTES NFR BLD AUTO: 0 %
LYMPHOCYTES # BLD AUTO: 2.1 X10E3/UL (ref 0.7–3.1)
LYMPHOCYTES NFR BLD AUTO: 33 %
MCH RBC QN AUTO: 29.1 PG (ref 26.6–33)
MCHC RBC AUTO-ENTMCNC: 32.2 G/DL (ref 31.5–35.7)
MCV RBC AUTO: 90 FL (ref 79–97)
MONOCYTES # BLD AUTO: 0.6 X10E3/UL (ref 0.1–0.9)
MONOCYTES NFR BLD AUTO: 9 %
NEUTROPHILS # BLD AUTO: 3.4 X10E3/UL (ref 1.4–7)
NEUTROPHILS NFR BLD AUTO: 54 %
PLATELET # BLD AUTO: 393 X10E3/UL (ref 150–450)
RBC # BLD AUTO: 4.13 X10E6/UL (ref 3.77–5.28)
WBC # BLD AUTO: 6.4 X10E3/UL (ref 3.4–10.8)

## 2022-11-23 NOTE — PROGRESS NOTES
[employer] message sent:    Good afternoon Ms. Isidro Bellamy labs have returned and your blood counts now show that the anemia has resolved. This is reassuring news. I hope you are still having a smooth recovery after your hospitalization. Please contact me with any questions that you may have.     Happy holidays,    Dr. Isai Bauman

## 2022-12-02 DIAGNOSIS — K58.2 IRRITABLE BOWEL SYNDROME WITH BOTH CONSTIPATION AND DIARRHEA: ICD-10-CM

## 2022-12-02 RX ORDER — ONDANSETRON 4 MG/1
4 TABLET, ORALLY DISINTEGRATING ORAL
Qty: 20 TABLET | Refills: 2 | Status: SHIPPED | OUTPATIENT
Start: 2022-12-02

## 2022-12-05 DIAGNOSIS — K22.2 ESOPHAGEAL STRICTURE: ICD-10-CM

## 2022-12-05 NOTE — TELEPHONE ENCOUNTER
Pt called asking for a refill on the attached medication.  After looking it looks like pt was prescribed medication while in the hospital.

## 2022-12-07 RX ORDER — DIAZEPAM 5 MG/1
2.5 TABLET ORAL
Qty: 30 TABLET | Refills: 0 | Status: SHIPPED | OUTPATIENT
Start: 2022-12-07

## 2023-01-02 DIAGNOSIS — K22.2 ESOPHAGEAL STRICTURE: ICD-10-CM

## 2023-01-02 DIAGNOSIS — F41.9 ANXIETY: Primary | ICD-10-CM

## 2023-01-06 ENCOUNTER — TELEPHONE (OUTPATIENT)
Dept: PRIMARY CARE CLINIC | Age: 88
End: 2023-01-06

## 2023-01-09 ENCOUNTER — TELEPHONE (OUTPATIENT)
Dept: PRIMARY CARE CLINIC | Age: 88
End: 2023-01-09

## 2023-01-09 RX ORDER — DIAZEPAM 5 MG/1
TABLET ORAL
Qty: 30 TABLET | Refills: 0 | Status: SHIPPED | OUTPATIENT
Start: 2023-01-09

## 2023-01-09 RX ORDER — PANTOPRAZOLE SODIUM 40 MG/1
40 TABLET, DELAYED RELEASE ORAL 2 TIMES DAILY
Qty: 60 TABLET | Refills: 2 | Status: SHIPPED | OUTPATIENT
Start: 2023-01-09

## 2023-02-02 DIAGNOSIS — R42 DIZZINESS: ICD-10-CM

## 2023-02-02 RX ORDER — MECLIZINE HCL 12.5 MG 12.5 MG/1
TABLET ORAL
Qty: 180 TABLET | Refills: 1 | Status: SHIPPED | OUTPATIENT
Start: 2023-02-02

## 2023-02-20 DIAGNOSIS — F41.9 ANXIETY: ICD-10-CM

## 2023-02-20 RX ORDER — DIAZEPAM 5 MG/1
TABLET ORAL
Qty: 30 TABLET | Refills: 2 | Status: SHIPPED | OUTPATIENT
Start: 2023-02-20

## 2023-04-18 ENCOUNTER — APPOINTMENT (OUTPATIENT)
Dept: CT IMAGING | Age: 88
End: 2023-04-18
Payer: MEDICARE

## 2023-04-18 ENCOUNTER — HOSPITAL ENCOUNTER (EMERGENCY)
Age: 88
Discharge: HOME | End: 2023-04-18
Attending: EMERGENCY MEDICINE
Payer: MEDICARE

## 2023-04-18 VITALS
DIASTOLIC BLOOD PRESSURE: 78 MMHG | TEMPERATURE: 97.5 F | SYSTOLIC BLOOD PRESSURE: 136 MMHG | OXYGEN SATURATION: 94 % | RESPIRATION RATE: 16 BRPM | HEART RATE: 94 BPM

## 2023-04-18 DIAGNOSIS — N39.0 URINARY TRACT INFECTION WITHOUT HEMATURIA, SITE UNSPECIFIED: ICD-10-CM

## 2023-04-18 DIAGNOSIS — R10.11 ABDOMINAL PAIN, RIGHT UPPER QUADRANT: Primary | ICD-10-CM

## 2023-04-18 LAB
ALBUMIN SERPL-MCNC: 3.6 G/DL (ref 3.5–5)
ALBUMIN/GLOB SERPL: 0.9 (ref 1.1–2.2)
ALP SERPL-CCNC: 99 U/L (ref 45–117)
ALT SERPL-CCNC: 23 U/L (ref 12–78)
ANION GAP SERPL CALC-SCNC: 5 MMOL/L (ref 5–15)
APPEARANCE UR: CLEAR
AST SERPL-CCNC: 23 U/L (ref 15–37)
BACTERIA URNS QL MICRO: NEGATIVE /HPF
BASOPHILS # BLD: 0 K/UL (ref 0–0.1)
BASOPHILS NFR BLD: 0 % (ref 0–1)
BILIRUB SERPL-MCNC: 0.6 MG/DL (ref 0.2–1)
BILIRUB UR QL: NEGATIVE
BUN SERPL-MCNC: 16 MG/DL (ref 6–20)
BUN/CREAT SERPL: 18 (ref 12–20)
CALCIUM SERPL-MCNC: 9.2 MG/DL (ref 8.5–10.1)
CHLORIDE SERPL-SCNC: 102 MMOL/L (ref 97–108)
CO2 SERPL-SCNC: 25 MMOL/L (ref 21–32)
COLOR UR: ABNORMAL
CREAT SERPL-MCNC: 0.89 MG/DL (ref 0.55–1.02)
DIFFERENTIAL METHOD BLD: ABNORMAL
EOSINOPHIL # BLD: 0.1 K/UL (ref 0–0.4)
EOSINOPHIL NFR BLD: 1 % (ref 0–7)
EPITH CASTS URNS QL MICRO: ABNORMAL /LPF
ERYTHROCYTE [DISTWIDTH] IN BLOOD BY AUTOMATED COUNT: 13.2 % (ref 11.5–14.5)
GLOBULIN SER CALC-MCNC: 4.2 G/DL (ref 2–4)
GLUCOSE SERPL-MCNC: 129 MG/DL (ref 65–100)
GLUCOSE UR STRIP.AUTO-MCNC: NEGATIVE MG/DL
HCT VFR BLD AUTO: 40.9 % (ref 35–47)
HGB BLD-MCNC: 13.3 G/DL (ref 11.5–16)
HGB UR QL STRIP: NEGATIVE
IMM GRANULOCYTES # BLD AUTO: 0 K/UL (ref 0–0.04)
IMM GRANULOCYTES NFR BLD AUTO: 0 % (ref 0–0.5)
KETONES UR QL STRIP.AUTO: NEGATIVE MG/DL
LEUKOCYTE ESTERASE UR QL STRIP.AUTO: ABNORMAL
LIPASE SERPL-CCNC: 214 U/L (ref 73–393)
LYMPHOCYTES # BLD: 0.9 K/UL (ref 0.8–3.5)
LYMPHOCYTES NFR BLD: 10 % (ref 12–49)
MCH RBC QN AUTO: 29.2 PG (ref 26–34)
MCHC RBC AUTO-ENTMCNC: 32.5 G/DL (ref 30–36.5)
MCV RBC AUTO: 89.7 FL (ref 80–99)
MONOCYTES # BLD: 0.3 K/UL (ref 0–1)
MONOCYTES NFR BLD: 4 % (ref 5–13)
NEUTS SEG # BLD: 7.4 K/UL (ref 1.8–8)
NEUTS SEG NFR BLD: 85 % (ref 32–75)
NITRITE UR QL STRIP.AUTO: POSITIVE
NRBC # BLD: 0 K/UL (ref 0–0.01)
NRBC BLD-RTO: 0 PER 100 WBC
PH UR STRIP: 6 (ref 5–8)
PLATELET # BLD AUTO: 301 K/UL (ref 150–400)
PMV BLD AUTO: 9.9 FL (ref 8.9–12.9)
POTASSIUM SERPL-SCNC: 4.1 MMOL/L (ref 3.5–5.1)
PROT SERPL-MCNC: 7.8 G/DL (ref 6.4–8.2)
PROT UR STRIP-MCNC: NEGATIVE MG/DL
RBC # BLD AUTO: 4.56 M/UL (ref 3.8–5.2)
RBC #/AREA URNS HPF: ABNORMAL /HPF (ref 0–5)
SODIUM SERPL-SCNC: 132 MMOL/L (ref 136–145)
SP GR UR REFRACTOMETRY: 1.03 (ref 1–1.03)
UROBILINOGEN UR QL STRIP.AUTO: 1 EU/DL (ref 0.2–1)
WBC # BLD AUTO: 8.7 K/UL (ref 3.6–11)
WBC URNS QL MICRO: ABNORMAL /HPF (ref 0–4)

## 2023-04-18 PROCEDURE — 96374 THER/PROPH/DIAG INJ IV PUSH: CPT

## 2023-04-18 PROCEDURE — 85025 COMPLETE CBC W/AUTO DIFF WBC: CPT

## 2023-04-18 PROCEDURE — 74011000636 HC RX REV CODE- 636: Performed by: EMERGENCY MEDICINE

## 2023-04-18 PROCEDURE — 99285 EMERGENCY DEPT VISIT HI MDM: CPT

## 2023-04-18 PROCEDURE — 74177 CT ABD & PELVIS W/CONTRAST: CPT

## 2023-04-18 PROCEDURE — 80053 COMPREHEN METABOLIC PANEL: CPT

## 2023-04-18 PROCEDURE — 74011250636 HC RX REV CODE- 250/636

## 2023-04-18 PROCEDURE — 83690 ASSAY OF LIPASE: CPT

## 2023-04-18 PROCEDURE — 81001 URINALYSIS AUTO W/SCOPE: CPT

## 2023-04-18 PROCEDURE — 36415 COLL VENOUS BLD VENIPUNCTURE: CPT

## 2023-04-18 RX ORDER — CIPROFLOXACIN 500 MG/1
500 TABLET ORAL 2 TIMES DAILY
Qty: 28 TABLET | Refills: 0 | Status: SHIPPED | OUTPATIENT
Start: 2023-04-18 | End: 2023-05-02

## 2023-04-18 RX ORDER — ONDANSETRON 2 MG/ML
4 INJECTION INTRAMUSCULAR; INTRAVENOUS
Status: COMPLETED | OUTPATIENT
Start: 2023-04-18 | End: 2023-04-18

## 2023-04-18 RX ADMIN — IOPAMIDOL 100 ML: 755 INJECTION, SOLUTION INTRAVENOUS at 13:02

## 2023-04-18 RX ADMIN — SODIUM CHLORIDE 500 ML: 9 INJECTION, SOLUTION INTRAVENOUS at 11:50

## 2023-04-18 RX ADMIN — ONDANSETRON 4 MG: 2 INJECTION INTRAMUSCULAR; INTRAVENOUS at 11:49

## 2023-04-18 NOTE — ED TRIAGE NOTES
Pt arrives via EMS from home for complaints of upset stomach and generalized weakness. Reports that patient had hypotension at home but has increased to normal parameters while in route. Denies any fevers, abdominal pain, chest pain or shortness of breath. Pt states that yesterday she was diagnosed with a UTI and started ABX. PMH of anxiety, breast cancer, diabetes, TIA, HTN. Pt does take plavix.

## 2023-04-18 NOTE — ED PROVIDER NOTES
Neil Cantu is a 81 y.o. female presenting to the ED c/o abdominal pain and weakness via EMS. Per medic, glucose 132, HR 80 s, initial SBP in 90s but has since went up to 130s. Pt is currently taking Plavix. Pt reports dx w/ UTI yesterday at [facility] yesterday where she was started on Augmentin. Pt reports taking a total of 2 doses. + nausea. Pt reports having an episode of diarrhea today after taking miralax. Denies cardiac hx, fever, vomiting, hematuria. Medical hx: TIA    The history is provided by the patient. No  was used.       Past Medical History:   Diagnosis Date    Allergies     Anxiety     Breast cancer ([facility] [address] 1993    s/p lumpectomy    Diabetes mellitus ([facility] [address]     diet-controlled    Hx-TIA (transient ischemic attack)     Hypertension     Hyponatremia     Melanoma ([facility] [address] 2008    s/p resection, right cheek    Tremor        Past Surgical History:   Procedure Laterality Date    HX BACK SURGERY      HX BREAST LUMPECTOMY  1993    right    HX CHOLECYSTECTOMY      HX HYSTERECTOMY      HX MALIGNANT SKIN LESION EXCISION  2008    HX MENISCECTOMY      HX OTHER SURGICAL      pineda's neuroma bilaterally         Family History:   Problem Relation Age of Onset    Heart Failure Mother     Other Father         aortic aneurysm    Diabetes Son     Immunodeficiency Son         post kidney and pancreas transplant       Social History     Socioeconomic History    Marital status:      Spouse name: Not on file    Number of children: Not on file    Years of education: Not on file    Highest education level: Not on file   Occupational History    Occupation: retired from retail   Tobacco Use    Smoking status: Never    Smokeless tobacco: Never   Vaping Use    Vaping Use: Never used   Substance and Sexual Activity    Alcohol use: No    Drug use: Never    Sexual activity: Not on file   Other Topics Concern    Not on file   Social History Narrative    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 Negative for activity change, chills and fever. HENT:  Negative for rhinorrhea and sore throat. Eyes:  Negative for visual disturbance. Respiratory:  Negative for cough and shortness of breath. Cardiovascular:  Negative for chest pain. Gastrointestinal:  Positive for abdominal pain and nausea. Negative for constipation, diarrhea and vomiting. Genitourinary:  Positive for dysuria. Negative for decreased urine volume and difficulty urinating. Musculoskeletal:  Negative for myalgias. Skin:  Negative for rash. Neurological:  Positive for weakness. Negative for speech difficulty and headaches. All other systems reviewed and are negative. There were no vitals filed for this visit. Physical Exam  Vitals reviewed. Constitutional:       General: She is not in acute distress. Appearance: Normal appearance. She is not ill-appearing. HENT:      Head: Normocephalic. Right Ear: Tympanic membrane normal.      Left Ear: Tympanic membrane normal.      Nose: No rhinorrhea. Cardiovascular:      Rate and Rhythm: Normal rate and regular rhythm. Pulses: Normal pulses. Popliteal pulses are 2+ on the right side and 2+ on the left side. Dorsalis pedis pulses are 2+ on the right side and 2+ on the left side. Heart sounds: Normal heart sounds. Pulmonary:      Effort: Pulmonary effort is normal. No respiratory distress. Breath sounds: Normal breath sounds.  No wheezing or rhonchi. Abdominal:      General: Bowel sounds are normal.      Palpations: Abdomen is soft. There is no mass. Tenderness: There is generalized abdominal tenderness and tenderness in the right upper quadrant. There is no guarding. Hernia: No hernia is present. Musculoskeletal:         General: Normal range of motion. Cervical back: Normal range of motion and neck supple. Right lower leg: No edema. Left lower leg: No edema. Skin:     General: Skin is warm and dry. Neurological:      General: No focal deficit present. Mental Status: She is alert and oriented to person, place, and time. Mental status is at baseline. Psychiatric:         Mood and Affect: Mood normal.        Medical Decision Making  Amount and/or Complexity of Data Reviewed  Labs: ordered. Radiology: ordered. Risk  Prescription drug management. Presentation, management, and disposition were discussed with the attending physician, Dr. Nuvia Jorgensen, who is in agreement with plan of care. Patient is over the age of 72 , and the attending will see the patient. Pt is an 82 y/o female presenting to the ED c/o abdominal pain, weakness, and an episode of diarrhea. Doubt sepsis given pt was afebrile while in ED and WBC was at 6.4. Doubt acute pancreatitis given lipase was 214. Doubt acute cholecystitis given CT showed gallbladder bas nee surgically removed. Doubt diverticulitis given CT showed \"colonic diverticulosis without diverticulitis\". Doubt acute appendicitis given pt denies RLQ tenderness and CT showed \"no acute process\". Pt's UA does snow positive nitrites with a small amount of leukocyte esterase. Pt requesting for a different antibiotic for her UTI given her abdominal discomfort and diarrhea. Pt given prescription for Cipro and encouraged to follow-up with her PCP in 2-3 days. Pt's vitals were stable while in ED. Pt is stable for discharge home. Strict return to ED precautions given.  ACI discussed with the patient; see instruction below. Patient verbalized understanding. Procedures    N/A    LABORATORY TESTS:  Recent Results (from the past 12 hour(s))   CBC WITH AUTOMATED DIFF    Collection Time: 04/18/23 11:23 AM   Result Value Ref Range    WBC 8.7 3.6 - 11.0 K/uL    RBC 4.56 3.80 - 5.20 M/uL    HGB 13.3 11.5 - 16.0 g/dL    HCT 40.9 35.0 - 47.0 %    MCV 89.7 80.0 - 99.0 FL    MCH 29.2 26.0 - 34.0 PG    MCHC 32.5 30.0 - 36.5 g/dL    RDW 13.2 11.5 - 14.5 %    PLATELET 832 470 - 600 K/uL    MPV 9.9 8.9 - 12.9 FL    NRBC 0.0 0  WBC    ABSOLUTE NRBC 0.00 0.00 - 0.01 K/uL    NEUTROPHILS 85 (H) 32 - 75 %    LYMPHOCYTES 10 (L) 12 - 49 %    MONOCYTES 4 (L) 5 - 13 %    EOSINOPHILS 1 0 - 7 %    BASOPHILS 0 0 - 1 %    IMMATURE GRANULOCYTES 0 0.0 - 0.5 %    ABS. NEUTROPHILS 7.4 1.8 - 8.0 K/UL    ABS. LYMPHOCYTES 0.9 0.8 - 3.5 K/UL    ABS. MONOCYTES 0.3 0.0 - 1.0 K/UL    ABS. EOSINOPHILS 0.1 0.0 - 0.4 K/UL    ABS. BASOPHILS 0.0 0.0 - 0.1 K/UL    ABS. IMM. GRANS. 0.0 0.00 - 0.04 K/UL    DF AUTOMATED     METABOLIC PANEL, COMPREHENSIVE    Collection Time: 04/18/23 11:23 AM   Result Value Ref Range    Sodium 132 (L) 136 - 145 mmol/L    Potassium 4.1 3.5 - 5.1 mmol/L    Chloride 102 97 - 108 mmol/L    CO2 25 21 - 32 mmol/L    Anion gap 5 5 - 15 mmol/L    Glucose 129 (H) 65 - 100 mg/dL    BUN 16 6 - 20 MG/DL    Creatinine 0.89 0.55 - 1.02 MG/DL    BUN/Creatinine ratio 18 12 - 20      eGFR >60 >60 ml/min/1.73m2    Calcium 9.2 8.5 - 10.1 MG/DL    Bilirubin, total 0.6 0.2 - 1.0 MG/DL    ALT (SGPT) 23 12 - 78 U/L    AST (SGOT) 23 15 - 37 U/L    Alk.  phosphatase 99 45 - 117 U/L    Protein, total 7.8 6.4 - 8.2 g/dL    Albumin 3.6 3.5 - 5.0 g/dL    Globulin 4.2 (H) 2.0 - 4.0 g/dL    A-G Ratio 0.9 (L) 1.1 - 2.2     LIPASE    Collection Time: 04/18/23 11:23 AM   Result Value Ref Range    Lipase 214 73 - 393 U/L   URINALYSIS W/MICROSCOPIC    Collection Time: 04/18/23  2:06 PM   Result Value Ref Range    Color BROWN Appearance CLEAR CLEAR      Specific gravity 1.026 1.003 - 1.030      pH (UA) 6.0 5.0 - 8.0      Protein Negative NEG mg/dL    Glucose Negative NEG mg/dL    Ketone Negative NEG mg/dL    Bilirubin Negative NEG      Blood Negative NEG      Urobilinogen 1.0 0.2 - 1.0 EU/dL    Nitrites Positive (A) NEG      Leukocyte Esterase SMALL (A) NEG      WBC 0-4 0 - 4 /hpf    RBC 0-5 0 - 5 /hpf    Epithelial cells MODERATE (A) FEW /lpf    Bacteria Negative NEG /hpf       IMAGING RESULTS:  CT ABD PELV W CONT   Final Result   No acute process. MEDICATIONS GIVEN:  Medications   sodium chloride 0.9 % bolus infusion 500 mL (0 mL IntraVENous Stopped 4/18/23 1200)   ondansetron (ZOFRAN) injection 4 mg (4 mg IntraVENous Given 4/18/23 1149)   iopamidoL (ISOVUE-370) 370 mg iodine /mL (76 %) injection 100 mL (100 mL IntraVENous Given 4/18/23 1302)       IMPRESSION:  1. Abdominal pain, right upper quadrant    2. Urinary tract infection without hematuria, site unspecified        PLAN:  1. Take Cipro as instructed only. Discharge Medication List as of 4/18/2023  2:55 PM        START taking these medications    Details   ciprofloxacin HCl (Cipro) 500 mg tablet Take 1 Tablet by mouth two (2) times a day for 14 days. , Normal, Disp-28 Tablet, R-0           CONTINUE these medications which have NOT CHANGED    Details   pantoprazole (PROTONIX) 40 mg tablet TAKE ONE TABLET BY MOUTH TWICE A DAY, Normal, Disp-60 Tablet, R-0      clopidogreL (PLAVIX) 75 mg tab TAKE 1 TABLET BY MOUTH EVERY DAY, Normal, Disp-90 Tablet, R-0      diazePAM (VALIUM) 5 mg tablet TAKE ONE-HALF TABLET BY MOUTH EVERY 12 HOURS AS NEEDED FOR ANXIETY, Normal, Disp-30 Tablet, R-2      meclizine (ANTIVERT) 12.5 mg tablet TAKE ONE TO TWO TABLETS BY MOUTH TWICE A DAY AS NEEDED FOR DIZZINESS, Normal, Disp-180 Tablet, R-1      ondansetron (ZOFRAN ODT) 4 mg disintegrating tablet Take 1 Tablet by mouth every eight (8) hours as needed for Nausea or Vomiting., Normal, Disp-20 Tablet, R-2      dicyclomine (BENTYL) 10 mg capsule Take 10 mg by mouth as needed., Historical Med      Blood Pressure Monitor (Blood Pressure Kit) kit 1 Each by Does Not Apply route daily. , Normal, Disp-1 Kit, R-0      fluticasone propionate (FLONASE) 50 mcg/actuation nasal spray SPRAY ONE SPRAY IN EACH NOSTRIL ONCE DAILY, Normal, Disp-16 g, R-3      atenoloL (TENORMIN) 25 mg tablet TAKE ONE TABLET BY MOUTH DAILY, Normal, Disp-90 Tablet, R-3      psyllium husk-aspartame (Metamucil Fiber Singles) 3.4 gram pwpk packet Take 1 Packet by mouth daily. , Normal, Disp-30 Packet, R-0      colestipoL (COLESTID) 5 gram packet Take 1 Packet by mouth daily. , Normal, Disp-30 Each, R-0      diclofenac (Voltaren) 1 % gel Apply 2 g to affected area four (4) times daily as needed (knee pain, back pain). , Normal, Disp-60 g, R-1      polyethylene glycol (MIRALAX) 17 gram/dose powder Take 17 g by mouth daily. , Historical Med      cyanocobalamin (VITAMIN B12) 500 mcg tablet Take 500 mcg by mouth as needed., Historical Med      acetaminophen (TYLENOL) 325 mg tablet Take 325-650 mg by mouth every six (6) hours as needed (headache). , Historical Med           2. Follow-up with PCP in 2-3 days. Follow-up Information       Follow up With Specialties Details Why Contact Info    Armin Humphrey NP Nurse Practitioner Schedule an appointment as soon as possible for a visit in 3 days  [address] [place] [address]  [phone]            3.  Return to ED if worse     Signed By: NAI Torre     April 18, 2023

## 2023-04-18 NOTE — DISCHARGE INSTRUCTIONS
Take the Cipro as instructed only twice a day for a total of 14 days. Follow-up with your primary care provider in 2-3 days. Call for appointment as soon as possible.

## 2023-05-22 RX ORDER — PANTOPRAZOLE SODIUM 40 MG/1
TABLET, DELAYED RELEASE ORAL
Qty: 60 TABLET | Refills: 0 | Status: SHIPPED | OUTPATIENT
Start: 2023-05-22

## 2023-05-23 RX ORDER — DIAZEPAM 5 MG/1
TABLET ORAL
Qty: 30 TABLET | OUTPATIENT
Start: 2023-05-23

## 2023-05-24 ENCOUNTER — TELEPHONE (OUTPATIENT)
Dept: PRIMARY CARE CLINIC | Facility: CLINIC | Age: 88
End: 2023-05-24

## 2023-05-24 DIAGNOSIS — F41.1 GENERALIZED ANXIETY DISORDER: Primary | ICD-10-CM

## 2023-05-24 RX ORDER — DIAZEPAM 5 MG/1
TABLET ORAL
Qty: 30 TABLET | Refills: 0 | Status: SHIPPED | OUTPATIENT
Start: 2023-05-24 | End: 2023-06-24

## 2023-05-24 NOTE — TELEPHONE ENCOUNTER
Pt called today she doesn't drive and she needs an appointment to get a rx refill on diazepam 5mg.  She's gonna call her granddaughter to see when she'll be at her house tomorrow, she's wondering if she can be fit in for a virtual tomorrow but she doesn't know when her granddaughter will show up

## 2023-05-24 NOTE — TELEPHONE ENCOUNTER
Gabby Ibarra,     If she can get scheduled for in office or virtual in the next month, I will send in a refill to get her to the appointment. Does not have to be this week. Just let me know once she is scheduled.

## 2023-06-19 DIAGNOSIS — F41.1 GENERALIZED ANXIETY DISORDER: ICD-10-CM

## 2023-06-20 DIAGNOSIS — F41.1 GENERALIZED ANXIETY DISORDER: ICD-10-CM

## 2023-06-20 RX ORDER — DIAZEPAM 5 MG/1
TABLET ORAL
Qty: 30 TABLET | OUTPATIENT
Start: 2023-06-20

## 2023-06-28 ENCOUNTER — OFFICE VISIT (OUTPATIENT)
Dept: PRIMARY CARE CLINIC | Facility: CLINIC | Age: 88
End: 2023-06-28
Payer: MEDICARE

## 2023-06-28 VITALS
BODY MASS INDEX: 17.64 KG/M2 | OXYGEN SATURATION: 99 % | DIASTOLIC BLOOD PRESSURE: 76 MMHG | SYSTOLIC BLOOD PRESSURE: 150 MMHG | HEART RATE: 72 BPM | WEIGHT: 93.4 LBS | HEIGHT: 61 IN | RESPIRATION RATE: 20 BRPM | TEMPERATURE: 97.2 F

## 2023-06-28 DIAGNOSIS — Z86.73 HISTORY OF TIAS: ICD-10-CM

## 2023-06-28 DIAGNOSIS — K58.2 MIXED IRRITABLE BOWEL SYNDROME: ICD-10-CM

## 2023-06-28 DIAGNOSIS — R42 VERTIGO: ICD-10-CM

## 2023-06-28 DIAGNOSIS — Z60.4 SOCIAL ISOLATION: ICD-10-CM

## 2023-06-28 DIAGNOSIS — I10 ESSENTIAL (PRIMARY) HYPERTENSION: ICD-10-CM

## 2023-06-28 DIAGNOSIS — Z00.00 MEDICARE ANNUAL WELLNESS VISIT, SUBSEQUENT: Primary | ICD-10-CM

## 2023-06-28 DIAGNOSIS — F41.9 ANXIETY: ICD-10-CM

## 2023-06-28 DIAGNOSIS — R73.03 PREDIABETES: ICD-10-CM

## 2023-06-28 PROCEDURE — 1123F ACP DISCUSS/DSCN MKR DOCD: CPT | Performed by: NURSE PRACTITIONER

## 2023-06-28 PROCEDURE — G8419 CALC BMI OUT NRM PARAM NOF/U: HCPCS | Performed by: NURSE PRACTITIONER

## 2023-06-28 PROCEDURE — 1090F PRES/ABSN URINE INCON ASSESS: CPT | Performed by: NURSE PRACTITIONER

## 2023-06-28 PROCEDURE — G8427 DOCREV CUR MEDS BY ELIG CLIN: HCPCS | Performed by: NURSE PRACTITIONER

## 2023-06-28 PROCEDURE — 99214 OFFICE O/P EST MOD 30 MIN: CPT | Performed by: NURSE PRACTITIONER

## 2023-06-28 PROCEDURE — G0439 PPPS, SUBSEQ VISIT: HCPCS | Performed by: NURSE PRACTITIONER

## 2023-06-28 PROCEDURE — 1036F TOBACCO NON-USER: CPT | Performed by: NURSE PRACTITIONER

## 2023-06-28 RX ORDER — DIAZEPAM 5 MG/1
5 TABLET ORAL 2 TIMES DAILY
COMMUNITY
End: 2023-06-28 | Stop reason: SDUPTHER

## 2023-06-28 RX ORDER — MECLIZINE HCL 12.5 MG/1
TABLET ORAL
Qty: 90 TABLET | Refills: 5 | Status: SHIPPED | OUTPATIENT
Start: 2023-06-28

## 2023-06-28 RX ORDER — CLOPIDOGREL BISULFATE 75 MG/1
75 TABLET ORAL DAILY
Qty: 90 TABLET | Refills: 1 | Status: SHIPPED | OUTPATIENT
Start: 2023-06-28

## 2023-06-28 RX ORDER — DICYCLOMINE HYDROCHLORIDE 10 MG/1
10 CAPSULE ORAL
Qty: 120 CAPSULE | Refills: 5 | Status: SHIPPED | OUTPATIENT
Start: 2023-06-28

## 2023-06-28 RX ORDER — DIAZEPAM 5 MG/1
2.5 TABLET ORAL EVERY 12 HOURS PRN
Qty: 30 TABLET | Refills: 3 | Status: SHIPPED | OUTPATIENT
Start: 2023-06-28 | End: 2023-12-25

## 2023-06-28 RX ORDER — ONDANSETRON 4 MG/1
4 TABLET, ORALLY DISINTEGRATING ORAL DAILY PRN
Qty: 30 TABLET | Refills: 5 | Status: SHIPPED | OUTPATIENT
Start: 2023-06-28

## 2023-06-28 SDOH — SOCIAL STABILITY - SOCIAL INSECURITY: SOCIAL EXCLUSION AND REJECTION: Z60.4

## 2023-06-28 ASSESSMENT — ENCOUNTER SYMPTOMS
CHEST TIGHTNESS: 0
CONSTIPATION: 1
NAUSEA: 1
SHORTNESS OF BREATH: 0

## 2023-06-28 ASSESSMENT — PATIENT HEALTH QUESTIONNAIRE - PHQ9
SUM OF ALL RESPONSES TO PHQ QUESTIONS 1-9: 0
2. FEELING DOWN, DEPRESSED OR HOPELESS: 0
1. LITTLE INTEREST OR PLEASURE IN DOING THINGS: 0
SUM OF ALL RESPONSES TO PHQ QUESTIONS 1-9: 0
SUM OF ALL RESPONSES TO PHQ9 QUESTIONS 1 & 2: 0

## 2023-06-28 ASSESSMENT — LIFESTYLE VARIABLES
HOW MANY STANDARD DRINKS CONTAINING ALCOHOL DO YOU HAVE ON A TYPICAL DAY: PATIENT DOES NOT DRINK
HOW OFTEN DO YOU HAVE A DRINK CONTAINING ALCOHOL: NEVER

## 2023-06-29 RX ORDER — DIAZEPAM 5 MG/1
TABLET ORAL
Qty: 30 TABLET | OUTPATIENT
Start: 2023-06-29

## 2023-07-06 LAB
ALBUMIN SERPL-MCNC: 4.5 G/DL (ref 3.6–4.6)
ALBUMIN/GLOB SERPL: 1.6 {RATIO} (ref 1.2–2.2)
ALP SERPL-CCNC: 100 IU/L (ref 44–121)
ALT SERPL-CCNC: 16 IU/L (ref 0–32)
AST SERPL-CCNC: 21 IU/L (ref 0–40)
BILIRUB SERPL-MCNC: 0.4 MG/DL (ref 0–1.2)
BUN SERPL-MCNC: 16 MG/DL (ref 8–27)
BUN/CREAT SERPL: 20 (ref 12–28)
CALCIUM SERPL-MCNC: 9.5 MG/DL (ref 8.7–10.3)
CHLORIDE SERPL-SCNC: 92 MMOL/L (ref 96–106)
CO2 SERPL-SCNC: 23 MMOL/L (ref 20–29)
CREAT SERPL-MCNC: 0.8 MG/DL (ref 0.57–1)
EGFRCR SERPLBLD CKD-EPI 2021: 71 ML/MIN/1.73
ERYTHROCYTE [DISTWIDTH] IN BLOOD BY AUTOMATED COUNT: 12 % (ref 11.7–15.4)
GLOBULIN SER CALC-MCNC: 2.9 G/DL (ref 1.5–4.5)
GLUCOSE SERPL-MCNC: 114 MG/DL (ref 70–99)
HBA1C MFR BLD: 5.6 % (ref 4.8–5.6)
HCT VFR BLD AUTO: 41.7 % (ref 34–46.6)
HGB BLD-MCNC: 13.8 G/DL (ref 11.1–15.9)
MCH RBC QN AUTO: 29.4 PG (ref 26.6–33)
MCHC RBC AUTO-ENTMCNC: 33.1 G/DL (ref 31.5–35.7)
MCV RBC AUTO: 89 FL (ref 79–97)
PLATELET # BLD AUTO: 337 X10E3/UL (ref 150–450)
POTASSIUM SERPL-SCNC: 4.2 MMOL/L (ref 3.5–5.2)
PROT SERPL-MCNC: 7.4 G/DL (ref 6–8.5)
RBC # BLD AUTO: 4.7 X10E6/UL (ref 3.77–5.28)
SODIUM SERPL-SCNC: 130 MMOL/L (ref 134–144)
WBC # BLD AUTO: 7.3 X10E3/UL (ref 3.4–10.8)

## 2023-07-25 RX ORDER — ATENOLOL 25 MG/1
TABLET ORAL
Qty: 90 TABLET | Refills: 0 | Status: SHIPPED | OUTPATIENT
Start: 2023-07-25

## 2023-09-06 ENCOUNTER — TELEPHONE (OUTPATIENT)
Dept: PRIMARY CARE CLINIC | Facility: CLINIC | Age: 88
End: 2023-09-06

## 2023-09-06 NOTE — TELEPHONE ENCOUNTER
Pt is currently in a rehab facility and will be discharged this week. 1 Yoel Hurst has received orders for pt and asked if Óscar Alexander will follow pt.

## 2023-10-03 ENCOUNTER — OFFICE VISIT (OUTPATIENT)
Dept: PRIMARY CARE CLINIC | Facility: CLINIC | Age: 88
End: 2023-10-03

## 2023-10-03 VITALS
DIASTOLIC BLOOD PRESSURE: 82 MMHG | BODY MASS INDEX: 17.44 KG/M2 | HEIGHT: 61 IN | TEMPERATURE: 97.9 F | SYSTOLIC BLOOD PRESSURE: 145 MMHG | HEART RATE: 73 BPM | WEIGHT: 92.4 LBS

## 2023-10-03 DIAGNOSIS — F41.9 ANXIETY: Chronic | ICD-10-CM

## 2023-10-03 DIAGNOSIS — Z23 ENCOUNTER FOR IMMUNIZATION: ICD-10-CM

## 2023-10-03 DIAGNOSIS — Z86.73 HISTORY OF TIAS: ICD-10-CM

## 2023-10-03 DIAGNOSIS — R42 VERTIGO: ICD-10-CM

## 2023-10-03 DIAGNOSIS — I10 ESSENTIAL (PRIMARY) HYPERTENSION: Chronic | ICD-10-CM

## 2023-10-03 DIAGNOSIS — Z09 HOSPITAL DISCHARGE FOLLOW-UP: ICD-10-CM

## 2023-10-03 DIAGNOSIS — R33.9 INCOMPLETE EMPTYING OF BLADDER: Primary | ICD-10-CM

## 2023-10-03 DIAGNOSIS — Z00.00 MEDICARE ANNUAL WELLNESS VISIT, SUBSEQUENT: ICD-10-CM

## 2023-10-03 DIAGNOSIS — K58.2 MIXED IRRITABLE BOWEL SYNDROME: ICD-10-CM

## 2023-10-03 LAB
BILIRUBIN, URINE, POC: NEGATIVE
BLOOD URINE, POC: ABNORMAL
GLUCOSE URINE, POC: NEGATIVE
KETONES, URINE, POC: NEGATIVE
LEUKOCYTE ESTERASE, URINE, POC: NEGATIVE
NITRITE, URINE, POC: NEGATIVE
PH, URINE, POC: 6.5 (ref 4.6–8)
PROTEIN,URINE, POC: NEGATIVE
SPECIFIC GRAVITY, URINE, POC: 1.02 (ref 1–1.03)
URINALYSIS CLARITY, POC: CLEAR
URINALYSIS COLOR, POC: YELLOW
UROBILINOGEN, POC: ABNORMAL

## 2023-10-03 RX ORDER — ATENOLOL 25 MG/1
25 TABLET ORAL DAILY
Qty: 90 TABLET | Refills: 0 | Status: SHIPPED | OUTPATIENT
Start: 2023-10-03

## 2023-10-03 SDOH — ECONOMIC STABILITY: FOOD INSECURITY: WITHIN THE PAST 12 MONTHS, THE FOOD YOU BOUGHT JUST DIDN'T LAST AND YOU DIDN'T HAVE MONEY TO GET MORE.: NEVER TRUE

## 2023-10-03 SDOH — ECONOMIC STABILITY: INCOME INSECURITY: HOW HARD IS IT FOR YOU TO PAY FOR THE VERY BASICS LIKE FOOD, HOUSING, MEDICAL CARE, AND HEATING?: NOT HARD AT ALL

## 2023-10-03 SDOH — ECONOMIC STABILITY: FOOD INSECURITY: WITHIN THE PAST 12 MONTHS, YOU WORRIED THAT YOUR FOOD WOULD RUN OUT BEFORE YOU GOT MONEY TO BUY MORE.: NEVER TRUE

## 2023-10-03 SDOH — ECONOMIC STABILITY: HOUSING INSECURITY
IN THE LAST 12 MONTHS, WAS THERE A TIME WHEN YOU DID NOT HAVE A STEADY PLACE TO SLEEP OR SLEPT IN A SHELTER (INCLUDING NOW)?: NO

## 2023-10-03 ASSESSMENT — PATIENT HEALTH QUESTIONNAIRE - PHQ9
2. FEELING DOWN, DEPRESSED OR HOPELESS: 1
SUM OF ALL RESPONSES TO PHQ QUESTIONS 1-9: 1
SUM OF ALL RESPONSES TO PHQ QUESTIONS 1-9: 1
1. LITTLE INTEREST OR PLEASURE IN DOING THINGS: 0
SUM OF ALL RESPONSES TO PHQ QUESTIONS 1-9: 1
SUM OF ALL RESPONSES TO PHQ9 QUESTIONS 1 & 2: 1
SUM OF ALL RESPONSES TO PHQ QUESTIONS 1-9: 1

## 2023-10-03 ASSESSMENT — ENCOUNTER SYMPTOMS
SHORTNESS OF BREATH: 0
CHEST TIGHTNESS: 0

## 2023-10-03 NOTE — PROGRESS NOTES
Identified Patient with two Patient identifiers(name and ). 1. Have you been to the ER, urgent care clinic since your last visit? Hospitalized since your last visit? Yes 2023    2. Have you seen or consulted any other health care providers outside of the [address] [place] [address] since your last visit? No     3. For patients aged 43-73: Has the patient had a colonoscopy / FIT/ Cologuard? No    If the patient is female:    4. For patients aged 43-66: Has the patient had a mammogram within the past 2 years? No      5. For patients aged 21-65: Has the patient had a pap smear? No   There were no vitals taken for this visit. Chief Complaint   Patient presents with    [facility] f/u and rehab. Health Maintenance Due   Topic Date Due    DTaP/Tdap/Td vaccine (1 - Tdap) Never done    Shingles vaccine (1 of 2) Never done    Pneumococcal 65+ years Vaccine (1 - PCV) Never done    COVID-19 Vaccine (2 - Booster for Maurizio series) 2021    Flu vaccine (1) 2023          No questionnaires available.

## 2023-10-03 NOTE — PROGRESS NOTES
Rahel Benitez is a 81 y.o. female who was seen in clinic today (10/4/2023). Assessment & Plan:   Below is the assessment and plan developed based on review of pertinent history, physical exam, labs, studies, and medications. 1. Incomplete emptying of bladder  Comments:  UA does not show infection. if symptoms continue, can consider urology   Orders:  -     AMB POC URINALYSIS DIP STICK AUTO W/O MICRO  2. Anxiety  Comments:  valium PRN. 3. Medicare annual wellness visit, subsequent  4. Encounter for immunization  -     Influenza, FLUAD, (age 72 y+), IM, Preservative Free, 0.5 mL  5. Essential (primary) hypertension  Comments:  stable on atenolol  Orders:  -     atenolol (TENORMIN) 25 MG tablet; Take 1 tablet by mouth daily, Disp-90 tablet, R-0Normal  6. Hospital discharge follow-up  Comments:  will request records from [facility].   7. Mixed irritable bowel syndrome  Comments:  current regiemn is working well for patient. Orders:  -     dicyclomine (BENTYL) 10 MG capsule; Take 1 capsule by mouth 4 times daily (before meals and nightly), Disp-120 capsule, R-5Normal  -     ondansetron (ZOFRAN-ODT) 4 MG disintegrating tablet; Take 1 tablet by mouth daily as needed for Nausea, Disp-30 tablet, R-5Normal  8. Vertigo  Comments:  meclizine works well for patient. Orders:  -     meclizine (ANTIVERT) 12.5 MG tablet; TAKE ONE TO TWO TABLETS BY MOUTH TWICE A DAY AS NEEDED FOR DIZZINESS, Disp-90 tablet, R-5Normal  9. History of TIAs  Comments:  continue on plavix. Orders:  -     clopidogrel (PLAVIX) 75 MG tablet; Take 1 tablet by mouth daily, Disp-90 tablet, R-1Normal      No follow-ups on file. On this date 10/3/2023 I have spent 40 minutes reviewing previous notes, test results and face to face with the patient discussing the diagnosis and plan of care as well as documenting on the day of the visit.       Subjective:   Anish Munguia was seen today for Follow-up [facility] f/u and rehab. )     Patient reported that she was

## 2023-10-04 RX ORDER — MECLIZINE HCL 12.5 MG/1
TABLET ORAL
Qty: 90 TABLET | Refills: 5 | Status: SHIPPED | OUTPATIENT
Start: 2023-10-04

## 2023-10-04 RX ORDER — DICYCLOMINE HYDROCHLORIDE 10 MG/1
10 CAPSULE ORAL
Qty: 120 CAPSULE | Refills: 5 | Status: SHIPPED | OUTPATIENT
Start: 2023-10-04

## 2023-10-04 RX ORDER — ONDANSETRON 4 MG/1
4 TABLET, ORALLY DISINTEGRATING ORAL DAILY PRN
Qty: 30 TABLET | Refills: 5 | Status: SHIPPED | OUTPATIENT
Start: 2023-10-04

## 2023-10-04 RX ORDER — CLOPIDOGREL BISULFATE 75 MG/1
75 TABLET ORAL DAILY
Qty: 90 TABLET | Refills: 1 | Status: SHIPPED | OUTPATIENT
Start: 2023-10-04

## 2023-12-01 DIAGNOSIS — F41.9 ANXIETY: ICD-10-CM

## 2023-12-04 RX ORDER — DIAZEPAM 5 MG/1
TABLET ORAL
Qty: 30 TABLET | OUTPATIENT
Start: 2023-12-04

## 2023-12-12 RX ORDER — DIAZEPAM 5 MG/1
2.5 TABLET ORAL EVERY 12 HOURS PRN
Qty: 30 TABLET | Refills: 3 | Status: SHIPPED | OUTPATIENT
Start: 2023-12-12 | End: 2024-06-09

## 2023-12-23 DIAGNOSIS — Z86.73 HISTORY OF TIAS: ICD-10-CM

## 2023-12-26 RX ORDER — CLOPIDOGREL BISULFATE 75 MG/1
75 TABLET ORAL DAILY
Qty: 90 TABLET | Refills: 1 | Status: SHIPPED | OUTPATIENT
Start: 2023-12-26

## 2024-01-26 ENCOUNTER — TELEPHONE (OUTPATIENT)
Dept: PRIMARY CARE CLINIC | Facility: CLINIC | Age: 89
End: 2024-01-26

## 2024-01-26 NOTE — TELEPHONE ENCOUNTER
Pt called stating that she is scared about running out of her medication due to [facility] not filling them. Pt states that she still has 11 days worth. She has been informed that she is not ready to got refill this early. She needs to contact pharmacy on 02/11/24

## 2024-03-08 DIAGNOSIS — I10 ESSENTIAL (PRIMARY) HYPERTENSION: Chronic | ICD-10-CM

## 2024-03-08 RX ORDER — ATENOLOL 25 MG/1
25 TABLET ORAL DAILY
Qty: 90 TABLET | Refills: 0 | Status: SHIPPED | OUTPATIENT
Start: 2024-03-08

## 2024-04-18 ENCOUNTER — HOSPITAL ENCOUNTER (EMERGENCY)
Facility: HOSPITAL | Age: 89
Discharge: HOME | End: 2024-04-18
Attending: EMERGENCY MEDICINE
Payer: MEDICARE

## 2024-04-18 VITALS
BODY MASS INDEX: 19.27 KG/M2 | OXYGEN SATURATION: 94 % | SYSTOLIC BLOOD PRESSURE: 167 MMHG | WEIGHT: 102.07 LBS | HEART RATE: 84 BPM | HEIGHT: 61 IN | DIASTOLIC BLOOD PRESSURE: 87 MMHG | TEMPERATURE: 98.3 F | RESPIRATION RATE: 16 BRPM

## 2024-04-18 DIAGNOSIS — R21 RASH AND OTHER NONSPECIFIC SKIN ERUPTION: Primary | ICD-10-CM

## 2024-04-18 PROCEDURE — 99283 EMERGENCY DEPT VISIT LOW MDM: CPT

## 2024-04-18 ASSESSMENT — ENCOUNTER SYMPTOMS
DIARRHEA: 0
VOMITING: 0
ABDOMINAL PAIN: 0
CONSTIPATION: 0
SHORTNESS OF BREATH: 0
NAUSEA: 0
COLOR CHANGE: 0
BACK PAIN: 0

## 2024-04-18 ASSESSMENT — PAIN SCALES - GENERAL: PAINLEVEL_OUTOF10: 0

## 2024-04-18 ASSESSMENT — PAIN - FUNCTIONAL ASSESSMENT: PAIN_FUNCTIONAL_ASSESSMENT: 0-10

## 2024-04-18 NOTE — ED PROVIDER NOTES
Normocephalic and atraumatic.      Nose: Nose normal.      Mouth/Throat:      Mouth: Mucous membranes are moist.   Eyes:      Extraocular Movements: Extraocular movements intact.      Conjunctiva/sclera: Conjunctivae normal.      Pupils: Pupils are equal, round, and reactive to light.   Cardiovascular:      Comments: Warm and well perfused  Pulmonary:      Effort: Pulmonary effort is normal.   Musculoskeletal:         General: Normal range of motion.      Cervical back: Normal range of motion.   Skin:     General: Skin is warm and dry.      Findings: Erythema present.   Neurological:      General: No focal deficit present.      Mental Status: She is alert and oriented to person, place, and time.   Psychiatric:         Mood and Affect: Mood normal.         Behavior: Behavior normal.         Thought Content: Thought content normal.         Judgment: Judgment normal.         DIAGNOSTIC RESULTS     EKG: All EKG's are interpreted by the Emergency Department Physician who either signs or Co-signs this chart in the absence of a cardiologist.        RADIOLOGY:   Non-plain film images such as CT, Ultrasound and MRI are read by the radiologist. Plain radiographic images are visualized and preliminarily interpreted by the emergency physician with the below findings:        Interpretation per the Radiologist below, if available at the time of this note:    No orders to display         ED BEDSIDE ULTRASOUND:   Performed by ED Physician - none    LABS:  Labs Reviewed - No data to display    All other labs were within normal range or not returned as of this dictation.    EMERGENCY DEPARTMENT COURSE and DIFFERENTIAL DIAGNOSIS/MDM:   Vitals:    Vitals:    04/18/24 1533   BP: (!) 173/84   Pulse: 84   Resp: 16   Temp: 98.3 °F (36.8 °C)   TempSrc: Oral   SpO2: 95%   Weight: 46.3 kg (102 lb 1.2 oz)   Height: 1.549 m (5' 1\")           Medical Decision Making    This is an 88-year-old female with past medical history, review of systems,

## 2024-04-18 NOTE — ED TRIAGE NOTES
Pt  presents with ems after a r arm rash for 2 weeks that has radiated to chest. [facility] and was given methylprednisolone and permetherin. Patient forgot to take them last night so she has taken 2 doses total. No pain noted with the rash but states it itches.

## 2024-04-29 ENCOUNTER — HOSPITAL ENCOUNTER (EMERGENCY)
Facility: HOSPITAL | Age: 89
Discharge: HOME | End: 2024-04-29
Attending: EMERGENCY MEDICINE
Payer: MEDICARE

## 2024-04-29 ENCOUNTER — APPOINTMENT (OUTPATIENT)
Facility: HOSPITAL | Age: 89
End: 2024-04-29
Payer: MEDICARE

## 2024-04-29 VITALS
SYSTOLIC BLOOD PRESSURE: 148 MMHG | BODY MASS INDEX: 19.26 KG/M2 | RESPIRATION RATE: 16 BRPM | HEART RATE: 89 BPM | WEIGHT: 102 LBS | TEMPERATURE: 97.8 F | OXYGEN SATURATION: 100 % | HEIGHT: 61 IN | DIASTOLIC BLOOD PRESSURE: 72 MMHG

## 2024-04-29 DIAGNOSIS — R19.7 DIARRHEA, UNSPECIFIED TYPE: Primary | ICD-10-CM

## 2024-04-29 LAB
ALBUMIN SERPL-MCNC: 3.6 G/DL (ref 3.5–5)
ALBUMIN/GLOB SERPL: 0.9 (ref 1.1–2.2)
ALP SERPL-CCNC: 88 U/L (ref 45–117)
ALT SERPL-CCNC: 20 U/L (ref 12–78)
ANION GAP SERPL CALC-SCNC: 4 MMOL/L (ref 5–15)
APPEARANCE UR: CLEAR
AST SERPL-CCNC: 23 U/L (ref 15–37)
BACTERIA URNS QL MICRO: ABNORMAL /HPF
BASOPHILS # BLD: 0.1 K/UL (ref 0–0.1)
BASOPHILS NFR BLD: 1 % (ref 0–1)
BILIRUB SERPL-MCNC: 0.7 MG/DL (ref 0.2–1)
BILIRUB UR QL: NEGATIVE
BUN SERPL-MCNC: 13 MG/DL (ref 6–20)
BUN/CREAT SERPL: 15 (ref 12–20)
CALCIUM SERPL-MCNC: 9.2 MG/DL (ref 8.5–10.1)
CHLORIDE SERPL-SCNC: 102 MMOL/L (ref 97–108)
CO2 SERPL-SCNC: 27 MMOL/L (ref 21–32)
COLOR UR: ABNORMAL
COMMENT:: NORMAL
CREAT SERPL-MCNC: 0.86 MG/DL (ref 0.55–1.02)
DIFFERENTIAL METHOD BLD: ABNORMAL
EOSINOPHIL # BLD: 0.2 K/UL (ref 0–0.4)
EOSINOPHIL NFR BLD: 3 % (ref 0–7)
EPITH CASTS URNS QL MICRO: ABNORMAL /LPF
ERYTHROCYTE [DISTWIDTH] IN BLOOD BY AUTOMATED COUNT: 13 % (ref 11.5–14.5)
GLOBULIN SER CALC-MCNC: 4 G/DL (ref 2–4)
GLUCOSE BLD STRIP.AUTO-MCNC: 153 MG/DL (ref 65–117)
GLUCOSE SERPL-MCNC: 165 MG/DL (ref 65–100)
GLUCOSE UR STRIP.AUTO-MCNC: NEGATIVE MG/DL
HCT VFR BLD AUTO: 38.8 % (ref 35–47)
HGB BLD-MCNC: 12.9 G/DL (ref 11.5–16)
HGB UR QL STRIP: NEGATIVE
IMM GRANULOCYTES # BLD AUTO: 0.1 K/UL (ref 0–0.04)
IMM GRANULOCYTES NFR BLD AUTO: 1 % (ref 0–0.5)
KETONES UR QL STRIP.AUTO: NEGATIVE MG/DL
LEUKOCYTE ESTERASE UR QL STRIP.AUTO: ABNORMAL
LYMPHOCYTES # BLD: 1.9 K/UL (ref 0.8–3.5)
LYMPHOCYTES NFR BLD: 24 % (ref 12–49)
MAGNESIUM SERPL-MCNC: 2.1 MG/DL (ref 1.6–2.4)
MCH RBC QN AUTO: 29.7 PG (ref 26–34)
MCHC RBC AUTO-ENTMCNC: 33.2 G/DL (ref 30–36.5)
MCV RBC AUTO: 89.4 FL (ref 80–99)
MONOCYTES # BLD: 0.4 K/UL (ref 0–1)
MONOCYTES NFR BLD: 6 % (ref 5–13)
NEUTS SEG # BLD: 5.2 K/UL (ref 1.8–8)
NEUTS SEG NFR BLD: 65 % (ref 32–75)
NITRITE UR QL STRIP.AUTO: NEGATIVE
NRBC # BLD: 0 K/UL (ref 0–0.01)
NRBC BLD-RTO: 0 PER 100 WBC
PH UR STRIP: 7.5 (ref 5–8)
PLATELET # BLD AUTO: 363 K/UL (ref 150–400)
PMV BLD AUTO: 9.4 FL (ref 8.9–12.9)
POTASSIUM SERPL-SCNC: 3.7 MMOL/L (ref 3.5–5.1)
PROT SERPL-MCNC: 7.6 G/DL (ref 6.4–8.2)
PROT UR STRIP-MCNC: NEGATIVE MG/DL
RBC # BLD AUTO: 4.34 M/UL (ref 3.8–5.2)
RBC #/AREA URNS HPF: ABNORMAL /HPF (ref 0–5)
SERVICE CMNT-IMP: ABNORMAL
SODIUM SERPL-SCNC: 133 MMOL/L (ref 136–145)
SP GR UR REFRACTOMETRY: 1.02 (ref 1–1.03)
SPECIMEN HOLD: NORMAL
URINE CULTURE IF INDICATED: ABNORMAL
UROBILINOGEN UR QL STRIP.AUTO: 0.2 EU/DL (ref 0.2–1)
WBC # BLD AUTO: 7.8 K/UL (ref 3.6–11)
WBC URNS QL MICRO: ABNORMAL /HPF (ref 0–4)

## 2024-04-29 PROCEDURE — 99285 EMERGENCY DEPT VISIT HI MDM: CPT

## 2024-04-29 PROCEDURE — 6360000004 HC RX CONTRAST MEDICATION: Performed by: EMERGENCY MEDICINE

## 2024-04-29 PROCEDURE — 94761 N-INVAS EAR/PLS OXIMETRY MLT: CPT

## 2024-04-29 PROCEDURE — 74177 CT ABD & PELVIS W/CONTRAST: CPT

## 2024-04-29 PROCEDURE — 6370000000 HC RX 637 (ALT 250 FOR IP): Performed by: EMERGENCY MEDICINE

## 2024-04-29 PROCEDURE — 36415 COLL VENOUS BLD VENIPUNCTURE: CPT

## 2024-04-29 PROCEDURE — 81001 URINALYSIS AUTO W/SCOPE: CPT

## 2024-04-29 PROCEDURE — 80053 COMPREHEN METABOLIC PANEL: CPT

## 2024-04-29 PROCEDURE — 83735 ASSAY OF MAGNESIUM: CPT

## 2024-04-29 PROCEDURE — 2580000003 HC RX 258: Performed by: EMERGENCY MEDICINE

## 2024-04-29 PROCEDURE — 85025 COMPLETE CBC W/AUTO DIFF WBC: CPT

## 2024-04-29 PROCEDURE — 82962 GLUCOSE BLOOD TEST: CPT

## 2024-04-29 RX ORDER — 0.9 % SODIUM CHLORIDE 0.9 %
500 INTRAVENOUS SOLUTION INTRAVENOUS ONCE
Status: COMPLETED | OUTPATIENT
Start: 2024-04-29 | End: 2024-04-29

## 2024-04-29 RX ORDER — LOPERAMIDE HYDROCHLORIDE 2 MG/1
2 CAPSULE ORAL 4 TIMES DAILY PRN
Status: DISCONTINUED | OUTPATIENT
Start: 2024-04-29 | End: 2024-04-29 | Stop reason: HOSPADM

## 2024-04-29 RX ADMIN — SODIUM CHLORIDE 500 ML: 9 INJECTION, SOLUTION INTRAVENOUS at 08:43

## 2024-04-29 RX ADMIN — IOPAMIDOL 100 ML: 755 INJECTION, SOLUTION INTRAVENOUS at 09:43

## 2024-04-29 RX ADMIN — LOPERAMIDE HYDROCHLORIDE 2 MG: 2 CAPSULE ORAL at 08:42

## 2024-04-29 ASSESSMENT — ENCOUNTER SYMPTOMS
COUGH: 0
SORE THROAT: 0
VOMITING: 0

## 2024-04-29 ASSESSMENT — PAIN - FUNCTIONAL ASSESSMENT
PAIN_FUNCTIONAL_ASSESSMENT: NONE - DENIES PAIN
PAIN_FUNCTIONAL_ASSESSMENT: 0-10

## 2024-04-29 ASSESSMENT — PAIN SCALES - GENERAL
PAINLEVEL_OUTOF10: 0
PAINLEVEL_OUTOF10: 0

## 2024-04-29 NOTE — ED NOTES
Went to discharge patient, pt needs a ride home, pt reports can go in a wheelchair and can get into home her self, pt reports has \"new pain\" in her private/groin area, pt concerned for UTI now, informed Dr Irvin, we had collected urine sample to send for testing at this time prior to discharge.

## 2024-04-29 NOTE — ED PROVIDER NOTES
radiologist. Plain radiographic images are visualized and preliminarily interpreted by the emergency physician with the below findings:        Interpretation per the Radiologist below, if available at the time of this note:    CT ABDOMEN PELVIS W IV CONTRAST Additional Contrast? None   Final Result      1. No acute abdominal or pelvic pathology.   2. Diverticulosis of the colon without acute inflammation.           LABS:  Labs Reviewed   CBC WITH AUTO DIFFERENTIAL - Abnormal; Notable for the following components:       Result Value    Immature Granulocytes % 1 (*)     Immature Granulocytes Absolute 0.1 (*)     All other components within normal limits   COMPREHENSIVE METABOLIC PANEL - Abnormal; Notable for the following components:    Sodium 133 (*)     Anion Gap 4 (*)     Glucose 165 (*)     Albumin/Globulin Ratio 0.9 (*)     All other components within normal limits   POCT GLUCOSE - Abnormal; Notable for the following components:    POC Glucose 153 (*)     All other components within normal limits   EXTRA TUBES HOLD   MAGNESIUM       All other labs were within normal range or not returned as of this dictation.    EMERGENCY DEPARTMENT COURSE and DIFFERENTIAL DIAGNOSIS/MDM:   Vitals:    Vitals:    04/29/24 0818   BP: (!) 193/91   Pulse: 90   Resp: 18   Temp: 97.8 °F (36.6 °C)   SpO2: 100%   Weight: 46.3 kg (102 lb)   Height: 1.549 m (5' 1\")           Medical Decision Making  Assessment: 88-year-old female presents from home with diarrhea.  Seems like it has improved compared to yesterday.  She arrives with stable vital signs but elevated blood pressure.  Blood pressure improved after her initial triage.  Labs and CT scan of her abdomen were unremarkable.  She has been resting comfortably without evidence of any loose stool or diarrhea at this time.  Abdomen is soft and nontender.  Vital signs remain normal.  I think she stable for discharge home.  Sounds like the worst of the diarrhea has passed.  She developed a

## 2024-04-29 NOTE — ED TRIAGE NOTES
Patient arrives via EMS with c/o generalized weakness, and diarrhea that began at 0300 this morning.     Denies N/V, chest pain, SOB.    Reports having \"couple\" episodes of diarrhea.    Per EMS, BG read \"Lo\" in route, and patient received oral glucose. Repeat B.     Patient is A&O x 4.

## 2024-04-29 NOTE — ED NOTES
Patient attempted to call granddaughter for a ride and they could not come; pt is ambulatory spoke to case management and informed does not need stretcher but reports only \"resource with insurance\" at this time.

## 2024-05-05 ENCOUNTER — HOSPITAL ENCOUNTER (EMERGENCY)
Facility: HOSPITAL | Age: 89
Discharge: HOME | End: 2024-05-05
Attending: EMERGENCY MEDICINE
Payer: MEDICARE

## 2024-05-05 ENCOUNTER — APPOINTMENT (OUTPATIENT)
Facility: HOSPITAL | Age: 89
End: 2024-05-05
Payer: MEDICARE

## 2024-05-05 VITALS
TEMPERATURE: 98 F | RESPIRATION RATE: 19 BRPM | DIASTOLIC BLOOD PRESSURE: 64 MMHG | HEIGHT: 61 IN | SYSTOLIC BLOOD PRESSURE: 164 MMHG | BODY MASS INDEX: 19.26 KG/M2 | OXYGEN SATURATION: 99 % | WEIGHT: 102 LBS | HEART RATE: 76 BPM
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
## 2024-05-05 LAB
ALBUMIN SERPL-MCNC: 3.6 G/DL (ref 3.5–5)
ALBUMIN/GLOB SERPL: 1 (ref 1.1–2.2)
ALP SERPL-CCNC: 76 U/L (ref 45–117)
ALT SERPL-CCNC: 20 U/L (ref 12–78)
ANION GAP SERPL CALC-SCNC: 5 MMOL/L (ref 5–15)
AST SERPL-CCNC: 24 U/L (ref 15–37)
BASOPHILS # BLD: 0.1 K/UL (ref 0–0.1)
BASOPHILS NFR BLD: 1 % (ref 0–1)
BILIRUB SERPL-MCNC: 0.4 MG/DL (ref 0.2–1)
BUN SERPL-MCNC: 13 MG/DL (ref 6–20)
BUN/CREAT SERPL: 15 (ref 12–20)
CALCIUM SERPL-MCNC: 9.1 MG/DL (ref 8.5–10.1)
CHLORIDE SERPL-SCNC: 100 MMOL/L (ref 97–108)
CO2 SERPL-SCNC: 25 MMOL/L (ref 21–32)
COMMENT:: NORMAL
CREAT SERPL-MCNC: 0.86 MG/DL (ref 0.55–1.02)
DIFFERENTIAL METHOD BLD: NORMAL
EKG DIAGNOSIS: NORMAL
EKG Q-T INTERVAL: 370 MS
EKG QRS DURATION: 68 MS
EKG QTC CALCULATION (BAZETT): 424 MS
EKG R AXIS: -16 DEGREES
EKG T AXIS: 51 DEGREES
EKG VENTRICULAR RATE: 79 BPM
EOSINOPHIL # BLD: 0.2 K/UL (ref 0–0.4)
EOSINOPHIL NFR BLD: 3 % (ref 0–7)
ERYTHROCYTE [DISTWIDTH] IN BLOOD BY AUTOMATED COUNT: 13.2 % (ref 11.5–14.5)
GLOBULIN SER CALC-MCNC: 3.7 G/DL (ref 2–4)
GLUCOSE SERPL-MCNC: 117 MG/DL (ref 65–100)
HCT VFR BLD AUTO: 36.9 % (ref 35–47)
HGB BLD-MCNC: 12.5 G/DL (ref 11.5–16)
IMM GRANULOCYTES # BLD AUTO: 0 K/UL (ref 0–0.04)
IMM GRANULOCYTES NFR BLD AUTO: 0 % (ref 0–0.5)
LYMPHOCYTES # BLD: 2 K/UL (ref 0.8–3.5)
LYMPHOCYTES NFR BLD: 25 % (ref 12–49)
MCH RBC QN AUTO: 29.8 PG (ref 26–34)
MCHC RBC AUTO-ENTMCNC: 33.9 G/DL (ref 30–36.5)
MCV RBC AUTO: 88.1 FL (ref 80–99)
MONOCYTES # BLD: 0.6 K/UL (ref 0–1)
MONOCYTES NFR BLD: 8 % (ref 5–13)
NEUTS SEG # BLD: 5.1 K/UL (ref 1.8–8)
NEUTS SEG NFR BLD: 63 % (ref 32–75)
NRBC # BLD: 0 K/UL (ref 0–0.01)
NRBC BLD-RTO: 0 PER 100 WBC
PLATELET # BLD AUTO: 328 K/UL (ref 150–400)
PMV BLD AUTO: 9.4 FL (ref 8.9–12.9)
POTASSIUM SERPL-SCNC: 3.6 MMOL/L (ref 3.5–5.1)
PROT SERPL-MCNC: 7.3 G/DL (ref 6.4–8.2)
RBC # BLD AUTO: 4.19 M/UL (ref 3.8–5.2)
SODIUM SERPL-SCNC: 130 MMOL/L (ref 136–145)
SPECIMEN HOLD: NORMAL
WBC # BLD AUTO: 8.1 K/UL (ref 3.6–11)

## 2024-05-05 PROCEDURE — 93005 ELECTROCARDIOGRAM TRACING: CPT | Performed by: EMERGENCY MEDICINE

## 2024-05-05 PROCEDURE — 85025 COMPLETE CBC W/AUTO DIFF WBC: CPT

## 2024-05-05 PROCEDURE — 80053 COMPREHEN METABOLIC PANEL: CPT

## 2024-05-05 PROCEDURE — 71045 X-RAY EXAM CHEST 1 VIEW: CPT

## 2024-05-05 PROCEDURE — 93010 ELECTROCARDIOGRAM REPORT: CPT | Performed by: INTERNAL MEDICINE

## 2024-05-05 PROCEDURE — 99285 EMERGENCY DEPT VISIT HI MDM: CPT

## 2024-05-05 PROCEDURE — 36415 COLL VENOUS BLD VENIPUNCTURE: CPT

## 2024-05-05 ASSESSMENT — PAIN - FUNCTIONAL ASSESSMENT: PAIN_FUNCTIONAL_ASSESSMENT: 0-10

## 2024-05-05 ASSESSMENT — LIFESTYLE VARIABLES
HOW OFTEN DO YOU HAVE A DRINK CONTAINING ALCOHOL: NEVER
HOW MANY STANDARD DRINKS CONTAINING ALCOHOL DO YOU HAVE ON A TYPICAL DAY: PATIENT DOES NOT DRINK

## 2024-05-05 ASSESSMENT — PAIN SCALES - GENERAL: PAINLEVEL_OUTOF10: 0

## 2024-05-05 NOTE — ED PROVIDER NOTES
ED Course as of 05/05/24 0959   Sun May 05, 2024   0842 EKG 12 Lead  ED ECG interpretation:  Rhythm: normal sinus rhythm. Rate (approx.): 79.  Axis: normal.  ST segment:  No concerning ST elevations or depressions. This EKG was independently interpreted by Herb Irvin MD,ED Provider.   [JM]      ED Course User Index  [JM] Herb Irvin MD           CONSULTS:  None    PROCEDURES:  Unless otherwise noted below, none     Procedures      FINAL IMPRESSION      1. Bug bite, initial encounter          DISPOSITION/PLAN   DISPOSITION Decision To Discharge 05/05/2024 09:59:26 AM      PATIENT REFERRED TO:  Ciro Gibbs, APRN - NP  [id number] [facility] [address] [phone]    Schedule an appointment as soon as possible for a visit   As needed    [facility] EMERGENCY DEPT  [address] [place] [address]  [phone]    If symptoms worsen      DISCHARGE MEDICATIONS:  New Prescriptions    No medications on file         (Please note that portions of this note were completed with a voice recognition program.  Efforts were made to edit the dictations but occasionally words are mis-transcribed.)    Herb Irvin MD (electronically signed)  Emergency Attending Physician / Physician Assistant / Nurse Practitioner             Herb Irvin MD  05/05/24 1000

## 2024-05-05 NOTE — ED TRIAGE NOTES
Patient to ER via for complaints of difficulty breathing since 0200 today.     Patient reports having house fumigated yesterday for bed bugs.     Dr. Irvin at bedside during triage to assess patient.

## 2024-05-06 ENCOUNTER — TELEPHONE (OUTPATIENT)
Dept: PRIMARY CARE CLINIC | Facility: CLINIC | Age: 89
End: 2024-05-06

## 2024-05-06 ENCOUNTER — TELEMEDICINE (OUTPATIENT)
Dept: PRIMARY CARE CLINIC | Facility: CLINIC | Age: 89
End: 2024-05-06
Payer: MEDICARE

## 2024-05-06 DIAGNOSIS — R53.1 WEAKNESS: Primary | ICD-10-CM

## 2024-05-06 DIAGNOSIS — R05.9 COUGH IN ADULT: ICD-10-CM

## 2024-05-06 DIAGNOSIS — F41.9 ANXIETY: ICD-10-CM

## 2024-05-06 PROCEDURE — 1090F PRES/ABSN URINE INCON ASSESS: CPT | Performed by: FAMILY MEDICINE

## 2024-05-06 PROCEDURE — G8427 DOCREV CUR MEDS BY ELIG CLIN: HCPCS | Performed by: FAMILY MEDICINE

## 2024-05-06 PROCEDURE — 1123F ACP DISCUSS/DSCN MKR DOCD: CPT | Performed by: FAMILY MEDICINE

## 2024-05-06 PROCEDURE — 1036F TOBACCO NON-USER: CPT | Performed by: FAMILY MEDICINE

## 2024-05-06 PROCEDURE — 99212 OFFICE O/P EST SF 10 MIN: CPT | Performed by: FAMILY MEDICINE

## 2024-05-06 PROCEDURE — G8420 CALC BMI NORM PARAMETERS: HCPCS | Performed by: FAMILY MEDICINE

## 2024-05-06 RX ORDER — DIAZEPAM 5 MG/1
TABLET ORAL
Qty: 30 TABLET | Refills: 0 | Status: SHIPPED | OUTPATIENT
Start: 2024-05-06 | End: 2024-06-05

## 2024-05-06 ASSESSMENT — PATIENT HEALTH QUESTIONNAIRE - PHQ9
SUM OF ALL RESPONSES TO PHQ9 QUESTIONS 1 & 2: 0
SUM OF ALL RESPONSES TO PHQ QUESTIONS 1-9: 0
SUM OF ALL RESPONSES TO PHQ QUESTIONS 1-9: 0
1. LITTLE INTEREST OR PLEASURE IN DOING THINGS: NOT AT ALL
2. FEELING DOWN, DEPRESSED OR HOPELESS: NOT AT ALL
SUM OF ALL RESPONSES TO PHQ QUESTIONS 1-9: 0
SUM OF ALL RESPONSES TO PHQ QUESTIONS 1-9: 0

## 2024-05-06 NOTE — PROGRESS NOTES
Chief Complaint   Patient presents with    Follow-up       
  psyllium (METAMUCIL SMOOTH TEXTURE) 58.6 % powder Take 1 packet by mouth daily  Patient not taking: Reported on 10/3/2023  Automatic Reconciliation, Ar       Social History     Tobacco Use    Smoking status: Never    Smokeless tobacco: Never   Substance Use Topics    Alcohol use: No    Drug use: Never            PHYSICAL EXAMINATION:  [ INSTRUCTIONS:  \"[x]\" Indicates a positive item  \"[]\" Indicates a negative item  -- DELETE ALL ITEMS NOT EXAMINED]  Vital Signs: (As obtained by patient/caregiver or practitioner observation)    Blood pressure-  Heart rate-    Respiratory rate-    Temperature-  Pulse oximetry-     Constitutional: [] Appears well-developed and well-nourished [x] No apparent distress      [] Abnormal-   Mental status  [x] Alert and awake  [x] Oriented to person/place/time [x]Able to follow commands      Eyes:  EOM    []  Normal  [] Abnormal-  Sclera  [x]  Normal  [] Abnormal -         Discharge [x]  None visible  [] Abnormal -    HENT:   [x] Normocephalic, atraumatic.  [] Abnormal   [] Mouth/Throat: Mucous membranes are moist.     External Ears [x] Normal  [] Abnormal-     Neck: [x] No visualized mass     Pulmonary/Chest: [x] Respiratory effort normal.  [] No visualized signs of difficulty breathing or respiratory distress        [x] Abnormal- occasional cough     Musculoskeletal:   [] Normal gait with no signs of ataxia         [] Normal range of motion of neck        [] Abnormal-       Neurological:        [x] No Facial Asymmetry (Cranial nerve 7 motor function) (limited exam to video visit)          [x] No gaze palsy        [] Abnormal-         Skin:        [] No significant exanthematous lesions or discoloration noted on facial skin         [] Abnormal-            Psychiatric:       [x] Normal Affect [] No Hallucinations        [] Abnormal  Other pertinent observable physical exam findings-     ASSESSMENT/PLAN:  1. Weakness  Assessment & Plan:   I will refer to OT for evaluation and treatment for

## 2024-05-06 NOTE — ASSESSMENT & PLAN NOTE
I will refer to OT for evaluation and treatment for upper extremity weakness and difficulty eating.

## 2024-05-08 ENCOUNTER — TELEPHONE (OUTPATIENT)
Dept: PRIMARY CARE CLINIC | Facility: CLINIC | Age: 89
End: 2024-05-08
Payer: MEDICARE

## 2024-05-08 DIAGNOSIS — R62.7 ADULT FAILURE TO THRIVE: Primary | ICD-10-CM

## 2024-05-08 PROCEDURE — 99441 PR PHYS/QHP TELEPHONE EVALUATION 5-10 MIN: CPT | Performed by: FAMILY MEDICINE

## 2024-05-09 PROBLEM — R62.7 FAILURE TO THRIVE IN ADULT: Status: RESOLVED | Noted: 2022-10-14 | Resolved: 2024-05-09

## 2024-05-09 NOTE — ASSESSMENT & PLAN NOTE
Patient is having increasing difficulties taking care of herself as well as with bug infestations.  She is resistant to going to an [facility] or nursing home.  Her daughter would like to have her evaluated for dementia.  I will get her referred to neurology.

## 2024-05-09 NOTE — TELEPHONE ENCOUNTER
Beth Stout (daughter) called back, and said that she is aware of the bedbug infestation. They have a plan to remove the couch and have it fumigate as well as have the house treated.  Daughter is concerned about advancing dementia, due to her inability to care for herself, and she would like patient evaluated by neurologist with goal of likely getting her into a nursing home.  I will refer to neurology.    1. Adult failure to thrive  Assessment & Plan:   Patient is having increasing difficulties taking care of herself as well as with bug infestations.  She is resistant to going to an [facility] or nursing home.  Her daughter would like to have her evaluated for dementia.  I will get her referred to neurology.

## 2024-05-09 NOTE — TELEPHONE ENCOUNTER
I called number on chart for Beth Stout (daughter), patient's listed decision-maker.  Reached voicemail and left a summary will wish to discuss.  I will try to call again later today.

## 2024-05-09 NOTE — TELEPHONE ENCOUNTER
Patients Daughter in law called back to speak with Dr. Eastman. States she would like a call back at [phone]

## 2024-05-17 ENCOUNTER — TELEPHONE (OUTPATIENT)
Dept: PRIMARY CARE CLINIC | Facility: CLINIC | Age: 89
End: 2024-05-17

## 2024-05-17 ENCOUNTER — OFFICE VISIT (OUTPATIENT)
Dept: PRIMARY CARE CLINIC | Facility: CLINIC | Age: 89
End: 2024-05-17
Payer: MEDICARE

## 2024-05-17 VITALS
OXYGEN SATURATION: 98 % | BODY MASS INDEX: 19.26 KG/M2 | WEIGHT: 102 LBS | SYSTOLIC BLOOD PRESSURE: 160 MMHG | RESPIRATION RATE: 16 BRPM | TEMPERATURE: 98.2 F | HEIGHT: 61 IN | HEART RATE: 74 BPM | DIASTOLIC BLOOD PRESSURE: 62 MMHG

## 2024-05-17 DIAGNOSIS — R53.1 WEAKNESS: ICD-10-CM

## 2024-05-17 DIAGNOSIS — R30.0 DYSURIA: Primary | ICD-10-CM

## 2024-05-17 LAB
BILIRUBIN, URINE, POC: NEGATIVE
BLOOD URINE, POC: NEGATIVE
GLUCOSE URINE, POC: 100
KETONES, URINE, POC: NEGATIVE
LEUKOCYTE ESTERASE, URINE, POC: NEGATIVE
NITRITE, URINE, POC: POSITIVE
PH, URINE, POC: 7 (ref 4.6–8)
PROTEIN,URINE, POC: ABNORMAL
SPECIFIC GRAVITY, URINE, POC: 1.01 (ref 1–1.03)
URINALYSIS CLARITY, POC: CLEAR
URINALYSIS COLOR, POC: ABNORMAL
UROBILINOGEN, POC: NORMAL

## 2024-05-17 PROCEDURE — G8427 DOCREV CUR MEDS BY ELIG CLIN: HCPCS | Performed by: FAMILY MEDICINE

## 2024-05-17 PROCEDURE — G8420 CALC BMI NORM PARAMETERS: HCPCS | Performed by: FAMILY MEDICINE

## 2024-05-17 PROCEDURE — 1036F TOBACCO NON-USER: CPT | Performed by: FAMILY MEDICINE

## 2024-05-17 PROCEDURE — 81002 URINALYSIS NONAUTO W/O SCOPE: CPT | Performed by: FAMILY MEDICINE

## 2024-05-17 PROCEDURE — 1090F PRES/ABSN URINE INCON ASSESS: CPT | Performed by: FAMILY MEDICINE

## 2024-05-17 PROCEDURE — 1123F ACP DISCUSS/DSCN MKR DOCD: CPT | Performed by: FAMILY MEDICINE

## 2024-05-17 PROCEDURE — 99214 OFFICE O/P EST MOD 30 MIN: CPT | Performed by: FAMILY MEDICINE

## 2024-05-17 RX ORDER — AMOXICILLIN 500 MG/1
500 CAPSULE ORAL 2 TIMES DAILY
Qty: 14 CAPSULE | Refills: 0 | Status: SHIPPED | OUTPATIENT
Start: 2024-05-17 | End: 2024-05-24

## 2024-05-17 NOTE — TELEPHONE ENCOUNTER
Spoke with Daughter In Law, Susu. She verified that house has been taken care of by , she states that pt called EMT and was taken to  Hosp for head pain, she states that CT was done and clear.  She states that pt is not taking care of her self and that she has caregiver there 2 hrs on Thurs. She states that she is trying to have pt seen with Neuro for evaluation and that she has called with possible appt with in week. She states that it is difficult for pt to continue living alone.

## 2024-05-17 NOTE — PROGRESS NOTES
\"Have you been to the ER, urgent care clinic since your last visit?  Hospitalized since your last visit?\"    YES - When: approximately 3 days ago.  Where and Why: [facility].    “Have you seen or consulted any other health care providers outside of [facility] since your last visit?”    NO            
   psyllium (METAMUCIL SMOOTH TEXTURE) 58.6 % powder Take 1 packet by mouth daily       No current facility-administered medications on file prior to visit.     Allergies   Allergen Reactions    Sulfa Antibiotics Nausea And Vomiting    Codeine Other (See Comments)     Stomach cramping    Linaclotide Diarrhea    Tilactase      Other reaction(s): Unknown (comments)    Tomato      Other reaction(s): Unknown (comments)    Levofloxacin Nausea And Vomiting     Pt can not take PO due to vomiting.       Objective  Vitals:    05/17/24 1139   BP: (!) 160/62   Pulse: 74   Resp: 16   Temp: 98.2 °F (36.8 °C)   SpO2: 98%     Physical Exam  Constitutional:       Appearance: Normal appearance.   HENT:      Head: Normocephalic and atraumatic.   Cardiovascular:      Rate and Rhythm: Normal rate and regular rhythm.   Pulmonary:      Effort: Pulmonary effort is normal.      Breath sounds: Normal breath sounds.   Neurological:      General: No focal deficit present.      Mental Status: She is alert and oriented to person, place, and time.      Comments: MMSE 4/5          Assessment & Plan  1. Dysuria  -     amoxicillin (AMOXIL) 500 MG capsule; Take 1 capsule by mouth 2 times daily for 7 days, Disp-14 capsule, R-0Normal  -     AMB POC URINALYSIS DIP STICK MANUAL W/O MICRO  UA positive for leukocytes and nitrates, will start amoxicillin.    2. Weakness  -     Vitamin B12; Future  -     Magnesium; Future  -     Comprehensive Metabolic Panel; Future  Will obtain labs.  Continue with physical therapy.      Aspects of this note have been generated using voice recognition software. Despite editing, there may be some syntax errors    Akira Eastman MD

## 2024-05-21 ENCOUNTER — TELEPHONE (OUTPATIENT)
Dept: PRIMARY CARE CLINIC | Facility: CLINIC | Age: 89
End: 2024-05-21

## 2024-05-21 NOTE — TELEPHONE ENCOUNTER
Called pt in ref to Home Health  Advised  PT seen patient 05/21/24 and OT did evaluation on 05/17/24 patient is receiving all service that agency officers.

## 2024-05-21 NOTE — TELEPHONE ENCOUNTER
Spoke with pt in ref to Maureen Pemberton, she states that Maureen Pemberton may have information discussed [phone], Pt states that Maureen is wanting to help her to stay in her house with some Home Health. LM for Maureen to return call

## 2024-05-21 NOTE — TELEPHONE ENCOUNTER
Please get in touch with home health to figure out if they are willing to go back to see patient now that the infestation is cleared.

## 2024-05-21 NOTE — TELEPHONE ENCOUNTER
Maureen fleming cma is asking to speak with the nurse about this pt but we have no documentation on this pts HIPAA that we can speak to her the nurse latoya will follow up with daniel to clarify

## 2024-05-21 NOTE — TELEPHONE ENCOUNTER
Spoke with Maureen in ref to pt, she is asking for order for Home Health to be sent for pt to be treated. Maureen states that pt is very weak and not eating enough. Maureen states that pt had Home Health to come to house 1 time and they have not returned nor does pt know if they are to return.

## 2024-05-22 LAB
ALBUMIN SERPL-MCNC: 4.1 G/DL (ref 3.7–4.7)
ALBUMIN/GLOB SERPL: 1.6 {RATIO} (ref 1.2–2.2)
ALP SERPL-CCNC: 94 IU/L (ref 44–121)
ALT SERPL-CCNC: 13 IU/L (ref 0–32)
AST SERPL-CCNC: 18 IU/L (ref 0–40)
BILIRUB SERPL-MCNC: 0.2 MG/DL (ref 0–1.2)
BUN SERPL-MCNC: 16 MG/DL (ref 8–27)
BUN/CREAT SERPL: 21 (ref 12–28)
CALCIUM SERPL-MCNC: 9.3 MG/DL (ref 8.7–10.3)
CHLORIDE SERPL-SCNC: 94 MMOL/L (ref 96–106)
CO2 SERPL-SCNC: 24 MMOL/L (ref 20–29)
CREAT SERPL-MCNC: 0.75 MG/DL (ref 0.57–1)
EGFRCR SERPLBLD CKD-EPI 2021: 77 ML/MIN/1.73
GLOBULIN SER CALC-MCNC: 2.5 G/DL (ref 1.5–4.5)
GLUCOSE SERPL-MCNC: 154 MG/DL (ref 70–99)
MAGNESIUM SERPL-MCNC: 2.1 MG/DL (ref 1.6–2.3)
POTASSIUM SERPL-SCNC: 4.2 MMOL/L (ref 3.5–5.2)
PROT SERPL-MCNC: 6.6 G/DL (ref 6–8.5)
SODIUM SERPL-SCNC: 130 MMOL/L (ref 134–144)
VIT B12 SERPL-MCNC: 474 PG/ML (ref 232–1245)

## 2024-05-23 NOTE — RESULT ENCOUNTER NOTE
Called and discussed with patient and her friend Tamra.  Sodium still low likely due to low protein intake.  Encouraged more protein intake and to continue with her electrolytes.

## 2024-05-29 ENCOUNTER — TELEPHONE (OUTPATIENT)
Dept: PRIMARY CARE CLINIC | Facility: CLINIC | Age: 89
End: 2024-05-29

## 2024-05-29 NOTE — TELEPHONE ENCOUNTER
Home Health needs additional orders for patient which is   Evaluation for nursing and needs it faxed to [phone]

## 2024-06-04 DIAGNOSIS — F41.9 ANXIETY: ICD-10-CM

## 2024-06-04 RX ORDER — DIAZEPAM 5 MG/1
5 TABLET ORAL NIGHTLY PRN
Qty: 30 TABLET | Refills: 1 | Status: SHIPPED | OUTPATIENT
Start: 2024-06-04 | End: 2024-08-03

## 2024-06-04 NOTE — TELEPHONE ENCOUNTER
Markie Ely is requesting a refill on diazePAM (VALIUM) 5 MG tablet  .   Please send medication to:   [facility] [id number] - [place] - [address] - [place] [phone] - F [phone] [address] [place] [address]  Phone: [phone]  Fax: [phone]     Patient's last appointment was 5/17/2024   Next visit is scheduled for 7/1/2024

## 2024-06-14 DIAGNOSIS — K58.2 MIXED IRRITABLE BOWEL SYNDROME: ICD-10-CM

## 2024-06-17 RX ORDER — ONDANSETRON 4 MG/1
TABLET, ORALLY DISINTEGRATING ORAL
Qty: 30 TABLET | Refills: 5 | Status: SHIPPED | OUTPATIENT
Start: 2024-06-17

## 2024-07-01 ENCOUNTER — OFFICE VISIT (OUTPATIENT)
Dept: PRIMARY CARE CLINIC | Facility: CLINIC | Age: 89
End: 2024-07-01
Payer: MEDICARE

## 2024-07-01 VITALS
RESPIRATION RATE: 16 BRPM | OXYGEN SATURATION: 98 % | BODY MASS INDEX: 18.5 KG/M2 | HEIGHT: 61 IN | SYSTOLIC BLOOD PRESSURE: 145 MMHG | HEART RATE: 71 BPM | TEMPERATURE: 98.1 F | DIASTOLIC BLOOD PRESSURE: 70 MMHG | WEIGHT: 98 LBS

## 2024-07-01 DIAGNOSIS — R73.03 PREDIABETES: ICD-10-CM

## 2024-07-01 DIAGNOSIS — Z86.73 HISTORY OF TIAS: ICD-10-CM

## 2024-07-01 DIAGNOSIS — Z13.29 SCREENING FOR ENDOCRINE DISORDER: ICD-10-CM

## 2024-07-01 DIAGNOSIS — F41.9 ANXIETY: ICD-10-CM

## 2024-07-01 DIAGNOSIS — Z00.00 MEDICARE ANNUAL WELLNESS VISIT, SUBSEQUENT: Primary | ICD-10-CM

## 2024-07-01 DIAGNOSIS — I10 ESSENTIAL (PRIMARY) HYPERTENSION: Chronic | ICD-10-CM

## 2024-07-01 DIAGNOSIS — R42 VERTIGO: ICD-10-CM

## 2024-07-01 DIAGNOSIS — K58.2 MIXED IRRITABLE BOWEL SYNDROME: ICD-10-CM

## 2024-07-01 PROCEDURE — G8420 CALC BMI NORM PARAMETERS: HCPCS | Performed by: NURSE PRACTITIONER

## 2024-07-01 PROCEDURE — 99214 OFFICE O/P EST MOD 30 MIN: CPT | Performed by: NURSE PRACTITIONER

## 2024-07-01 PROCEDURE — 1036F TOBACCO NON-USER: CPT | Performed by: NURSE PRACTITIONER

## 2024-07-01 PROCEDURE — G8427 DOCREV CUR MEDS BY ELIG CLIN: HCPCS | Performed by: NURSE PRACTITIONER

## 2024-07-01 PROCEDURE — 1123F ACP DISCUSS/DSCN MKR DOCD: CPT | Performed by: NURSE PRACTITIONER

## 2024-07-01 PROCEDURE — G0439 PPPS, SUBSEQ VISIT: HCPCS | Performed by: NURSE PRACTITIONER

## 2024-07-01 PROCEDURE — 1090F PRES/ABSN URINE INCON ASSESS: CPT | Performed by: NURSE PRACTITIONER

## 2024-07-01 RX ORDER — ONDANSETRON 4 MG/1
TABLET, ORALLY DISINTEGRATING ORAL
Qty: 30 TABLET | Refills: 5 | Status: SHIPPED | OUTPATIENT
Start: 2024-07-01

## 2024-07-01 RX ORDER — MECLIZINE HCL 12.5 MG/1
TABLET ORAL
Qty: 90 TABLET | Refills: 5 | Status: SHIPPED | OUTPATIENT
Start: 2024-07-01

## 2024-07-01 RX ORDER — CLOPIDOGREL BISULFATE 75 MG/1
75 TABLET ORAL DAILY
Qty: 90 TABLET | Refills: 1 | Status: SHIPPED | OUTPATIENT
Start: 2024-07-01

## 2024-07-01 RX ORDER — DICYCLOMINE HYDROCHLORIDE 10 MG/1
10 CAPSULE ORAL
Qty: 120 CAPSULE | Refills: 5 | Status: SHIPPED | OUTPATIENT
Start: 2024-07-01

## 2024-07-01 RX ORDER — ATENOLOL 25 MG/1
25 TABLET ORAL DAILY
Qty: 90 TABLET | Refills: 1 | Status: SHIPPED | OUTPATIENT
Start: 2024-07-01

## 2024-07-01 RX ORDER — DIAZEPAM 5 MG/1
5 TABLET ORAL NIGHTLY PRN
Qty: 30 TABLET | Refills: 3 | Status: SHIPPED | OUTPATIENT
Start: 2024-07-01 | End: 2024-10-29

## 2024-07-01 ASSESSMENT — PATIENT HEALTH QUESTIONNAIRE - PHQ9
2. FEELING DOWN, DEPRESSED OR HOPELESS: NOT AT ALL
SUM OF ALL RESPONSES TO PHQ QUESTIONS 1-9: 0
1. LITTLE INTEREST OR PLEASURE IN DOING THINGS: NOT AT ALL
SUM OF ALL RESPONSES TO PHQ QUESTIONS 1-9: 0
SUM OF ALL RESPONSES TO PHQ9 QUESTIONS 1 & 2: 0
SUM OF ALL RESPONSES TO PHQ QUESTIONS 1-9: 0
SUM OF ALL RESPONSES TO PHQ QUESTIONS 1-9: 0

## 2024-07-01 ASSESSMENT — LIFESTYLE VARIABLES: HOW OFTEN DO YOU HAVE A DRINK CONTAINING ALCOHOL: NEVER

## 2024-07-01 NOTE — PROGRESS NOTES
Chief Complaint   Patient presents with    Medicare AWV     BP (!) 161/81 (Site: Left Upper Arm, Position: Sitting)   Pulse 71   Temp 98.1 °F (36.7 °C) (Oral)   Resp 16   Ht 1.549 m (5' 1\")   Wt 44.5 kg (98 lb)   SpO2 98%   BMI 18.52 kg/m²     \"Have you been to the ER, urgent care clinic since your last visit?  Hospitalized since your last visit?\"    NO    “Have you seen or consulted any other health care providers outside of [facility] since your last visit?”    NO            Click Here for Release of Records Request

## 2024-07-01 NOTE — PROGRESS NOTES
Medicare Annual Wellness Visit    Markie Ely is here for Medicare AWV    Assessment & Plan   Medicare annual wellness visit, subsequent  History of TIAs  Comments:  continue on plavix.   Orders:  -     clopidogrel (PLAVIX) 75 MG tablet; Take 1 tablet by mouth daily, Disp-90 tablet, R-1Normal  Essential (primary) hypertension  Comments:  elevated today but she reported better readings as home and declines med adjustments at this time.  Orders:  -     atenolol (TENORMIN) 25 MG tablet; Take 1 tablet by mouth daily, Disp-90 tablet, R-1Normal  -     CBC  -     Comprehensive Metabolic Panel  Anxiety  Comments:  valium PRN.  Discussed only using as needed instead of daily.  Can use a daily agent SSRI if needed.   Orders:  -     diazePAM (VALIUM) 5 MG tablet; Take 1 tablet by mouth nightly as needed for Anxiety for up to 120 days. Max Daily Amount: 5 mg, Disp-30 tablet, R-3Normal  Vertigo  Comments:  meclizine works well for patient.   Orders:  -     meclizine (ANTIVERT) 12.5 MG tablet; TAKE ONE TO TWO TABLETS BY MOUTH TWICE A DAY AS NEEDED FOR DIZZINESS, Disp-90 tablet, R-5Normal  Mixed irritable bowel syndrome  Comments:  current regiemn is working well for patient.   Orders:  -     ondansetron (ZOFRAN-ODT) 4 MG disintegrating tablet; DISSOLVE 1 TABLET BY MOUTH DAILY AS NEEDED FOR NAUSEA, Disp-30 tablet, R-5Normal  -     dicyclomine (BENTYL) 10 MG capsule; Take 1 capsule by mouth 4 times daily (before meals and nightly), Disp-120 capsule, R-5Normal  Prediabetes  Comments:  stable on current medications.  Orders:  -     Hemoglobin A1C  Screening for endocrine disorder  -     TSH     Recommendations for Preventive Services Due: see orders and patient instructions/AVS.  Recommended screening schedule for the next 5-10 years is provided to the patient in written form: see Patient Instructions/AVS.     Return in 6 months (on 1/1/2025) for Chronic OV.     Subjective   The following acute and/or chronic problems were also

## 2024-07-02 LAB
ALBUMIN SERPL-MCNC: 4.3 G/DL (ref 3.7–4.7)
ALP SERPL-CCNC: 92 IU/L (ref 44–121)
ALT SERPL-CCNC: 12 IU/L (ref 0–32)
AST SERPL-CCNC: 25 IU/L (ref 0–40)
BILIRUB SERPL-MCNC: 0.3 MG/DL (ref 0–1.2)
BUN SERPL-MCNC: 14 MG/DL (ref 8–27)
BUN/CREAT SERPL: 17 (ref 12–28)
CALCIUM SERPL-MCNC: 9.6 MG/DL (ref 8.7–10.3)
CHLORIDE SERPL-SCNC: 96 MMOL/L (ref 96–106)
CO2 SERPL-SCNC: 24 MMOL/L (ref 20–29)
CREAT SERPL-MCNC: 0.83 MG/DL (ref 0.57–1)
EGFRCR SERPLBLD CKD-EPI 2021: 68 ML/MIN/1.73
ERYTHROCYTE [DISTWIDTH] IN BLOOD BY AUTOMATED COUNT: 11.7 % (ref 11.7–15.4)
GLOBULIN SER CALC-MCNC: 2.8 G/DL (ref 1.5–4.5)
GLUCOSE SERPL-MCNC: 119 MG/DL (ref 70–99)
HBA1C MFR BLD: 5.9 % (ref 4.8–5.6)
HCT VFR BLD AUTO: 41.3 % (ref 34–46.6)
HGB BLD-MCNC: 13.9 G/DL (ref 11.1–15.9)
MCH RBC QN AUTO: 30 PG (ref 26.6–33)
MCHC RBC AUTO-ENTMCNC: 33.7 G/DL (ref 31.5–35.7)
MCV RBC AUTO: 89 FL (ref 79–97)
PLATELET # BLD AUTO: 281 X10E3/UL (ref 150–450)
POTASSIUM SERPL-SCNC: 4.4 MMOL/L (ref 3.5–5.2)
PROT SERPL-MCNC: 7.1 G/DL (ref 6–8.5)
RBC # BLD AUTO: 4.63 X10E6/UL (ref 3.77–5.28)
SODIUM SERPL-SCNC: 133 MMOL/L (ref 134–144)
TSH SERPL DL<=0.005 MIU/L-ACNC: 4.2 UIU/ML (ref 0.45–4.5)
WBC # BLD AUTO: 8 X10E3/UL (ref 3.4–10.8)

## 2024-07-16 ENCOUNTER — HOSPITAL ENCOUNTER (EMERGENCY)
Facility: HOSPITAL | Age: 89
Discharge: HOME | End: 2024-07-16
Attending: STUDENT IN AN ORGANIZED HEALTH CARE EDUCATION/TRAINING PROGRAM
Payer: MEDICARE

## 2024-07-16 VITALS
SYSTOLIC BLOOD PRESSURE: 179 MMHG | HEIGHT: 61 IN | OXYGEN SATURATION: 97 % | HEART RATE: 66 BPM | TEMPERATURE: 97.8 F | RESPIRATION RATE: 16 BRPM | DIASTOLIC BLOOD PRESSURE: 75 MMHG | WEIGHT: 98 LBS | BODY MASS INDEX: 18.5 KG/M2

## 2024-07-16 DIAGNOSIS — R42 VERTIGO: Primary | ICD-10-CM

## 2024-07-16 PROCEDURE — 6370000000 HC RX 637 (ALT 250 FOR IP): Performed by: STUDENT IN AN ORGANIZED HEALTH CARE EDUCATION/TRAINING PROGRAM

## 2024-07-16 PROCEDURE — 93005 ELECTROCARDIOGRAM TRACING: CPT | Performed by: STUDENT IN AN ORGANIZED HEALTH CARE EDUCATION/TRAINING PROGRAM

## 2024-07-16 PROCEDURE — 99283 EMERGENCY DEPT VISIT LOW MDM: CPT

## 2024-07-16 RX ORDER — DICYCLOMINE HCL 20 MG
20 TABLET ORAL ONCE
Status: COMPLETED | OUTPATIENT
Start: 2024-07-16 | End: 2024-07-16

## 2024-07-16 RX ORDER — MECLIZINE HYDROCHLORIDE 25 MG/1
25 TABLET ORAL
Status: COMPLETED | OUTPATIENT
Start: 2024-07-16 | End: 2024-07-16

## 2024-07-16 RX ORDER — ONDANSETRON 4 MG/1
4 TABLET, ORALLY DISINTEGRATING ORAL
Status: COMPLETED | OUTPATIENT
Start: 2024-07-16 | End: 2024-07-16

## 2024-07-16 RX ADMIN — ONDANSETRON 4 MG: 4 TABLET, ORALLY DISINTEGRATING ORAL at 13:43

## 2024-07-16 RX ADMIN — MECLIZINE HYDROCHLORIDE 25 MG: 25 TABLET ORAL at 13:43

## 2024-07-16 RX ADMIN — DICYCLOMINE HYDROCHLORIDE 20 MG: 20 TABLET ORAL at 13:43

## 2024-07-16 ASSESSMENT — PAIN - FUNCTIONAL ASSESSMENT: PAIN_FUNCTIONAL_ASSESSMENT: NONE - DENIES PAIN

## 2024-07-16 NOTE — ED PROVIDER NOTES
within normal range or not returned as of this dictation.        PROCEDURES:  Unless otherwise noted below, none     Procedures    See [facility] for documentation of critical care time.       FINAL IMPRESSION      1. Vertigo          DISPOSITION/PLAN   DISPOSITION Decision To Discharge 07/16/2024 02:55:53 PM      PATIENT REFERRED TO:  Ciro Gibbs, AVIVA - NP  [address] [facility] [address]  [phone]    Call in 1 day  Regarding your ER visit today    Emergency Department    Go to   If symptoms worsen      DISCHARGE MEDICATIONS:  New Prescriptions    No medications on file         (Please note that portions of this note were completed with a voice recognition program.  Efforts were made to edit the dictations but occasionally words are mis-transcribed.)    Bonnie Edwards DO (electronically signed)  Emergency Attending Physician / Physician Assistant / Nurse Practitioner             Bonnie Edwards DO  07/16/24 7891

## 2024-07-16 NOTE — ED TRIAGE NOTES
Patient arrives via ems from home with co worsening dizziness, patient takes meclizine prophylactily.  Patient ambulatory from stretcher to er stretcher.  Patient alert and oriented x 4

## 2024-07-17 ENCOUNTER — TELEPHONE (OUTPATIENT)
Dept: PRIMARY CARE CLINIC | Facility: CLINIC | Age: 89
End: 2024-07-17

## 2024-07-17 DIAGNOSIS — R42 VERTIGO: ICD-10-CM

## 2024-07-17 DIAGNOSIS — I10 ESSENTIAL HYPERTENSION: Primary | ICD-10-CM

## 2024-07-17 LAB
EKG ATRIAL RATE: 65 BPM
EKG DIAGNOSIS: NORMAL
EKG P AXIS: 65 DEGREES
EKG P-R INTERVAL: 240 MS
EKG Q-T INTERVAL: 410 MS
EKG QRS DURATION: 86 MS
EKG QTC CALCULATION (BAZETT): 426 MS
EKG R AXIS: -42 DEGREES
EKG T AXIS: 38 DEGREES
EKG VENTRICULAR RATE: 65 BPM

## 2024-07-17 PROCEDURE — 93010 ELECTROCARDIOGRAM REPORT: CPT | Performed by: STUDENT IN AN ORGANIZED HEALTH CARE EDUCATION/TRAINING PROGRAM

## 2024-07-17 PROCEDURE — 99441 PR PHYS/QHP TELEPHONE EVALUATION 5-10 MIN: CPT | Performed by: FAMILY MEDICINE

## 2024-07-17 NOTE — PROGRESS NOTES
Patient reports increasing vertigo.  She went to the ER and her blood pressure was quite elevated.  She reports the home health nurse  Check her vitals for a week or 2.  I placed an order.

## 2024-07-18 NOTE — TELEPHONE ENCOUNTER
Patient has worsening weakness and elevated blood pressure.  Placed an order for home health skilled nursing to come and check her blood pressure and other vitals a couple times a week over the next couple weeks.

## 2024-07-23 ENCOUNTER — TELEPHONE (OUTPATIENT)
Dept: PRIMARY CARE CLINIC | Facility: CLINIC | Age: 89
End: 2024-07-23

## 2024-07-23 NOTE — TELEPHONE ENCOUNTER
Pt called stating that she has been informed that she needs to not take Meclozine for Vertigo, medication was given at ER for Vertigo and she feels that she needs to be taking it. Pt was transported to ER for Dizziness and Dx with Vertigo. Pt was advised that she needs to discuss with [facility] if Hosp Visit and medication changes. Pt states that she will call back if she needs to be seen.

## 2024-07-25 ENCOUNTER — TELEPHONE (OUTPATIENT)
Dept: PRIMARY CARE CLINIC | Facility: CLINIC | Age: 89
End: 2024-07-25

## 2024-07-25 NOTE — TELEPHONE ENCOUNTER
Patient called requesting that we find her a dermatologist. Informed patient that she can call her insurance to find a dermatologist that will take her insurance. Patient states that she has a issue with the side of her face and  possibly her hand. Please call patient back on home phone.

## 2024-09-25 ENCOUNTER — TELEPHONE (OUTPATIENT)
Dept: PRIMARY CARE CLINIC | Facility: CLINIC | Age: 89
End: 2024-09-25

## 2024-09-25 NOTE — TELEPHONE ENCOUNTER
Pt initially declined home health but says the lady she had been using has not been \"nice lately\" and has had some issues.. Does this pt need to be seen for a home health referral be placed again for her since the last was declined by pt?

## 2024-09-26 NOTE — TELEPHONE ENCOUNTER
Are you markus to just change the home health referral that is already in the system as she was never seen by the previous company?

## 2024-09-26 NOTE — TELEPHONE ENCOUNTER
She doesn't want that same company as they are the ones who feels were rude to her.     Do we have another option for home health for her?

## 2024-10-01 ENCOUNTER — TELEPHONE (OUTPATIENT)
Dept: PRIMARY CARE CLINIC | Facility: CLINIC | Age: 89
End: 2024-10-01

## 2024-10-01 NOTE — TELEPHONE ENCOUNTER
I just filled out paperwork for this patient to have a dental procedure with her dentist.    Are they getting her scheduled?

## 2024-10-01 NOTE — TELEPHONE ENCOUNTER
Called patient. Patient stated that's she knows that pcp has nothing to do with dental but her tooth that had a fill in broke or fell out and she is not sure if pcp could refer her to a dentist because she had called her previous dentist and she was told she was getting scheduled for an appointment today and when she called to confirm it, they told her no appt was made.

## 2024-10-01 NOTE — TELEPHONE ENCOUNTER
----- Message from Haley PIPER sent at 10/1/2024 12:48 PM EDT -----  Regarding: [facility] Referral Request  [facility] Referral Request    Reason for referral request: Specialty Provider    Specialist/Lab/Test patient is requesting (if known):N/A    Specialist Phone Number (if applicable):N/A    Additional Information: Patient thought she has an appointment for dentist, however the office told her that they have no received a referral from her PCP and Patient need the referral as soon as possible because she wants to see the dentist   --------------------------------------------------------------------------------------------------------------------------    Relationship to Patient: Self     Call Back Information: OK to leave message on voicemail  Preferred Call Back Number: Phone

## 2024-11-07 ENCOUNTER — OFFICE VISIT (OUTPATIENT)
Dept: PRIMARY CARE CLINIC | Facility: CLINIC | Age: 89
End: 2024-11-07

## 2024-11-07 VITALS
DIASTOLIC BLOOD PRESSURE: 74 MMHG | HEIGHT: 61 IN | TEMPERATURE: 97.7 F | WEIGHT: 105 LBS | HEART RATE: 68 BPM | SYSTOLIC BLOOD PRESSURE: 146 MMHG | BODY MASS INDEX: 19.83 KG/M2

## 2024-11-07 DIAGNOSIS — F33.1 MODERATE EPISODE OF RECURRENT MAJOR DEPRESSIVE DISORDER (HCC): ICD-10-CM

## 2024-11-07 DIAGNOSIS — N30.00 ACUTE CYSTITIS WITHOUT HEMATURIA: Primary | ICD-10-CM

## 2024-11-07 DIAGNOSIS — K58.2 IRRITABLE BOWEL SYNDROME WITH BOTH CONSTIPATION AND DIARRHEA: ICD-10-CM

## 2024-11-07 LAB
BILIRUBIN, URINE, POC: NEGATIVE
BLOOD URINE, POC: ABNORMAL
GLUCOSE URINE, POC: NEGATIVE
KETONES, URINE, POC: NEGATIVE
LEUKOCYTE ESTERASE, URINE, POC: ABNORMAL
NITRITE, URINE, POC: ABNORMAL
PH, URINE, POC: 7 (ref 4.6–8)
PROTEIN,URINE, POC: NEGATIVE
SPECIFIC GRAVITY, URINE, POC: 1.01 (ref 1–1.03)
URINALYSIS CLARITY, POC: CLEAR
URINALYSIS COLOR, POC: YELLOW
UROBILINOGEN, POC: ABNORMAL MG/DL

## 2024-11-07 RX ORDER — CIPROFLOXACIN 500 MG/1
500 TABLET, FILM COATED ORAL 2 TIMES DAILY
Qty: 14 TABLET | Refills: 0 | Status: SHIPPED | OUTPATIENT
Start: 2024-11-07 | End: 2024-11-14

## 2024-11-07 RX ORDER — DICYCLOMINE HYDROCHLORIDE 10 MG/1
20 CAPSULE ORAL
Qty: 120 CAPSULE | Refills: 5 | Status: SHIPPED
Start: 2024-11-07

## 2024-11-07 SDOH — ECONOMIC STABILITY: FOOD INSECURITY: WITHIN THE PAST 12 MONTHS, THE FOOD YOU BOUGHT JUST DIDN'T LAST AND YOU DIDN'T HAVE MONEY TO GET MORE.: NEVER TRUE

## 2024-11-07 SDOH — ECONOMIC STABILITY: FOOD INSECURITY: WITHIN THE PAST 12 MONTHS, YOU WORRIED THAT YOUR FOOD WOULD RUN OUT BEFORE YOU GOT MONEY TO BUY MORE.: NEVER TRUE

## 2024-11-07 SDOH — ECONOMIC STABILITY: INCOME INSECURITY: HOW HARD IS IT FOR YOU TO PAY FOR THE VERY BASICS LIKE FOOD, HOUSING, MEDICAL CARE, AND HEATING?: NOT HARD AT ALL

## 2024-11-07 NOTE — PROGRESS NOTES
Subjective  Chief Complaint   Patient presents with    Other     UTI     HPI:  Markie Ely is a 89 y.o. female.    UTI - patient reports frequency and caregiver reports confusion over last 2 weeks. [facility] and was given cefuroxime. No change in symptoms since then.    Depression - worsening due to isolation. Participating in therapy with her .    IBS - limits patient's travel and worsens her isolation. Notes symptoms worse in morning when she wakes up. Bentyl helps some.    Past Medical History:   Diagnosis Date    Allergies     Anxiety     Breast cancer (HCC) 1993    s/p lumpectomy    Diabetes mellitus (HCC)     diet-controlled    Hx-TIA (transient ischemic attack)     Hypertension     Hyponatremia     Melanoma (HCC) 2008    s/p resection, right cheek    Tremor      Current Outpatient Medications on File Prior to Visit   Medication Sig Dispense Refill    clopidogrel (PLAVIX) 75 MG tablet Take 1 tablet by mouth daily 90 tablet 1    atenolol (TENORMIN) 25 MG tablet Take 1 tablet by mouth daily 90 tablet 1    meclizine (ANTIVERT) 12.5 MG tablet TAKE ONE TO TWO TABLETS BY MOUTH TWICE A DAY AS NEEDED FOR DIZZINESS 90 tablet 5    ondansetron (ZOFRAN-ODT) 4 MG disintegrating tablet DISSOLVE 1 TABLET BY MOUTH DAILY AS NEEDED FOR NAUSEA 30 tablet 5    dicyclomine (BENTYL) 10 MG capsule Take 1 capsule by mouth 4 times daily (before meals and nightly) 120 capsule 5    pantoprazole (PROTONIX) 40 MG tablet TAKE ONE TABLET BY MOUTH TWICE A DAY 60 tablet 0    acetaminophen (TYLENOL) 325 MG tablet Take by mouth every 6 hours as needed      cyanocobalamin 500 MCG tablet Take by mouth as needed      diclofenac sodium (VOLTAREN) 1 % GEL Apply topically 4 times daily as needed      fluticasone (FLONASE) 50 MCG/ACT nasal spray SPRAY ONE SPRAY IN EACH NOSTRIL ONCE DAILY      polyethylene glycol (GLYCOLAX) 17 GM/SCOOP powder Take by mouth daily      psyllium (METAMUCIL SMOOTH TEXTURE) 58.6 % powder

## 2024-11-07 NOTE — ASSESSMENT & PLAN NOTE
Flared currently. Continue therapy with . Will see if improving IBS symptoms helps with isolation.

## 2024-11-09 LAB — BACTERIA UR CULT: NO GROWTH

## 2024-11-14 ENCOUNTER — TELEPHONE (OUTPATIENT)
Dept: PRIMARY CARE CLINIC | Facility: CLINIC | Age: 89
End: 2024-11-14

## 2024-11-14 DIAGNOSIS — Z86.73 HISTORY OF TIAS: Primary | ICD-10-CM

## 2024-11-14 NOTE — TELEPHONE ENCOUNTER
Patient called and needs a referral for home health, said she's had this sent before and gave me the nurses phone number    [phone]

## 2024-11-15 NOTE — TELEPHONE ENCOUNTER
Can you contact patient and ask what she is requiring home health for.   We are happy to place orders if needed but we need to know what she needs assistance with from a medical perspective.   PT?  OT?  Skilled nursing?

## 2024-11-27 DIAGNOSIS — F41.9 ANXIETY: ICD-10-CM

## 2024-11-27 RX ORDER — DIAZEPAM 5 MG/1
TABLET ORAL
Qty: 30 TABLET | Refills: 2 | Status: SHIPPED | OUTPATIENT
Start: 2024-11-27 | End: 2025-02-25

## 2024-12-02 ENCOUNTER — TELEPHONE (OUTPATIENT)
Dept: PRIMARY CARE CLINIC | Facility: CLINIC | Age: 89
End: 2024-12-02

## 2024-12-02 DIAGNOSIS — Z86.73 HISTORY OF TIAS: ICD-10-CM

## 2024-12-02 DIAGNOSIS — F41.9 ANXIETY: ICD-10-CM

## 2024-12-02 RX ORDER — DIAZEPAM 5 MG/1
5 TABLET ORAL NIGHTLY PRN
Qty: 30 TABLET | Refills: 2 | Status: SHIPPED | OUTPATIENT
Start: 2024-12-11 | End: 2025-03-11

## 2024-12-02 RX ORDER — CLOPIDOGREL BISULFATE 75 MG/1
75 TABLET ORAL DAILY
Qty: 90 TABLET | Refills: 1 | Status: SHIPPED | OUTPATIENT
Start: 2024-12-02

## 2024-12-02 NOTE — TELEPHONE ENCOUNTER
Angle from Home health called and stated she needs verbal orders for PT. Call back number is [phone].

## 2024-12-02 NOTE — ADDENDUM NOTE
Addended by: KIRILL JAMES on: 12/2/2024 12:31 PM     Modules accepted: Orders     Your ldl or bad cholesterol still elevated so eat less cholesterol in diet and exercise as tolerated to lower it , you are not anemic, your vitamin d and sugar and kidney and liver and hepatitis c tests are normal

## 2024-12-02 NOTE — TELEPHONE ENCOUNTER
Hi Dr. Eastman, [facility] pharmacy called and stated that patient called in saying she lost her Diazepam and can not find it anywhere in her house. She wanted to know if she could have an early refill.

## 2024-12-03 ENCOUNTER — OFFICE VISIT (OUTPATIENT)
Age: 89
End: 2024-12-03
Payer: MEDICARE

## 2024-12-03 VITALS
HEART RATE: 84 BPM | OXYGEN SATURATION: 96 % | SYSTOLIC BLOOD PRESSURE: 165 MMHG | RESPIRATION RATE: 14 BRPM | DIASTOLIC BLOOD PRESSURE: 87 MMHG

## 2024-12-03 DIAGNOSIS — F03.B0 MODERATE DEMENTIA WITHOUT BEHAVIORAL DISTURBANCE, PSYCHOTIC DISTURBANCE, MOOD DISTURBANCE, OR ANXIETY, UNSPECIFIED DEMENTIA TYPE (HCC): ICD-10-CM

## 2024-12-03 DIAGNOSIS — R41.3 MEMORY DIFFICULTIES: Primary | ICD-10-CM

## 2024-12-03 PROCEDURE — G8484 FLU IMMUNIZE NO ADMIN: HCPCS | Performed by: PSYCHIATRY & NEUROLOGY

## 2024-12-03 PROCEDURE — 95816 EEG AWAKE AND DROWSY: CPT | Performed by: PSYCHIATRY & NEUROLOGY

## 2024-12-03 PROCEDURE — 1123F ACP DISCUSS/DSCN MKR DOCD: CPT | Performed by: PSYCHIATRY & NEUROLOGY

## 2024-12-03 PROCEDURE — G8428 CUR MEDS NOT DOCUMENT: HCPCS | Performed by: PSYCHIATRY & NEUROLOGY

## 2024-12-03 PROCEDURE — 99204 OFFICE O/P NEW MOD 45 MIN: CPT | Performed by: PSYCHIATRY & NEUROLOGY

## 2024-12-03 PROCEDURE — 1126F AMNT PAIN NOTED NONE PRSNT: CPT | Performed by: PSYCHIATRY & NEUROLOGY

## 2024-12-03 PROCEDURE — 1090F PRES/ABSN URINE INCON ASSESS: CPT | Performed by: PSYCHIATRY & NEUROLOGY

## 2024-12-03 PROCEDURE — 96138 PSYCL/NRPSYC TECH 1ST: CPT | Performed by: PSYCHIATRY & NEUROLOGY

## 2024-12-03 PROCEDURE — 1036F TOBACCO NON-USER: CPT | Performed by: PSYCHIATRY & NEUROLOGY

## 2024-12-03 PROCEDURE — G8420 CALC BMI NORM PARAMETERS: HCPCS | Performed by: PSYCHIATRY & NEUROLOGY

## 2024-12-03 PROCEDURE — 96132 NRPSYC TST EVAL PHYS/QHP 1ST: CPT | Performed by: PSYCHIATRY & NEUROLOGY

## 2024-12-03 RX ORDER — MEMANTINE HYDROCHLORIDE 5 MG/1
5 TABLET ORAL 2 TIMES DAILY
Qty: 60 TABLET | Refills: 4 | Status: SHIPPED | OUTPATIENT
Start: 2024-12-03

## 2024-12-03 ASSESSMENT — PATIENT HEALTH QUESTIONNAIRE - PHQ9
SUM OF ALL RESPONSES TO PHQ QUESTIONS 1-9: 2
SUM OF ALL RESPONSES TO PHQ9 QUESTIONS 1 & 2: 2
SUM OF ALL RESPONSES TO PHQ QUESTIONS 1-9: 2
1. LITTLE INTEREST OR PLEASURE IN DOING THINGS: SEVERAL DAYS
2. FEELING DOWN, DEPRESSED OR HOPELESS: SEVERAL DAYS

## 2024-12-03 NOTE — PROGRESS NOTES
of a similar age and gender, suggesting possible presence of cognitive impairment.  
    Other reaction(s): Unknown (comments)    Tomato Other (See Comments)       GI upset    Levofloxacin Nausea And Vomiting     Pt can not take PO due to vomiting.       Social History     Tobacco Use    Smoking status: Never    Smokeless tobacco: Never   Vaping Use    Vaping status: Never Used   Substance Use Topics    Alcohol use: No    Drug use: Never       Family History   Problem Relation Age of Onset    Heart Failure Mother     Migraines Mother     Other Father         aortic aneurysm    Heart Disease Father     Heart Disease Brother     Seizures Brother     Stroke Paternal Grandmother     Immunodeficiency Son         post kidney and pancreas transplant    Diabetes Type 1  Son     Skin Cancer Son     Neuropathy Son     Heart Disease Son     Heart Attack Son     Cancer Maternal Uncle        Examination  BP (!) 165/87   Pulse 84   SpO2 96%     She is a pleasant lady.  She is engaging.  She is oriented to Tuesday, December 3, 2024.  She denies around the second [facility] and [facility].  She recalls the current president is Biden and the president elect is Trump.  She recalls 0 out of 3 and with clues 1 out of 3.  She follows all commands.  Cranial nerves are intact.  No nystagmus.  No pronation or drift.  She gives symmetric resistance in all 4 extremities in all muscle groups.  Reflexes symmetric.  No pathologic reflex.  No ataxia.        Cognitive Testing Evaluation   Introduction:      Markie Ely  1935  Female   This 89 year old Female was administered a battery of neurocognitive testing on 12/03/2024.      Tests Administered:      Trails A, Trails B, Digit Symbol Substitution, Stroop, Immediate Recognition, Delayed Recognition   The combined test administration time was 17 minutes   Test Results:   Cognitive testing was provided via a battery of cognitive assessments. The pattern of test scores indicate that results are valid.  A Clinical Report with further description of scores and results

## 2024-12-03 NOTE — PATIENT INSTRUCTIONS
PRIOR AUTHORIZATION FOR MEDICATIONS    If you were prescribed medication during your visit, it may require a prior authorization which is a determination of the medication coverage made by your insurance plan.     This process can take 14 business days for most medications prescribed by our Neurologists.      Botox injections (for therapeutic use) can take up to 8 weeks.    If you haven't heard from our office or your pharmacy within the time outlined above, please contact our office.        [facility] Test Result Communication    Test results are available in [employer].  [employer] is the patient portal into our electronic health record.  This feature allows patients to see diagnostic test results, immunizations, allergies, past medical and surgical history, current medications, and send messages directly to providers.  Our team members at the  can provide additional information and assist with registration.  The [employer] support team can be reached at [phone].    In some cases, a provider might need time to explain the results in detail during a follow-up appointment.  This might include additional information or context that will help patients understand the reason for next steps in the plan of care recommended by their provider.    If a patient chooses to receive diagnostic testing at an imaging center outside of the [facility] network, it is the patient's responsibility to bring the imaging report and disc to their [facility] follow-up appointment.    If the test results reveal anything that is particularly noteworthy, we will contact you to discuss the matter and, if necessary, schedule a follow-up appointment at an earlier date.    If you have not received your test results by [employer] or other communication within 7 days, please contact our office.  An inquiry can be sent to your provider using [employer].  Alternatively, appointments can be scheduled via

## 2024-12-05 ENCOUNTER — TELEPHONE (OUTPATIENT)
Age: 89
End: 2024-12-05

## 2024-12-05 NOTE — TELEPHONE ENCOUNTER
Susu requesting a call to discuss medication:    Mirtha    She stated the patient might be having side effects and what to verify.

## 2024-12-05 NOTE — TELEPHONE ENCOUNTER
Return call to nurse Susu w/ [facility]  - she saw pt for the 1st time today (referred by PCP) Pt was telling nurse that she felt extremely fatigued and thought that it was her memantine.   Advised that we just prescribed the memantine 2 days ago - 5 mg BID at her NP appt w/ us. If pt feels like this is the memantine, she can certainly cut it back to 1 tab daily or try stopping it to see if her sx's resolve. Aura will rely this to the pt and update us if needed.

## 2024-12-09 ENCOUNTER — TELEPHONE (OUTPATIENT)
Age: 89
End: 2024-12-09

## 2024-12-11 ENCOUNTER — HOSPITAL ENCOUNTER (OUTPATIENT)
Facility: HOSPITAL | Age: 89
Discharge: HOME | End: 2024-12-14
Attending: PSYCHIATRY & NEUROLOGY
Payer: MEDICARE

## 2024-12-11 ENCOUNTER — TELEPHONE (OUTPATIENT)
Dept: PRIMARY CARE CLINIC | Facility: CLINIC | Age: 89
End: 2024-12-11

## 2024-12-11 DIAGNOSIS — Z86.73 HISTORY OF TIAS: ICD-10-CM

## 2024-12-11 DIAGNOSIS — R53.1 WEAKNESS: Primary | ICD-10-CM

## 2024-12-11 DIAGNOSIS — R41.3 MEMORY DIFFICULTIES: ICD-10-CM

## 2024-12-11 PROCEDURE — 70450 CT HEAD/BRAIN W/O DYE: CPT

## 2024-12-11 NOTE — TELEPHONE ENCOUNTER
[facility] nurse called in says she saw pt today 12-11 and that she had not felt well last week she had some chest pressure that wet away with an anti acid they also are asking for a new pt eval order be sent over to them ([facility] )

## 2024-12-12 ENCOUNTER — ANCILLARY PROCEDURE (OUTPATIENT)
Age: 89
End: 2024-12-12
Payer: MEDICARE

## 2024-12-12 DIAGNOSIS — R41.3 MEMORY DIFFICULTIES: ICD-10-CM

## 2024-12-12 DIAGNOSIS — I65.23 CAROTID STENOSIS, BILATERAL: Primary | ICD-10-CM

## 2024-12-12 PROCEDURE — 93880 EXTRACRANIAL BILAT STUDY: CPT | Performed by: PSYCHIATRY & NEUROLOGY

## 2024-12-13 LAB
T4 FREE SERPL-MCNC: 0.9 NG/DL (ref 0.8–1.5)
TSH SERPL DL<=0.05 MIU/L-ACNC: 3.36 UIU/ML (ref 0.36–3.74)
VIT B12 SERPL-MCNC: 451 PG/ML (ref 193–986)

## 2025-01-02 DIAGNOSIS — K58.2 IRRITABLE BOWEL SYNDROME WITH BOTH CONSTIPATION AND DIARRHEA: ICD-10-CM

## 2025-01-02 RX ORDER — DICYCLOMINE HYDROCHLORIDE 10 MG/1
CAPSULE ORAL
Qty: 360 CAPSULE | Refills: 3 | Status: SHIPPED | OUTPATIENT
Start: 2025-01-02

## 2025-01-13 RX ORDER — PANTOPRAZOLE SODIUM 40 MG/1
40 TABLET, DELAYED RELEASE ORAL 2 TIMES DAILY
Qty: 180 TABLET | Refills: 3 | Status: SHIPPED | OUTPATIENT
Start: 2025-01-13

## 2025-01-13 NOTE — TELEPHONE ENCOUNTER
Patient's daughter called requesting a refill for pantoprazole 40mg to be sent over to [facility] [address]

## 2025-01-14 ENCOUNTER — HOSPITAL ENCOUNTER (EMERGENCY)
Facility: HOSPITAL | Age: 89
Discharge: HOME | End: 2025-01-15
Attending: STUDENT IN AN ORGANIZED HEALTH CARE EDUCATION/TRAINING PROGRAM
Payer: MEDICARE

## 2025-01-14 ENCOUNTER — PROCEDURE VISIT (OUTPATIENT)
Age: 89
End: 2025-01-14
Payer: MEDICARE

## 2025-01-14 VITALS
BODY MASS INDEX: 17.18 KG/M2 | OXYGEN SATURATION: 96 % | WEIGHT: 91 LBS | DIASTOLIC BLOOD PRESSURE: 79 MMHG | TEMPERATURE: 97.8 F | SYSTOLIC BLOOD PRESSURE: 173 MMHG | HEIGHT: 61 IN | HEART RATE: 73 BPM | RESPIRATION RATE: 18 BRPM

## 2025-01-14 DIAGNOSIS — R41.0 CONFUSION: Primary | ICD-10-CM

## 2025-01-14 DIAGNOSIS — R53.83 OTHER FATIGUE: Primary | ICD-10-CM

## 2025-01-14 LAB
ANION GAP BLD CALC-SCNC: 11.5 MMOL/L (ref 10–20)
BASOPHILS # BLD: 0.07 K/UL (ref 0–0.1)
BASOPHILS NFR BLD: 0.8 % (ref 0–1)
CA-I BLD-MCNC: 1.14 MMOL/L (ref 1.15–1.33)
CHLORIDE BLD-SCNC: 98 MMOL/L (ref 98–107)
CO2 BLD-SCNC: 23.5 MMOL/L (ref 21–32)
COMMENT:: NORMAL
CREAT BLD-MCNC: 0.96 MG/DL (ref 0.6–1.3)
DIFFERENTIAL METHOD BLD: NORMAL
EOSINOPHIL # BLD: 0.26 K/UL (ref 0–0.4)
EOSINOPHIL NFR BLD: 3 % (ref 0–7)
ERYTHROCYTE [DISTWIDTH] IN BLOOD BY AUTOMATED COUNT: 13.2 % (ref 11.5–14.5)
FLUAV RNA SPEC QL NAA+PROBE: NOT DETECTED
FLUBV RNA SPEC QL NAA+PROBE: NOT DETECTED
GLUCOSE BLD-MCNC: 113 MG/DL (ref 74–99)
HCT VFR BLD AUTO: 36 % (ref 35–47)
HGB BLD-MCNC: 12.2 G/DL (ref 11.5–16)
IMM GRANULOCYTES # BLD AUTO: 0.03 K/UL (ref 0–0.04)
IMM GRANULOCYTES NFR BLD AUTO: 0.3 % (ref 0–0.5)
LYMPHOCYTES # BLD: 3.45 K/UL (ref 0.8–3.5)
LYMPHOCYTES NFR BLD: 39.7 % (ref 12–49)
MCH RBC QN AUTO: 29.3 PG (ref 26–34)
MCHC RBC AUTO-ENTMCNC: 33.9 G/DL (ref 30–36.5)
MCV RBC AUTO: 86.5 FL (ref 80–99)
MONOCYTES # BLD: 0.87 K/UL (ref 0–1)
MONOCYTES NFR BLD: 10 % (ref 5–13)
NEUTS SEG # BLD: 4.01 K/UL (ref 1.8–8)
NEUTS SEG NFR BLD: 46.2 % (ref 32–75)
NRBC # BLD: 0 K/UL (ref 0–0.01)
NRBC BLD-RTO: 0 PER 100 WBC
PLATELET # BLD AUTO: 307 K/UL (ref 150–400)
PMV BLD AUTO: 9.8 FL (ref 8.9–12.9)
POTASSIUM BLD-SCNC: 4.3 MMOL/L (ref 3.5–5.1)
RBC # BLD AUTO: 4.16 M/UL (ref 3.8–5.2)
SARS-COV-2 RNA RESP QL NAA+PROBE: NOT DETECTED
SERVICE CMNT-IMP: ABNORMAL
SODIUM BLD-SCNC: 133 MMOL/L (ref 136–145)
SOURCE: NORMAL
SPECIMEN HOLD: NORMAL
WBC # BLD AUTO: 8.7 K/UL (ref 3.6–11)

## 2025-01-14 PROCEDURE — 85025 COMPLETE CBC W/AUTO DIFF WBC: CPT

## 2025-01-14 PROCEDURE — 95816 EEG AWAKE AND DROWSY: CPT | Performed by: PSYCHIATRY & NEUROLOGY

## 2025-01-14 PROCEDURE — 81001 URINALYSIS AUTO W/SCOPE: CPT

## 2025-01-14 PROCEDURE — 99284 EMERGENCY DEPT VISIT MOD MDM: CPT

## 2025-01-14 PROCEDURE — 93005 ELECTROCARDIOGRAM TRACING: CPT | Performed by: EMERGENCY MEDICINE

## 2025-01-14 PROCEDURE — 87636 SARSCOV2 & INF A&B AMP PRB: CPT

## 2025-01-14 PROCEDURE — 80047 BASIC METABLC PNL IONIZED CA: CPT

## 2025-01-14 PROCEDURE — 36415 COLL VENOUS BLD VENIPUNCTURE: CPT

## 2025-01-14 ASSESSMENT — PAIN - FUNCTIONAL ASSESSMENT: PAIN_FUNCTIONAL_ASSESSMENT: NONE - DENIES PAIN

## 2025-01-15 LAB
APPEARANCE UR: CLEAR
BACTERIA URNS QL MICRO: NEGATIVE /HPF
BILIRUB UR QL: NEGATIVE
COLOR UR: ABNORMAL
EKG ATRIAL RATE: 69 BPM
EKG DIAGNOSIS: NORMAL
EKG P AXIS: 53 DEGREES
EKG P-R INTERVAL: 242 MS
EKG Q-T INTERVAL: 416 MS
EKG QRS DURATION: 86 MS
EKG QTC CALCULATION (BAZETT): 445 MS
EKG R AXIS: -41 DEGREES
EKG T AXIS: 33 DEGREES
EKG VENTRICULAR RATE: 69 BPM
EPITH CASTS URNS QL MICRO: ABNORMAL /LPF
GLUCOSE UR STRIP.AUTO-MCNC: 100 MG/DL
HGB UR QL STRIP: NEGATIVE
KETONES UR QL STRIP.AUTO: NEGATIVE MG/DL
LEUKOCYTE ESTERASE UR QL STRIP.AUTO: NEGATIVE
NITRITE UR QL STRIP.AUTO: POSITIVE
PH UR STRIP: 7 (ref 5–8)
PROT UR STRIP-MCNC: ABNORMAL MG/DL
RBC #/AREA URNS HPF: ABNORMAL /HPF
SP GR UR REFRACTOMETRY: 1.01 (ref 1–1.03)
SPECIMEN HOLD: NORMAL
UROBILINOGEN UR QL STRIP.AUTO: 1 EU/DL (ref 0.2–1)
WBC URNS QL MICRO: ABNORMAL /HPF (ref 0–4)

## 2025-01-15 NOTE — ED TRIAGE NOTES
Patient presents to the ED via EMS for c/o generalized weakness and abd pain that started this morning. Has not been eating well today. Placed on 2L NC in route for oxygen saturation of 91% on RA.

## 2025-01-15 NOTE — ED PROVIDER NOTES
other components within normal limits   POC CHEM 8 - Abnormal; Notable for the following components:    POC Ionized Calcium 1.14 (*)     POC Sodium 133 (*)     POC Glucose 113 (*)     eGFR, POC 57 (*)     All other components within normal limits   COVID-19 & INFLUENZA COMBO   URINE CULTURE HOLD SAMPLE   CBC WITH AUTO DIFFERENTIAL   EXTRA TUBES HOLD       All other labs were within normal range or not returned as of this dictation.    EMERGENCY DEPARTMENT COURSE and DIFFERENTIAL DIAGNOSIS/MDM:   Vitals:    Vitals:    01/14/25 2250 01/14/25 2320 01/14/25 2322 01/14/25 2345   BP: (!) 169/78 (!) 163/74  (!) 173/79   Pulse:       Resp:       Temp:       TempSrc:       SpO2: 94% 93%  96%   Weight:   41.3 kg (91 lb)    Height:   1.549 m (5' 1\")            Medical Decision Making  Dementia, sundowning, fatigue.-year-old female presented emergency department with fatigue patient's physical exam reassuring patient in no acute distress on chart review patient currently being worked up for dementia with [facility] neurology and an appointment there earlier today.  Objectively patient's physical exam reassuring labs also reassuring no indication for imaging at this time discussed lab results with the patient patient will be discharged to home with follow-up with neurology and/or PCP.    Amount and/or Complexity of Data Reviewed  Labs: ordered.            REASSESSMENT     ED Course as of 01/15/25 0533   Tue Jan 14, 2025   2255 EKG independently reviewed by me:    EKG obtained at 2046. Noted with sinus rhythm at a rate of 69 with first-degree AV block.  Left axis deviation. Narrow complex QRS.  Minimal nonspecific ST changes noted. No ectopy or ischemia noted. [RS]      ED Course User Index  [RS] Ash Landa MD           CONSULTS:  None    PROCEDURES:  Unless otherwise noted below, none     Procedures      FINAL IMPRESSION      1. Other fatigue          DISPOSITION/PLAN   DISPOSITION Decision To Discharge 01/15/2025

## 2025-01-15 NOTE — ED NOTES
Pt given discharge instructions, patient education, prescriptions, and follow up information. Pt verbalizes understanding. All questions answered. Patient needing transport back home. Ride set up.

## 2025-01-15 NOTE — ED NOTES
[facility] here to transport patient to her home. All belongings and paperwork accompanied patient upon departure.

## 2025-01-17 ENCOUNTER — TELEPHONE (OUTPATIENT)
Dept: PRIMARY CARE CLINIC | Facility: CLINIC | Age: 89
End: 2025-01-17

## 2025-01-17 NOTE — TELEPHONE ENCOUNTER
----- Message from Kaylyn STONE sent at 1/16/2025  3:30 PM EST -----  Regarding: ECC Escalation To Practice  ECC Escalation To Practice      Type of Escalation: Red Flag Symptom  --------------------------------------------------------------------------------------------------------------------------    Information for Provider:  Patient is looking for appointment for: Symptom High Blood Pressure  Weakness  Reasons for Message: Unable to reach practice     Additional Information : Patient is experiencing high blood pressure and weakness. Needs an appointment asap.  --------------------------------------------------------------------------------------------------------------------------    Relationship to Patient: Covered Entity Beth Stout  Daughter in Law     Call Back Info: OK to leave message on voicemail  Preferred Call Back Number: Phone number : [phone]

## 2025-01-23 ENCOUNTER — OFFICE VISIT (OUTPATIENT)
Dept: PRIMARY CARE CLINIC | Facility: CLINIC | Age: 89
End: 2025-01-23

## 2025-01-23 VITALS
BODY MASS INDEX: 20.99 KG/M2 | HEART RATE: 73 BPM | TEMPERATURE: 97.4 F | SYSTOLIC BLOOD PRESSURE: 159 MMHG | HEIGHT: 61 IN | WEIGHT: 111.2 LBS | DIASTOLIC BLOOD PRESSURE: 88 MMHG

## 2025-01-23 DIAGNOSIS — I10 ESSENTIAL (PRIMARY) HYPERTENSION: Chronic | ICD-10-CM

## 2025-01-23 DIAGNOSIS — F03.B0 MODERATE DEMENTIA WITHOUT BEHAVIORAL DISTURBANCE, PSYCHOTIC DISTURBANCE, MOOD DISTURBANCE, OR ANXIETY, UNSPECIFIED DEMENTIA TYPE (HCC): ICD-10-CM

## 2025-01-23 DIAGNOSIS — M79.645 PAIN OF LEFT THUMB: ICD-10-CM

## 2025-01-23 DIAGNOSIS — F33.1 MODERATE EPISODE OF RECURRENT MAJOR DEPRESSIVE DISORDER (HCC): ICD-10-CM

## 2025-01-23 DIAGNOSIS — I10 ESSENTIAL HYPERTENSION: Primary | ICD-10-CM

## 2025-01-23 RX ORDER — ATENOLOL 50 MG/1
50 TABLET ORAL DAILY
Qty: 90 TABLET | Refills: 3 | Status: SHIPPED | OUTPATIENT
Start: 2025-01-23

## 2025-01-23 SDOH — ECONOMIC STABILITY: FOOD INSECURITY: WITHIN THE PAST 12 MONTHS, YOU WORRIED THAT YOUR FOOD WOULD RUN OUT BEFORE YOU GOT MONEY TO BUY MORE.: NEVER TRUE

## 2025-01-23 SDOH — ECONOMIC STABILITY: FOOD INSECURITY: WITHIN THE PAST 12 MONTHS, THE FOOD YOU BOUGHT JUST DIDN'T LAST AND YOU DIDN'T HAVE MONEY TO GET MORE.: NEVER TRUE

## 2025-01-23 ASSESSMENT — COLUMBIA-SUICIDE SEVERITY RATING SCALE - C-SSRS
2. HAVE YOU ACTUALLY HAD ANY THOUGHTS OF KILLING YOURSELF?: NO
6. HAVE YOU EVER DONE ANYTHING, STARTED TO DO ANYTHING, OR PREPARED TO DO ANYTHING TO END YOUR LIFE?: NO
1. WITHIN THE PAST MONTH, HAVE YOU WISHED YOU WERE DEAD OR WISHED YOU COULD GO TO SLEEP AND NOT WAKE UP?: NO

## 2025-01-23 ASSESSMENT — PATIENT HEALTH QUESTIONNAIRE - PHQ9
5. POOR APPETITE OR OVEREATING: SEVERAL DAYS
4. FEELING TIRED OR HAVING LITTLE ENERGY: SEVERAL DAYS
7. TROUBLE CONCENTRATING ON THINGS, SUCH AS READING THE NEWSPAPER OR WATCHING TELEVISION: SEVERAL DAYS
SUM OF ALL RESPONSES TO PHQ9 QUESTIONS 1 & 2: 2
SUM OF ALL RESPONSES TO PHQ QUESTIONS 1-9: 9
3. TROUBLE FALLING OR STAYING ASLEEP: SEVERAL DAYS
10. IF YOU CHECKED OFF ANY PROBLEMS, HOW DIFFICULT HAVE THESE PROBLEMS MADE IT FOR YOU TO DO YOUR WORK, TAKE CARE OF THINGS AT HOME, OR GET ALONG WITH OTHER PEOPLE: SOMEWHAT DIFFICULT
1. LITTLE INTEREST OR PLEASURE IN DOING THINGS: SEVERAL DAYS
SUM OF ALL RESPONSES TO PHQ QUESTIONS 1-9: 9
SUM OF ALL RESPONSES TO PHQ QUESTIONS 1-9: 9
2. FEELING DOWN, DEPRESSED OR HOPELESS: SEVERAL DAYS
8. MOVING OR SPEAKING SO SLOWLY THAT OTHER PEOPLE COULD HAVE NOTICED. OR THE OPPOSITE, BEING SO FIGETY OR RESTLESS THAT YOU HAVE BEEN MOVING AROUND A LOT MORE THAN USUAL: SEVERAL DAYS
SUM OF ALL RESPONSES TO PHQ QUESTIONS 1-9: 8
9. THOUGHTS THAT YOU WOULD BE BETTER OFF DEAD, OR OF HURTING YOURSELF: SEVERAL DAYS
6. FEELING BAD ABOUT YOURSELF - OR THAT YOU ARE A FAILURE OR HAVE LET YOURSELF OR YOUR FAMILY DOWN: SEVERAL DAYS

## 2025-01-23 NOTE — PROGRESS NOTES
\"Have you been to the ER, urgent care clinic since your last visit?  Hospitalized since your last visit?\"    NO    “Have you seen or consulted any other health care providers outside our system since your last visit?”    YES - When: approximately 2  weeks ago.  Where and Why: Pt First/ confusion/ weakness.

## 2025-01-23 NOTE — PROGRESS NOTES
Subjective  Chief Complaint   Patient presents with    Other     Blood Pressure/ Appetite/ stomach     HPI:  Markie Ely is a 89 y.o. female who presented to the clinic today complaining of an elevated blood pressure. The patient indicates that her blood pressure has been elevated around 200 systolic for the past month. The patient also reports that 4-5 weeks ago she injured her thumb on the toilet seat. The patient states that since then she has been experiencing thumb pain. Lastly, the patient went to the hospital on 1/14 for fatigue. Since then, the patient has been better but still has a decreased appetite. The patient denies any other consitutional symptoms at this time.         Past Medical History:   Diagnosis Date    Allergies     Anxiety     Breast cancer (HCC) 1993    s/p lumpectomy    Depression     Diabetes mellitus (HCC)     diet-controlled    Hx-TIA (transient ischemic attack)     Hypertension     Hyponatremia     Melanoma (HCC) 2008    s/p resection, right cheek    Migraines     Tremor      Current Outpatient Medications on File Prior to Visit   Medication Sig Dispense Refill    pantoprazole (PROTONIX) 40 MG tablet Take 1 tablet by mouth 2 times daily 180 tablet 3    dicyclomine (BENTYL) 10 MG capsule TAKE 1 CAPSULE BY MOUTH 4 TIMES A DAY BEFORE EACH MEAL AND AT NIGHT 360 capsule 3    clopidogrel (PLAVIX) 75 MG tablet TAKE 1 TABLET BY MOUTH DAILY 90 tablet 1    diazePAM (VALIUM) 5 MG tablet Take 1 tablet by mouth nightly as needed for Anxiety for up to 90 days. Max Daily Amount: 5 mg (Patient taking differently: Take 0.5 tablets by mouth in the morning and at bedtime.) 30 tablet 2    meclizine (ANTIVERT) 12.5 MG tablet TAKE ONE TO TWO TABLETS BY MOUTH TWICE A DAY AS NEEDED FOR DIZZINESS 90 tablet 5    ondansetron (ZOFRAN-ODT) 4 MG disintegrating tablet DISSOLVE 1 TABLET BY MOUTH DAILY AS NEEDED FOR NAUSEA 30 tablet 5    acetaminophen (TYLENOL) 325 MG tablet Take by mouth every 6 hours as needed

## 2025-02-03 NOTE — PROCEDURES
Procedures      [facility]   Electroencephalogram Report    Procedure ID: [id number] Procedure Date: 1/14/2025   Patient Name: Markie Ely YOB: 1935   Procedure Type: Routine Medical Record No: [id number]       An EEG is requested in this 89-year-old lady to evaluate for epileptiform abnormalities.  Medications such include Valium, Bentyl, Antivert, Zofran, Protonix, and Plavix.    This tracing is obtained during the awake state.    During wakefulness, there are intermittent runs of posteriorly dominant and symmetrical low to medium amplitude 8 cps activities which attenuate with eye opening.  Lower voltage faster frequency activities are seen symmetrically over the anterior head regions.    Hyperventilation is not performed.    Intermittent photic stimulation is not performed.    Sleep is not attained.    Interpretation  This EEG recorded during the awake state is normal.        Trupti Jett MD

## 2025-02-25 ENCOUNTER — TELEPHONE (OUTPATIENT)
Dept: PRIMARY CARE CLINIC | Facility: CLINIC | Age: 89
End: 2025-02-25

## 2025-02-25 DIAGNOSIS — F41.9 ANXIETY: ICD-10-CM

## 2025-02-25 RX ORDER — DIAZEPAM 5 MG/1
2.5 TABLET ORAL EVERY 12 HOURS PRN
Qty: 30 TABLET | Refills: 2 | Status: SHIPPED | OUTPATIENT
Start: 2025-02-25 | End: 2025-05-26

## 2025-02-25 NOTE — TELEPHONE ENCOUNTER
I sent in a prescription on the following med(s):    Requested Prescriptions     Signed Prescriptions Disp Refills    diazePAM (VALIUM) 5 MG tablet 30 tablet 2     Sig: Take 0.5 tablets by mouth every 12 hours as needed for Anxiety for up to 90 days. Max Daily Amount: 5 mg     Authorizing Provider: KIRILL JAMES        To the below pharmacy:    [facility] [id number] - [facility] [address] [address] [facility] [phone] - F [phone] [address] [facility] [address]  Phone: [phone] Fax: [phone]       Let me know if you need anything else.

## 2025-03-06 ENCOUNTER — HOSPITAL ENCOUNTER (EMERGENCY)
Facility: HOSPITAL | Age: 89
Discharge: HOME | End: 2025-03-06
Attending: STUDENT IN AN ORGANIZED HEALTH CARE EDUCATION/TRAINING PROGRAM
Payer: MEDICARE

## 2025-03-06 VITALS
WEIGHT: 110 LBS | HEART RATE: 66 BPM | BODY MASS INDEX: 20.77 KG/M2 | RESPIRATION RATE: 16 BRPM | OXYGEN SATURATION: 94 % | TEMPERATURE: 98 F | SYSTOLIC BLOOD PRESSURE: 166 MMHG | HEIGHT: 61 IN | DIASTOLIC BLOOD PRESSURE: 84 MMHG

## 2025-03-06 DIAGNOSIS — R11.0 NAUSEA: Primary | ICD-10-CM

## 2025-03-06 LAB
ALBUMIN SERPL-MCNC: 4.1 G/DL (ref 3.5–5.2)
ALBUMIN/GLOB SERPL: 1.1 (ref 1.1–2.2)
ALP SERPL-CCNC: 91 U/L (ref 35–104)
ALT SERPL-CCNC: 6 U/L (ref 10–35)
ANION GAP SERPL CALC-SCNC: 13 MMOL/L (ref 2–12)
AST SERPL-CCNC: 25 U/L (ref 10–35)
BASOPHILS # BLD: 0.08 K/UL (ref 0–0.1)
BASOPHILS NFR BLD: 0.9 % (ref 0–1)
BILIRUB SERPL-MCNC: 0.6 MG/DL (ref 0.2–1)
BUN SERPL-MCNC: 23 MG/DL (ref 8–23)
BUN/CREAT SERPL: 27 (ref 12–20)
CALCIUM SERPL-MCNC: 9.9 MG/DL (ref 8.8–10.2)
CHLORIDE SERPL-SCNC: 103 MMOL/L (ref 98–107)
CO2 SERPL-SCNC: 26 MMOL/L (ref 22–29)
CREAT SERPL-MCNC: 0.84 MG/DL (ref 0.5–0.9)
DIFFERENTIAL METHOD BLD: NORMAL
EOSINOPHIL # BLD: 0.14 K/UL (ref 0–0.4)
EOSINOPHIL NFR BLD: 1.5 % (ref 0–7)
ERYTHROCYTE [DISTWIDTH] IN BLOOD BY AUTOMATED COUNT: 13.4 % (ref 11.5–14.5)
GLOBULIN SER CALC-MCNC: 3.8 G/DL (ref 2–4)
GLUCOSE SERPL-MCNC: 139 MG/DL (ref 65–100)
HCT VFR BLD AUTO: 42.7 % (ref 35–47)
HGB BLD-MCNC: 13.9 G/DL (ref 11.5–16)
IMM GRANULOCYTES # BLD AUTO: 0.02 K/UL (ref 0–0.04)
IMM GRANULOCYTES NFR BLD AUTO: 0.2 % (ref 0–0.5)
LIPASE SERPL-CCNC: 24 U/L (ref 13–60)
LYMPHOCYTES # BLD: 2.1 K/UL (ref 0.8–3.5)
LYMPHOCYTES NFR BLD: 22.7 % (ref 12–49)
MCH RBC QN AUTO: 29.1 PG (ref 26–34)
MCHC RBC AUTO-ENTMCNC: 32.6 G/DL (ref 30–36.5)
MCV RBC AUTO: 89.3 FL (ref 80–99)
MONOCYTES # BLD: 0.55 K/UL (ref 0–1)
MONOCYTES NFR BLD: 6 % (ref 5–13)
NEUTS SEG # BLD: 6.35 K/UL (ref 1.8–8)
NEUTS SEG NFR BLD: 68.7 % (ref 32–75)
NRBC # BLD: 0 K/UL (ref 0–0.01)
NRBC BLD-RTO: 0 PER 100 WBC
PLATELET # BLD AUTO: 288 K/UL (ref 150–400)
PMV BLD AUTO: 10.2 FL (ref 8.9–12.9)
POTASSIUM SERPL-SCNC: 4.1 MMOL/L (ref 3.5–5.1)
PROT SERPL-MCNC: 7.9 G/DL (ref 6.4–8.3)
RBC # BLD AUTO: 4.78 M/UL (ref 3.8–5.2)
SODIUM SERPL-SCNC: 142 MMOL/L (ref 136–145)
WBC # BLD AUTO: 9.2 K/UL (ref 3.6–11)

## 2025-03-06 PROCEDURE — 85025 COMPLETE CBC W/AUTO DIFF WBC: CPT

## 2025-03-06 PROCEDURE — 83690 ASSAY OF LIPASE: CPT

## 2025-03-06 PROCEDURE — 80053 COMPREHEN METABOLIC PANEL: CPT

## 2025-03-06 PROCEDURE — 99284 EMERGENCY DEPT VISIT MOD MDM: CPT

## 2025-03-06 PROCEDURE — 36415 COLL VENOUS BLD VENIPUNCTURE: CPT

## 2025-03-06 PROCEDURE — 96374 THER/PROPH/DIAG INJ IV PUSH: CPT

## 2025-03-06 PROCEDURE — 2580000003 HC RX 258: Performed by: STUDENT IN AN ORGANIZED HEALTH CARE EDUCATION/TRAINING PROGRAM

## 2025-03-06 PROCEDURE — 6360000002 HC RX W HCPCS: Performed by: STUDENT IN AN ORGANIZED HEALTH CARE EDUCATION/TRAINING PROGRAM

## 2025-03-06 RX ORDER — ONDANSETRON 4 MG/1
4 TABLET, ORALLY DISINTEGRATING ORAL 3 TIMES DAILY PRN
Qty: 12 TABLET | Refills: 0 | Status: SHIPPED | OUTPATIENT
Start: 2025-03-06 | End: 2025-03-10

## 2025-03-06 RX ADMIN — PANTOPRAZOLE SODIUM 40 MG: 40 INJECTION, POWDER, FOR SOLUTION INTRAVENOUS at 13:21

## 2025-03-06 ASSESSMENT — PAIN - FUNCTIONAL ASSESSMENT: PAIN_FUNCTIONAL_ASSESSMENT: 0-10

## 2025-03-06 ASSESSMENT — PAIN SCALES - GENERAL: PAINLEVEL_OUTOF10: 0

## 2025-03-06 NOTE — ED TRIAGE NOTES
Pt arrives in the ED via EMS with complaints of vomiting x 1 week.  Pt reports taking Zofran and Dramamine with minimal relief.  Pt reports vomiting after eating.  Last took Zofran this morning.  Denies diarrhea.

## 2025-03-06 NOTE — ED NOTES
Patient tolerating PO snacks and water without vomiting. Was assisted to bedside commode to urinate per request. Awaiting provider re-eval.

## 2025-03-09 NOTE — ED PROVIDER NOTES
HISTORY OF PRESENT ILLNESS:    An 89-year-old female with a past medical history significant for hypertension, anxiety, breast cancer, melanoma, diet-controlled diabetes, and hyponatremia presented to the emergency department via EMS. The history was provided by both the patient and EMS. She reports experiencing nausea and vomiting for the past week, with vomiting occurring every time she eats. She denies any abdominal pain, chest pain, shortness of breath, recent illnesses, fevers, or chills. She has tried medications such as Zofran and Dramamine with little to no relief, although she took Zofran earlier this morning. She is able to tolerate Ensure.    PAST MEDICAL HISTORY:    - Hypertension    - Anxiety    - Breast cancer    - Melanoma    - Diet-controlled diabetes    - Hyponatremia      PHYSICAL EXAM    Vitals: Interpreted as normal for this patient.    General: NAD. Well-kept female adult.    Eyes: Appear normal with no scleral icterus.    HENT: Atraumatic. Moist mucous membranes, no pharyngeal erythema, edema or lesions.    Neck: Atraumatic, supple.    Cardiac: Regular rate, regular rhythm, no significant murmurs appreciated.    Respiratory: No respiratory distress, clear lungs bilaterally with no abnormal breath sounds.    Abdomen: Soft, non-tender, non-distended throughout all four quadrants. No rebound. No guarding. No tenderness over McBurney's point, no tenderness over the liver or spleen, no Martinez's sign, no pulsatile abdominal mass.    : No CVAT.    MS: Extremities atraumatic. No edema, no calf tenderness to palpation.    Skin: No exanthems, no cyanosis, no diaphoresis.    Back exam: Atraumatic.    Neurologic: No altered mental status, speech is fluent. Gait is within normal limits. No cerebellar deficits. No motor deficits. No sensory deficits.    Psychological: Cooperative and participatory with examination.    SUMMARY:  The patient is an 89-year-old female presenting with nausea and vomiting for 1

## 2025-03-11 ENCOUNTER — HOSPITAL ENCOUNTER (EMERGENCY)
Facility: HOSPITAL | Age: 89
Discharge: HOME | End: 2025-03-11
Attending: EMERGENCY MEDICINE
Payer: MEDICARE

## 2025-03-11 ENCOUNTER — OFFICE VISIT (OUTPATIENT)
Age: 89
End: 2025-03-11
Payer: MEDICARE

## 2025-03-11 VITALS
DIASTOLIC BLOOD PRESSURE: 85 MMHG | OXYGEN SATURATION: 97 % | SYSTOLIC BLOOD PRESSURE: 150 MMHG | HEART RATE: 69 BPM | RESPIRATION RATE: 16 BRPM

## 2025-03-11 VITALS
OXYGEN SATURATION: 96 % | BODY MASS INDEX: 22.28 KG/M2 | SYSTOLIC BLOOD PRESSURE: 185 MMHG | WEIGHT: 118 LBS | DIASTOLIC BLOOD PRESSURE: 30 MMHG | RESPIRATION RATE: 15 BRPM | TEMPERATURE: 98.5 F | HEIGHT: 61 IN | HEART RATE: 72 BPM

## 2025-03-11 DIAGNOSIS — R10.84 GENERALIZED ABDOMINAL PAIN: Primary | ICD-10-CM

## 2025-03-11 DIAGNOSIS — F03.A0 MILD DEMENTIA WITHOUT BEHAVIORAL DISTURBANCE, PSYCHOTIC DISTURBANCE, MOOD DISTURBANCE, OR ANXIETY, UNSPECIFIED DEMENTIA TYPE (HCC): Primary | ICD-10-CM

## 2025-03-11 PROCEDURE — 99283 EMERGENCY DEPT VISIT LOW MDM: CPT

## 2025-03-11 PROCEDURE — 1123F ACP DISCUSS/DSCN MKR DOCD: CPT | Performed by: PSYCHIATRY & NEUROLOGY

## 2025-03-11 PROCEDURE — G8420 CALC BMI NORM PARAMETERS: HCPCS | Performed by: PSYCHIATRY & NEUROLOGY

## 2025-03-11 PROCEDURE — 99214 OFFICE O/P EST MOD 30 MIN: CPT | Performed by: PSYCHIATRY & NEUROLOGY

## 2025-03-11 PROCEDURE — 1090F PRES/ABSN URINE INCON ASSESS: CPT | Performed by: PSYCHIATRY & NEUROLOGY

## 2025-03-11 PROCEDURE — G8428 CUR MEDS NOT DOCUMENT: HCPCS | Performed by: PSYCHIATRY & NEUROLOGY

## 2025-03-11 PROCEDURE — 1036F TOBACCO NON-USER: CPT | Performed by: PSYCHIATRY & NEUROLOGY

## 2025-03-11 PROCEDURE — 1125F AMNT PAIN NOTED PAIN PRSNT: CPT | Performed by: PSYCHIATRY & NEUROLOGY

## 2025-03-11 ASSESSMENT — PAIN DESCRIPTION - LOCATION: LOCATION: ABDOMEN

## 2025-03-11 ASSESSMENT — PAIN SCALES - GENERAL: PAINLEVEL_OUTOF10: 3

## 2025-03-11 ASSESSMENT — PAIN - FUNCTIONAL ASSESSMENT: PAIN_FUNCTIONAL_ASSESSMENT: 0-10

## 2025-03-11 NOTE — PROGRESS NOTES
[facility] at [facility] at [address] [place] [address] [address] [facility] [address] [place] [address] [phone]              Chief Complaint   Patient presents with    Memory Loss     3 mo f/up after Namenda start - stopped the Namenda after 1/14 ER visit due to S.E.  // EEG, dop results      Current Outpatient Medications   Medication Sig Dispense Refill    diazePAM (VALIUM) 5 MG tablet Take 0.5 tablets by mouth every 12 hours as needed for Anxiety for up to 90 days. Max Daily Amount: 5 mg 30 tablet 2    atenolol (TENORMIN) 50 MG tablet Take 1 tablet by mouth daily 90 tablet 3    pantoprazole (PROTONIX) 40 MG tablet Take 1 tablet by mouth 2 times daily 180 tablet 3    dicyclomine (BENTYL) 10 MG capsule TAKE 1 CAPSULE BY MOUTH 4 TIMES A DAY BEFORE EACH MEAL AND AT NIGHT 360 capsule 3    clopidogrel (PLAVIX) 75 MG tablet TAKE 1 TABLET BY MOUTH DAILY 90 tablet 1    meclizine (ANTIVERT) 12.5 MG tablet TAKE ONE TO TWO TABLETS BY MOUTH TWICE A DAY AS NEEDED FOR DIZZINESS 90 tablet 5    ondansetron (ZOFRAN-ODT) 4 MG disintegrating tablet DISSOLVE 1 TABLET BY MOUTH DAILY AS NEEDED FOR NAUSEA 30 tablet 5    acetaminophen (TYLENOL) 325 MG tablet Take by mouth every 6 hours as needed      diclofenac sodium (VOLTAREN) 1 % GEL Apply topically 4 times daily as needed      fluticasone (FLONASE) 50 MCG/ACT nasal spray SPRAY ONE SPRAY IN EACH NOSTRIL ONCE DAILY      polyethylene glycol (GLYCOLAX) 17 GM/SCOOP powder Take by mouth daily      psyllium (METAMUCIL SMOOTH TEXTURE) 58.6 % powder Take 1 packet by mouth daily       No current facility-administered medications for this visit.      Allergies   Allergen Reactions    Sulfa Antibiotics Nausea And Vomiting    Codeine Other (See Comments)     Stomach cramping    Linaclotide Diarrhea    Tilactase      Other reaction(s): Unknown (comments)    Tomato

## 2025-03-12 NOTE — ED PROVIDER NOTES
[facility] EMERGENCY DEPARTMENT  EMERGENCY DEPARTMENT ENCOUNTER      Pt Name: Markie Ely  MRN: [id number]  Birthdate 1935  Date of evaluation: 3/11/2025  Provider: Herb Chanel MD      HISTORY OF PRESENT ILLNESS      89-year-old female history of depression, diabetes, hypertension, hyponatremia, recent diagnosis of Alzheimer's, chronic abdominal pain presents to the emergency department by EMS with concern for lightheadedness.  She reports she initially called EMS because she had some abdominal pain today.  After eating some soup she felt better but when she stood she was lightheaded and called EMS back.  MS reports hypertension, otherwise no concerning findings.  The patient reports abdominal pain to me as her main complaint.  She was seen recently in the emergency department for similar complaint.    The history is provided by the patient, the EMS personnel and medical records.           Nursing Notes were reviewed.    REVIEW OF SYSTEMS         Review of Systems        PAST MEDICAL HISTORY     Past Medical History:   Diagnosis Date    Allergies     Anxiety     Breast cancer (HCC) 1993    s/p lumpectomy    Depression     Diabetes mellitus (HCC)     diet-controlled    Hx-TIA (transient ischemic attack)     Hypertension     Hyponatremia     Melanoma (HCC) 2008    s/p resection, right cheek    Migraines     Tremor          SURGICAL HISTORY       Past Surgical History:   Procedure Laterality Date    APPENDECTOMY      BACK SURGERY      BREAST LUMPECTOMY  1993    right    CHOLECYSTECTOMY      COLECTOMY      HIATAL HERNIA REPAIR      HIP FRACTURE SURGERY Right     HYSTERECTOMY (CERVIX STATUS UNKNOWN)      MALIGNANT SKIN LESION EXCISION  2008    MENISCECTOMY      OTHER SURGICAL HISTORY      barriga's neuroma bilaterally    TONSILLECTOMY           CURRENT MEDICATIONS       Previous Medications    ACETAMINOPHEN (TYLENOL) 325 MG TABLET    Take by mouth every 6 hours as needed    ATENOLOL (TENORMIN) 50 MG TABLET

## 2025-03-12 NOTE — ED TRIAGE NOTES
Pt here from home via EMS for abdominal pain, N/V. Called EMS 1st time tonight, refused transport, then called back because she was feeling dizzy when she stood. PMHx TIA, takes plavix. States, I've had stomach pain my whole life. 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

## 2025-03-12 NOTE — ED NOTES
Pt insisting on leaving without labs, treatment. States daughter in law on the way. Dr. Chanel spoke w pt, then pt was wheeled to wr to wait for ride. NAD, VSS upon dc.

## 2025-04-19 DIAGNOSIS — K58.2 MIXED IRRITABLE BOWEL SYNDROME: ICD-10-CM

## 2025-04-21 RX ORDER — ONDANSETRON 4 MG/1
TABLET, ORALLY DISINTEGRATING ORAL
Qty: 30 TABLET | Refills: 5 | Status: SHIPPED | OUTPATIENT
Start: 2025-04-21

## 2025-06-08 DIAGNOSIS — F41.9 ANXIETY: ICD-10-CM

## 2025-06-09 ENCOUNTER — TELEPHONE (OUTPATIENT)
Dept: PRIMARY CARE CLINIC | Facility: CLINIC | Age: 89
End: 2025-06-09

## 2025-06-09 RX ORDER — DIAZEPAM 5 MG/1
TABLET ORAL
Qty: 30 TABLET | Refills: 1 | Status: SHIPPED | OUTPATIENT
Start: 2025-06-18 | End: 2025-08-17

## 2025-06-10 ENCOUNTER — OFFICE VISIT (OUTPATIENT)
Dept: PRIMARY CARE CLINIC | Facility: CLINIC | Age: 89
End: 2025-06-10
Payer: MEDICARE

## 2025-06-10 VITALS
BODY MASS INDEX: 22.3 KG/M2 | OXYGEN SATURATION: 96 % | TEMPERATURE: 97.8 F | WEIGHT: 118 LBS | DIASTOLIC BLOOD PRESSURE: 86 MMHG | HEART RATE: 73 BPM | SYSTOLIC BLOOD PRESSURE: 159 MMHG

## 2025-06-10 DIAGNOSIS — R30.0 DYSURIA: Primary | ICD-10-CM

## 2025-06-10 LAB
BILIRUBIN, URINE, POC: NEGATIVE
BLOOD URINE, POC: NORMAL
GLUCOSE URINE, POC: NEGATIVE
KETONES, URINE, POC: NEGATIVE
LEUKOCYTE ESTERASE, URINE, POC: NORMAL
NITRITE, URINE, POC: POSITIVE
PH, URINE, POC: 6.5 (ref 4.6–8)
PROTEIN,URINE, POC: 100
SPECIFIC GRAVITY, URINE, POC: 1.02 (ref 1–1.03)
URINALYSIS CLARITY, POC: NORMAL
URINALYSIS COLOR, POC: YELLOW
UROBILINOGEN, POC: NORMAL

## 2025-06-10 PROCEDURE — G8427 DOCREV CUR MEDS BY ELIG CLIN: HCPCS | Performed by: STUDENT IN AN ORGANIZED HEALTH CARE EDUCATION/TRAINING PROGRAM

## 2025-06-10 PROCEDURE — 1123F ACP DISCUSS/DSCN MKR DOCD: CPT | Performed by: STUDENT IN AN ORGANIZED HEALTH CARE EDUCATION/TRAINING PROGRAM

## 2025-06-10 PROCEDURE — 99214 OFFICE O/P EST MOD 30 MIN: CPT | Performed by: STUDENT IN AN ORGANIZED HEALTH CARE EDUCATION/TRAINING PROGRAM

## 2025-06-10 PROCEDURE — 81003 URINALYSIS AUTO W/O SCOPE: CPT | Performed by: STUDENT IN AN ORGANIZED HEALTH CARE EDUCATION/TRAINING PROGRAM

## 2025-06-10 PROCEDURE — G8420 CALC BMI NORM PARAMETERS: HCPCS | Performed by: STUDENT IN AN ORGANIZED HEALTH CARE EDUCATION/TRAINING PROGRAM

## 2025-06-10 PROCEDURE — 1036F TOBACCO NON-USER: CPT | Performed by: STUDENT IN AN ORGANIZED HEALTH CARE EDUCATION/TRAINING PROGRAM

## 2025-06-10 PROCEDURE — 1090F PRES/ABSN URINE INCON ASSESS: CPT | Performed by: STUDENT IN AN ORGANIZED HEALTH CARE EDUCATION/TRAINING PROGRAM

## 2025-06-10 PROCEDURE — 1159F MED LIST DOCD IN RCRD: CPT | Performed by: STUDENT IN AN ORGANIZED HEALTH CARE EDUCATION/TRAINING PROGRAM

## 2025-06-10 RX ORDER — CIPROFLOXACIN 250 MG/1
250 TABLET, FILM COATED ORAL 2 TIMES DAILY
Qty: 14 TABLET | Refills: 0 | Status: SHIPPED | OUTPATIENT
Start: 2025-06-10 | End: 2025-06-17

## 2025-06-10 NOTE — PROGRESS NOTES
Have you been to the ER, urgent care clinic since your last visit?  Hospitalized since your last visit?   NO    Have you seen or consulted any other health care providers outside our system since your last visit?   NO        
technology. The patient (or guardian, if applicable) and other individuals in attendance at the appointment consented to the use of AI, including the recording.              An electronic signature was used to authenticate this note.    --Mehran Jarquin MD   
Minoxidil Counseling: Minoxidil is a topical medication which can increase blood flow where it is applied. It is uncertain how this medication increases hair growth. Side effects are uncommon and include stinging and allergic reactions.

## 2025-06-13 ENCOUNTER — RESULTS FOLLOW-UP (OUTPATIENT)
Dept: PRIMARY CARE CLINIC | Facility: CLINIC | Age: 89
End: 2025-06-13

## 2025-06-14 LAB — BACTERIA UR CULT: ABNORMAL

## 2025-07-21 DIAGNOSIS — Z86.73 HISTORY OF TIAS: ICD-10-CM

## 2025-07-23 RX ORDER — CLOPIDOGREL BISULFATE 75 MG/1
75 TABLET ORAL DAILY
Qty: 90 TABLET | Refills: 1 | Status: SHIPPED | OUTPATIENT
Start: 2025-07-23

## 2025-08-06 DIAGNOSIS — F41.9 ANXIETY: ICD-10-CM

## 2025-08-07 RX ORDER — DIAZEPAM 5 MG/1
TABLET ORAL
Qty: 30 TABLET | Refills: 1 | Status: SHIPPED | OUTPATIENT
Start: 2025-08-15 | End: 2025-10-14

## 2025-08-08 ENCOUNTER — APPOINTMENT (OUTPATIENT)
Facility: HOSPITAL | Age: 89
End: 2025-08-08
Payer: MEDICARE

## 2025-08-08 ENCOUNTER — HOSPITAL ENCOUNTER (EMERGENCY)
Facility: HOSPITAL | Age: 89
Discharge: HOME | End: 2025-08-09
Attending: EMERGENCY MEDICINE
Payer: MEDICARE

## 2025-08-08 DIAGNOSIS — R42 DIZZINESS: ICD-10-CM

## 2025-08-08 DIAGNOSIS — N30.00 ACUTE CYSTITIS WITHOUT HEMATURIA: Primary | ICD-10-CM

## 2025-08-08 LAB
ALBUMIN SERPL-MCNC: 3.6 G/DL (ref 3.5–5.2)
ALBUMIN/GLOB SERPL: 1 (ref 1.1–2.2)
ALP SERPL-CCNC: 70 U/L (ref 35–104)
ALT SERPL-CCNC: 12 U/L (ref 10–35)
ANION GAP SERPL CALC-SCNC: 10 MMOL/L (ref 2–14)
APPEARANCE UR: CLEAR
AST SERPL-CCNC: 22 U/L (ref 10–35)
BACTERIA URNS QL MICRO: NEGATIVE /HPF
BASOPHILS # BLD: 0.09 K/UL (ref 0–0.1)
BASOPHILS NFR BLD: 1 % (ref 0–1)
BILIRUB SERPL-MCNC: 0.4 MG/DL (ref 0–1.2)
BILIRUB UR QL: NEGATIVE
BUN SERPL-MCNC: 14 MG/DL (ref 8–23)
BUN/CREAT SERPL: 15 (ref 12–20)
CALCIUM SERPL-MCNC: 9.1 MG/DL (ref 8.8–10.2)
CHLORIDE SERPL-SCNC: 97 MMOL/L (ref 98–107)
CO2 SERPL-SCNC: 26 MMOL/L (ref 20–29)
COLOR UR: ABNORMAL
CREAT SERPL-MCNC: 0.94 MG/DL (ref 0.6–1)
DIFFERENTIAL METHOD BLD: ABNORMAL
EOSINOPHIL # BLD: 0.35 K/UL (ref 0–0.4)
EOSINOPHIL NFR BLD: 3.7 % (ref 0–7)
EPITH CASTS URNS QL MICRO: ABNORMAL /LPF
ERYTHROCYTE [DISTWIDTH] IN BLOOD BY AUTOMATED COUNT: 13.1 % (ref 11.5–14.5)
GLOBULIN SER CALC-MCNC: 3.5 G/DL (ref 2–4)
GLUCOSE SERPL-MCNC: 101 MG/DL (ref 65–100)
GLUCOSE UR STRIP.AUTO-MCNC: NEGATIVE MG/DL
HCT VFR BLD AUTO: 36.1 % (ref 35–47)
HGB BLD-MCNC: 11.7 G/DL (ref 11.5–16)
HGB UR QL STRIP: NEGATIVE
IMM GRANULOCYTES # BLD AUTO: 0.03 K/UL (ref 0–0.04)
IMM GRANULOCYTES NFR BLD AUTO: 0.3 % (ref 0–0.5)
KETONES UR QL STRIP.AUTO: NEGATIVE MG/DL
LEUKOCYTE ESTERASE UR QL STRIP.AUTO: ABNORMAL
LYMPHOCYTES # BLD: 3.73 K/UL (ref 0.8–3.5)
LYMPHOCYTES NFR BLD: 39.9 % (ref 12–49)
MCH RBC QN AUTO: 29.1 PG (ref 26–34)
MCHC RBC AUTO-ENTMCNC: 32.4 G/DL (ref 30–36.5)
MCV RBC AUTO: 89.8 FL (ref 80–99)
MONOCYTES # BLD: 0.67 K/UL (ref 0–1)
MONOCYTES NFR BLD: 7.2 % (ref 5–13)
NEUTS SEG # BLD: 4.49 K/UL (ref 1.8–8)
NEUTS SEG NFR BLD: 47.9 % (ref 32–75)
NITRITE UR QL STRIP.AUTO: POSITIVE
NRBC # BLD: 0 K/UL (ref 0–0.01)
NRBC BLD-RTO: 0 PER 100 WBC
PH UR STRIP: 7.5 (ref 5–8)
PLATELET # BLD AUTO: 320 K/UL (ref 150–400)
PMV BLD AUTO: 10.1 FL (ref 8.9–12.9)
POTASSIUM SERPL-SCNC: 4 MMOL/L (ref 3.5–5.1)
PROT SERPL-MCNC: 7.2 G/DL (ref 6.4–8.3)
PROT UR STRIP-MCNC: NEGATIVE MG/DL
RBC # BLD AUTO: 4.02 M/UL (ref 3.8–5.2)
RBC #/AREA URNS HPF: ABNORMAL /HPF (ref 0–5)
SODIUM SERPL-SCNC: 133 MMOL/L (ref 136–145)
SP GR UR REFRACTOMETRY: 1.01 (ref 1–1.03)
URINE CULTURE IF INDICATED: ABNORMAL
UROBILINOGEN UR QL STRIP.AUTO: 1 EU/DL (ref 0.2–1)
WBC # BLD AUTO: 9.4 K/UL (ref 3.6–11)
WBC URNS QL MICRO: ABNORMAL /HPF (ref 0–4)

## 2025-08-08 PROCEDURE — 71045 X-RAY EXAM CHEST 1 VIEW: CPT

## 2025-08-08 PROCEDURE — 85025 COMPLETE CBC W/AUTO DIFF WBC: CPT

## 2025-08-08 PROCEDURE — 80053 COMPREHEN METABOLIC PANEL: CPT

## 2025-08-08 PROCEDURE — 93005 ELECTROCARDIOGRAM TRACING: CPT | Performed by: EMERGENCY MEDICINE

## 2025-08-08 PROCEDURE — 81001 URINALYSIS AUTO W/SCOPE: CPT

## 2025-08-08 PROCEDURE — 2500000003 HC RX 250 WO HCPCS: Performed by: EMERGENCY MEDICINE

## 2025-08-08 PROCEDURE — 96374 THER/PROPH/DIAG INJ IV PUSH: CPT

## 2025-08-08 PROCEDURE — 6360000002 HC RX W HCPCS: Performed by: EMERGENCY MEDICINE

## 2025-08-08 PROCEDURE — 36415 COLL VENOUS BLD VENIPUNCTURE: CPT

## 2025-08-08 PROCEDURE — 99285 EMERGENCY DEPT VISIT HI MDM: CPT

## 2025-08-08 RX ORDER — CEPHALEXIN 500 MG/1
500 CAPSULE ORAL 2 TIMES DAILY
Qty: 14 CAPSULE | Refills: 0 | Status: SHIPPED | OUTPATIENT
Start: 2025-08-08 | End: 2025-08-15

## 2025-08-08 RX ADMIN — WATER 1000 MG: 1 INJECTION INTRAMUSCULAR; INTRAVENOUS; SUBCUTANEOUS at 23:59

## 2025-08-08 ASSESSMENT — PAIN - FUNCTIONAL ASSESSMENT: PAIN_FUNCTIONAL_ASSESSMENT: 0-10

## 2025-08-08 ASSESSMENT — ENCOUNTER SYMPTOMS
COLOR CHANGE: 0
NAUSEA: 0
BACK PAIN: 0
ABDOMINAL PAIN: 0
SHORTNESS OF BREATH: 0
VOMITING: 0
CONSTIPATION: 0
DIARRHEA: 0

## 2025-08-08 ASSESSMENT — PAIN SCALES - GENERAL: PAINLEVEL_OUTOF10: 0

## 2025-08-09 ENCOUNTER — APPOINTMENT (OUTPATIENT)
Facility: HOSPITAL | Age: 89
End: 2025-08-09
Payer: MEDICARE

## 2025-08-09 VITALS
WEIGHT: 118 LBS | DIASTOLIC BLOOD PRESSURE: 67 MMHG | BODY MASS INDEX: 22.28 KG/M2 | TEMPERATURE: 97.6 F | HEART RATE: 67 BPM | SYSTOLIC BLOOD PRESSURE: 164 MMHG | RESPIRATION RATE: 18 BRPM | HEIGHT: 61 IN | OXYGEN SATURATION: 91 %

## 2025-08-09 PROCEDURE — 6360000002 HC RX W HCPCS: Performed by: EMERGENCY MEDICINE

## 2025-08-09 PROCEDURE — 2580000003 HC RX 258: Performed by: EMERGENCY MEDICINE

## 2025-08-09 PROCEDURE — 96375 TX/PRO/DX INJ NEW DRUG ADDON: CPT

## 2025-08-09 PROCEDURE — 6370000000 HC RX 637 (ALT 250 FOR IP): Performed by: EMERGENCY MEDICINE

## 2025-08-09 PROCEDURE — 70450 CT HEAD/BRAIN W/O DYE: CPT

## 2025-08-09 RX ORDER — DIAZEPAM 10 MG/2ML
2.5 INJECTION, SOLUTION INTRAMUSCULAR; INTRAVENOUS ONCE
Status: COMPLETED | OUTPATIENT
Start: 2025-08-09 | End: 2025-08-09

## 2025-08-09 RX ORDER — SODIUM CHLORIDE, SODIUM LACTATE, POTASSIUM CHLORIDE, AND CALCIUM CHLORIDE .6; .31; .03; .02 G/100ML; G/100ML; G/100ML; G/100ML
1000 INJECTION, SOLUTION INTRAVENOUS ONCE
Status: COMPLETED | OUTPATIENT
Start: 2025-08-09 | End: 2025-08-09

## 2025-08-09 RX ORDER — MECLIZINE HYDROCHLORIDE 25 MG/1
25 TABLET ORAL
Status: COMPLETED | OUTPATIENT
Start: 2025-08-09 | End: 2025-08-09

## 2025-08-09 RX ADMIN — SODIUM CHLORIDE, SODIUM LACTATE, POTASSIUM CHLORIDE, AND CALCIUM CHLORIDE 1000 ML: .6; .31; .03; .02 INJECTION, SOLUTION INTRAVENOUS at 01:16

## 2025-08-09 RX ADMIN — DIAZEPAM 2.5 MG: 5 INJECTION, SOLUTION INTRAMUSCULAR; INTRAVENOUS at 02:31

## 2025-08-09 RX ADMIN — MECLIZINE HYDROCHLORIDE 25 MG: 25 TABLET ORAL at 02:31

## 2025-08-10 LAB
EKG ATRIAL RATE: 62 BPM
EKG DIAGNOSIS: NORMAL
EKG P AXIS: 76 DEGREES
EKG P-R INTERVAL: 232 MS
EKG Q-T INTERVAL: 424 MS
EKG QRS DURATION: 84 MS
EKG QTC CALCULATION (BAZETT): 430 MS
EKG R AXIS: -30 DEGREES
EKG T AXIS: 8 DEGREES
EKG VENTRICULAR RATE: 62 BPM

## 2025-08-10 PROCEDURE — 93010 ELECTROCARDIOGRAM REPORT: CPT | Performed by: INTERNAL MEDICINE

## 2025-08-20 ENCOUNTER — TELEPHONE (OUTPATIENT)
Dept: PRIMARY CARE CLINIC | Facility: CLINIC | Age: 89
End: 2025-08-20

## (undated) DEVICE — CATH IV AUTOGRD BC BLU 22GA 25 -- INSYTE

## (undated) DEVICE — ELECTRODE,RADIOTRANSLUCENT,FOAM,3PK: Brand: MEDLINE

## (undated) DEVICE — BAG BELONG PT PERS CLEAR HANDL

## (undated) DEVICE — SET ADMIN 16ML TBNG L100IN 2 Y INJ SITE IV PIGGY BK DISP

## (undated) DEVICE — SYR 3ML LL TIP 1/10ML GRAD --

## (undated) DEVICE — Device

## (undated) DEVICE — KIT COLON W/ 1.1OZ LUB AND 2 END

## (undated) DEVICE — BLUNTFILL: Brand: MONOJECT

## (undated) DEVICE — CONTAINER SPEC 20 ML LID NEUT BUFF FORMALIN 10 % POLYPR STS

## (undated) DEVICE — SYR 5ML 1/5 GRAD LL NSAF LF --

## (undated) DEVICE — BLUNTFILL WITH FILTER: Brand: MONOJECT

## (undated) DEVICE — FORCEPS BX L240CM JAW DIA2.8MM L CAP W/ NDL MIC MESH TOOTH

## (undated) DEVICE — CANNULA CUSH AD W/ 14FT TBG

## (undated) DEVICE — BASIN EMSIS 16OZ GRAPHITE PLAS KID SHP MOLD GRAD FOR ORAL

## (undated) DEVICE — 1200 GUARD II KIT W/5MM TUBE W/O VAC TUBE: Brand: GUARDIAN

## (undated) DEVICE — ESOPHAGEAL/PYLORIC/COLONIC/BILIARY WIREGUIDED BALLOON DILATATION CATHETER: Brand: CRE™ PRO

## (undated) DEVICE — SOLIDIFIER FLD 2OZ 1500CC N DISINF IN BTL DISP SAFESORB

## (undated) DEVICE — BITEBLOCK ENDOSCP 60FR MAXI WHT POLYETH STURDY W/ VELC WVN

## (undated) DEVICE — BAG SPEC BIOHZRD 10 X 10 IN --